# Patient Record
Sex: MALE | Race: ASIAN | Employment: PART TIME | URBAN - METROPOLITAN AREA
[De-identification: names, ages, dates, MRNs, and addresses within clinical notes are randomized per-mention and may not be internally consistent; named-entity substitution may affect disease eponyms.]

---

## 2017-01-19 ENCOUNTER — OFFICE VISIT (OUTPATIENT)
Dept: FAMILY MEDICINE CLINIC | Age: 72
End: 2017-01-19

## 2017-01-19 VITALS — HEIGHT: 63 IN

## 2017-01-20 ENCOUNTER — OFFICE VISIT (OUTPATIENT)
Dept: FAMILY MEDICINE CLINIC | Age: 72
End: 2017-01-20

## 2017-01-20 VITALS
HEART RATE: 73 BPM | HEIGHT: 63 IN | DIASTOLIC BLOOD PRESSURE: 72 MMHG | SYSTOLIC BLOOD PRESSURE: 116 MMHG | TEMPERATURE: 97.5 F | OXYGEN SATURATION: 98 % | BODY MASS INDEX: 26.58 KG/M2 | RESPIRATION RATE: 16 BRPM | WEIGHT: 150 LBS

## 2017-01-20 DIAGNOSIS — M94.0 COSTOCHONDRITIS, ACUTE: ICD-10-CM

## 2017-01-20 DIAGNOSIS — R07.2 PRECORDIAL PAIN: Primary | ICD-10-CM

## 2017-01-20 DIAGNOSIS — I25.10 CORONARY ARTERY DISEASE INVOLVING NATIVE CORONARY ARTERY OF NATIVE HEART WITHOUT ANGINA PECTORIS: Chronic | ICD-10-CM

## 2017-01-20 DIAGNOSIS — J32.2 CHRONIC ETHMOIDAL SINUSITIS: ICD-10-CM

## 2017-01-20 RX ORDER — DICLOFENAC SODIUM 10 MG/G
GEL TOPICAL 4 TIMES DAILY
Qty: 100 G | Refills: 1 | Status: SHIPPED | OUTPATIENT
Start: 2017-01-20 | End: 2017-03-10 | Stop reason: ALTCHOICE

## 2017-01-20 NOTE — PROGRESS NOTES
HISTORY OF PRESENT ILLNESS  Haider Ramesh is a 80 y.o. male. He was seen for sharp left sided chest pain, headache and rt knee pain. Pt has h/o CAD, s/p stent  HPI  Chest Pain  Patient complains of chest pain. Onset was 1 week ago, with unchanged course since that time. The patient admits to chest discomfort that is intermittent, with radiation to none, rated as a scale of 6/10 in intensity that is sharp, precordial in nature. Associated symptoms are none. Aggravating factors are coughing, deep inspiration, cold air or exertion. Alleviating factors are: rest. Patient's cardiac risk factors are dyslipidemia, diabetes mellitus, male gender, hypertension, h/o CAD and s/p stent. Patient's risk factors for DVT/PE: none. Previous cardiac testing includes: Electrocardiogram (EKG), Echocardiogram, Exercise Thallium Stress Test , he has been evaluated by cardiology, almost 2 years back and Echo and stress test were reassuring. Candance Huger URI Review  Akash Martinez returns to clinic today to talk about: congestion, dry cough, headache, bilateral sinus pain, itching in eyes and URI symptoms for several days ago, which are unchanged since that time. He is not compliant with his Singulair and Asmanex  He also reports nasal blockage, sinus congestion and chest congestion. He denies a history of: coryza, sore throat, night sweats, fever, chills, rhinorrhea, wheezing and SOB/DUGGAN. Treatments have included: cough suppressants, nasal steroids, which have been not very effective. Relevant PMH: COPD, Sinusitis and allergic rhinitis. Patient reports sick contacts: no.      Knee Pain  Patient complains of right knee injury and knee swelling. Onset of the symptoms was several months ago. Inciting event: none known. Current symptoms include pain location: right sided: anterior, lateral, severity of pain = fairly severe, swelling: mild, knee gives out, knee locks and popping sensation. Associated symptoms: none.  Aggravating symptoms: going up and down stairs, walking, squatting, rising after sitting. Patient's overall course: gradually worsening and course of pain: gradually worsening Patient has had prior knee problems. Patient denies knee procedure or trauma. Evaluation to date: none. Treatment to date: none. Review of Systems   Constitutional: Negative for chills, fever and malaise/fatigue. HENT: Negative for congestion, ear pain, sore throat and tinnitus. Eyes: Negative for blurred vision, double vision, pain and discharge. Respiratory: Positive for cough. Negative for shortness of breath and wheezing. Cardiovascular: Positive for chest pain. Negative for palpitations and leg swelling. Gastrointestinal: Negative for abdominal pain, blood in stool, constipation, diarrhea, nausea and vomiting. Genitourinary: Negative for dysuria, frequency, hematuria and urgency. Musculoskeletal: Negative for back pain, joint pain and myalgias. Rt knee pain   Skin: Negative for rash. Neurological: Positive for headaches. Negative for dizziness, tremors and seizures. Endo/Heme/Allergies: Negative for polydipsia. Does not bruise/bleed easily. Psychiatric/Behavioral: Negative for depression and substance abuse. The patient is not nervous/anxious. Physical Exam   Constitutional: He is oriented to person, place, and time. He appears well-developed and well-nourished. No distress. HENT:   Head: Normocephalic and atraumatic. Right Ear: Tympanic membrane and external ear normal. No drainage. Tympanic membrane is not injected. No middle ear effusion. No decreased hearing is noted. Left Ear: Tympanic membrane normal. No drainage. Tympanic membrane is not injected. No middle ear effusion. No decreased hearing is noted. Nose: Mucosal edema present. No rhinorrhea. Right sinus exhibits maxillary sinus tenderness and frontal sinus tenderness. Left sinus exhibits maxillary sinus tenderness and frontal sinus tenderness.    Mouth/Throat: Uvula is midline. Posterior oropharyngeal erythema present. No oropharyngeal exudate. Nasal mucosa congested, edematous   Eyes: Conjunctivae and EOM are normal. Pupils are equal, round, and reactive to light. No scleral icterus. Neck: Normal range of motion. Neck supple. No JVD present. No thyromegaly present. Cardiovascular: Normal rate, regular rhythm, normal heart sounds and intact distal pulses. No murmur heard. Pulmonary/Chest: Effort normal and breath sounds normal. He has no wheezes. He has no rales. Abdominal: Soft. Bowel sounds are normal. He exhibits no distension and no mass. Musculoskeletal: Normal range of motion. He exhibits no edema. Right knee: He exhibits swelling. He exhibits normal range of motion, no effusion, no deformity and no erythema. Tenderness found. Lateral joint line and patellar tendon tenderness noted. Lymphadenopathy:        Head (right side): No tonsillar adenopathy present. Head (left side): No tonsillar adenopathy present. He has no cervical adenopathy. Neurological: He is alert and oriented to person, place, and time. He has normal reflexes. No cranial nerve deficit. Skin: Skin is warm and dry. No rash noted. He is not diaphoretic. Psychiatric: He has a normal mood and affect. Nursing note and vitals reviewed. ASSESSMENT and PLAN  Jessenia Madera was seen today for chest pain, leg pain and other. Diagnoses and all orders for this visit:    Precordial pain  -     AMB POC EKG ROUTINE W/ 12 LEADS, INTER & REP  -     diclofenac (VOLTAREN) 1 % gel; Apply  to affected area four (4) times daily. Coronary artery disease involving native coronary artery of native heart without angina pectoris  -     AMB POC EKG ROUTINE W/ 12 LEADS, INTER & REP  -     diclofenac (VOLTAREN) 1 % gel;  Apply  to affected area four (4) times daily.  -     REFERRAL TO CARDIOLOGY    Costochondritis, acute    Chronic ethmoidal sinusitis    EKG reassuring, pain likely from costochondritis vs strained intercostal muscle from coughing  As he is on Plavix, no NSAID. Advised for Tylenol extra strength and topical Diclofenac  Knee exercise demonstrated and advised for Tylenol  Headache likely from sinusitis and allergies. Advised for medication compliance  Discussed lifestyle issues and health guidance given  Patient was given an after visit summary which includes diagnoses, vital signs, current medications, instructions and references & authorized prescriptions . Pt verbalized instructions I provided and expressed understanding of discussion that was held today.   Follow-up Disposition: Not on File

## 2017-01-20 NOTE — MR AVS SNAPSHOT
Visit Information Date & Time Provider Department Dept. Phone Encounter #  
 1/20/2017  3:30 PM Caden Rosales Ana [id number] Upcoming Health Maintenance Date Due  
 EYE EXAM RETINAL OR DILATED Q1 9/4/1956 DTaP/Tdap/Td series (1 - Tdap) 9/4/1967 FOBT Q 1 YEAR AGE 50-75 9/4/1996 ZOSTER VACCINE AGE 60> 9/4/2006 GLAUCOMA SCREENING Q2Y 9/4/2011 MEDICARE YEARLY EXAM 1/28/2017 HEMOGLOBIN A1C Q6M 5/12/2017 FOOT EXAM Q1 11/10/2017 MICROALBUMIN Q1 11/12/2017 LIPID PANEL Q1 11/12/2017 Allergies as of 1/20/2017  Review Complete On: 1/20/2017 By: Harmony Ayers MD  
 No Known Allergies Current Immunizations  Reviewed on 11/29/2016 Name Date Pneumococcal Conjugate (PCV-13) 10/1/2016 Pneumococcal Vaccine (Unspecified Type) 7/20/2012 10:35 AM  
  
 Not reviewed this visit You Were Diagnosed With   
  
 Codes Comments Precordial pain    -  Primary ICD-10-CM: R07.2 ICD-9-CM: 786.51 Coronary artery disease involving native coronary artery of native heart without angina pectoris     ICD-10-CM: I25.10 ICD-9-CM: 414.01 Costochondritis, acute     ICD-10-CM: M94.0 ICD-9-CM: 733.6 Chronic ethmoidal sinusitis     ICD-10-CM: J32.2 ICD-9-CM: 473.2 Vitals BP Pulse Temp Resp Height(growth percentile) Weight(growth percentile) 116/72 (BP 1 Location: Right arm, BP Patient Position: Sitting) 73 97.5 °F (36.4 °C) (Oral) 16 5' 3\" (1.6 m) 150 lb (68 kg) SpO2 BMI Smoking Status 98% 26.57 kg/m2 Never Smoker Vitals History BMI and BSA Data Body Mass Index Body Surface Area  
 26.57 kg/m 2 1.74 m 2 Preferred Pharmacy Pharmacy Name Phone [facility] [address] [place] [address] [phone] Your Updated Medication List  
  
   
This list is accurate as of: 1/20/17  4:57 PM.  Always use your most recent med list.  
  
  
  
  
 albuterol 90 mcg/actuation inhaler Commonly known as:  PROVENTIL HFA Take 2 Puffs by inhalation every four (4) hours as needed for Wheezing. carvedilol 6.25 mg tablet Commonly known as:  COREG  
TAKE ONE TABLET BY MOUTH TWICE DAILY WITH FOOD  
  
 clopidogrel 75 mg Tab Commonly known as:  PLAVIX TAKE ONE TABLET BY MOUTH ONCE DAILY  
  
 diclofenac 1 % Gel Commonly known as:  VOLTAREN Apply  to affected area four (4) times daily. fluticasone 50 mcg/actuation nasal spray Commonly known as:  Radha Shames One spray each nostril  
  
 guaiFENesin-codeine 100-10 mg/5 mL solution Commonly known as:  ROBITUSSIN AC Take 5 mL by mouth three (3) times daily as needed for Cough. Max Daily Amount: 15 mL. losartan-hydroCHLOROthiazide 50-12.5 mg per tablet Commonly known as:  HYZAAR Take 1 Tab by mouth daily. Indications: HYPERTENSION  
  
 metFORMIN 500 mg tablet Commonly known as:  GLUCOPHAGE  
TAKE ONE TABLET BY MOUTH TWICE DAILY WITH MEALS  
  
 montelukast 10 mg tablet Commonly known as:  SINGULAIR Take 1 Tab by mouth daily. nitroglycerin 0.4 mg SL tablet Commonly known as:  NITROSTAT  
1 Tab by SubLINGual route every five (5) minutes as needed for Chest Pain. Please provide Generic Omega-3-DHA-EPA-Fish Oil 1,200 (144-216) mg Cap Take 1 tablet by mouth daily (after lunch). pravastatin 20 mg tablet Commonly known as:  PRAVACHOL Take 1 Tab by mouth nightly. Prescriptions Sent to Pharmacy Refills  
 diclofenac (VOLTAREN) 1 % gel 1 Sig: Apply  to affected area four (4) times daily. Class: Normal  
 Pharmacy: [address] Ph #: [phone] Route: Topical  
  
We Performed the Following AMB POC EKG ROUTINE W/ 12 LEADS, INTER & REP [81582 CPT(R)] REFERRAL TO CARDIOLOGY [ADB31 Custom] Comments:  
 Please evaluate patient for f/up on CAD. Referral Information Referral ID Referred By Referred To  
  
 [id number] MD Jeanna Lindsey [address] Phone: [phone] Fax: [phone] Visits Status Start Date End Date 1 New Request 1/20/17 1/20/18 If your referral has a status of pending review or denied, additional information will be sent to support the outcome of this decision. Patient Instructions Costochondritis: Care Instructions Your Care Instructions You have chest pain because the cartilage of your rib cage is inflamed. This problem is called costochondritis. This type of chest wall pain may last from days to weeks. It is not a heart problem. Sometimes costochondritis occurs with a cold or the flu, and other times the exact cause is not known. Follow-up care is a key part of your treatment and safety. Be sure to make and go to all appointments, and call your doctor if you are having problems. Its also a good idea to know your test results and keep a list of the medicines you take. How can you care for yourself at home? · Take medicines for pain and inflammation exactly as directed. ¨ If the doctor gave you a prescription medicine, take it as prescribed. ¨ If you are not taking a prescription pain medicine, ask your doctor if you can take an over-the-counter medicine. ¨ Do not take two or more pain medicines at the same time unless the doctor told you to. Many pain medicines have acetaminophen, which is Tylenol. Too much acetaminophen (Tylenol) can be harmful. · It may help to use a warm compress or heating pad (set on low) on your chest. You can also try alternating heat and ice. Put ice or a cold pack on the area for 10 to 20 minutes at a time. Put a thin cloth between the ice and your skin. · Avoid any activity that strains the chest area. As your pain gets better, you can slowly return to your normal activities. · Do not use tape, an elastic bandage, a \"rib belt,\" or anything else that restricts your chest wall motion. When should you call for help? Call 911 anytime you think you may need emergency care. For example, call if: 
· You have new or different chest pain or pressure. This may occur with: ¨ Sweating. ¨ Shortness of breath. ¨ Nausea or vomiting. ¨ Pain that spreads from the chest to the neck, jaw, or one or both shoulders or arms. ¨ Dizziness or lightheadedness. ¨ A fast or uneven pulse. After calling 911, chew 1 adult-strength aspirin. Wait for an ambulance. Do not try to drive yourself. · You have severe trouble breathing. Call your doctor now or seek immediate medical care if: 
· You have a fever or cough. · You have any trouble breathing. · Your chest pain gets worse. Watch closely for changes in your health, and be sure to contact your doctor if: 
· Your chest pain continues even though you are taking anti-inflammatory medicine. · Your chest wall pain has not improved after 5 to 7 days. Where can you learn more? Go to http://lv-shelbi.info/. Enter R569 in the search box to learn more about \"Costochondritis: Care Instructions. \" Current as of: May 27, 2016 Content Version: 11.1 © 6286-0636 [employer]. Care instructions adapted under license by [employer] (which disclaims liability or warranty for this information). If you have questions about a medical condition or this instruction, always ask your healthcare professional. [place] [address] any warranty or liability for your use of this information. Introducing [facility]! Immanuel Henderson introduces Playdate App patient portal. Now you can access parts of your medical record, email your doctor's office, and request medication refills online. 1. In your internet browser, go to [email or web address] 2. Click on the First Time User? Click Here link in the Sign In box. You will see the New Member Sign Up page. 3. Enter your Dctio Access Code exactly as it appears below. You will not need to use this code after youve completed the sign-up process. If you do not sign up before the expiration date, you must request a new code. · Dctio Access Code: [email or web address] Expires: 4/19/2017  3:16 PM 
 
4. Enter the last four digits of your Social Security Number (xxxx) and Date of Birth (mm/dd/yyyy) as indicated and click Submit. You will be taken to the next sign-up page. 5. Create a Dctio ID. This will be your Dctio login ID and cannot be changed, so think of one that is secure and easy to remember. 6. Create a Dctio password. You can change your password at any time. 7. Enter your Password Reset Question and Answer. This can be used at a later time if you forget your password. 8. Enter your e-mail address. You will receive e-mail notification when new information is available in [address] 9. Click Sign Up. You can now view and download portions of your medical record. 10. Click the Download Summary menu link to download a portable copy of your medical information. If you have questions, please visit the Frequently Asked Questions section of the Dctio website. Remember, Dctio is NOT to be used for urgent needs. For medical emergencies, dial 911. Now available from your iPhone and Android! Please provide this summary of care documentation to your next provider. Your primary care clinician is listed as Sumit Donald. If you have any questions after today's visit, please call [phone].

## 2017-01-20 NOTE — PROGRESS NOTES
Chief Complaint   Patient presents with    Chest Pain     throbbing sensation    Leg Pain     left side with electricial sensation     Other     Head fullness     1. Have you been to the ER, urgent care clinic since your last visit? Hospitalized since your last visit? No    2. Have you seen or consulted any other health care providers outside of the [facility] since your last visit? Include any pap smears or colon screening.    No

## 2017-01-20 NOTE — PATIENT INSTRUCTIONS
Costochondritis: Care Instructions  Your Care Instructions  You have chest pain because the cartilage of your rib cage is inflamed. This problem is called costochondritis. This type of chest wall pain may last from days to weeks. It is not a heart problem. Sometimes costochondritis occurs with a cold or the flu, and other times the exact cause is not known. Follow-up care is a key part of your treatment and safety. Be sure to make and go to all appointments, and call your doctor if you are having problems. Its also a good idea to know your test results and keep a list of the medicines you take. How can you care for yourself at home? · Take medicines for pain and inflammation exactly as directed. ¨ If the doctor gave you a prescription medicine, take it as prescribed. ¨ If you are not taking a prescription pain medicine, ask your doctor if you can take an over-the-counter medicine. ¨ Do not take two or more pain medicines at the same time unless the doctor told you to. Many pain medicines have acetaminophen, which is Tylenol. Too much acetaminophen (Tylenol) can be harmful. · It may help to use a warm compress or heating pad (set on low) on your chest. You can also try alternating heat and ice. Put ice or a cold pack on the area for 10 to 20 minutes at a time. Put a thin cloth between the ice and your skin. · Avoid any activity that strains the chest area. As your pain gets better, you can slowly return to your normal activities. · Do not use tape, an elastic bandage, a \"rib belt,\" or anything else that restricts your chest wall motion. When should you call for help? Call 911 anytime you think you may need emergency care. For example, call if:  · You have new or different chest pain or pressure. This may occur with:  ¨ Sweating. ¨ Shortness of breath. ¨ Nausea or vomiting. ¨ Pain that spreads from the chest to the neck, jaw, or one or both shoulders or arms. ¨ Dizziness or lightheadedness.   ¨ A fast or uneven pulse. After calling 911, chew 1 adult-strength aspirin. Wait for an ambulance. Do not try to drive yourself. · You have severe trouble breathing. Call your doctor now or seek immediate medical care if:  · You have a fever or cough. · You have any trouble breathing. · Your chest pain gets worse. Watch closely for changes in your health, and be sure to contact your doctor if:  · Your chest pain continues even though you are taking anti-inflammatory medicine. · Your chest wall pain has not improved after 5 to 7 days. Where can you learn more? Go to http://lv-shelbi.info/. Enter F759 in the search box to learn more about \"Costochondritis: Care Instructions. \"  Current as of: May 27, 2016  Content Version: 11.1  © 2181-6038 [employer]. Care instructions adapted under license by [employer] (which disclaims liability or warranty for this information). If you have questions about a medical condition or this instruction, always ask your healthcare professional. [address] any warranty or liability for your use of this information.

## 2017-02-04 DIAGNOSIS — I25.10 CORONARY ARTERY DISEASE INVOLVING NATIVE CORONARY ARTERY OF NATIVE HEART WITHOUT ANGINA PECTORIS: Chronic | ICD-10-CM

## 2017-02-04 RX ORDER — CLOPIDOGREL BISULFATE 75 MG/1
TABLET ORAL
Qty: 90 TAB | Refills: 0 | Status: SHIPPED | COMMUNITY
Start: 2017-02-04 | End: 2017-03-10 | Stop reason: SDUPTHER

## 2017-02-24 ENCOUNTER — TELEPHONE (OUTPATIENT)
Dept: FAMILY MEDICINE CLINIC | Age: 72
End: 2017-02-24

## 2017-02-24 NOTE — TELEPHONE ENCOUNTER
Diclofenac is topical antiinflammatory for costochondritis, can not replace with Flurouracil or Imiquimod

## 2017-02-24 NOTE — TELEPHONE ENCOUNTER
Chief Complaint   Patient presents with    Request For New Medication     Plan prefers Fluorouracil, Imiquimod. Diclofenac 1% gel is not covered.

## 2017-03-09 DIAGNOSIS — I25.10 CORONARY ARTERY DISEASE INVOLVING NATIVE CORONARY ARTERY OF NATIVE HEART WITHOUT ANGINA PECTORIS: Chronic | ICD-10-CM

## 2017-03-09 NOTE — TELEPHONE ENCOUNTER
Patient called and said he needed refills on \"Carvedilol 6.25 mg, and Clopidogrel 75mg. \", sent to [facility] on [facility]. (he called the pharmacy and he said they told him to call his doctor's office) Thanks

## 2017-03-10 RX ORDER — CARVEDILOL 6.25 MG/1
TABLET ORAL
Qty: 180 TAB | Refills: 1 | Status: SHIPPED | OUTPATIENT
Start: 2017-03-10 | End: 2017-08-31 | Stop reason: SDUPTHER

## 2017-03-10 RX ORDER — CLOPIDOGREL BISULFATE 75 MG/1
75 TABLET ORAL DAILY
Qty: 90 TAB | Refills: 1 | Status: SHIPPED | OUTPATIENT
Start: 2017-03-10 | End: 2017-06-05 | Stop reason: SDUPTHER

## 2017-03-10 NOTE — TELEPHONE ENCOUNTER
Patient informed that prescriptions as written by Dr. Cuauhtemoc Kumar, Plavix and Carvedilol has been sent to pharmacy.

## 2017-03-24 RX ORDER — NITROGLYCERIN 0.4 MG/1
0.4 TABLET SUBLINGUAL
Qty: 30 TAB | Refills: 2 | Status: SHIPPED | OUTPATIENT
Start: 2017-03-24 | End: 2017-06-29 | Stop reason: SDUPTHER

## 2017-03-24 NOTE — TELEPHONE ENCOUNTER
Chief Complaint   Patient presents with    Medication Refill     Nitroglycerin     Patient called requesting refill on medication.

## 2017-05-04 DIAGNOSIS — I10 ESSENTIAL HYPERTENSION WITH GOAL BLOOD PRESSURE LESS THAN 130/85: ICD-10-CM

## 2017-05-04 RX ORDER — LOSARTAN POTASSIUM AND HYDROCHLOROTHIAZIDE 12.5; 5 MG/1; MG/1
TABLET ORAL
Qty: 90 TAB | Refills: 1 | Status: SHIPPED | OUTPATIENT
Start: 2017-05-04 | End: 2017-06-05 | Stop reason: SDUPTHER

## 2017-06-05 DIAGNOSIS — I25.10 CORONARY ARTERY DISEASE INVOLVING NATIVE CORONARY ARTERY OF NATIVE HEART WITHOUT ANGINA PECTORIS: Chronic | ICD-10-CM

## 2017-06-05 DIAGNOSIS — I10 ESSENTIAL HYPERTENSION WITH GOAL BLOOD PRESSURE LESS THAN 130/85: ICD-10-CM

## 2017-06-05 NOTE — TELEPHONE ENCOUNTER
Marcos Orta  [phone]    Patient is requesting refills of Losartan and Plavix. He states that he is out of his medications. Pharmacy verified. [facility]

## 2017-06-06 RX ORDER — LOSARTAN POTASSIUM AND HYDROCHLOROTHIAZIDE 12.5; 5 MG/1; MG/1
TABLET ORAL
Qty: 90 TAB | Refills: 0 | Status: SHIPPED | OUTPATIENT
Start: 2017-06-06 | End: 2018-02-26 | Stop reason: ALTCHOICE

## 2017-06-06 RX ORDER — CLOPIDOGREL BISULFATE 75 MG/1
75 TABLET ORAL DAILY
Qty: 90 TAB | Refills: 0 | Status: SHIPPED | OUTPATIENT
Start: 2017-06-06 | End: 2018-05-16 | Stop reason: SDUPTHER

## 2017-06-07 DIAGNOSIS — E78.5 HYPERLIPIDEMIA LDL GOAL <100: ICD-10-CM

## 2017-06-07 DIAGNOSIS — I25.10 CORONARY ARTERY DISEASE INVOLVING NATIVE CORONARY ARTERY OF NATIVE HEART WITHOUT ANGINA PECTORIS: Chronic | ICD-10-CM

## 2017-06-07 RX ORDER — ROSUVASTATIN CALCIUM 10 MG/1
10 TABLET, COATED ORAL
Qty: 30 TAB | Refills: 5 | Status: CANCELLED | OUTPATIENT
Start: 2017-06-07

## 2017-06-07 NOTE — TELEPHONE ENCOUNTER
Received faxed refill request for this medication from the pharmacy that is on file.     rosuvastatin (CRESTOR) 10 mg tablet  Take 1 Tab by mouth nightly., Normal, Disp-30 Tab, R-5

## 2017-06-19 RX ORDER — ROSUVASTATIN CALCIUM 10 MG/1
10 TABLET, COATED ORAL
Qty: 30 TAB | Refills: 5 | Status: CANCELLED | OUTPATIENT
Start: 2017-06-19

## 2017-06-19 RX ORDER — ROSUVASTATIN CALCIUM 10 MG/1
10 TABLET, COATED ORAL
Qty: 30 TAB | Refills: 5 | Status: SHIPPED | OUTPATIENT
Start: 2017-06-19 | End: 2017-06-20

## 2017-06-19 NOTE — TELEPHONE ENCOUNTER
[facility]  [phone]    Patient is requesting a 90 day refill of Crestor. Pharmacy verified. 72 hour rule given, however, the patient states that he has been out of his medication for 3 days.

## 2017-06-19 NOTE — TELEPHONE ENCOUNTER
Spoke to patient. Verified, he is taking Crestor that he has brought from [facility].  Will send Rx

## 2017-06-20 RX ORDER — PRAVASTATIN SODIUM 20 MG/1
20 TABLET ORAL
Qty: 90 TAB | Refills: 1 | Status: SHIPPED | OUTPATIENT
Start: 2017-06-20 | End: 2017-10-03 | Stop reason: ALTCHOICE

## 2017-06-20 RX ORDER — PRAVASTATIN SODIUM 20 MG/1
20 TABLET ORAL
Qty: 90 TAB | Refills: 1 | Status: SHIPPED | OUTPATIENT
Start: 2017-06-20 | End: 2017-06-29 | Stop reason: SDUPTHER

## 2017-06-20 NOTE — TELEPHONE ENCOUNTER
Bailey Temple  [phone]    Mr. Ana Paula Emery states that he needs a refill of Crestor. He states that he was taking Pravastatin here but then went to [place] and began taking Crestor in [place]. Patient states that his co-pay for Crestor is too expensive in the [address] and he needs an alternative. *note - Patient states that he has talked to Dr. Avila Luu about this in the past and Dr. Avila Luu agreed to refill the Crestor with an alternative. Patient was very unfriendly to the [facility]. Pharmacy verified.

## 2017-06-29 ENCOUNTER — OFFICE VISIT (OUTPATIENT)
Dept: FAMILY MEDICINE CLINIC | Age: 72
End: 2017-06-29

## 2017-06-29 VITALS
TEMPERATURE: 98.3 F | OXYGEN SATURATION: 95 % | HEART RATE: 75 BPM | SYSTOLIC BLOOD PRESSURE: 114 MMHG | RESPIRATION RATE: 16 BRPM | BODY MASS INDEX: 27.29 KG/M2 | HEIGHT: 63 IN | WEIGHT: 154 LBS | DIASTOLIC BLOOD PRESSURE: 79 MMHG

## 2017-06-29 DIAGNOSIS — M17.11 ARTHRITIS OF KNEE, RIGHT: ICD-10-CM

## 2017-06-29 DIAGNOSIS — E11.9 CONTROLLED TYPE 2 DIABETES MELLITUS WITHOUT COMPLICATION, WITHOUT LONG-TERM CURRENT USE OF INSULIN (HCC): Primary | ICD-10-CM

## 2017-06-29 DIAGNOSIS — I25.10 CORONARY ARTERY DISEASE INVOLVING NATIVE CORONARY ARTERY OF NATIVE HEART WITHOUT ANGINA PECTORIS: Chronic | ICD-10-CM

## 2017-06-29 DIAGNOSIS — E78.5 HYPERLIPIDEMIA LDL GOAL <100: ICD-10-CM

## 2017-06-29 DIAGNOSIS — I10 ESSENTIAL HYPERTENSION: ICD-10-CM

## 2017-06-29 RX ORDER — NITROGLYCERIN 0.4 MG/1
0.4 TABLET SUBLINGUAL
Qty: 30 TAB | Refills: 2 | Status: SHIPPED | OUTPATIENT
Start: 2017-06-29 | End: 2018-07-07 | Stop reason: SDUPTHER

## 2017-06-29 RX ORDER — ROSUVASTATIN CALCIUM 10 MG/1
TABLET, COATED ORAL
COMMUNITY
Start: 2017-06-19 | End: 2017-09-06 | Stop reason: SDUPTHER

## 2017-06-29 RX ORDER — MELOXICAM 15 MG/1
15 TABLET ORAL
Qty: 30 TAB | Refills: 0 | Status: SHIPPED | OUTPATIENT
Start: 2017-06-29 | End: 2017-08-17 | Stop reason: SDUPTHER

## 2017-06-29 NOTE — PROGRESS NOTES
HISTORY OF PRESENT ILLNESS  Sylvia Joy is a 80 y.o. male. he was seen for follow up on diabetes, HTN, dyslipidemia, CAD, new concern of rt knee pain. HPI  Cardiovascular Review  The patient has hypertension, hyperlipidemia, coronary artery disease and status post coronary artery stenting. He reports taking medications as instructed, no medication side effects noted, no TIA's, no chest pain on exertion, no dyspnea on exertion, no swelling of ankles, no orthostatic dizziness or lightheadedness, no orthopnea or paroxysmal nocturnal dyspnea, no palpitations, no intermittent claudication symptoms, no muscle aches or pain. Diet and Lifestyle: generally follows a low fat low cholesterol diet, generally follows a low sodium diet, does not rigorously follow a diabetic diet, exercises regularly, nonsmoker. Lab review: labs reviewed and discussed with patient. He had normal Lexican stress test and Echo in 09/2014 and normal EKG in 01/2016  Medicines: Carvedilol 6.25 mg BID, Plavix, Pravastatin 20 mg,( crestor 10 mg sometimes) Losartan/Hctz 50/12.5 mg    Lab Results   Component Value Date/Time    Cholesterol, total 116 11/12/2016 08:49 AM    HDL Cholesterol 49 11/12/2016 08:49 AM    LDL, calculated 51 11/12/2016 08:49 AM    VLDL, calculated 16 11/12/2016 08:49 AM    Triglyceride 79 11/12/2016 08:49 AM       Endocrine Review  He is seen for diabetes. Since last visit: no change. Home glucose monitoring: is performed sporadically, nonfasting values range 100-140. He reports medication compliance: noncompliant some of the time. Medication side effects: none. Diabetic diet compliance: noncompliant some of the time. Further diabetic ROS: no polyuria or polydipsia, no chest pain, dyspnea or TIA's, no numbness, tingling or pain in extremities, no unusual visual symptoms, no hypoglycemia. Lab review: labs reviewed and discussed with patient.   Eye exam: due.  ,   Medications: Metformin  500 mg BID  on ACEI/ARBS :yes  On ASA, no as he is on Plavix  Podiatry visit: not done  Lab Results   Component Value Date/Time    Hemoglobin A1c 6.4 11/12/2016 08:49 AM        COPD  Patient complains of cough and shortness of breath. Symptoms began several years ago. Symptoms chronic dyspnea: severity = mild: course of sx: well controlled  able walk 5 blocks  cough: severity: moderate: sputum : clear: light  symptoms worse with exertion. does worsen with exertion. Sputum is clear in small amounts. Fever has been suspected fevers but not measured at home. Patient uses 1 pillows at night. Patient can walk 1 mile  before resting. Patient currently is not on home oxygen therapy. Sami Grant Respiratory history: frequent episodes of bronchitis and COPD  He is on scheduled Qvar, Singulair and prn albuterol, but non compliant with treatment. Knee Pain  Patient complains of right knee pain and knee swelling. Onset of the symptoms was several months ago. Inciting event: none known. Current symptoms include pain location: right sided: anterior, medial, severity of pain = fairly severe and swelling: moderate. Associated symptoms: knee giving out, knee locking, crepitus sensation and popping sensation. Aggravating symptoms: any weight bearing. Patient's overall course: gradually worsening and course of pain: gradually worsening Patient has had prior knee problems. Patient denies injury. Evaluation to date: plain films: abnormal: arthritis, Ortho consult: years back. Treatment to date: OTC analgesics: somewhat effective  Cortisone shot years back. Review of Systems   Constitutional: Negative for chills, fever and malaise/fatigue. HENT: Negative for congestion, ear pain, sore throat and tinnitus. Eyes: Negative for blurred vision, double vision, pain and discharge. Respiratory: Negative for cough, shortness of breath and wheezing. Cardiovascular: Negative for chest pain, palpitations and leg swelling.    Gastrointestinal: Negative for abdominal pain, blood in stool, constipation, diarrhea, nausea and vomiting. Genitourinary: Negative for dysuria, frequency, hematuria and urgency. Musculoskeletal: Negative for back pain, joint pain and myalgias. Knee pain   Skin: Negative for rash. Neurological: Negative for dizziness, tremors, seizures and headaches. Endo/Heme/Allergies: Negative for polydipsia. Does not bruise/bleed easily. Psychiatric/Behavioral: Negative for depression and substance abuse. The patient is not nervous/anxious. Physical Exam   Constitutional: He is oriented to person, place, and time. He appears well-developed and well-nourished. HENT:   Head: Normocephalic and atraumatic. Right Ear: External ear normal.   Mouth/Throat: Oropharynx is clear and moist. No oropharyngeal exudate. Eyes: Conjunctivae and EOM are normal. Pupils are equal, round, and reactive to light. No scleral icterus. Neck: Normal range of motion. Neck supple. No JVD present. No thyromegaly present. Cardiovascular: Normal rate, regular rhythm, normal heart sounds and intact distal pulses. No murmur heard. Pulmonary/Chest: Effort normal and breath sounds normal. He has no wheezes. Abdominal: Soft. Bowel sounds are normal. He exhibits no distension and no mass. Musculoskeletal: He exhibits no edema. Right knee: He exhibits decreased range of motion and swelling. Tenderness found. Medial joint line and lateral joint line tenderness noted. Feet:warm, good capillary refill, no trophic changes or ulcerative lesions, normal DP and PT pulses, normal monofilament exam and normal sensory exam     Lymphadenopathy:     He has no cervical adenopathy. Neurological: He is alert and oriented to person, place, and time. He has normal reflexes. No cranial nerve deficit. Skin: Skin is warm and dry. No rash noted. He is not diaphoretic. Psychiatric: He has a normal mood and affect. Nursing note and vitals reviewed.       ASSESSMENT and PLAN  Jonathan Escobar was seen today for knee pain, ear pain and diabetes. Diagnoses and all orders for this visit:    Controlled type 2 diabetes mellitus without complication, without long-term current use of insulin ([facility] [address]  -     REFERRAL TO OPHTHALMOLOGY  -      DIABETES FOOT EXAM  -     METABOLIC PANEL, COMPREHENSIVE  -     HEMOGLOBIN A1C WITH EAG  -     MICROALBUMIN, UR, RAND W/ MICROALBUMIN/CREA RATIO    Essential hypertension  -     METABOLIC PANEL, COMPREHENSIVE    Hyperlipidemia LDL goal <773  -     METABOLIC PANEL, COMPREHENSIVE  -     LIPID PANEL    Coronary artery disease involving native coronary artery of native heart without angina pectoris  -     CBC WITH AUTOMATED DIFF  -     METABOLIC PANEL, COMPREHENSIVE    Arthritis of knee, right  -     meloxicam (MOBIC) 15 mg tablet; Take 1 Tab by mouth nightly. -     Cancel: XR KNEE RT MAX 2 VWS; Future    Other orders  -     nitroglycerin (NITROSTAT) 0.4 mg SL tablet; 1 Tab by SubLINGual route every five (5) minutes as needed for Chest Pain (up to 3 doses). 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 Results of labs will be conveyed to patient, once available. Pt verbalized instructions I provided and expressed understanding of discussion that was held today. Follow-up Disposition:  Return in about 4 months (around 10/29/2017) for fasting, physical, medicare wellness.

## 2017-06-29 NOTE — PROGRESS NOTES
Chief Complaint   Patient presents with    Knee Pain     right    Ear Pain     left ear, nodule      1. Have you been to the ER, urgent care clinic since your last visit? Hospitalized since your last visit? No    2. Have you seen or consulted any other health care providers outside of the [address] [place] [address] since your last visit? Include any pap smears or colon screening.  No

## 2017-06-29 NOTE — MR AVS SNAPSHOT
Visit Information Date & Time Provider Department Dept. Phone Encounter #  
 6/29/2017  5:00 PM Mary Vickers MD [address] [place] [address] [phone] [id number] Follow-up Instructions Return in about 4 months (around 10/29/2017) for fasting, physical, medicare wellness. Upcoming Health Maintenance Date Due  
 EYE EXAM RETINAL OR DILATED Q1 9/4/1956 DTaP/Tdap/Td series (1 - Tdap) 9/4/1967 ZOSTER VACCINE AGE 60> 9/4/2006 GLAUCOMA SCREENING Q2Y 9/4/2011 MEDICARE YEARLY EXAM 1/28/2017 HEMOGLOBIN A1C Q6M 5/12/2017 INFLUENZA AGE 9 TO ADULT 8/1/2017 FOOT EXAM Q1 11/10/2017 MICROALBUMIN Q1 11/12/2017 LIPID PANEL Q1 11/12/2017 COLONOSCOPY 6/29/2027 Allergies as of 6/29/2017  Review Complete On: 6/29/2017 By: Aaron Kohli LPN No Known Allergies Current Immunizations  Reviewed on 11/29/2016 Name Date Pneumococcal Conjugate (PCV-13) 10/1/2016 Pneumococcal Vaccine (Unspecified Type) 7/20/2012 10:35 AM  
  
 Not reviewed this visit You Were Diagnosed With   
  
 Codes Comments Controlled type 2 diabetes mellitus without complication, without long-term current use of insulin [facility]    -  Primary ICD-10-CM: E11.9 ICD-9-CM: 250.00 Essential hypertension     ICD-10-CM: I10 
ICD-9-CM: 401.9 Hyperlipidemia LDL goal <100     ICD-10-CM: E78.5 ICD-9-CM: 272.4 Coronary artery disease involving native coronary artery of native heart without angina pectoris     ICD-10-CM: I25.10 ICD-9-CM: 414.01 Arthritis of knee, right     ICD-10-CM: M17.11 ICD-9-CM: 716.96 Vitals BP Pulse Temp Resp Height(growth percentile) Weight(growth percentile) 114/79 (BP 1 Location: Left arm, BP Patient Position: Sitting) 75 98.3 °F (36.8 °C) (Oral) 16 5' 3\" (1.6 m) 154 lb (69.9 kg) SpO2 BMI Smoking Status 95% 27.28 kg/m2 Never Smoker Vitals History BMI and BSA Data Body Mass Index Body Surface Area 27.28 kg/m 2 1.76 m 2 Preferred Pharmacy Pharmacy Name Phone [id number]- [place] - [address] AT [address] [place] [address] [phone] Your Updated Medication List  
  
   
This list is accurate as of: 17  6:04 PM.  Always use your most recent med list.  
  
  
  
  
 carvedilol 6.25 mg tablet Commonly known as:  COREG  
TAKE ONE TABLET BY MOUTH TWICE DAILY WITH FOOD  
  
 clopidogrel 75 mg Tab Commonly known as:  PLAVIX Take 1 Tab by mouth daily. fluticasone 50 mcg/actuation nasal spray Commonly known as:  Joyce Rad One spray each nostril  
  
 losartan-hydroCHLOROthiazide 50-12.5 mg per tablet Commonly known as:  HYZAAR  
TAKE 1 TABLET BY MOUTH DAILY. INDICATIONS: HYPERTENSION  
  
 meloxicam 15 mg tablet Commonly known as:  MOBIC Take 1 Tab by mouth nightly. metFORMIN 500 mg tablet Commonly known as:  GLUCOPHAGE  
TAKE ONE TABLET BY MOUTH TWICE DAILY WITH MEALS  
  
 montelukast 10 mg tablet Commonly known as:  SINGULAIR Take 1 Tab by mouth daily. nitroglycerin 0.4 mg SL tablet Commonly known as:  NITROSTAT  
1 Tab by SubLINGual route every five (5) minutes as needed for Chest Pain (up to 3 doses). Please provide Generic  
  
 pravastatin 20 mg tablet Commonly known as:  PRAVACHOL Take 1 Tab by mouth nightly. rosuvastatin 10 mg tablet Commonly known as:  CRESTOR Prescriptions Sent to Pharmacy Refills  
 nitroglycerin (NITROSTAT) 0.4 mg SL tablet 2 Si Tab by SubLINGual route every five (5) minutes as needed for Chest Pain (up to 3 doses). Please provide Generic Class: Normal  
 Pharmacy: [place], [address]  #: [phone] Route: SubLINGual  
 meloxicam (MOBIC) 15 mg tablet 0 Sig: Take 1 Tab by mouth nightly.   
 Class: Normal  
 Pharmacy: [address] [place] [address] [address] [facility] #: [phone] Route: Oral  
  
We Performed the Following CBC WITH AUTOMATED DIFF [19782 CPT(R)] HEMOGLOBIN A1C WITH EAG [65304 CPT(R)]  DIABETES FOOT EXAM [HM7 Custom] LIPID PANEL [23336 CPT(R)] METABOLIC PANEL, COMPREHENSIVE [71516 CPT(R)] MICROALBUMIN, UR, RAND W/ MICROALBUMIN/CREA RATIO [id number] CPT(R)] REFERRAL TO OPHTHALMOLOGY [REF57 Custom] Comments:  
 Please evaluate patient for diabetic eye exam.  
  
Follow-up Instructions Return in about 4 months (around 10/29/2017) for fasting, physical, medicare wellness. To-Do List   
 06/29/2017 Imaging:  XR KNEE RT MAX 2 VWS Referral Information Referral ID Referred By Referred To  
  
 [id number] Frank Han [address] [place] [address] [phone] 97 [place], [address] [place] [address] Visits Status Start Date End Date 1 New Request 6/29/17 6/29/18 If your referral has a status of pending review or denied, additional information will be sent to support the outcome of this decision. Introducing [facility]! [facility] introduces QBotix patient portal. Now you can access parts of your medical record, email your doctor's office, and request medication refills online. 1. In your internet browser, go to [email or web address] 2. Click on the First Time User? Click Here link in the Sign In box. You will see the New Member Sign Up page. 3. Enter your QBotix Access Code exactly as it appears below. You will not need to use this code after youve completed the sign-up process. If you do not sign up before the expiration date, you must request a new code. · QBotix Access Code: [email or web address] Expires: 9/27/2017  6:04 PM 
 
4. Enter the last four digits of your Social Security Number (xxxx) and Date of Birth (mm/dd/yyyy) as indicated and click Submit. You will be taken to the next sign-up page. 5. Create a Epuls ID. This will be your Epuls login ID and cannot be changed, so think of one that is secure and easy to remember. 6. Create a Epuls password. You can change your password at any time. 7. Enter your Password Reset Question and Answer. This can be used at a later time if you forget your password. 8. Enter your e-mail address. You will receive e-mail notification when new information is available in [address] 9. Click Sign Up. You can now view and download portions of your medical record. 10. Click the Download Summary menu link to download a portable copy of your medical information. If you have questions, please visit the Frequently Asked Questions section of the Epuls website. Remember, Epuls is NOT to be used for urgent needs. For medical emergencies, dial 911. Now available from your iPhone and Android! Please provide this summary of care documentation to your next provider. Your primary care clinician is listed as Norberto Ritchie. If you have any questions after today's visit, please call [phone].

## 2017-06-29 NOTE — LETTER
7/3/2017 12:02 PM 
 
Mr. Marco Chavira 
[place] [address] Dear Marco Costello: 
 
Please find your most recent results below. Resulted Orders CBC WITH AUTOMATED DIFF Result Value Ref Range WBC 6.3 3.4 - 10.8 x10E3/uL  
 RBC 4.42 4.14 - 5.80 x10E6/uL HGB 13.2 12.6 - 17.7 g/dL HCT 39.6 37.5 - 51.0 % MCV 90 79 - 97 fL  
 MCH 29.9 26.6 - 33.0 pg  
 MCHC 33.3 31.5 - 35.7 g/dL  
 RDW 13.6 12.3 - 15.4 % PLATELET 334 207 - 404 x10E3/uL NEUTROPHILS 58 % Lymphocytes 30 % MONOCYTES 8 % EOSINOPHILS 4 % BASOPHILS 0 %  
 ABS. NEUTROPHILS 3.7 1.4 - 7.0 x10E3/uL Abs Lymphocytes 1.9 0.7 - 3.1 x10E3/uL  
 ABS. MONOCYTES 0.5 0.1 - 0.9 x10E3/uL  
 ABS. EOSINOPHILS 0.2 0.0 - 0.4 x10E3/uL  
 ABS. BASOPHILS 0.0 0.0 - 0.2 x10E3/uL IMMATURE GRANULOCYTES 0 %  
 ABS. IMM. GRANS. 0.0 0.0 - 0.1 x10E3/uL Narrative Performed at:  [address]  [id number] : Sheila Heredia MD, Phone:  [phone] METABOLIC PANEL, COMPREHENSIVE Result Value Ref Range Glucose 120 (H) 65 - 99 mg/dL BUN 14 8 - 27 mg/dL Creatinine 0.79 0.76 - 1.27 mg/dL GFR est non-AA 91 >59 mL/min/1.73 GFR est  >59 mL/min/1.73  
 BUN/Creatinine ratio 18 10 - 24 Sodium 138 134 - 144 mmol/L Potassium 4.3 3.5 - 5.2 mmol/L Chloride 101 96 - 106 mmol/L  
 CO2 24 18 - 29 mmol/L Calcium 9.0 8.6 - 10.2 mg/dL Protein, total 6.9 6.0 - 8.5 g/dL Albumin 3.8 3.5 - 4.8 g/dL GLOBULIN, TOTAL 3.1 1.5 - 4.5 g/dL A-G Ratio 1.2 1.2 - 2.2 Bilirubin, total 0.4 0.0 - 1.2 mg/dL Alk. phosphatase 68 39 - 117 IU/L  
 AST (SGOT) 21 0 - 40 IU/L  
 ALT (SGPT) 11 0 - 44 IU/L Narrative Performed at:  [address]  [id number] : Sheila Heredia MD, Phone:  [phone] HEMOGLOBIN A1C WITH EAG Result Value Ref Range Hemoglobin A1c 6.7 (H) 4.8 - 5.6 % Comment:  
            Pre-diabetes: 5.7 - 6.4 Diabetes: >6.4 Glycemic control for adults with diabetes: <7.0 Estimated average glucose 146 mg/dL Narrative Performed at:  [address]  [id number] : Rashawn Basilio MD, Phone:  [phone] LIPID PANEL Result Value Ref Range Cholesterol, total 111 100 - 199 mg/dL Triglyceride 71 0 - 149 mg/dL HDL Cholesterol 43 >39 mg/dL VLDL, calculated 14 5 - 40 mg/dL LDL, calculated 54 0 - 99 mg/dL Narrative Performed at:  [address]  [id number] : Rashawn Basilio MD, Phone:  [phone] MICROALBUMIN, UR, RAND W/ MICROALBUMIN/CREA RATIO Result Value Ref Range Creatinine, urine 41.2 Not Estab. mg/dL Microalbumin, urine <3.0 Not Estab. ug/mL Comment: **Verified by repeat analysis** Microalb/Creat ratio (ug/mg creat.) <7.3 0.0 - 30.0 mg/g creat Narrative Performed at:  [address]  [id number] : Rashawn Basilio MD, Phone:  [phone] CVD REPORT Result Value Ref Range INTERPRETATION Note Comment:  
   Supplement report is available. Narrative Performed at:  [address] [place] [address]  [id number] : Kirsten Rodriguez PhD, Phone:  [phone] RECOMMENDATIONS: 
Results are normal for kidney, liver, cholesterol, urine protein. But A1C is up to 6.7, needs to take Metformin twice as directed, which he is not taking and missing dose. thanks Please call me if you have any questions: [phone] Sincerely, Prince Fernandez MD

## 2017-07-01 LAB
ALBUMIN SERPL-MCNC: 3.8 G/DL (ref 3.5–4.8)
ALBUMIN/CREAT UR: <7.3 MG/G CREAT (ref 0–30)
ALBUMIN/GLOB SERPL: 1.2 {RATIO} (ref 1.2–2.2)
ALP SERPL-CCNC: 68 IU/L (ref 39–117)
ALT SERPL-CCNC: 11 IU/L (ref 0–44)
AST SERPL-CCNC: 21 IU/L (ref 0–40)
BASOPHILS # BLD AUTO: 0 X10E3/UL (ref 0–0.2)
BASOPHILS NFR BLD AUTO: 0 %
BILIRUB SERPL-MCNC: 0.4 MG/DL (ref 0–1.2)
BUN SERPL-MCNC: 14 MG/DL (ref 8–27)
BUN/CREAT SERPL: 18 (ref 10–24)
CALCIUM SERPL-MCNC: 9 MG/DL (ref 8.6–10.2)
CHLORIDE SERPL-SCNC: 101 MMOL/L (ref 96–106)
CHOLEST SERPL-MCNC: 111 MG/DL (ref 100–199)
CO2 SERPL-SCNC: 24 MMOL/L (ref 18–29)
CREAT SERPL-MCNC: 0.79 MG/DL (ref 0.76–1.27)
CREAT UR-MCNC: 41.2 MG/DL
EOSINOPHIL # BLD AUTO: 0.2 X10E3/UL (ref 0–0.4)
EOSINOPHIL NFR BLD AUTO: 4 %
ERYTHROCYTE [DISTWIDTH] IN BLOOD BY AUTOMATED COUNT: 13.6 % (ref 12.3–15.4)
EST. AVERAGE GLUCOSE BLD GHB EST-MCNC: 146 MG/DL
GLOBULIN SER CALC-MCNC: 3.1 G/DL (ref 1.5–4.5)
GLUCOSE SERPL-MCNC: 120 MG/DL (ref 65–99)
HBA1C MFR BLD: 6.7 % (ref 4.8–5.6)
HCT VFR BLD AUTO: 39.6 % (ref 37.5–51)
HDLC SERPL-MCNC: 43 MG/DL
HGB BLD-MCNC: 13.2 G/DL (ref 12.6–17.7)
IMM GRANULOCYTES # BLD: 0 X10E3/UL (ref 0–0.1)
IMM GRANULOCYTES NFR BLD: 0 %
INTERPRETATION, 910389: NORMAL
LDLC SERPL CALC-MCNC: 54 MG/DL (ref 0–99)
LYMPHOCYTES # BLD AUTO: 1.9 X10E3/UL (ref 0.7–3.1)
LYMPHOCYTES NFR BLD AUTO: 30 %
MCH RBC QN AUTO: 29.9 PG (ref 26.6–33)
MCHC RBC AUTO-ENTMCNC: 33.3 G/DL (ref 31.5–35.7)
MCV RBC AUTO: 90 FL (ref 79–97)
MICROALBUMIN UR-MCNC: <3 UG/ML
MONOCYTES # BLD AUTO: 0.5 X10E3/UL (ref 0.1–0.9)
MONOCYTES NFR BLD AUTO: 8 %
NEUTROPHILS # BLD AUTO: 3.7 X10E3/UL (ref 1.4–7)
NEUTROPHILS NFR BLD AUTO: 58 %
PLATELET # BLD AUTO: 277 X10E3/UL (ref 150–379)
POTASSIUM SERPL-SCNC: 4.3 MMOL/L (ref 3.5–5.2)
PROT SERPL-MCNC: 6.9 G/DL (ref 6–8.5)
RBC # BLD AUTO: 4.42 X10E6/UL (ref 4.14–5.8)
SODIUM SERPL-SCNC: 138 MMOL/L (ref 134–144)
TRIGL SERPL-MCNC: 71 MG/DL (ref 0–149)
VLDLC SERPL CALC-MCNC: 14 MG/DL (ref 5–40)
WBC # BLD AUTO: 6.3 X10E3/UL (ref 3.4–10.8)

## 2017-07-01 NOTE — PROGRESS NOTES
Please inform patient  Results are normal for kidney, liver, cholesterol, urine protein. But A1C is up to 6.7, he needs to take Metformin twice as directed, which he is not taking and missing dose.   thanks

## 2017-07-03 ENCOUNTER — TELEPHONE (OUTPATIENT)
Dept: FAMILY MEDICINE CLINIC | Age: 72
End: 2017-07-03

## 2017-07-03 NOTE — TELEPHONE ENCOUNTER
Patient was left an voice message informing as reviewed by Dr. Rubi Olivares can take OTC Folic Acid 147 mcg daily and Vitamin B 12 500 mcg daily.

## 2017-07-03 NOTE — TELEPHONE ENCOUNTER
Chief Complaint   Patient presents with    Labs     results    Medication Evaluation     Vitamin K71 and Folic Acid         Patient was informed of lab results as reviewed by Dr. Daniel Goldstein. Patient inquired about dosage of Folic Acid or whether or not he needs to take medication ( prescription/OTC). Patient states he is currently taking Vitamin B 12. Informed will contact once reviewed by Dr. Daniel Goldstein.

## 2017-08-01 DIAGNOSIS — I25.10 CORONARY ARTERY DISEASE INVOLVING NATIVE CORONARY ARTERY OF NATIVE HEART WITHOUT ANGINA PECTORIS: Primary | Chronic | ICD-10-CM

## 2017-08-03 ENCOUNTER — TELEPHONE (OUTPATIENT)
Dept: FAMILY MEDICINE CLINIC | Age: 72
End: 2017-08-03

## 2017-08-08 ENCOUNTER — OFFICE VISIT (OUTPATIENT)
Dept: FAMILY MEDICINE CLINIC | Age: 72
End: 2017-08-08

## 2017-08-08 VITALS
RESPIRATION RATE: 16 BRPM | HEART RATE: 68 BPM | HEIGHT: 63 IN | TEMPERATURE: 97.6 F | BODY MASS INDEX: 27.29 KG/M2 | DIASTOLIC BLOOD PRESSURE: 80 MMHG | WEIGHT: 154 LBS | OXYGEN SATURATION: 98 % | SYSTOLIC BLOOD PRESSURE: 120 MMHG

## 2017-08-08 DIAGNOSIS — E11.9 CONTROLLED TYPE 2 DIABETES MELLITUS WITHOUT COMPLICATION, WITHOUT LONG-TERM CURRENT USE OF INSULIN (HCC): ICD-10-CM

## 2017-08-08 DIAGNOSIS — I10 ESSENTIAL HYPERTENSION: Primary | ICD-10-CM

## 2017-08-08 RX ORDER — CLONIDINE HYDROCHLORIDE 0.1 MG/1
0.1 TABLET ORAL AS NEEDED
Qty: 30 TAB | Refills: 0 | Status: SHIPPED | OUTPATIENT
Start: 2017-08-08 | End: 2017-09-06 | Stop reason: SDUPTHER

## 2017-08-08 RX ORDER — METFORMIN HYDROCHLORIDE 500 MG/1
500 TABLET ORAL 2 TIMES DAILY WITH MEALS
Qty: 180 TAB | Refills: 2 | Status: SHIPPED | OUTPATIENT
Start: 2017-08-08 | End: 2018-05-16 | Stop reason: SDUPTHER

## 2017-08-08 NOTE — PROGRESS NOTES
Chief Complaint   Patient presents with    Blood Pressure Check    Headache     1. Have you been to the ER, urgent care clinic since your last visit? Hospitalized since your last visit? No    2. Have you seen or consulted any other health care providers outside of the [facility] since your last visit? Include any pap smears or colon screening.  No

## 2017-08-08 NOTE — MR AVS SNAPSHOT
Visit Information Date & Time Provider Department Dept. Phone Encounter #  
 8/8/2017 10:00 AM Marquita Tiffanysherin, [address] [facility] [facility] [address] [phone] [id number] Follow-up Instructions Return if symptoms worsen or fail to improve. Your Appointments 8/31/2017  1:40 PM  
ESTABLISHED PATIENT with Jayson Perry MD  
[facility] ([facility] SCHEDULING) Appt Note: annual  
 [address] [place] [address]  
[address] [place] [address]  
  
    
 10/30/2017  2:00 PM  
COMPLETE 40 with Marquita Ann MD  
[facility]) Appt Note: CPE w/fasting labs; Medicare wellness [address] [phone]  
  
   
 [address] [phone] Upcoming Health Maintenance Date Due  
 EYE EXAM RETINAL OR DILATED Q1 9/4/1956 DTaP/Tdap/Td series (1 - Tdap) 9/4/1967 ZOSTER VACCINE AGE 60> 7/4/2006 GLAUCOMA SCREENING Q2Y 9/4/2011 MEDICARE YEARLY EXAM 1/28/2017 INFLUENZA AGE 9 TO ADULT 8/1/2017 HEMOGLOBIN A1C Q6M 12/30/2017 FOOT EXAM Q1 6/29/2018 MICROALBUMIN Q1 6/30/2018 LIPID PANEL Q1 6/30/2018 COLONOSCOPY 6/29/2027 Allergies as of 8/8/2017  Review Complete On: 8/8/2017 By: Marquita Ann MD  
 No Known Allergies Current Immunizations  Reviewed on 11/29/2016 Name Date Pneumococcal Conjugate (PCV-13) 10/1/2016 ZZZ-RETIRED (DO NOT USE) Pneumococcal Vaccine (Unspecified Type) 7/20/2012 10:35 AM  
  
 Not reviewed this visit You Were Diagnosed With   
  
 Codes Comments Essential hypertension    -  Primary ICD-10-CM: I10 
ICD-9-CM: 401.9 Vitals BP Pulse Temp Resp Height(growth percentile) Weight(growth percentile) 120/80 (BP 1 Location: Left arm, BP Patient Position: Sitting) 68 97.6 °F (36.4 °C) (Oral) 16 5' 3\" (1.6 m) 154 lb (69.9 kg) SpO2 BMI Smoking Status 98% 27.28 kg/m2 Never Smoker Vitals History BMI and BSA Data Body Mass Index Body Surface Area  
 27.28 kg/m 2 1.76 m 2 Preferred Pharmacy Pharmacy Name Phone [facility] #[id number]- PETER Lerner  [address] [facility] AT [address] [place], [place] [phone] Your Updated Medication List  
  
   
This list is accurate as of: 8/8/17 10:50 AM.  Always use your most recent med list.  
  
  
  
  
 carvedilol 6.25 mg tablet Commonly known as:  COREG  
TAKE ONE TABLET BY MOUTH TWICE DAILY WITH FOOD  
  
 cloNIDine HCl 0.1 mg tablet Commonly known as:  CATAPRES Take 1 Tab by mouth as needed. If systolic BP > 533 and/or diastolic BP > 90  
  
 clopidogrel 75 mg Tab Commonly known as:  PLAVIX Take 1 Tab by mouth daily. fluticasone 50 mcg/actuation nasal spray Commonly known as:  Karina Courser One spray each nostril  
  
 losartan-hydroCHLOROthiazide 50-12.5 mg per tablet Commonly known as:  HYZAAR  
TAKE 1 TABLET BY MOUTH DAILY. INDICATIONS: HYPERTENSION  
  
 meloxicam 15 mg tablet Commonly known as:  MOBIC Take 1 Tab by mouth nightly. metFORMIN 500 mg tablet Commonly known as:  GLUCOPHAGE Take 1 Tab by mouth two (2) times daily (with meals). montelukast 10 mg tablet Commonly known as:  SINGULAIR Take 1 Tab by mouth daily. nitroglycerin 0.4 mg SL tablet Commonly known as:  NITROSTAT  
1 Tab by SubLINGual route every five (5) minutes as needed for Chest Pain (up to 3 doses). Please provide Generic  
  
 pravastatin 20 mg tablet Commonly known as:  PRAVACHOL Take 1 Tab by mouth nightly. rosuvastatin 10 mg tablet Commonly known as:  CRESTOR Prescriptions Sent to Pharmacy Refills  
 metFORMIN (GLUCOPHAGE) 500 mg tablet 2 Sig: Take 1 Tab by mouth two (2) times daily (with meals).   
 Class: Normal  
 Pharmacy: [address] [place] [address] [address] [facility] #: [phone] Route: Oral  
 cloNIDine HCl (CATAPRES) 0.1 mg tablet 0 Sig: Take 1 Tab by mouth as needed. If systolic BP > 657 and/or diastolic BP > 90 Class: Normal  
 Pharmacy: [place], [address] Ph #: [phone] Route: Oral  
  
Follow-up Instructions Return if symptoms worsen or fail to improve. Patient Instructions DASH Diet: Care Instructions Your Care Instructions The DASH diet is an eating plan that can help lower your blood pressure. DASH stands for Dietary Approaches to Stop Hypertension. Hypertension is high blood pressure. The DASH diet focuses on eating foods that are high in calcium, potassium, and magnesium. These nutrients can lower blood pressure. The foods that are highest in these nutrients are fruits, vegetables, low-fat dairy products, nuts, seeds, and legumes. But taking calcium, potassium, and magnesium supplements instead of eating foods that are high in those nutrients does not have the same effect. The DASH diet also includes whole grains, fish, and poultry. The DASH diet is one of several lifestyle changes your doctor may recommend to lower your high blood pressure. Your doctor may also want you to decrease the amount of sodium in your diet. Lowering sodium while following the DASH diet can lower blood pressure even further than just the DASH diet alone. Follow-up care is a key part of your treatment and safety. Be sure to make and go to all appointments, and call your doctor if you are having problems. It's also a good idea to know your test results and keep a list of the medicines you take. How can you care for yourself at home? Following the DASH diet · Eat 4 to 5 servings of fruit each day.  A serving is 1 medium-sized piece of fruit, ½ cup chopped or canned fruit, 1/4 cup dried fruit, or 4 ounces (½ cup) of fruit juice. Choose fruit more often than fruit juice. · Eat 4 to 5 servings of vegetables each day. A serving is 1 cup of lettuce or raw leafy vegetables, ½ cup of chopped or cooked vegetables, or 4 ounces (½ cup) of vegetable juice. Choose vegetables more often than vegetable juice. · Get 2 to 3 servings of low-fat and fat-free dairy each day. A serving is 8 ounces of milk, 1 cup of yogurt, or 1 ½ ounces of cheese. · Eat 6 to 8 servings of grains each day. A serving is 1 slice of bread, 1 ounce of dry cereal, or ½ cup of cooked rice, pasta, or cooked cereal. Try to choose whole-grain products as much as possible. · Limit lean meat, poultry, and fish to 2 servings each day. A serving is 3 ounces, about the size of a deck of cards. · Eat 4 to 5 servings of nuts, seeds, and legumes (cooked dried beans, lentils, and split peas) each week. A serving is 1/3 cup of nuts, 2 tablespoons of seeds, or ½ cup of cooked beans or peas. · Limit fats and oils to 2 to 3 servings each day. A serving is 1 teaspoon of vegetable oil or 2 tablespoons of salad dressing. · Limit sweets and added sugars to 5 servings or less a week. A serving is 1 tablespoon jelly or jam, ½ cup sorbet, or 1 cup of lemonade. · Eat less than 2,300 milligrams (mg) of sodium a day. If you limit your sodium to 1,500 mg a day, you can lower your blood pressure even more. Tips for success · Start small. Do not try to make dramatic changes to your diet all at once. You might feel that you are missing out on your favorite foods and then be more likely to not follow the plan. Make small changes, and stick with them. Once those changes become habit, add a few more changes. · Try some of the following: ¨ Make it a goal to eat a fruit or vegetable at every meal and at snacks. This will make it easy to get the recommended amount of fruits and vegetables each day. ¨ Try yogurt topped with fruit and nuts for a snack or healthy dessert. ¨ Add lettuce, tomato, cucumber, and onion to sandwiches. ¨ Combine a ready-made pizza crust with low-fat mozzarella cheese and lots of vegetable toppings. Try using tomatoes, squash, spinach, broccoli, carrots, cauliflower, and onions. ¨ Have a variety of cut-up vegetables with a low-fat dip as an appetizer instead of chips and dip. ¨ Sprinkle sunflower seeds or chopped almonds over salads. Or try adding chopped walnuts or almonds to cooked vegetables. ¨ Try some vegetarian meals using beans and peas. Add garbanzo or kidney beans to salads. Make burritos and tacos with mashed gil beans or black beans. Where can you learn more? Go to http://lv-shelbi.info/. Enter T097 in the search box to learn more about \"DASH Diet: Care Instructions. \" Current as of: April 3, 2017 Content Version: 11.3 © 7581-5488 [employer]. Care instructions adapted under license by [employer] (which disclaims liability or warranty for this information). If you have questions about a medical condition or this instruction, always ask your healthcare professional. [address] any warranty or liability for your use of this information. Introducing [facility] & HEALTH SERVICES! Pari Fernandes introduces [employer] patient portal. Now you can access parts of your medical record, email your doctor's office, and request medication refills online. 1. In your internet browser, go to [email or web address] 2. Click on the First Time User? Click Here link in the Sign In box. You will see the New Member Sign Up page. 3. Enter your [employer] Access Code exactly as it appears below. You will not need to use this code after youve completed the sign-up process. If you do not sign up before the expiration date, you must request a new code. · [employer] Access Code: [email or web address] Expires: 9/27/2017  6:04 PM 
 
 4. Enter the last four digits of your Social Security Number (xxxx) and Date of Birth (mm/dd/yyyy) as indicated and click Submit. You will be taken to the next sign-up page. 5. Create a [employer] ID. This will be your [employer] login ID and cannot be changed, so think of one that is secure and easy to remember. 6. Create a [employer] password. You can change your password at any time. 7. Enter your Password Reset Question and Answer. This can be used at a later time if you forget your password. 8. Enter your e-mail address. You will receive e-mail notification when new information is available in [address] 9. Click Sign Up. You can now view and download portions of your medical record. 10. Click the Download Summary menu link to download a portable copy of your medical information. If you have questions, please visit the Frequently Asked Questions section of the [employer] website. Remember, [employer] is NOT to be used for urgent needs. For medical emergencies, dial 911. Now available from your iPhone and Android! Please provide this summary of care documentation to your next provider. Your primary care clinician is listed as Debora Gaitan. If you have any questions after today's visit, please call [phone].

## 2017-08-08 NOTE — PATIENT INSTRUCTIONS

## 2017-08-08 NOTE — PROGRESS NOTES
HISTORY OF PRESENT ILLNESS  Eladio Santiago is a 80 y.o. male. He was seen for concern of labile blood pressure readings for last 2 weeks. HPI  Cardiovascular Review  The patient has hypertension. He reports taking medications as instructed, no medication side effects noted, home BP monitoring in range of 928-144'Q systolic over 14-81'Z diastolic, no chest pain on exertion, no dyspnea on exertion, notes new dyspnea on exertion  no swelling of ankles, no orthostatic dizziness or lightheadedness, no orthopnea or paroxysmal nocturnal dyspnea, no palpitations, , no muscle aches or pain but has pressure in head and pounding headaches sometimes. Diet and Lifestyle: generally follows a low fat low cholesterol diet, generally follows a low sodium diet, sedentary, nonsmoker, but active. Lab review: labs reviewed and discussed with patient. Medicines:Losartan/Hctz 50/12.5 mg, Carvedilol 6.25 mg BID    He is concerned about headaches, intermittent left sided chest pain and worsening SOB. He has been non compliant with advise and follow ups in past. He stops his medicines on his own. He has scheduled an appointment with cardiology after strict recommendation. Review of Systems   Constitutional: Negative for chills, fever and malaise/fatigue. HENT: Negative for congestion, ear pain, sore throat and tinnitus. Eyes: Negative for blurred vision, double vision, pain and discharge. Respiratory: Positive for shortness of breath. Negative for cough and wheezing. Cardiovascular: Positive for chest pain. Negative for palpitations and leg swelling. Gastrointestinal: Negative for abdominal pain, blood in stool, constipation, diarrhea, nausea and vomiting. Genitourinary: Negative for dysuria, frequency, hematuria and urgency. Musculoskeletal: Negative for back pain, joint pain and myalgias. Skin: Negative for rash. Neurological: Positive for headaches. Negative for dizziness, tremors and seizures. Endo/Heme/Allergies: Negative for polydipsia. Does not bruise/bleed easily. Psychiatric/Behavioral: Negative for depression and substance abuse. The patient is not nervous/anxious. Physical Exam   Constitutional: He is oriented to person, place, and time. He appears well-developed and well-nourished. HENT:   Head: Normocephalic and atraumatic. Right Ear: External ear normal.   Mouth/Throat: Oropharynx is clear and moist. No oropharyngeal exudate. Eyes: Conjunctivae and EOM are normal. Pupils are equal, round, and reactive to light. No scleral icterus. Neck: Normal range of motion. Neck supple. No JVD present. No thyromegaly present. Cardiovascular: Normal rate, regular rhythm, normal heart sounds and intact distal pulses. No murmur heard. Pulmonary/Chest: Effort normal and breath sounds normal. He has no wheezes. Abdominal: Soft. Bowel sounds are normal. He exhibits no distension and no mass. Musculoskeletal: Normal range of motion. He exhibits no edema or tenderness. Lymphadenopathy:     He has no cervical adenopathy. Neurological: He is alert and oriented to person, place, and time. He has normal reflexes. No cranial nerve deficit. Skin: Skin is warm and dry. No rash noted. He is not diaphoretic. Scattered Bruises over skin   Psychiatric: He has a normal mood and affect. Nursing note and vitals reviewed. ASSESSMENT and PLAN  Diagnoses and all orders for this visit:    1. Essential hypertension  -     cloNIDine HCl (CATAPRES) 0.1 mg tablet; Take 1 Tab by mouth as needed. If systolic BP > 072 and/or diastolic BP > 90    2. Controlled type 2 diabetes mellitus without complication, without long-term current use of insulin (HCC)  -     REFERRAL TO OPHTHALMOLOGY    Other orders  -     metFORMIN (GLUCOPHAGE) 500 mg tablet; Take 1 Tab by mouth two (2) times daily (with meals). Discussed lifestyle issues and health guidance given.  Advised for prn Clonidine with criteria, of systolic > 839 and or  Diastolic > 90  He needs to closely follow cardiology, discussed again  Patient was given an after visit summary which includes diagnoses, vital signs, current medications, instructions and references & authorized prescriptions . Results of labs will be conveyed to patient, once available. Pt verbalized instructions I provided and expressed understanding of discussion that was held today. Follow-up Disposition:  Return if symptoms worsen or fail to improve.

## 2017-08-16 ENCOUNTER — TELEPHONE (OUTPATIENT)
Dept: FAMILY MEDICINE CLINIC | Age: 72
End: 2017-08-16

## 2017-08-16 NOTE — TELEPHONE ENCOUNTER
Referral Request Telephone Call      Insurance Name:     [address] [place] [address] (MEDICARE REPLACEMENT/ADVANTAGE - HMO    Insurance ID:   [id number]   Specialist Name: Dr. Xena Ascencio of Specialty:  Cardiovascular    Address of Specialist:  Jeanna Murguia, [address], [address] [facility]   Phone/Fax Number of Specialist: Phone: [phone]  Fax: [phone]   Diagnosis: Stress test for cardiovascular disease   Appointment Date: 08/17/17   NPI    Tax ID

## 2017-08-17 ENCOUNTER — OFFICE VISIT (OUTPATIENT)
Dept: CARDIOLOGY CLINIC | Age: 72
End: 2017-08-17

## 2017-08-17 ENCOUNTER — TELEPHONE (OUTPATIENT)
Dept: FAMILY MEDICINE CLINIC | Age: 72
End: 2017-08-17

## 2017-08-17 VITALS
HEIGHT: 63 IN | HEART RATE: 72 BPM | WEIGHT: 153.4 LBS | RESPIRATION RATE: 16 BRPM | SYSTOLIC BLOOD PRESSURE: 130 MMHG | BODY MASS INDEX: 27.18 KG/M2 | DIASTOLIC BLOOD PRESSURE: 90 MMHG

## 2017-08-17 DIAGNOSIS — E78.00 HYPERCHOLESTEREMIA: ICD-10-CM

## 2017-08-17 DIAGNOSIS — E11.9 CONTROLLED TYPE 2 DIABETES MELLITUS WITHOUT COMPLICATION, WITHOUT LONG-TERM CURRENT USE OF INSULIN (HCC): ICD-10-CM

## 2017-08-17 DIAGNOSIS — R06.09 DOE (DYSPNEA ON EXERTION): ICD-10-CM

## 2017-08-17 DIAGNOSIS — M17.11 ARTHRITIS OF KNEE, RIGHT: ICD-10-CM

## 2017-08-17 DIAGNOSIS — I25.110 CORONARY ARTERY DISEASE INVOLVING NATIVE CORONARY ARTERY OF NATIVE HEART WITH UNSTABLE ANGINA PECTORIS (HCC): Primary | Chronic | ICD-10-CM

## 2017-08-17 DIAGNOSIS — R07.2 PRECORDIAL PAIN: ICD-10-CM

## 2017-08-17 DIAGNOSIS — I10 ESSENTIAL HYPERTENSION: ICD-10-CM

## 2017-08-17 RX ORDER — MELOXICAM 15 MG/1
TABLET ORAL
Qty: 30 TAB | Refills: 0 | Status: SHIPPED | OUTPATIENT
Start: 2017-08-17 | End: 2017-10-03 | Stop reason: ALTCHOICE

## 2017-08-17 NOTE — PROGRESS NOTES
Satinder Meek     1945       Endy Faustin MD, [facility] [place]  Date of Visit-2017   PCP is Ascencion Edwards MD   [facility] [place]  [facility]  HPI:  Satinder Meek is a 80 y.o. male   Not here for fu in 3 years, Ascencion Edwards MD urged patient to come and requested move up fu which we are glad to do so    Presents with chest pain, DUGGAN, and CAD. He reports DUGGAN occurs with mild activity such as walking in from parking lot today. He states he has taken nitro recently for episodes of daily chest pain and notes these occasionally feel like the chest pain he was experiencing in the past for which he had stents placed. Denies edema, syncope or shortness of breath at rest, has no tachycardia, palpitations or sense of arrhythmia. Social history:  Nonsmoker. No alcohol. No caffeine. , retired, has two children. Family history: Mother passed away during an operation, unclear age. Father  of an elderly illness at 80. Assessment/Plan:     CAD with new CP on exertion  The primary encounter diagnosis was Coronary artery disease involving native coronary artery of native heart with unstable angina pectoris ([address] Diagnoses of Essential hypertension, Hypercholesteremia, Controlled type 2 diabetes mellitus without complication, without long-term current use of insulin ([address] Precordial pain, and DUGGAN (dyspnea on exertion) were also pertinent to this visit.    Patient with exertional angina and increased dyspnea  -strongly reccomend heart cath and angioplasty   -he declines cath understanding the risk and insists on an MRI before  -I explained to him that an MRI is not appropriate to look for CAD but stress test would be alternative  -he agrees only to nuclear stress test and not cath at this time  -current medical therapy would be adjusted based on results   Continue treatment for lipids and bp  DM is main cardiovascular disease risk factors   Patient in some denial about his symptoms    PLAN: Nuclear stress test asap , if pt changes his mind then cath asap   I  ROS-except as noted above. . A complete cardiac and respiratory are reviewed and negative except as above ; Resp-denies wheezing  or productive cough,. Const- No unusual weight loss or fever; Neuro-no recent seizure or CVA ; GI- No BRBPR, abdom pain, bloating ; - no  hematuria   see supplement sheet, initialed and to be scanned by staff  Past Medical History:   Diagnosis Date    CAD (coronary artery disease)     3 stents, [place] one [place] apart in [place],    Diabetes [facility])     Hypercholesterolemia     Hypertension       Social Hx= reports that he has never smoked. He has never used smokeless tobacco. He reports that he does not drink alcohol or use illicit drugs. Exam and Labs:    Visit Vitals    /90 (BP 1 Location: Right arm, BP Patient Position: Sitting)    Pulse 72    Resp 16    Ht 5' 3\" (1.6 m)    Wt 153 lb 6.4 oz (69.6 kg)    BMI 27.17 kg/m2      Constitutional:  NAD, comfortable  Head: NC,AT. Eyes: No scleral icterus. Neck:  Neck supple. No JVD present. Throat: moist mucous membranes. Chest: Effort normal & normal respiratory excursion . Neurological: alert, conversant and oriented . Skin: Skin is not cold. No obvious systemic rash noted. Not diaphoretic. No erythema. Psychiatric:  Grossly normal mood and affect. Behavior appears normal. Extremities:  no clubbing or cyanosis. Abdomen: non distended    Lungs:breath sounds normal. No stridor. distress, wheezes or  Rales. Heart:    normal rate, regular rhythm, normal S1, S2, no murmurs, rubs, clicks or gallops , PMI non displaced. Edema: Edema is none. Lab Results   Component Value Date/Time    Cholesterol, total 111 06/30/2017 08:39 AM    HDL Cholesterol 43 06/30/2017 08:39 AM    LDL, calculated 54 06/30/2017 08:39 AM    Triglyceride 71 06/30/2017 08:39 AM     No results found for this or any previous visit.    Lab Results   Component Value Date/Time    Sodium 138 06/30/2017 08:39 AM    Potassium 4.3 06/30/2017 08:39 AM    Chloride 101 06/30/2017 08:39 AM    CO2 24 06/30/2017 08:39 AM    Glucose 120 06/30/2017 08:39 AM    BUN 14 06/30/2017 08:39 AM    Creatinine 0.79 06/30/2017 08:39 AM    BUN/Creatinine ratio 18 06/30/2017 08:39 AM    GFR est  06/30/2017 08:39 AM    GFR est non-AA 91 06/30/2017 08:39 AM    Calcium 9.0 06/30/2017 08:39 AM      Wt Readings from Last 3 Encounters:   08/17/17 153 lb 6.4 oz (69.6 kg)   08/08/17 154 lb (69.9 kg)   06/29/17 154 lb (69.9 kg)      BP Readings from Last 3 Encounters:   08/17/17 130/90   08/08/17 120/80   06/29/17 114/79        Current Outpatient Prescriptions   Medication Sig    meloxicam (MOBIC) 15 mg tablet TAKE 1 TABLET BY MOUTH NIGHTLY.  metFORMIN (GLUCOPHAGE) 500 mg tablet Take 1 Tab by mouth two (2) times daily (with meals).  cloNIDine HCl (CATAPRES) 0.1 mg tablet Take 1 Tab by mouth as needed. If systolic BP > 385 and/or diastolic BP > 90    rosuvastatin (CRESTOR) 10 mg tablet     nitroglycerin (NITROSTAT) 0.4 mg SL tablet 1 Tab by SubLINGual route every five (5) minutes as needed for Chest Pain (up to 3 doses). Please provide Generic    pravastatin (PRAVACHOL) 20 mg tablet Take 1 Tab by mouth nightly.  losartan-hydroCHLOROthiazide (HYZAAR) 50-12.5 mg per tablet TAKE 1 TABLET BY MOUTH DAILY. INDICATIONS: HYPERTENSION    clopidogrel (PLAVIX) 75 mg tab Take 1 Tab by mouth daily.  carvedilol (COREG) 6.25 mg tablet TAKE ONE TABLET BY MOUTH TWICE DAILY WITH FOOD    montelukast (SINGULAIR) 10 mg tablet Take 1 Tab by mouth daily.  fluticasone (FLONASE) 50 mcg/actuation nasal spray One spray each nostril     No current facility-administered medications for this visit. Impression see above.     Written by Mara Morley, as dictated by Roseanna Martinez MD.

## 2017-08-17 NOTE — TELEPHONE ENCOUNTER
Karla Grade       [phone]     -  Received in-coming call from this number but the connection was terrible-  Could only hear static and wind blowing-      Called patient back and he stated he was calling about the referral to make sure it was faxed to the specialist and yes per the doctor's office they did receive the fax-                 BRYSON -----------

## 2017-08-17 NOTE — MR AVS SNAPSHOT
Visit Information Date & Time Provider Department Dept. Phone Encounter #  
 8/17/2017 12:00 PM Yesy Bowman MD [facility] [phone] [id number] Your Appointments 10/30/2017  2:00 PM  
COMPLETE 40 with Judson Valdivia MD  
[facility]) Appt Note: CPE w/fasting labs; Medicare wellness [address] [address]  
[phone]  
  
   
 [address] [address] Upcoming Health Maintenance Date Due  
 EYE EXAM RETINAL OR DILATED Q1 9/4/1956 DTaP/Tdap/Td series (1 - Tdap) 9/4/1967 ZOSTER VACCINE AGE 60> 7/4/2006 GLAUCOMA SCREENING Q2Y 9/4/2011 MEDICARE YEARLY EXAM 1/28/2017 INFLUENZA AGE 9 TO ADULT 8/1/2017 HEMOGLOBIN A1C Q6M 12/30/2017 FOOT EXAM Q1 6/29/2018 MICROALBUMIN Q1 6/30/2018 LIPID PANEL Q1 6/30/2018 COLONOSCOPY 6/29/2027 Allergies as of 8/17/2017  Review Complete On: 8/17/2017 By: Sandrine Arana No Known Allergies Current Immunizations  Reviewed on 11/29/2016 Name Date Pneumococcal Conjugate (PCV-13) 10/1/2016 ZZZ-RETIRED (DO NOT USE) Pneumococcal Vaccine (Unspecified Type) 7/20/2012 10:35 AM  
  
 Not reviewed this visit You Were Diagnosed With   
  
 Codes Comments Coronary artery disease involving native coronary artery of native heart without angina pectoris    -  Primary ICD-10-CM: I25.10 ICD-9-CM: 414.01 Essential hypertension     ICD-10-CM: I10 
ICD-9-CM: 401.9 Vitals BP Pulse Resp Height(growth percentile) Weight(growth percentile) BMI  
 130/90 (BP 1 Location: Right arm, BP Patient Position: Sitting) 72 16 5' 3\" (1.6 m) 153 lb 6.4 oz (69.6 kg) 27.17 kg/m2 Smoking Status Never Smoker Vitals History BMI and BSA Data Body Mass Index Body Surface Area  
 27.17 kg/m 2 1.76 m 2 Preferred Pharmacy Pharmacy Name Phone [facility]/PHARMACY #[id number]- [place] [phone] [facility] AT [address] [place] [address] [phone] Your Updated Medication List  
  
   
This list is accurate as of: 8/17/17  1:48 PM.  Always use your most recent med list.  
  
  
  
  
 carvedilol 6.25 mg tablet Commonly known as:  COREG  
TAKE ONE TABLET BY MOUTH TWICE DAILY WITH FOOD  
  
 cloNIDine HCl 0.1 mg tablet Commonly known as:  CATAPRES Take 1 Tab by mouth as needed. If systolic BP > 275 and/or diastolic BP > 90  
  
 clopidogrel 75 mg Tab Commonly known as:  PLAVIX Take 1 Tab by mouth daily. fluticasone 50 mcg/actuation nasal spray Commonly known as:  Francella Lacks One spray each nostril  
  
 losartan-hydroCHLOROthiazide 50-12.5 mg per tablet Commonly known as:  HYZAAR  
TAKE 1 TABLET BY MOUTH DAILY. INDICATIONS: HYPERTENSION  
  
 meloxicam 15 mg tablet Commonly known as:  MOBIC  
TAKE 1 TABLET BY MOUTH NIGHTLY.  
  
 metFORMIN 500 mg tablet Commonly known as:  GLUCOPHAGE Take 1 Tab by mouth two (2) times daily (with meals). montelukast 10 mg tablet Commonly known as:  SINGULAIR Take 1 Tab by mouth daily. nitroglycerin 0.4 mg SL tablet Commonly known as:  NITROSTAT  
1 Tab by SubLINGual route every five (5) minutes as needed for Chest Pain (up to 3 doses). Please provide Generic  
  
 pravastatin 20 mg tablet Commonly known as:  PRAVACHOL Take 1 Tab by mouth nightly. rosuvastatin 10 mg tablet Commonly known as:  CRESTOR We Performed the Following AMB POC EKG ROUTINE W/ 12 LEADS, INTER & REP [90020 CPT(R)] Patient Instructions Nuclear stress test  
 
 
  
Introducing [facility]! Yusra Barahona introduces [employer] patient portal. Now you can access parts of your medical record, email your doctor's office, and request medication refills online. 1. In your internet browser, go to [email or web address] 2. Click on the First Time User? Click Here link in the Sign In box. You will see the New Member Sign Up page. 3. Enter your [employer] Access Code exactly as it appears below. You will not need to use this code after youve completed the sign-up process. If you do not sign up before the expiration date, you must request a new code. · [employer] Access Code: [email or web address] Expires: 9/27/2017  6:04 PM 
 
4. Enter the last four digits of your Social Security Number (xxxx) and Date of Birth (mm/dd/yyyy) as indicated and click Submit. You will be taken to the next sign-up page. 5. Create a [employer] ID. This will be your [employer] login ID and cannot be changed, so think of one that is secure and easy to remember. 6. Create a [employer] password. You can change your password at any time. 7. Enter your Password Reset Question and Answer. This can be used at a later time if you forget your password. 8. Enter your e-mail address. You will receive e-mail notification when new information is available in [address] 9. Click Sign Up. You can now view and download portions of your medical record. 10. Click the Download Summary menu link to download a portable copy of your medical information. If you have questions, please visit the Frequently Asked Questions section of the [employer] website. Remember, [employer] is NOT to be used for urgent needs. For medical emergencies, dial 911. Now available from your iPhone and Android! Please provide this summary of care documentation to your next provider. Your primary care clinician is listed as Beverly Ear. If you have any questions after today's visit, please call [phone].

## 2017-08-22 ENCOUNTER — TELEPHONE (OUTPATIENT)
Dept: CARDIOLOGY CLINIC | Age: 72
End: 2017-08-22

## 2017-08-22 ENCOUNTER — CLINICAL SUPPORT (OUTPATIENT)
Dept: CARDIOLOGY CLINIC | Age: 72
End: 2017-08-22

## 2017-08-22 DIAGNOSIS — Z98.61 HISTORY OF PTCA: Primary | ICD-10-CM

## 2017-08-22 NOTE — PROGRESS NOTES
See scanned document. Ordering Doctor:Dr. Karina Cruz  Reading Doctor:Dr. Karina Cruz    Patient tolerated procedure well.

## 2017-08-29 NOTE — TELEPHONE ENCOUNTER
His nuclear shows moderate risk scan with areas of reversible ischemia   I called both #s he answered on mobile  Called and spoke to patient -ID X2   He denies cp at first but admits to cp and sob with exertion  I recommend cath as I did in office but he is not sure and wants to speak to Dr Abhijit Khan first  We await his decision  thanks

## 2017-08-31 DIAGNOSIS — I25.10 CORONARY ARTERY DISEASE INVOLVING NATIVE CORONARY ARTERY OF NATIVE HEART WITHOUT ANGINA PECTORIS: Chronic | ICD-10-CM

## 2017-08-31 RX ORDER — CARVEDILOL 6.25 MG/1
TABLET ORAL
Qty: 180 TAB | Refills: 1 | Status: SHIPPED | COMMUNITY
Start: 2017-08-31 | End: 2018-04-03 | Stop reason: SDUPTHER

## 2017-09-06 ENCOUNTER — DOCUMENTATION ONLY (OUTPATIENT)
Dept: FAMILY MEDICINE CLINIC | Age: 72
End: 2017-09-06

## 2017-09-06 DIAGNOSIS — I10 ESSENTIAL HYPERTENSION: ICD-10-CM

## 2017-09-06 NOTE — TELEPHONE ENCOUNTER
Received faxed refill request for this medication from the pharmacy that is on file. Requesting a 90 day supply. cloNIDine HCl (CATAPRES) 0.1 mg tablet  Take 1 Tab by mouth as needed.  If systolic BP > 088 and/or diastolic BP > 90, Normal, Disp-30 Tab, R-0    rosuvastatin (CRESTOR) 10 mg tablet  Normal

## 2017-09-06 NOTE — PROGRESS NOTES
Spoke to patient and discussed his nuclear stress test result and Dr Deepak Holliday recommendation in detail. Recommended cardiac cath due to his previous h/o cardiac stent, diabetes, HTN, dyslipidemia and symptoms. Patient is in denial. He wants to wait and if has symptoms again, will get cardiac cath. Again warned patient, not to neglect his symptoms and keeping his follow up appointments.

## 2017-09-07 RX ORDER — CLONIDINE HYDROCHLORIDE 0.1 MG/1
0.1 TABLET ORAL AS NEEDED
Qty: 30 TAB | Refills: 1 | Status: SHIPPED | OUTPATIENT
Start: 2017-09-07 | End: 2017-09-11 | Stop reason: SDUPTHER

## 2017-09-07 RX ORDER — ROSUVASTATIN CALCIUM 10 MG/1
10 TABLET, COATED ORAL DAILY
Qty: 90 TAB | Refills: 1 | Status: SHIPPED | OUTPATIENT
Start: 2017-09-07 | End: 2019-07-16 | Stop reason: SDUPTHER

## 2017-09-11 DIAGNOSIS — I10 ESSENTIAL HYPERTENSION: ICD-10-CM

## 2017-09-11 RX ORDER — CLONIDINE HYDROCHLORIDE 0.1 MG/1
0.1 TABLET ORAL AS NEEDED
Qty: 90 TAB | Refills: 0 | Status: SHIPPED | OUTPATIENT
Start: 2017-09-11 | End: 2019-12-03 | Stop reason: ALTCHOICE

## 2017-09-11 NOTE — TELEPHONE ENCOUNTER
Pharmacy on file is requesting that the patients Clonidine HCL 0.1mg tab be re-written and sent over to the pharmacy for a 90 day supply for insurance coverage purposes.

## 2017-10-03 ENCOUNTER — OFFICE VISIT (OUTPATIENT)
Dept: FAMILY MEDICINE CLINIC | Age: 72
End: 2017-10-03

## 2017-10-03 VITALS
DIASTOLIC BLOOD PRESSURE: 86 MMHG | HEIGHT: 63 IN | RESPIRATION RATE: 18 BRPM | SYSTOLIC BLOOD PRESSURE: 124 MMHG | WEIGHT: 154 LBS | TEMPERATURE: 97.3 F | HEART RATE: 88 BPM | OXYGEN SATURATION: 99 % | BODY MASS INDEX: 27.29 KG/M2

## 2017-10-03 DIAGNOSIS — J44.1 COPD EXACERBATION (HCC): ICD-10-CM

## 2017-10-03 DIAGNOSIS — J20.0 ACUTE BRONCHITIS DUE TO MYCOPLASMA PNEUMONIAE: Primary | ICD-10-CM

## 2017-10-03 RX ORDER — LEVOFLOXACIN 750 MG/1
750 TABLET ORAL DAILY
Qty: 10 TAB | Refills: 0 | Status: SHIPPED | OUTPATIENT
Start: 2017-10-03 | End: 2017-10-13

## 2017-10-03 RX ORDER — PREDNISONE 10 MG/1
10 TABLET ORAL DAILY
Qty: 10 TAB | Refills: 0 | Status: SHIPPED | OUTPATIENT
Start: 2017-10-03 | End: 2017-10-30 | Stop reason: ALTCHOICE

## 2017-10-03 NOTE — MR AVS SNAPSHOT
Visit Information Date & Time Provider Department Dept. Phone Encounter #  
 10/3/2017  5:40 PM Louise Magallanes  [facility] [phone] [id number] Follow-up Instructions Return if symptoms worsen or fail to improve. Your Appointments 10/30/2017  2:00 PM  
COMPLETE 40 with Louise Magallanes MD  
[facility]) Appt Note: CPE w/fasting labs; Medicare wellness [address] [phone]  
[phone]  
  
   
 [address] [phone] Upcoming Health Maintenance Date Due  
 EYE EXAM RETINAL OR DILATED Q1 9/4/1956 DTaP/Tdap/Td series (1 - Tdap) 9/4/1967 ZOSTER VACCINE AGE 60> 7/4/2006 GLAUCOMA SCREENING Q2Y 9/4/2011 MEDICARE YEARLY EXAM 1/28/2017 INFLUENZA AGE 9 TO ADULT 8/1/2017 HEMOGLOBIN A1C Q6M 12/30/2017 FOOT EXAM Q1 6/29/2018 MICROALBUMIN Q1 6/30/2018 LIPID PANEL Q1 6/30/2018 COLONOSCOPY 6/29/2027 Allergies as of 10/3/2017  Review Complete On: 10/3/2017 By: Louise Magallanes MD  
 No Known Allergies Current Immunizations  Reviewed on 11/29/2016 Name Date Pneumococcal Conjugate (PCV-13) 10/1/2016 ZZZ-RETIRED (DO NOT USE) Pneumococcal Vaccine (Unspecified Type) 7/20/2012 10:35 AM  
  
 Not reviewed this visit You Were Diagnosed With   
  
 Codes Comments Acute bronchitis due to Mycoplasma pneumoniae    -  Primary ICD-10-CM: J20.0 ICD-9-CM: 466.0, 041.81   
 COPD exacerbation ([facility] [address]     ICD-10-CM: J44.1 ICD-9-CM: 491.21 Vitals BP Pulse Temp Resp Height(growth percentile) Weight(growth percentile) 124/86 (BP 1 Location: Right arm, BP Patient Position: Sitting) 88 97.3 °F (36.3 °C) (Oral) 18 5' 3\" (1.6 m) 154 lb (69.9 kg) SpO2 BMI Smoking Status 99% 27.28 kg/m2 Never Smoker Vitals History BMI and BSA Data  Body Mass Index Body Surface Area  
 27.28 kg/m 2 1.76 m 2  
  
  
 Preferred Pharmacy Pharmacy Name Phone [facility]/PHARMACY #[id number]- [place] - [address] [facility] AT [address] [place] [address] [phone] Your Updated Medication List  
  
   
This list is accurate as of: 10/3/17  6:22 PM.  Always use your most recent med list.  
  
  
  
  
 carvedilol 6.25 mg tablet Commonly known as:  COREG  
TAKE ONE TABLET BY MOUTH TWICE DAILY WITH FOOD  
  
 cloNIDine HCl 0.1 mg tablet Commonly known as:  CATAPRES Take 1 Tab by mouth as needed. If systolic BP > 216 and/or diastolic BP > 90  
  
 clopidogrel 75 mg Tab Commonly known as:  PLAVIX Take 1 Tab by mouth daily. fluticasone 50 mcg/actuation nasal spray Commonly known as:  Giraldo Blizzard One spray each nostril  
  
 levoFLOXacin 750 mg tablet Commonly known as:  Threasa Mince Take 1 Tab by mouth daily for 10 days. losartan-hydroCHLOROthiazide 50-12.5 mg per tablet Commonly known as:  HYZAAR  
TAKE 1 TABLET BY MOUTH DAILY. INDICATIONS: HYPERTENSION  
  
 metFORMIN 500 mg tablet Commonly known as:  GLUCOPHAGE Take 1 Tab by mouth two (2) times daily (with meals). montelukast 10 mg tablet Commonly known as:  SINGULAIR Take 1 Tab by mouth daily. nitroglycerin 0.4 mg SL tablet Commonly known as:  NITROSTAT  
1 Tab by SubLINGual route every five (5) minutes as needed for Chest Pain (up to 3 doses). Please provide Generic  
  
 predniSONE 10 mg tablet Commonly known as:  Therman Rail Take 1 Tab by mouth daily. rosuvastatin 10 mg tablet Commonly known as:  CRESTOR Take 1 Tab by mouth daily. Prescriptions Sent to Pharmacy Refills  
 predniSONE (DELTASONE) 10 mg tablet 0 Sig: Take 1 Tab by mouth daily. Class: Normal  
 Pharmacy: [address] [place], [place] [address]  #: [phone] Route: Oral  
 levoFLOXacin (LEVAQUIN) 750 mg tablet 0 Sig: Take 1 Tab by mouth daily for 10 days.   
 Class: Normal  
 Pharmacy: [facility]  #: [phone] Route: Oral  
  
Follow-up Instructions Return if symptoms worsen or fail to improve. Patient Instructions Learning About Chronic Bronchitis What is chronic bronchitis? Chronic bronchitis is long-term swelling and the buildup of mucus in the airways of your lungs. The airways (bronchial tubes) get inflamed and make a lot of mucus. This can narrow or block the airways, making it hard for you to breathe. It is a form of COPD (chronic obstructive pulmonary disease). Chronic bronchitis is usually caused by smoking. But chemical fumes, dust, or air pollution also can cause it over time. What can you expect when you have chronic bronchitis? Chronic bronchitis gets worse over time. You cannot undo the damage to your lungs. Over time, you may find that: 
· You get short of breath even when you do simple things like get dressed or fix a meal. 
· It is hard to eat or exercise. · You lose weight and feel weaker. Over many years, the swelling and mucus from chronic bronchitis make it more likely that you will get lung infections. But there are things you can do to prevent more damage and feel better. What are the symptoms? The main symptoms of chronic bronchitis are: · A cough that will not go away. · Mucus that comes up when you cough. · Shortness of breath that gets worse when you exercise. At times, your symptoms may suddenly flare up and get much worse. This is a called an exacerbation (say \"egg-SE--BAY-luis felipe\"). When this happens, your usual symptoms quickly get worse and stay bad. This can be dangerous. You may have to go to the hospital. 
How can you keep chronic bronchitis from getting worse? Don't smoke. That is the best way to keep chronic bronchitis from getting worse. If you already smoke, it is never too late to stop.  If you need help quitting, talk to your doctor about stop-smoking programs and medicines. These can increase your chances of quitting for good. You can do other things to keep chronic bronchitis from getting worse: · Avoid bad air. Air pollution, chemical fumes, and dust also can make chronic bronchitis worse. · Get a flu shot every year. A shot may keep the flu from turning into something more serious, like pneumonia. A flu shot also may lower your chances of having a flare-up. · Get a pneumococcal shot. A shot can prevent some of the serious complications of pneumonia. Ask your doctor how often you should get this shot. How is chronic bronchitis treated? Chronic bronchitis is treated with medicines and oxygen. You also can take steps at home to stay healthy and keep your condition from getting worse. Medicines and oxygen therapy · You may be taking medicines such as: ¨ Bronchodilators. These help open your airways and make breathing easier. Bronchodilators are either short-acting (work for 6 to 9 hours) or long-acting (work for 24 hours). You inhale most bronchodilators, so they start to act quickly. Always carry your quick-relief inhaler with you in case you need it while you are away from home. ¨ Corticosteroids. These reduce airway inflammation. They come in pill or inhaled form. You must take these medicines every day for them to work well. ¨ Antibiotics. These medicines are used when you have a bacterial lung infection. · Take your medicines exactly as prescribed. Call your doctor if you think you are having a problem with your medicine. · Oxygen therapy boosts the amount of oxygen in your blood and helps you breathe easier. Use the flow rate your doctor has recommended, and do not change it without talking to your doctor first. 
Other care at home · If your doctor recommends it, get more exercise. Walking is a good choice. Bit by bit, increase the amount you walk every day.  Try for at least 30 minutes on most days of the week. · Learn breathing methodssuch as breathing through pursed lipsto help you become less short of breath. · If your doctor has not set you up with a pulmonary rehabilitation program, talk to him or her about whether rehab is right for you. Rehab includes exercise programs, education about your disease and how to manage it, help with diet and other changes, and emotional support. · Eat regular, healthy meals. Use bronchodilators about 1 hour before you eat to make it easier to eat. Eat several small meals instead of three large ones. Drink beverages at the end of the meal. Avoid foods that are hard to chew. Follow-up care is a key part of your treatment and safety. Be sure to make and go to all appointments, and call your doctor if you are having problems. It's also a good idea to know your test results and keep a list of the medicines you take. Where can you learn more? Go to http://lvRedKixshelbi.info/. Enter I000 in the search box to learn more about \"Learning About Chronic Bronchitis. \" Current as of: March 25, 2017 Content Version: 11.3 © 2190-8425 [employer]. Care instructions adapted under license by [employer] (which disclaims liability or warranty for this information). If you have questions about a medical condition or this instruction, always ask your healthcare professional. [address] any warranty or liability for your use of this information. Introducing [facility]! Kristian Maki introduces [employer] patient portal. Now you can access parts of your medical record, email your doctor's office, and request medication refills online. 1. In your internet browser, go to [email or web address] 2. Click on the First Time User? Click Here link in the Sign In box. You will see the New Member Sign Up page. 3. Enter your proVITAL Access Code exactly as it appears below. You will not need to use this code after youve completed the sign-up process. If you do not sign up before the expiration date, you must request a new code. · proVITAL Access Code: [email or web address] Expires: 1/1/2018  6:22 PM 
 
4. Enter the last four digits of your Social Security Number (xxxx) and Date of Birth (mm/dd/yyyy) as indicated and click Submit. You will be taken to the next sign-up page. 5. Create a proVITAL ID. This will be your proVITAL login ID and cannot be changed, so think of one that is secure and easy to remember. 6. Create a proVITAL password. You can change your password at any time. 7. Enter your Password Reset Question and Answer. This can be used at a later time if you forget your password. 8. Enter your e-mail address. You will receive e-mail notification when new information is available in [address] 9. Click Sign Up. You can now view and download portions of your medical record. 10. Click the Download Summary menu link to download a portable copy of your medical information. If you have questions, please visit the Frequently Asked Questions section of the proVITAL website. Remember, proVITAL is NOT to be used for urgent needs. For medical emergencies, dial 911. Now available from your iPhone and Android! Please provide this summary of care documentation to your next provider. Your primary care clinician is listed as Khris Ramos. If you have any questions after today's visit, please call [phone].

## 2017-10-03 NOTE — PATIENT INSTRUCTIONS
Learning About Chronic Bronchitis  What is chronic bronchitis? Chronic bronchitis is long-term swelling and the buildup of mucus in the airways of your lungs. The airways (bronchial tubes) get inflamed and make a lot of mucus. This can narrow or block the airways, making it hard for you to breathe. It is a form of COPD (chronic obstructive pulmonary disease). Chronic bronchitis is usually caused by smoking. But chemical fumes, dust, or air pollution also can cause it over time. What can you expect when you have chronic bronchitis? Chronic bronchitis gets worse over time. You cannot undo the damage to your lungs. Over time, you may find that:  · You get short of breath even when you do simple things like get dressed or fix a meal.  · It is hard to eat or exercise. · You lose weight and feel weaker. Over many years, the swelling and mucus from chronic bronchitis make it more likely that you will get lung infections. But there are things you can do to prevent more damage and feel better. What are the symptoms? The main symptoms of chronic bronchitis are:  · A cough that will not go away. · Mucus that comes up when you cough. · Shortness of breath that gets worse when you exercise. At times, your symptoms may suddenly flare up and get much worse. This is a called an exacerbation (say \"egg-ZAYUNG-er-BAY-luis felipe\"). When this happens, your usual symptoms quickly get worse and stay bad. This can be dangerous. You may have to go to the hospital.  How can you keep chronic bronchitis from getting worse? Don't smoke. That is the best way to keep chronic bronchitis from getting worse. If you already smoke, it is never too late to stop. If you need help quitting, talk to your doctor about stop-smoking programs and medicines. These can increase your chances of quitting for good. You can do other things to keep chronic bronchitis from getting worse:  · Avoid bad air.  Air pollution, chemical fumes, and dust also can make chronic bronchitis worse. · Get a flu shot every year. A shot may keep the flu from turning into something more serious, like pneumonia. A flu shot also may lower your chances of having a flare-up. · Get a pneumococcal shot. A shot can prevent some of the serious complications of pneumonia. Ask your doctor how often you should get this shot. How is chronic bronchitis treated? Chronic bronchitis is treated with medicines and oxygen. You also can take steps at home to stay healthy and keep your condition from getting worse. Medicines and oxygen therapy  · You may be taking medicines such as:  ¨ Bronchodilators. These help open your airways and make breathing easier. Bronchodilators are either short-acting (work for 6 to 9 hours) or long-acting (work for 24 hours). You inhale most bronchodilators, so they start to act quickly. Always carry your quick-relief inhaler with you in case you need it while you are away from home. ¨ Corticosteroids. These reduce airway inflammation. They come in pill or inhaled form. You must take these medicines every day for them to work well. ¨ Antibiotics. These medicines are used when you have a bacterial lung infection. · Take your medicines exactly as prescribed. Call your doctor if you think you are having a problem with your medicine. · Oxygen therapy boosts the amount of oxygen in your blood and helps you breathe easier. Use the flow rate your doctor has recommended, and do not change it without talking to your doctor first.  Other care at home  · If your doctor recommends it, get more exercise. Walking is a good choice. Bit by bit, increase the amount you walk every day. Try for at least 30 minutes on most days of the week. · Learn breathing methods--such as breathing through pursed lips--to help you become less short of breath. · If your doctor has not set you up with a pulmonary rehabilitation program, talk to him or her about whether rehab is right for you.  Rehab includes exercise programs, education about your disease and how to manage it, help with diet and other changes, and emotional support. · Eat regular, healthy meals. Use bronchodilators about 1 hour before you eat to make it easier to eat. Eat several small meals instead of three large ones. Drink beverages at the end of the meal. Avoid foods that are hard to chew. Follow-up care is a key part of your treatment and safety. Be sure to make and go to all appointments, and call your doctor if you are having problems. It's also a good idea to know your test results and keep a list of the medicines you take. Where can you learn more? Go to http://lv-shelbi.info/. Enter I827 in the search box to learn more about \"Learning About Chronic Bronchitis. \"  Current as of: March 25, 2017  Content Version: 11.3  © 6937-3259 [employer]. Care instructions adapted under license by [employer] (which disclaims liability or warranty for this information). If you have questions about a medical condition or this instruction, always ask your healthcare professional. [address] any warranty or liability for your use of this information.

## 2017-10-03 NOTE — PROGRESS NOTES
HISTORY OF PRESENT ILLNESS  Ciera Recio is a 71 y.o. male. He was seen for cough, wheezing and congestion fo 2 days. He has just returned from [address] 2 days back  HPI  Cough  Patient complains of nonproductive cough, sore throat and wheezing. Symptoms began 1 week ago. The cough is non-productive, with wheezing, with shortness of breath, with shortness of breath during the cough, chest is painful during coughing, worsening over time and is aggravated by pollens, cold air and reclining position Associated symptoms include:chills, change in voice, sore throat. Patient does not have new pets. Patient does have a history of COPD Patient does have a history of environmental allergens. Patient had recent travel. he went to [facility]  and was sick while returning back. Patient does not have a history of smoking. Patient  does not have previous Chest X-ray. Patient does not have had a PPD done. He is on scheduled asmanex and prn albuterol  Review of Systems   Constitutional: Positive for malaise/fatigue. Negative for chills and fever. HENT: Positive for congestion. Negative for ear pain, sore throat and tinnitus. Eyes: Negative for blurred vision, double vision, pain and discharge. Respiratory: Positive for cough. Negative for sputum production, shortness of breath and wheezing. Cardiovascular: Negative for chest pain, palpitations and leg swelling. Gastrointestinal: Negative for abdominal pain, blood in stool, constipation, diarrhea, nausea and vomiting. Genitourinary: Negative for dysuria, frequency, hematuria and urgency. Musculoskeletal: Negative for back pain, joint pain and myalgias. Skin: Negative for rash. Neurological: Negative for dizziness, tremors, seizures and headaches. Endo/Heme/Allergies: Negative for polydipsia. Does not bruise/bleed easily. Psychiatric/Behavioral: Negative for depression and substance abuse. The patient is not nervous/anxious.         Physical Exam   Constitutional: No distress. HENT:   Right Ear: Tympanic membrane normal. No drainage. Tympanic membrane is not injected. No middle ear effusion. No decreased hearing is noted. Left Ear: Tympanic membrane normal. No drainage. Tympanic membrane is not injected. No middle ear effusion. No decreased hearing is noted. Nose: Mucosal edema present. No rhinorrhea. Right sinus exhibits maxillary sinus tenderness and frontal sinus tenderness. Left sinus exhibits maxillary sinus tenderness and frontal sinus tenderness. Mouth/Throat: Uvula is midline. Posterior oropharyngeal erythema present. No oropharyngeal exudate. Nasal mucosa congested, edematous   Cardiovascular: Regular rhythm and normal heart sounds. No murmur heard. Pulmonary/Chest: Effort normal. He has wheezes (expiratory wheezes). He has no rales. Bilateral coarse crackles on bases   Lymphadenopathy:        Head (right side): No tonsillar adenopathy present. Head (left side): No tonsillar adenopathy present. He has no cervical adenopathy. Nursing note and vitals reviewed. ASSESSMENT and PLAN  Diagnoses and all orders for this visit:    1. Acute bronchitis due to Mycoplasma pneumoniae  -     predniSONE (DELTASONE) 10 mg tablet; Take 1 Tab by mouth daily. -     levoFLOXacin (LEVAQUIN) 750 mg tablet; Take 1 Tab by mouth daily for 10 days. 2. COPD exacerbation (HCC)  -     predniSONE (DELTASONE) 10 mg tablet; Take 1 Tab by mouth daily. -     levoFLOXacin (LEVAQUIN) 750 mg tablet; Take 1 Tab by mouth daily for 10 days. Discussed lifestyle issues and health guidance given  Patient was given an after visit summary which includes diagnoses, vital signs, current medications, instructions and references & authorized prescriptions . Pt verbalized instructions I provided and expressed understanding of discussion that was held today. Follow-up Disposition:  Return if symptoms worsen or fail to improve.

## 2017-10-30 ENCOUNTER — OFFICE VISIT (OUTPATIENT)
Dept: FAMILY MEDICINE CLINIC | Age: 72
End: 2017-10-30

## 2017-10-30 VITALS
OXYGEN SATURATION: 98 % | SYSTOLIC BLOOD PRESSURE: 118 MMHG | DIASTOLIC BLOOD PRESSURE: 72 MMHG | RESPIRATION RATE: 16 BRPM | HEART RATE: 72 BPM | TEMPERATURE: 97.6 F | WEIGHT: 151 LBS | HEIGHT: 63 IN | BODY MASS INDEX: 26.75 KG/M2

## 2017-10-30 DIAGNOSIS — Z00.00 MEDICARE ANNUAL WELLNESS VISIT, SUBSEQUENT: ICD-10-CM

## 2017-10-30 DIAGNOSIS — Z12.5 SPECIAL SCREENING FOR MALIGNANT NEOPLASM OF PROSTATE: ICD-10-CM

## 2017-10-30 DIAGNOSIS — K59.00 CONSTIPATION, UNSPECIFIED CONSTIPATION TYPE: ICD-10-CM

## 2017-10-30 DIAGNOSIS — E78.5 HYPERLIPIDEMIA LDL GOAL <100: ICD-10-CM

## 2017-10-30 DIAGNOSIS — I25.10 CORONARY ARTERY DISEASE INVOLVING NATIVE CORONARY ARTERY OF NATIVE HEART WITHOUT ANGINA PECTORIS: Chronic | ICD-10-CM

## 2017-10-30 DIAGNOSIS — Z12.11 SCREEN FOR COLON CANCER: ICD-10-CM

## 2017-10-30 DIAGNOSIS — Z23 ENCOUNTER FOR IMMUNIZATION: ICD-10-CM

## 2017-10-30 DIAGNOSIS — E11.9 CONTROLLED TYPE 2 DIABETES MELLITUS WITHOUT COMPLICATION, WITHOUT LONG-TERM CURRENT USE OF INSULIN (HCC): ICD-10-CM

## 2017-10-30 DIAGNOSIS — I10 ESSENTIAL HYPERTENSION: ICD-10-CM

## 2017-10-30 DIAGNOSIS — Z00.00 ROUTINE GENERAL MEDICAL EXAMINATION AT A HEALTH CARE FACILITY: Primary | ICD-10-CM

## 2017-10-30 RX ORDER — MELOXICAM 15 MG/1
TABLET ORAL
COMMUNITY
Start: 2017-08-17 | End: 2017-10-30 | Stop reason: ALTCHOICE

## 2017-10-30 RX ORDER — POLYETHYLENE GLYCOL 3350 17 G/17G
17 POWDER, FOR SOLUTION ORAL DAILY
Qty: 510 G | Refills: 0 | Status: SHIPPED | OUTPATIENT
Start: 2017-10-30 | End: 2019-01-02 | Stop reason: ALTCHOICE

## 2017-10-30 NOTE — PROGRESS NOTES
This is a Subsequent Medicare Annual Wellness Exam (AWV) (Performed 12 months after IPPE or effective date of Medicare Part B enrollment)    I have reviewed the patient's medical history in detail and updated the computerized patient record. History     Past Medical History:   Diagnosis Date    CAD (coronary artery disease)     3 stents, [place] one [place] in [place] in [place],    Diabetes [facility])     Hypercholesterolemia     Hypertension       Past Surgical History:   Procedure Laterality Date    HX PTCA      2007    HX TONSILLECTOMY       Current Outpatient Prescriptions   Medication Sig Dispense Refill    polyethylene glycol (MIRALAX) 17 gram/dose powder Take 17 g by mouth daily. 510 g 0    pneumococcal 13 argelia conj dip (PREVNAR 13, PF,) 0.5 mL syrg injection 0.5 mL by IntraMUSCular route once for 1 dose. 0.5 mL 0    cloNIDine HCl (CATAPRES) 0.1 mg tablet Take 1 Tab by mouth as needed. If systolic BP > 324 and/or diastolic BP > 90 90 Tab 0    rosuvastatin (CRESTOR) 10 mg tablet Take 1 Tab by mouth daily. 90 Tab 1    carvedilol (COREG) 6.25 mg tablet TAKE ONE TABLET BY MOUTH TWICE DAILY WITH FOOD 180 Tab 1    metFORMIN (GLUCOPHAGE) 500 mg tablet Take 1 Tab by mouth two (2) times daily (with meals). 180 Tab 2    nitroglycerin (NITROSTAT) 0.4 mg SL tablet 1 Tab by SubLINGual route every five (5) minutes as needed for Chest Pain (up to 3 doses). Please provide Generic 30 Tab 2    losartan-hydroCHLOROthiazide (HYZAAR) 50-12.5 mg per tablet TAKE 1 TABLET BY MOUTH DAILY. INDICATIONS: HYPERTENSION 90 Tab 0    clopidogrel (PLAVIX) 75 mg tab Take 1 Tab by mouth daily. 90 Tab 0    montelukast (SINGULAIR) 10 mg tablet Take 1 Tab by mouth daily.  30 Tab 3    fluticasone (FLONASE) 50 mcg/actuation nasal spray One spray each nostril 1 Bottle 2     No Known Allergies  Family History   Problem Relation Age of Onset    No Known Problems Mother     No Known Problems Father      Social History   Substance Use Topics    Smoking status: Never Smoker    Smokeless tobacco: Never Used    Alcohol use No     Patient Active Problem List   Diagnosis Code    Dysuria R30.0    Essential hypertension I10    Urinary frequency R35.0    Fever R50.9    CAD (coronary artery disease) I25.10    Hyperlipidemia LDL goal <100 E78.5    Vitamin D deficiency E55.9    Visit for preventive health examination Z00.00    Controlled type 2 diabetes mellitus without complication ([facility] [address] D66.1    Bronchitis, acute J20.9    Sinusitis J32.9    Tongue sore K14.6    Tonsillitis J03.90    COPD, moderate (HCC) J44.9    Routine general medical examination at a health care facility Z00.00    Coronary artery disease involving native coronary artery of native heart without angina pectoris I25.10    Chronic ethmoidal sinusitis J32.2    Bilateral impacted cerumen H61.23    COPD exacerbation (HCC) J44.1    Precordial pain R07.2    Costochondritis, acute M94.0    Arthritis of knee, right M17.11       Depression Risk Factor Screening:     PHQ over the last two weeks 10/30/2017   Little interest or pleasure in doing things Not at all   Feeling down, depressed or hopeless Not at all   Total Score PHQ 2 0     Alcohol Risk Factor Screening: You do not drink alcohol or very rarely. Functional Ability and Level of Safety:   Hearing Loss  Hearing is good. Activities of Daily Living  The home contains: no safety equipment. Patient does total self care    Fall Risk  Fall Risk Assessment, last 12 mths 10/30/2017   Able to walk? Yes   Fall in past 12 months?  No       Abuse Screen  Patient is not abused    Cognitive Screening   Evaluation of Cognitive Function:  Has your family/caregiver stated any concerns about your memory: no  Normal, MMSE    Patient Care Team   Patient Care Team:  Katrin Solitario MD as PCP - General (Family Practice)  Semaj Bush MD (Cardiology)    Assessment/Plan   Education and counseling provided:  Are appropriate based on today's review and evaluation    Diagnoses and all orders for this visit:    1. Routine general medical examination at a health care facility    2. Controlled type 2 diabetes mellitus without complication, without long-term current use of insulin (HCC)  -     HEMOGLOBIN A1C WITH EAG    3. Essential hypertension    4. Hyperlipidemia LDL goal <100  -     LIPID PANEL    5. Coronary artery disease involving native coronary artery of native heart without angina pectoris    6. Medicare annual wellness visit, subsequent  -     CBC WITH AUTOMATED DIFF  -     METABOLIC PANEL, COMPREHENSIVE  -     TSH 3RD GENERATION  -     URINALYSIS W/ RFLX MICROSCOPIC    7. Screen for colon cancer  -     OCCULT BLOOD, IMMUNOASSAY (FIT) ([id number])    8. Special screening for malignant neoplasm of prostate  -     PSA Screening ([id number])    9. Encounter for immunization  -     Influenza Admin ()  -     Influenza virus vaccine (FLUZONE HIGH DOSE) PF ([id number])  -     pneumococcal 13 argelia conj dip (PREVNAR 13, PF,) 0.5 mL syrg injection; 0.5 mL by IntraMUSCular route once for 1 dose. Other orders  -     polyethylene glycol (MIRALAX) 17 gram/dose powder; Take 17 g by mouth daily. Health Maintenance Due   Topic Date Due    EYE EXAM RETINAL OR DILATED Q1  09/04/1956    DTaP/Tdap/Td series (1 - Tdap) 09/04/1967    ZOSTER VACCINE AGE 60>  07/04/2006    GLAUCOMA SCREENING Q2Y  09/04/2011       Discussed lifestyle issues and health guidance given  Patient was given an after visit summary which includes diagnoses, vital signs, current medications, instructions and references & authorized prescriptions . Results of labs will be conveyed to patient, once available. Pt verbalized instructions I provided and expressed understanding of discussion that was held today. Follow-up Disposition:  Return in about 4 months (around 2/28/2018) for fasting, follow up, DM, HTN, CHO.

## 2017-10-30 NOTE — PROGRESS NOTES
Chief Complaint   Patient presents with    Complete Physical    Knee Pain     right     Leg Pain     left     Constipation    Immunization/Injection     Influenza High dose       1. Have you been to the ER, urgent care clinic since your last visit? Hospitalized since your last visit? No    2. Have you seen or consulted any other health care providers outside of the [address] [place] [address] since your last visit? Include any pap smears or colon screening. Yoly Zepeda  is a 71 y.o.  male  who present for Influenza High dose immunizations/injections. He/she denies any symptoms , reactions or allergies that would exclude them from being immunized today. Risks and adverse reactions were discussed and the VIS was given if applicable to them. All questions were addressed. He/She was observed for 10 min post injection. There were no reactions observed.     Gianfranco Haji LPN

## 2017-10-30 NOTE — PATIENT INSTRUCTIONS
Medicare Wellness Visit, Male    The best way to live healthy is to have a healthy lifestyle by eating a well-balanced diet, exercising regularly, limiting alcohol and stopping smoking. Regular physical exams and screening tests are another way to keep healthy. Preventive exams provided by your health care provider can find health problems before they become diseases or illnesses. Preventive services including immunizations, screening tests, monitoring and exams can help you take care of your own health. All people over age 72 should have a pneumovax  and and a prevnar shot to prevent pneumonia. These are once in a lifetime unless you and your provider decide differently. All people over 65 should have a yearly flu shot and a tetanus vaccine every 10 years. Screening for diabetes mellitus with a blood sugar test should be done every year. Glaucoma is a disease of the eye due to increased ocular pressure that can lead to blindness and it should be done every year by an eye professional.    Cardiovascular screening tests that check for elevated lipids (fatty part of blood) which can lead to heart disease and strokes should be done every 5 years. Colorectal screening that evaluates for blood or polyps in your colon should be done yearly as a stool test or every five years as a flexible sigmoidoscope or every 10 years as a colonoscopy up to age 76. Men up to age 76 may need a screening blood test for prostate cancer at certain intervals, depending on their personal and family history. This decision is between the patient and his provider. If you have been a smoker or had family history of abdominal aortic aneurysms, you and your provider may decide to schedule an ultrasound test of your aorta. Hepatitis C screening is also recommended for anyone born between [birth year] through [address]. A shingles vaccine is also recommended once in a lifetime after age 61.     Your Medicare Wellness Exam is recommended annually. Here is a list of your current Health Maintenance items with a due date:  Health Maintenance Due   Topic Date Due    Eye Exam  09[birth year]04/1955    DTaP/Tdap/Td  (1 - Tdap) 09/04/1967    Shingles Vaccine  07/04/2006    Glaucoma Screening   09/04/2011    Annual Well Visit  01/28/2017    Flu Vaccine  08/01/2017         Medicare Wellness Visit, Male    The best way to live healthy is to have a healthy lifestyle by eating a well-balanced diet, exercising regularly, limiting alcohol and stopping smoking. Regular physical exams and screening tests are another way to keep healthy. Preventive exams provided by your health care provider can find health problems before they become diseases or illnesses. Preventive services including immunizations, screening tests, monitoring and exams can help you take care of your own health. All people over age 72 should have a pneumovax  and and a prevnar shot to prevent pneumonia. These are once in a lifetime unless you and your provider decide differently. All people over 65 should have a yearly flu shot and a tetanus vaccine every 10 years. Screening for diabetes mellitus with a blood sugar test should be done every year. Glaucoma is a disease of the eye due to increased ocular pressure that can lead to blindness and it should be done every year by an eye professional.    Cardiovascular screening tests that check for elevated lipids (fatty part of blood) which can lead to heart disease and strokes should be done every 5 years. Colorectal screening that evaluates for blood or polyps in your colon should be done yearly as a stool test or every five years as a flexible sigmoidoscope or every 10 years as a colonoscopy up to age 76. Men up to age 76 may need a screening blood test for prostate cancer at certain intervals, depending on their personal and family history. This decision is between the patient and his provider.     If you have been a smoker or had family history of abdominal aortic aneurysms, you and your provider may decide to schedule an ultrasound test of your aorta. Hepatitis C screening is also recommended for anyone born between [birth year] through [address]. A shingles vaccine is also recommended once in a lifetime after age 61. Your Medicare Wellness Exam is recommended annually. Here is a list of your current Health Maintenance items with a due date:  Health Maintenance Due   Topic Date Due    Eye Exam  09/04/1955    DTaP/Tdap/Td  (1 - Tdap) 09/04/1967    Shingles Vaccine  07/04/2006    Glaucoma Screening   09/04/2011    Annual Well Visit  01/28/2017    Flu Vaccine  08/01/2017            Prostate Cancer Screening: Care Instructions  Your Care Instructions    The prostate gland is an organ found just below a man's bladder. It is the size and shape of a walnut. It surrounds the tube that carries urine from the bladder out of the body through the penis. This tube is called the urethra. Prostate cancer is the abnormal growth of cells in the prostate. It is the second most common type of cancer in men. (Skin cancer is the most common.)  Most cases of prostate cancer occur in men older than 72. The disease runs in families. And it's more common in -American men. When it's found and treated early, prostate cancer may be cured. But it is not always treated. This is because prostate cancer may not shorten your life, especially if you are older and the cancer is growing slowly. Follow-up care is a key part of your treatment and safety. Be sure to make and go to all appointments, and call your doctor if you are having problems. It's also a good idea to know your test results and keep a list of the medicines you take. What are the screening tests for prostate cancer? The main screening test for prostate cancer is the prostate-specific antigen (PSA) test. This is a blood test that measures how much PSA is in your blood.  A high level may mean that you have an enlargement, an infection, or cancer. Along with the PSA test, you may have a digital rectal exam. The digital (finger) rectal exam checks for anything abnormal in your prostate. To do the exam, the doctor puts a lubricated, gloved finger into your rectum. If these tests suggest cancer, you may need a prostate biopsy. How is prostate cancer diagnosed? In a biopsy, the doctor takes small tissue samples from your prostate gland. Another doctor then looks at the tissue under a microscope to see if there are cancer cells, signs of infection, or other problems. The results help diagnose prostate cancer. What are the pros and cons of screening? Neither a PSA test nor a digital rectal exam can tell you for sure that you do or do not have cancer. But they can help you decide if you need more tests, such as a prostate biopsy. Screening tests may be useful because most men with prostate cancer don't have symptoms. It can be hard to know if you have cancer until it is more advanced. And then it's harder to treat. But having a PSA test can also cause harm. The test may show high levels of PSA that aren't caused by cancer. So you could have a prostate biopsy you didn't need. Or the PSA test might be normal when there is cancer, so a cancer might not be found early. The test can also find cancers that would never have caused a problem during your lifetime. So you might have treatment that was not needed. Prostate cancer usually develops late in life and grows slowly. For many men, it does not shorten their lives. Some experts advise screening only for men who are at high risk. Talk with your doctor to see if screening is right for you. Where can you learn more? Go to http://lv-shelbi.info/. Enter R550 in the search box to learn more about \"Prostate Cancer Screening: Care Instructions. \"  Current as of:  May 12, 2017  Content Version: 11.4  © 2812-2737 [employer]. Care instructions adapted under license by [employer] (which disclaims liability or warranty for this information). If you have questions about a medical condition or this instruction, always ask your healthcare professional. [address] any warranty or liability for your use of this information. High Cholesterol: Care Instructions  Your Care Instructions    Cholesterol is a type of fat in your blood. It is needed for many body functions, such as making new cells. Cholesterol is made by your body. It also comes from food you eat. High cholesterol means that you have too much of the fat in your blood. This raises your risk of a heart attack and stroke. LDL and HDL are part of your total cholesterol. LDL is the \"bad\" cholesterol. High LDL can raise your risk for heart disease, heart attack, and stroke. HDL is the \"good\" cholesterol. It helps clear bad cholesterol from the body. High HDL is linked with a lower risk of heart disease, heart attack, and stroke. Your cholesterol levels help your doctor find out your risk for having a heart attack or stroke. You and your doctor can talk about whether you need to lower your risk and what treatment is best for you. A heart-healthy lifestyle along with medicines can help lower your cholesterol and your risk. The way you choose to lower your risk will depend on how high your risk is for heart attack and stroke. It will also depend on how you feel about taking medicines. Follow-up care is a key part of your treatment and safety. Be sure to make and go to all appointments, and call your doctor if you are having problems. It's also a good idea to know your test results and keep a list of the medicines you take. How can you care for yourself at home? · Eat a variety of foods every day.  Good choices include fruits, vegetables, whole grains (like oatmeal), dried beans and peas, nuts and seeds, soy products (like tofu), and fat-free or low-fat dairy products. · Replace butter, margarine, and hydrogenated or partially hydrogenated oils with olive and canola oils. (Canola oil margarine without trans fat is fine.)  · Replace red meat with fish, poultry, and soy protein (like tofu). · Limit processed and packaged foods like chips, crackers, and cookies. · Bake, broil, or steam foods. Don't palmer them. · Be physically active. Get at least 30 minutes of exercise on most days of the week. Walking is a good choice. You also may want to do other activities, such as running, swimming, cycling, or playing tennis or team sports. · Stay at a healthy weight or lose weight by making the changes in eating and physical activity listed above. Losing just a small amount of weight, even 5 to 10 pounds, can reduce your risk for having a heart attack or stroke. · Do not smoke. When should you call for help? Watch closely for changes in your health, and be sure to contact your doctor if:  ? · You need help making lifestyle changes. ? · You have questions about your medicine. Where can you learn more? Go to http://lv-shelbi.info/. Enter Q262 in the search box to learn more about \"High Cholesterol: Care Instructions. \"  Current as of: September 21, 2016  Content Version: 11.4  © 0151-0025 [employer]. Care instructions adapted under license by [employer] (which disclaims liability or warranty for this information). If you have questions about a medical condition or this instruction, always ask your healthcare professional. [place] [address] any warranty or liability for your use of this information.

## 2017-10-30 NOTE — MR AVS SNAPSHOT
Visit Information Date & Time Provider Department Dept. Phone Encounter #  
 10/30/2017  4:00 PM Carleen Chatterjee, [address] [facility] [address] [phone] [id number] Follow-up Instructions Return in about 4 months (around 2/28/2018) for fasting, follow up, DM, HTN, CHO. Upcoming Health Maintenance Date Due  
 EYE EXAM RETINAL OR DILATED Q1 9/4/1955 DTaP/Tdap/Td series (1 - Tdap) 9/4/1967 ZOSTER VACCINE AGE 60> 7/4/2006 GLAUCOMA SCREENING Q2Y 9/4/2011 MEDICARE YEARLY EXAM 1/28/2017 INFLUENZA AGE 9 TO ADULT 8/1/2017 HEMOGLOBIN A1C Q6M 12/30/2017 FOOT EXAM Q1 6/29/2018 MICROALBUMIN Q1 6/30/2018 LIPID PANEL Q1 6/30/2018 COLONOSCOPY 6/29/2027 Allergies as of 10/30/2017  Review Complete On: 10/30/2017 By: Carleen Chatterjee MD  
 No Known Allergies Current Immunizations  Reviewed on 11/29/2016 Name Date Influenza High Dose Vaccine PF  Incomplete Pneumococcal Conjugate (PCV-13) 10/1/2016 ZZZ-RETIRED (DO NOT USE) Pneumococcal Vaccine (Unspecified Type) 7/20/2012 10:35 AM  
  
 Not reviewed this visit You Were Diagnosed With   
  
 Codes Comments Routine general medical examination at a health care facility    -  Primary ICD-10-CM: Z00.00 ICD-9-CM: V70.0 Controlled type 2 diabetes mellitus without complication, without long-term current use of insulin ([facility]     ICD-10-CM: E11.9 ICD-9-CM: 250.00 Essential hypertension     ICD-10-CM: I10 
ICD-9-CM: 401.9 Hyperlipidemia LDL goal <100     ICD-10-CM: E78.5 ICD-9-CM: 272.4 Coronary artery disease involving native coronary artery of native heart without angina pectoris     ICD-10-CM: I25.10 ICD-9-CM: 414.01 Medicare annual wellness visit, subsequent     ICD-10-CM: Z00.00 ICD-9-CM: V70.0 Screen for colon cancer     ICD-10-CM: Z12.11 ICD-9-CM: V76.51 Special screening for malignant neoplasm of prostate     ICD-10-CM: Z12.5 ICD-9-CM: V76.44   
 Encounter for immunization     ICD-10-CM: B50 ICD-9-CM: V03.89 Vitals BP Pulse Temp Resp Height(growth percentile) Weight(growth percentile) 118/72 (BP 1 Location: Right arm, BP Patient Position: Sitting) 72 97.6 °F (36.4 °C) (Oral) 16 5' 3\" (1.6 m) 151 lb (68.5 kg) SpO2 BMI Smoking Status 98% 26.75 kg/m2 Never Smoker Vitals History BMI and BSA Data Body Mass Index Body Surface Area  
 26.75 kg/m 2 1.74 m 2 Preferred Pharmacy Pharmacy Name Phone [id number] [place], [place] - [address] [facility] AT [address] [place] [address] [phone] Your Updated Medication List  
  
   
This list is accurate as of: 10/30/17  4:52 PM.  Always use your most recent med list.  
  
  
  
  
 carvedilol 6.25 mg tablet Commonly known as:  COREG  
TAKE ONE TABLET BY MOUTH TWICE DAILY WITH FOOD  
  
 cloNIDine HCl 0.1 mg tablet Commonly known as:  CATAPRES Take 1 Tab by mouth as needed. If systolic BP > 971 and/or diastolic BP > 90  
  
 clopidogrel 75 mg Tab Commonly known as:  PLAVIX Take 1 Tab by mouth daily. fluticasone 50 mcg/actuation nasal spray Commonly known as:  Nell Fryer One spray each nostril  
  
 losartan-hydroCHLOROthiazide 50-12.5 mg per tablet Commonly known as:  HYZAAR  
TAKE 1 TABLET BY MOUTH DAILY. INDICATIONS: HYPERTENSION  
  
 metFORMIN 500 mg tablet Commonly known as:  GLUCOPHAGE Take 1 Tab by mouth two (2) times daily (with meals). montelukast 10 mg tablet Commonly known as:  SINGULAIR Take 1 Tab by mouth daily. nitroglycerin 0.4 mg SL tablet Commonly known as:  NITROSTAT  
1 Tab by SubLINGual route every five (5) minutes as needed for Chest Pain (up to 3 doses). Please provide Generic  
  
 pneumococcal 13 argelia conj dip 0.5 mL Syrg injection Commonly known as:  PREVNAR 13 (PF)  
0.5 mL by IntraMUSCular route once for 1 dose. polyethylene glycol 17 gram/dose powder Commonly known as:  Naeem Arevalo Take 17 g by mouth daily. rosuvastatin 10 mg tablet Commonly known as:  CRESTOR Take 1 Tab by mouth daily. Prescriptions Printed Refills  
 pneumococcal 13 argelia conj dip (PREVNAR 13, PF,) 0.5 mL syrg injection 0 Si.5 mL by IntraMUSCular route once for 1 dose. Class: Print Route: IntraMUSCular Prescriptions Sent to Pharmacy Refills  
 polyethylene glycol (MIRALAX) 17 gram/dose powder 0 Sig: Take 17 g by mouth daily. Class: Normal  
 Pharmacy: [place], [address]  #: [phone] Route: Oral  
  
We Performed the Following ADMIN INFLUENZA VIRUS VAC [ [facility]] CBC WITH AUTOMATED DIFF [33452 CPT(R)] HEMOGLOBIN A1C WITH EAG [25455 CPT(R)] INFLUENZA VIRUS VACCINE, HIGH DOSE SEASONAL, PRESERVATIVE FREE [55244 CPT(R)] LIPID PANEL [67445 CPT(R)] METABOLIC PANEL, COMPREHENSIVE [85762 CPT(R)] OCCULT BLOOD, IMMUNOASSAY (FIT) [id number] CPT(R)] PSA SCREENING (SCREENING) [ [facility]] TSH 3RD GENERATION [66114 CPT(R)] URINALYSIS W/ RFLX MICROSCOPIC [27321 CPT(R)] Follow-up Instructions Return in about 4 months (around 2018) for fasting, follow up, DM, HTN, CHO. Patient Instructions Medicare Wellness Visit, Male The best way to live healthy is to have a healthy lifestyle by eating a well-balanced diet, exercising regularly, limiting alcohol and stopping smoking. Regular physical exams and screening tests are another way to keep healthy. Preventive exams provided by your health care provider can find health problems before they become diseases or illnesses. Preventive services including immunizations, screening tests, monitoring and exams can help you take care of your own health.  
 
All people over age 72 should have a pneumovax  and and a prevnar shot to prevent pneumonia. These are once in a lifetime unless you and your provider decide differently. All people over 65 should have a yearly flu shot and a tetanus vaccine every 10 years. Screening for diabetes mellitus with a blood sugar test should be done every year. Glaucoma is a disease of the eye due to increased ocular pressure that can lead to blindness and it should be done every year by an eye professional. 
 
Cardiovascular screening tests that check for elevated lipids (fatty part of blood) which can lead to heart disease and strokes should be done every 5 years. Colorectal screening that evaluates for blood or polyps in your colon should be done yearly as a stool test or every five years as a flexible sigmoidoscope or every 10 years as a colonoscopy up to age 76. Men up to age 76 may need a screening blood test for prostate cancer at certain intervals, depending on their personal and family history. This decision is between the patient and his provider. If you have been a smoker or had family history of abdominal aortic aneurysms, you and your provider may decide to schedule an ultrasound test of your aorta. Hepatitis C screening is also recommended for anyone born between [birth year] through [address]. A shingles vaccine is also recommended once in a lifetime after age 61. Your Medicare Wellness Exam is recommended annually. Here is a list of your current Health Maintenance items with a due date: 
Health Maintenance Due Topic Date Due [facility] Eye Exam  09/04/1955  DTaP/Tdap/Td  (1 - Tdap) 09/04/1967  Shingles Vaccine  07/04/2006  Glaucoma Screening   09/04/2011 [facility] Annual Well Visit  01/28/2017  Flu Vaccine  08/01/2017 Medicare Wellness Visit, Male The best way to live healthy is to have a healthy lifestyle by eating a well-balanced diet, exercising regularly, limiting alcohol and stopping smoking.  
 
Regular physical exams and screening tests are another way to keep healthy. Preventive exams provided by your health care provider can find health problems before they become diseases or illnesses. Preventive services including immunizations, screening tests, monitoring and exams can help you take care of your own health. All people over age 72 should have a pneumovax  and and a prevnar shot to prevent pneumonia. These are once in a lifetime unless you and your provider decide differently. All people over 65 should have a yearly flu shot and a tetanus vaccine every 10 years. Screening for diabetes mellitus with a blood sugar test should be done every year. Glaucoma is a disease of the eye due to increased ocular pressure that can lead to blindness and it should be done every year by an eye professional. 
 
Cardiovascular screening tests that check for elevated lipids (fatty part of blood) which can lead to heart disease and strokes should be done every 5 years. Colorectal screening that evaluates for blood or polyps in your colon should be done yearly as a stool test or every five years as a flexible sigmoidoscope or every 10 years as a colonoscopy up to age 76. Men up to age 76 may need a screening blood test for prostate cancer at certain intervals, depending on their personal and family history. This decision is between the patient and his provider. If you have been a smoker or had family history of abdominal aortic aneurysms, you and your provider may decide to schedule an ultrasound test of your aorta. Hepatitis C screening is also recommended for anyone born between [birth year] through [address]. A shingles vaccine is also recommended once in a lifetime after age 61. Your Medicare Wellness Exam is recommended annually. Here is a list of your current Health Maintenance items with a due date: 
Health Maintenance Due Topic Date Due [facility] Eye Exam  09/04/1955  DTaP/Tdap/Td  (1 - Tdap) 09/04/1967  Shingles Vaccine  07/04/2006  Glaucoma Screening   09/04/2011 [facility] Annual Well Visit  01/28/2017  Flu Vaccine  08/01/2017 Prostate Cancer Screening: Care Instructions Your Care Instructions The prostate gland is an organ found just below a man's bladder. It is the size and shape of a walnut. It surrounds the tube that carries urine from the bladder out of the body through the penis. This tube is called the urethra. Prostate cancer is the abnormal growth of cells in the prostate. It is the second most common type of cancer in men. (Skin cancer is the most common.) Most cases of prostate cancer occur in men older than 72. The disease runs in families. And it's more common in -American men. When it's found and treated early, prostate cancer may be cured. But it is not always treated. This is because prostate cancer may not shorten your life, especially if you are older and the cancer is growing slowly. Follow-up care is a key part of your treatment and safety. Be sure to make and go to all appointments, and call your doctor if you are having problems. It's also a good idea to know your test results and keep a list of the medicines you take. What are the screening tests for prostate cancer? The main screening test for prostate cancer is the prostate-specific antigen (PSA) test. This is a blood test that measures how much PSA is in your blood. A high level may mean that you have an enlargement, an infection, or cancer. Along with the PSA test, you may have a digital rectal exam. The digital (finger) rectal exam checks for anything abnormal in your prostate. To do the exam, the doctor puts a lubricated, gloved finger into your rectum. If these tests suggest cancer, you may need a prostate biopsy. How is prostate cancer diagnosed? In a biopsy, the doctor takes small tissue samples from your prostate gland.  Another doctor then looks at the tissue under a microscope to see if there are cancer cells, signs of infection, or other problems. The results help diagnose prostate cancer. What are the pros and cons of screening? Neither a PSA test nor a digital rectal exam can tell you for sure that you do or do not have cancer. But they can help you decide if you need more tests, such as a prostate biopsy. Screening tests may be useful because most men with prostate cancer don't have symptoms. It can be hard to know if you have cancer until it is more advanced. And then it's harder to treat. But having a PSA test can also cause harm. The test may show high levels of PSA that aren't caused by cancer. So you could have a prostate biopsy you didn't need. Or the PSA test might be normal when there is cancer, so a cancer might not be found early. The test can also find cancers that would never have caused a problem during your lifetime. So you might have treatment that was not needed. Prostate cancer usually develops late in life and grows slowly. For many men, it does not shorten their lives. Some experts advise screening only for men who are at high risk. Talk with your doctor to see if screening is right for you. Where can you learn more? Go to http://lv-shelbi.info/. Enter R550 in the search box to learn more about \"Prostate Cancer Screening: Care Instructions. \" Current as of: May 12, 2017 Content Version: 11.4 © 1887-0628 [employer]. Care instructions adapted under license by [employer] (which disclaims liability or warranty for this information). If you have questions about a medical condition or this instruction, always ask your healthcare professional. [place] [address] any warranty or liability for your use of this information. High Cholesterol: Care Instructions Your Care Instructions Cholesterol is a type of fat in your blood.  It is needed for many body functions, such as making new cells. Cholesterol is made by your body. It also comes from food you eat. High cholesterol means that you have too much of the fat in your blood. This raises your risk of a heart attack and stroke. LDL and HDL are part of your total cholesterol. LDL is the \"bad\" cholesterol. High LDL can raise your risk for heart disease, heart attack, and stroke. HDL is the \"good\" cholesterol. It helps clear bad cholesterol from the body. High HDL is linked with a lower risk of heart disease, heart attack, and stroke. Your cholesterol levels help your doctor find out your risk for having a heart attack or stroke. You and your doctor can talk about whether you need to lower your risk and what treatment is best for you. A heart-healthy lifestyle along with medicines can help lower your cholesterol and your risk. The way you choose to lower your risk will depend on how high your risk is for heart attack and stroke. It will also depend on how you feel about taking medicines. Follow-up care is a key part of your treatment and safety. Be sure to make and go to all appointments, and call your doctor if you are having problems. It's also a good idea to know your test results and keep a list of the medicines you take. How can you care for yourself at home? · Eat a variety of foods every day. Good choices include fruits, vegetables, whole grains (like oatmeal), dried beans and peas, nuts and seeds, soy products (like tofu), and fat-free or low-fat dairy products. · Replace butter, margarine, and hydrogenated or partially hydrogenated oils with olive and canola oils. (Canola oil margarine without trans fat is fine.) · Replace red meat with fish, poultry, and soy protein (like tofu). · Limit processed and packaged foods like chips, crackers, and cookies. · Bake, broil, or steam foods. Don't palmer them. · Be physically active.  Get at least 30 minutes of exercise on most days of the week. Walking is a good choice. You also may want to do other activities, such as running, swimming, cycling, or playing tennis or team sports. · Stay at a healthy weight or lose weight by making the changes in eating and physical activity listed above. Losing just a small amount of weight, even 5 to 10 pounds, can reduce your risk for having a heart attack or stroke. · Do not smoke. When should you call for help? Watch closely for changes in your health, and be sure to contact your doctor if: 
? · You need help making lifestyle changes. ? · You have questions about your medicine. Where can you learn more? Go to http://lvGazillion Entertainmentshelbi.info/. Enter A952 in the search box to learn more about \"High Cholesterol: Care Instructions. \" Current as of: September 21, 2016 Content Version: 11.4 © 9618-9658 [employer]. Care instructions adapted under license by [employer] (which disclaims liability or warranty for this information). If you have questions about a medical condition or this instruction, always ask your healthcare professional. [address] any warranty or liability for your use of this information. Introducing [facility]! Chuck Galdamez introduces [employer] patient portal. Now you can access parts of your medical record, email your doctor's office, and request medication refills online. 1. In your internet browser, go to [email or web address] 2. Click on the First Time User? Click Here link in the Sign In box. You will see the New Member Sign Up page. 3. Enter your [employer] Access Code exactly as it appears below. You will not need to use this code after youve completed the sign-up process. If you do not sign up before the expiration date, you must request a new code. · [employer] Access Code: [email or web address] Expires: 1/1/2018  6:22 PM 
 
 4. Enter the last four digits of your Social Security Number (xxxx) and Date of Birth (mm/dd/yyyy) as indicated and click Submit. You will be taken to the next sign-up page. 5. Create a [employer] ID. This will be your [employer] login ID and cannot be changed, so think of one that is secure and easy to remember. 6. Create a [employer] password. You can change your password at any time. 7. Enter your Password Reset Question and Answer. This can be used at a later time if you forget your password. 8. Enter your e-mail address. You will receive e-mail notification when new information is available in [address] 9. Click Sign Up. You can now view and download portions of your medical record. 10. Click the Download Summary menu link to download a portable copy of your medical information. If you have questions, please visit the Frequently Asked Questions section of the [employer] website. Remember, [employer] is NOT to be used for urgent needs. For medical emergencies, dial 911. Now available from your iPhone and Android! Please provide this summary of care documentation to your next provider. Your primary care clinician is listed as Barbie Rosa. If you have any questions after today's visit, please call [phone].

## 2017-11-05 LAB
ALBUMIN SERPL-MCNC: 4.2 G/DL (ref 3.5–4.8)
ALBUMIN/GLOB SERPL: 1.5 {RATIO} (ref 1.2–2.2)
ALP SERPL-CCNC: 68 IU/L (ref 39–117)
ALT SERPL-CCNC: 9 IU/L (ref 0–44)
APPEARANCE UR: CLEAR
AST SERPL-CCNC: 15 IU/L (ref 0–40)
BACTERIA #/AREA URNS HPF: ABNORMAL /[HPF]
BASOPHILS # BLD AUTO: 0 X10E3/UL (ref 0–0.2)
BASOPHILS NFR BLD AUTO: 1 %
BILIRUB SERPL-MCNC: 0.5 MG/DL (ref 0–1.2)
BILIRUB UR QL STRIP: NEGATIVE
BUN SERPL-MCNC: 14 MG/DL (ref 8–27)
BUN/CREAT SERPL: 16 (ref 10–24)
CALCIUM SERPL-MCNC: 9.3 MG/DL (ref 8.6–10.2)
CASTS URNS MICRO: ABNORMAL
CASTS URNS QL MICRO: PRESENT /LPF
CHLORIDE SERPL-SCNC: 99 MMOL/L (ref 96–106)
CHOLEST SERPL-MCNC: 113 MG/DL (ref 100–199)
CO2 SERPL-SCNC: 26 MMOL/L (ref 18–29)
COLOR UR: YELLOW
CREAT SERPL-MCNC: 0.85 MG/DL (ref 0.76–1.27)
EOSINOPHIL # BLD AUTO: 0.2 X10E3/UL (ref 0–0.4)
EOSINOPHIL NFR BLD AUTO: 3 %
EPI CELLS #/AREA URNS HPF: ABNORMAL /HPF
ERYTHROCYTE [DISTWIDTH] IN BLOOD BY AUTOMATED COUNT: 12.8 % (ref 12.3–15.4)
EST. AVERAGE GLUCOSE BLD GHB EST-MCNC: 137 MG/DL
GFR SERPLBLD CREATININE-BSD FMLA CKD-EPI: 101 ML/MIN/1.73
GFR SERPLBLD CREATININE-BSD FMLA CKD-EPI: 88 ML/MIN/1.73
GLOBULIN SER CALC-MCNC: 2.8 G/DL (ref 1.5–4.5)
GLUCOSE SERPL-MCNC: 105 MG/DL (ref 65–99)
GLUCOSE UR QL: NEGATIVE
HBA1C MFR BLD: 6.4 % (ref 4.8–5.6)
HCT VFR BLD AUTO: 38.2 % (ref 37.5–51)
HDLC SERPL-MCNC: 43 MG/DL
HGB BLD-MCNC: 13.2 G/DL (ref 12.6–17.7)
HGB UR QL STRIP: ABNORMAL
IMM GRANULOCYTES # BLD: 0 X10E3/UL (ref 0–0.1)
IMM GRANULOCYTES NFR BLD: 0 %
INTERPRETATION, 910389: NORMAL
KETONES UR QL STRIP: NEGATIVE
LDLC SERPL CALC-MCNC: 48 MG/DL (ref 0–99)
LEUKOCYTE ESTERASE UR QL STRIP: NEGATIVE
LYMPHOCYTES # BLD AUTO: 2 X10E3/UL (ref 0.7–3.1)
LYMPHOCYTES NFR BLD AUTO: 35 %
MCH RBC QN AUTO: 30.5 PG (ref 26.6–33)
MCHC RBC AUTO-ENTMCNC: 34.6 G/DL (ref 31.5–35.7)
MCV RBC AUTO: 88 FL (ref 79–97)
MICRO URNS: ABNORMAL
MONOCYTES # BLD AUTO: 0.6 X10E3/UL (ref 0.1–0.9)
MONOCYTES NFR BLD AUTO: 10 %
MORPHOLOGY BLD-IMP: NORMAL
MUCOUS THREADS URNS QL MICRO: PRESENT
NEUTROPHILS # BLD AUTO: 2.9 X10E3/UL (ref 1.4–7)
NEUTROPHILS NFR BLD AUTO: 51 %
NITRITE UR QL STRIP: NEGATIVE
PH UR STRIP: 6 [PH] (ref 5–7.5)
PLATELET # BLD AUTO: 302 X10E3/UL (ref 150–379)
POTASSIUM SERPL-SCNC: 4 MMOL/L (ref 3.5–5.2)
PROT SERPL-MCNC: 7 G/DL (ref 6–8.5)
PROT UR QL STRIP: NEGATIVE
PSA SERPL-MCNC: 1.8 NG/ML (ref 0–4)
RBC # BLD AUTO: 4.33 X10E6/UL (ref 4.14–5.8)
RBC #/AREA URNS HPF: ABNORMAL /HPF
SODIUM SERPL-SCNC: 138 MMOL/L (ref 134–144)
SP GR UR: 1.02 (ref 1–1.03)
TRIGL SERPL-MCNC: 112 MG/DL (ref 0–149)
TSH SERPL DL<=0.005 MIU/L-ACNC: 1.78 UIU/ML (ref 0.45–4.5)
UROBILINOGEN UR STRIP-MCNC: 0.2 MG/DL (ref 0.2–1)
VLDLC SERPL CALC-MCNC: 22 MG/DL (ref 5–40)
WBC # BLD AUTO: 5.6 X10E3/UL (ref 3.4–10.8)
WBC #/AREA URNS HPF: ABNORMAL /HPF

## 2017-12-04 ENCOUNTER — OFFICE VISIT (OUTPATIENT)
Dept: FAMILY MEDICINE CLINIC | Age: 72
End: 2017-12-04

## 2017-12-04 VITALS
WEIGHT: 150 LBS | SYSTOLIC BLOOD PRESSURE: 120 MMHG | HEART RATE: 88 BPM | TEMPERATURE: 97.4 F | RESPIRATION RATE: 20 BRPM | DIASTOLIC BLOOD PRESSURE: 76 MMHG | HEIGHT: 63 IN | BODY MASS INDEX: 26.58 KG/M2 | OXYGEN SATURATION: 97 %

## 2017-12-04 DIAGNOSIS — J44.1 COPD EXACERBATION (HCC): Primary | ICD-10-CM

## 2017-12-04 RX ORDER — PREDNISONE 10 MG/1
TABLET ORAL
COMMUNITY
Start: 2017-10-03 | End: 2017-12-04 | Stop reason: ALTCHOICE

## 2017-12-04 RX ORDER — PREDNISONE 5 MG/1
TABLET ORAL
Qty: 21 TAB | Refills: 0 | Status: SHIPPED | OUTPATIENT
Start: 2017-12-04 | End: 2018-02-28 | Stop reason: ALTCHOICE

## 2017-12-04 RX ORDER — LEVOFLOXACIN 500 MG/1
500 TABLET, FILM COATED ORAL DAILY
Qty: 10 TAB | Refills: 0 | Status: SHIPPED | OUTPATIENT
Start: 2017-12-04 | End: 2017-12-14

## 2017-12-04 RX ORDER — IPRATROPIUM BROMIDE AND ALBUTEROL SULFATE 2.5; .5 MG/3ML; MG/3ML
3 SOLUTION RESPIRATORY (INHALATION)
Qty: 1 NEBULE | Refills: 0
Start: 2017-12-04 | End: 2017-12-04

## 2017-12-04 RX ORDER — ALBUTEROL SULFATE 0.83 MG/ML
2.5 SOLUTION RESPIRATORY (INHALATION) ONCE
Qty: 1 EACH | Refills: 0
Start: 2017-12-04 | End: 2017-12-04 | Stop reason: ALTCHOICE

## 2017-12-04 NOTE — MR AVS SNAPSHOT
Visit Information Date & Time Provider Department Dept. Phone Encounter #  
 12/4/2017  1:20 PM Autumn Pratt MD [address] [place] [address] [phone] [id number] Follow-up Instructions Return if symptoms worsen or fail to improve. Your Appointments 2/28/2018  4:00 PM  
ROUTINE CARE with Autumn Pratt MD  
[facility]) Appt Note: medication check [address] [place] [address]  
[phone]  
  
   
 [address] [place] [address] Upcoming Health Maintenance Date Due DTaP/Tdap/Td series (1 - Tdap) 9/4/1967 ZOSTER VACCINE AGE 60> 7/4/2006 HEMOGLOBIN A1C Q6M 5/4/2018 FOOT EXAM Q1 6/29/2018 MICROALBUMIN Q1 6/30/2018 EYE EXAM RETINAL OR DILATED Q1 10/11/2018 MEDICARE YEARLY EXAM 10/31/2018 LIPID PANEL Q1 11/4/2018 GLAUCOMA SCREENING Q2Y 10/11/2019 COLONOSCOPY 6/29/2027 Allergies as of 12/4/2017  Review Complete On: 12/4/2017 By: Autumn Pratt MD  
 No Known Allergies Current Immunizations  Reviewed on 10/30/2017 Name Date Influenza High Dose Vaccine PF 10/30/2017 Pneumococcal Conjugate (PCV-13) 10/1/2016 ZZZ-RETIRED (DO NOT USE) Pneumococcal Vaccine (Unspecified Type) 7/20/2012 10:35 AM  
  
 Not reviewed this visit You Were Diagnosed With   
  
 Codes Comments COPD exacerbation ([facility] [address]    -  Primary ICD-10-CM: J44.1 ICD-9-CM: 491.21 Vitals BP Pulse Temp Resp Height(growth percentile) Weight(growth percentile) 120/76 (BP 1 Location: Left arm, BP Patient Position: Sitting) 88 97.4 °F (36.3 °C) (Oral) 20 5' 3\" (1.6 m) 150 lb (68 kg) SpO2 BMI Smoking Status 97% 26.57 kg/m2 Never Smoker Vitals History BMI and BSA Data Body Mass Index Body Surface Area  
 26.57 kg/m 2 1.74 m 2 Preferred Pharmacy Pharmacy Name Phone [facility]ACY [facility][id number] [place] [phone] [facility] AT [address] [place] [address] [phone] Your Updated Medication List  
  
   
This list is accurate as of: 12/4/17  2:17 PM.  Always use your most recent med list.  
  
  
  
  
 * albuterol-ipratropium 2.5 mg-0.5 mg/3 ml Nebu Commonly known as:  DUO-NEB  
3 mL by Nebulization route now for 1 dose. * ipratropium-albuterol  mcg/actuation inhaler Commonly known as:  Kinza Pour Take 1 Puff by inhalation every six (6) hours. carvedilol 6.25 mg tablet Commonly known as:  COREG  
TAKE ONE TABLET BY MOUTH TWICE DAILY WITH FOOD  
  
 cloNIDine HCl 0.1 mg tablet Commonly known as:  CATAPRES Take 1 Tab by mouth as needed. If systolic BP > 123 and/or diastolic BP > 90  
  
 clopidogrel 75 mg Tab Commonly known as:  PLAVIX Take 1 Tab by mouth daily. fluticasone 50 mcg/actuation nasal spray Commonly known as:  Keily Finely One spray each nostril  
  
 fluticasone furoate 200 mcg/actuation Dsdv inhaler Commonly known as:  ARNUITY ELLIPTA Take 1 Puff by inhalation daily. levoFLOXacin 500 mg tablet Commonly known as:  Jayshree Moscow Take 1 Tab by mouth daily for 10 days. losartan-hydroCHLOROthiazide 50-12.5 mg per tablet Commonly known as:  HYZAAR  
TAKE 1 TABLET BY MOUTH DAILY. INDICATIONS: HYPERTENSION  
  
 metFORMIN 500 mg tablet Commonly known as:  GLUCOPHAGE Take 1 Tab by mouth two (2) times daily (with meals). montelukast 10 mg tablet Commonly known as:  SINGULAIR Take 1 Tab by mouth daily. nitroglycerin 0.4 mg SL tablet Commonly known as:  NITROSTAT  
1 Tab by SubLINGual route every five (5) minutes as needed for Chest Pain (up to 3 doses). Please provide Generic  
  
 polyethylene glycol 17 gram/dose powder Commonly known as:  Scot [place] Take 17 g by mouth daily. predniSONE 5 mg dose pack Commonly known as:  STERAPRED  
 See administration instruction per 5mg dose pack  
  
 rosuvastatin 10 mg tablet Commonly known as:  CRESTOR Take 1 Tab by mouth daily. * Notice: This list has 2 medication(s) that are the same as other medications prescribed for you. Read the directions carefully, and ask your doctor or other care provider to review them with you. Prescriptions Sent to Pharmacy Refills  
 levoFLOXacin (LEVAQUIN) 500 mg tablet 0 Sig: Take 1 Tab by mouth daily for 10 days. Class: Normal  
 Pharmacy: [place], [address]  Ph #: [phone] Route: Oral  
 predniSONE (STERAPRED) 5 mg dose pack 0 Sig: See administration instruction per 5mg dose pack Class: Normal  
 Pharmacy: [facility]/pharmacy #[id number]- [place] [facility] #: [phone]  
 ipratropium-albuterol (COMBIVENT RESPIMAT)  mcg/actuation inhaler 1 Sig: Take 1 Puff by inhalation every six (6) hours. Class: Normal  
 Pharmacy: [place], [address] [facility] #: [phone] Route: Inhalation We Performed the Following ALBUTEROL IPRATROP NON-COMP [id number] [facility]] CT INHAL RX, AIRWAY OBST/DX SPUTUM INDUCT [id number] CPT(R)] Follow-up Instructions Return if symptoms worsen or fail to improve. Patient Instructions Chronic Obstructive Pulmonary Disease (COPD) Flare-Ups: Care Instructions Your Care Instructions Chronic obstructive pulmonary disease (COPD) is a lung disease that makes it hard to breathe. It is caused by damage to the lungs over many years, usually from smoking. COPD is often a mix of two diseases: · Chronic bronchitis: The airways that carry air to the lungs (bronchial tubes) get inflamed and make a lot of mucus. This can narrow or block the airways. · Emphysema:  In a healthy person, the tiny air sacs in the lungs are like balloons. As you breathe in and out, they get bigger and smaller to move air through your lungs. But with emphysema, these air sacs are damaged and lose their stretch. Less air gets in and out of the lungs. Many people with COPD have attacks called flare-ups or exacerbations. This is when your usual symptoms quickly get worse and stay worse. The doctor has checked you carefully. But problems can develop later. If you notice any problems or new symptoms, get medical treatment right away. Follow-up care is a key part of your treatment and safety. Be sure to make and go to all appointments, and call your doctor if you are having problems. It's also a good idea to know your test results and keep a list of the medicines you take. How can you care for yourself at home? · Be safe with medicines. Take your medicines exactly as prescribed. Call your doctor if you think you are having a problem with your medicine. You may be taking medicines such as: ¨ Bronchodilators. These help open your airways and make breathing easier. ¨ Corticosteroids. These reduce airway inflammation. They may be given as pills, in a vein, or in an inhaled form. You may go home with pills in addition to an inhaler that you already use. · A spacer may help you get more inhaled medicine to your lungs. Ask your doctor or pharmacist if a spacer is right for you. If it is, ask how to use it properly. · If your doctor prescribed antibiotics, take them as directed. Do not stop taking them just because you feel better. You need to take the full course of antibiotics. · If your doctor prescribed oxygen, use the flow rate your doctor has recommended. Do not change it without talking to your doctor first. 
· Do not smoke. Smoking makes COPD worse. If you need help quitting, talk to your doctor about stop-smoking programs and medicines. These can increase your chances of quitting for good. When should you call for help? Call 911 anytime you think you may need emergency care. For example, call if: 
? · You have severe trouble breathing. ?Call your doctor now or seek immediate medical care if: 
? · You have new or worse trouble breathing. ? · Your coughing or wheezing gets worse. ? · You cough up dark brown or bloody mucus (sputum). ? · You have a new or higher fever. ? Watch closely for changes in your health, and be sure to contact your doctor if: 
? · You notice more mucus or a change in the color of your mucus. ? · You need to use your antibiotic or steroid pills. ? · You do not get better as expected. Where can you learn more? Go to http://lv-shelbi.info/. Enter X881 in the search box to learn more about \"Chronic Obstructive Pulmonary Disease (COPD) Flare-Ups: Care Instructions. \" Current as of: May 12, 2017 Content Version: 11.4 © 5591-3591 [employer]. Care instructions adapted under license by [employer] (which disclaims liability or warranty for this information). If you have questions about a medical condition or this instruction, always ask your healthcare professional. [place] [address] any warranty or liability for your use of this information. Introducing [facility]! Carly Alcocer introduces [employer] patient portal. Now you can access parts of your medical record, email your doctor's office, and request medication refills online. 1. In your internet browser, go to [email or web address] 2. Click on the First Time User? Click Here link in the Sign In box. You will see the New Member Sign Up page. 3. Enter your [employer] Access Code exactly as it appears below. You will not need to use this code after youve completed the sign-up process. If you do not sign up before the expiration date, you must request a new code. · [employer] Access Code: [email or web address] Expires: 1/1/2018  5:22 PM 
 
 4. Enter the last four digits of your Social Security Number (xxxx) and Date of Birth (mm/dd/yyyy) as indicated and click Submit. You will be taken to the next sign-up page. 5. Create a Greenstack ID. This will be your Greenstack login ID and cannot be changed, so think of one that is secure and easy to remember. 6. Create a Greenstack password. You can change your password at any time. 7. Enter your Password Reset Question and Answer. This can be used at a later time if you forget your password. 8. Enter your e-mail address. You will receive e-mail notification when new information is available in [address] 9. Click Sign Up. You can now view and download portions of your medical record. 10. Click the Download Summary menu link to download a portable copy of your medical information. If you have questions, please visit the Frequently Asked Questions section of the Greenstack website. Remember, Greenstack is NOT to be used for urgent needs. For medical emergencies, dial 911. Now available from your iPhone and Android! Please provide this summary of care documentation to your next provider. Your primary care clinician is listed as Desean Nava. If you have any questions after today's visit, please call [phone].

## 2017-12-04 NOTE — PROGRESS NOTES
Chief Complaint   Patient presents with    Cough     cough, shortness of breath X 1 week      1. Have you been to the ER, urgent care clinic since your last visit? Hospitalized since your last visit? No    2. Have you seen or consulted any other health care providers outside of the [address] [place] [address] since your last visit? Include any pap smears or colon screening.  No

## 2017-12-04 NOTE — PATIENT INSTRUCTIONS
Chronic Obstructive Pulmonary Disease (COPD) Flare-Ups: Care Instructions  Your Care Instructions    Chronic obstructive pulmonary disease (COPD) is a lung disease that makes it hard to breathe. It is caused by damage to the lungs over many years, usually from smoking. COPD is often a mix of two diseases:  · Chronic bronchitis: The airways that carry air to the lungs (bronchial tubes) get inflamed and make a lot of mucus. This can narrow or block the airways. · Emphysema: In a healthy person, the tiny air sacs in the lungs are like balloons. As you breathe in and out, they get bigger and smaller to move air through your lungs. But with emphysema, these air sacs are damaged and lose their stretch. Less air gets in and out of the lungs. Many people with COPD have attacks called flare-ups or exacerbations. This is when your usual symptoms quickly get worse and stay worse. The doctor has checked you carefully. But problems can develop later. If you notice any problems or new symptoms, get medical treatment right away. Follow-up care is a key part of your treatment and safety. Be sure to make and go to all appointments, and call your doctor if you are having problems. It's also a good idea to know your test results and keep a list of the medicines you take. How can you care for yourself at home? · Be safe with medicines. Take your medicines exactly as prescribed. Call your doctor if you think you are having a problem with your medicine. You may be taking medicines such as:  ¨ Bronchodilators. These help open your airways and make breathing easier. ¨ Corticosteroids. These reduce airway inflammation. They may be given as pills, in a vein, or in an inhaled form. You may go home with pills in addition to an inhaler that you already use. · A spacer may help you get more inhaled medicine to your lungs. Ask your doctor or pharmacist if a spacer is right for you. If it is, ask how to use it properly.   · If your doctor prescribed antibiotics, take them as directed. Do not stop taking them just because you feel better. You need to take the full course of antibiotics. · If your doctor prescribed oxygen, use the flow rate your doctor has recommended. Do not change it without talking to your doctor first.  · Do not smoke. Smoking makes COPD worse. If you need help quitting, talk to your doctor about stop-smoking programs and medicines. These can increase your chances of quitting for good. When should you call for help? Call 911 anytime you think you may need emergency care. For example, call if:  ? · You have severe trouble breathing. ?Call your doctor now or seek immediate medical care if:  ? · You have new or worse trouble breathing. ? · Your coughing or wheezing gets worse. ? · You cough up dark brown or bloody mucus (sputum). ? · You have a new or higher fever. ? Watch closely for changes in your health, and be sure to contact your doctor if:  ? · You notice more mucus or a change in the color of your mucus. ? · You need to use your antibiotic or steroid pills. ? · You do not get better as expected. Where can you learn more? Go to http://lv-shelbi.info/. Enter J984 in the search box to learn more about \"Chronic Obstructive Pulmonary Disease (COPD) Flare-Ups: Care Instructions. \"  Current as of: May 12, 2017  Content Version: 11.4  © 0628-8804 [employer]. Care instructions adapted under license by [employer] (which disclaims liability or warranty for this information). If you have questions about a medical condition or this instruction, always ask your healthcare professional. [address] any warranty or liability for your use of this information.

## 2017-12-04 NOTE — PROGRESS NOTES
HISTORY OF PRESENT ILLNESS  Karla Dunn is a 71 y.o. male. He was seen for cough and wheezing for a month. He has h/o COPD and has been non compliant with treatment. HPI  Cough  Patient complains of nonproductive cough, sore throat and wheezing. Symptoms began 1 week ago. The cough is non-productive, with wheezing, with shortness of breath, with shortness of breath during the cough, chest is painful during coughing, worsening over time and is aggravated by pollens, cold air and reclining position Associated symptoms include:chills, change in voice, sore throat. Patient does not have new pets. Patient does have a history of COPD Patient does have a history of environmental allergens. Patient had recent travel. he went to [facility]  and was sick while returning back. Patient does not have a history of smoking. Patient  does not have previous Chest X-ray. Patient does not have had a PPD done. He is on scheduled asmanex and prn albuterol but not using asmanex at all and albuterol only occasionally. Review of Systems   Constitutional: Positive for malaise/fatigue. Negative for chills and fever. HENT: Negative for congestion, ear pain, sore throat and tinnitus. Eyes: Negative for blurred vision, double vision, pain and discharge. Respiratory: Positive for cough, shortness of breath and wheezing. Cardiovascular: Negative for chest pain, palpitations and leg swelling. Gastrointestinal: Negative for abdominal pain, blood in stool, constipation, diarrhea, nausea and vomiting. Genitourinary: Negative for dysuria, frequency, hematuria and urgency. Musculoskeletal: Negative for back pain, joint pain and myalgias. Skin: Negative for rash. Neurological: Negative for dizziness, tremors, seizures and headaches. Endo/Heme/Allergies: Negative for polydipsia. Does not bruise/bleed easily. Psychiatric/Behavioral: Negative for depression and substance abuse. The patient is not nervous/anxious. Physical Exam   Constitutional: He is oriented to person, place, and time. He appears well-developed and well-nourished. No distress. HENT:   Head: Normocephalic and atraumatic. Right Ear: Tympanic membrane and external ear normal. No drainage. Tympanic membrane is not injected. No middle ear effusion. No decreased hearing is noted. Left Ear: Tympanic membrane normal. No drainage. Tympanic membrane is not injected. No middle ear effusion. No decreased hearing is noted. Nose: Mucosal edema present. No rhinorrhea. Right sinus exhibits maxillary sinus tenderness and frontal sinus tenderness. Left sinus exhibits maxillary sinus tenderness and frontal sinus tenderness. Mouth/Throat: Uvula is midline and oropharynx is clear and moist. No oropharyngeal exudate. Nasal mucosa congested, edematous   Eyes: Conjunctivae and EOM are normal. Pupils are equal, round, and reactive to light. No scleral icterus. Neck: Normal range of motion. Neck supple. No JVD present. No thyromegaly present. Cardiovascular: Normal rate, regular rhythm, normal heart sounds and intact distal pulses. No murmur heard. Pulmonary/Chest: Effort normal. He has wheezes. He has no rales. Decreased air entry bilaterally   Abdominal: Soft. Bowel sounds are normal. He exhibits no distension and no mass. Musculoskeletal: Normal range of motion. He exhibits no edema or tenderness. Lymphadenopathy:        Head (right side): No tonsillar adenopathy present. Head (left side): No tonsillar adenopathy present. He has no cervical adenopathy. Neurological: He is alert and oriented to person, place, and time. He has normal reflexes. No cranial nerve deficit. Skin: Skin is warm and dry. No rash noted. He is not diaphoretic. Psychiatric: He has a normal mood and affect. Nursing note and vitals reviewed. ASSESSMENT and PLAN  Diagnoses and all orders for this visit:    1.  COPD exacerbation (HCC)  -     ALBUTEROL IPRATROP NON-COMP  -     NE INHAL RX, AIRWAY OBST/DX SPUTUM INDUCT  -     albuterol-ipratropium (DUO-NEB) 2.5 mg-0.5 mg/3 ml nebu; 3 mL by Nebulization route now for 1 dose. -     levoFLOXacin (LEVAQUIN) 500 mg tablet; Take 1 Tab by mouth daily for 10 days. -     predniSONE (STERAPRED) 5 mg dose pack; See administration instruction per 5mg dose pack  -     ipratropium-albuterol (COMBIVENT RESPIMAT)  mcg/actuation inhaler; Take 1 Puff by inhalation every six (6) hours. post nebulizer exam much better than prior. Discussed lifestyle issues and health guidance given  Patient was given an after visit summary which includes diagnoses, vital signs, current medications, instructions and references & authorized prescriptions . Pt verbalized instructions I provided and expressed understanding of discussion that was held today.   Follow-up Disposition: Not on File

## 2017-12-05 ENCOUNTER — TELEPHONE (OUTPATIENT)
Dept: FAMILY MEDICINE CLINIC | Age: 72
End: 2017-12-05

## 2017-12-05 DIAGNOSIS — J44.1 COPD EXACERBATION (HCC): ICD-10-CM

## 2017-12-05 RX ORDER — ALBUTEROL SULFATE 90 UG/1
2 AEROSOL, METERED RESPIRATORY (INHALATION)
Qty: 1 INHALER | Refills: 1 | Status: SHIPPED | OUTPATIENT
Start: 2017-12-05 | End: 2019-11-29 | Stop reason: SDUPTHER

## 2017-12-05 NOTE — TELEPHONE ENCOUNTER
Outgoing call to patient.  verified. Patient stated medication was to expensive. MD made aware new RX sent to pharmacy.

## 2017-12-05 NOTE — TELEPHONE ENCOUNTER
Pharmacy on file verified. Pharmacy sent a fax  Requested Prescriptions     Pending Prescriptions Disp Refills    ipratropium-albuterol (COMBIVENT RESPIMAT)  mcg/actuation inhaler 1 Inhaler 1     Sig: Take 1 Puff by inhalation every six (6) hours.

## 2017-12-05 NOTE — TELEPHONE ENCOUNTER
----- Message from Arnaud Giron sent at 12/4/2017  5:44 PM EST -----  Regarding: Dr. João Sharpe  Pt requesting inexpensive alternatives to the inhaler that was discussed at today's appt. Pt callback [phone].

## 2018-01-17 ENCOUNTER — TELEPHONE (OUTPATIENT)
Dept: FAMILY MEDICINE CLINIC | Age: 73
End: 2018-01-17

## 2018-01-17 ENCOUNTER — OFFICE VISIT (OUTPATIENT)
Dept: INTERNAL MEDICINE CLINIC | Age: 73
End: 2018-01-17

## 2018-01-17 ENCOUNTER — OFFICE VISIT (OUTPATIENT)
Dept: FAMILY MEDICINE CLINIC | Age: 73
End: 2018-01-17

## 2018-01-17 VITALS
OXYGEN SATURATION: 96 % | BODY MASS INDEX: 26.58 KG/M2 | WEIGHT: 150 LBS | HEART RATE: 84 BPM | HEIGHT: 63 IN | SYSTOLIC BLOOD PRESSURE: 117 MMHG | TEMPERATURE: 98.7 F | RESPIRATION RATE: 18 BRPM | DIASTOLIC BLOOD PRESSURE: 70 MMHG

## 2018-01-17 VITALS
RESPIRATION RATE: 17 BRPM | HEART RATE: 88 BPM | TEMPERATURE: 98.6 F | SYSTOLIC BLOOD PRESSURE: 119 MMHG | WEIGHT: 150 LBS | OXYGEN SATURATION: 99 % | DIASTOLIC BLOOD PRESSURE: 78 MMHG | HEIGHT: 63 IN | BODY MASS INDEX: 26.58 KG/M2

## 2018-01-17 DIAGNOSIS — M25.561 PATELLOFEMORAL ARTHRALGIA OF RIGHT KNEE: Primary | ICD-10-CM

## 2018-01-17 DIAGNOSIS — M75.42 SHOULDER IMPINGEMENT, LEFT: ICD-10-CM

## 2018-01-17 DIAGNOSIS — M76.822 POSTERIOR TIBIAL TENDONITIS, LEFT: ICD-10-CM

## 2018-01-17 DIAGNOSIS — M17.11 ARTHRITIS OF KNEE, RIGHT: Primary | ICD-10-CM

## 2018-01-17 RX ORDER — MELOXICAM 15 MG/1
15 TABLET ORAL DAILY
COMMUNITY
End: 2018-02-26 | Stop reason: ALTCHOICE

## 2018-01-17 RX ORDER — METHYLPREDNISOLONE 4 MG/1
TABLET ORAL
Qty: 1 DOSE PACK | Refills: 0 | Status: SHIPPED | OUTPATIENT
Start: 2018-01-17 | End: 2018-02-28 | Stop reason: ALTCHOICE

## 2018-01-17 RX ORDER — TRIAMCINOLONE ACETONIDE 40 MG/ML
40 INJECTION, SUSPENSION INTRA-ARTICULAR; INTRAMUSCULAR ONCE
Qty: 1 ML | Refills: 0
Start: 2018-01-17 | End: 2018-01-17

## 2018-01-17 NOTE — TELEPHONE ENCOUNTER
----- Message from Alejandrina Hicks sent at 1/17/2018  9:53 AM EST -----  Regarding: [place]ala/telephone  Pt is requesting an appointment today for right knee pain. I did offer him an appointment to see an other provider and he did decline. Pts number is [phone].

## 2018-01-17 NOTE — PATIENT INSTRUCTIONS

## 2018-01-17 NOTE — PROGRESS NOTES
HISTORY OF PRESENT ILLNESS  Shira Rizvi is a 71 y.o. male. Blood pressure 117/70, pulse 84, temperature 98.7 °F (37.1 °C), temperature source Oral, resp. rate 18, height 5' 3\" (1.6 m), weight 150 lb (68 kg), SpO2 96 %. Body mass index is 26.57 kg/(m^2). Chief Complaint   Patient presents with    Knee Swelling     x24 hours not due to injury        HPI  Shira Fillers 71 y.o. male  presents to the office today for knee swelling. Right knee swelling: Pt reports that he has been having knee pain for several months that has started to get worse. Pt states that starting yesterday, 1/16/17, he began having increased pain in his right knee. He notes that starting this morning his knee began to swell and he is now having difficulty walking. Pt reports that he was given Meloxicam with minimal relief. Advised pt that I will give him a Medrol dose pack for the pain and I want to get an XR of his knee. Will also refer pt to Dr. Jovanna Panda (Sports medicine) for further evaluation. Will also check a Uric acid level today to rule out Gout. Current Outpatient Prescriptions   Medication Sig Dispense Refill    methylPREDNISolone (MEDROL DOSEPACK) 4 mg tablet Take as directed 1 Dose Pack 0    albuterol (PROVENTIL HFA, VENTOLIN HFA, PROAIR HFA) 90 mcg/actuation inhaler Take 2 Puffs by inhalation every four (4) hours as needed for Wheezing. 1 Inhaler 1    fluticasone furoate (ARNUITY ELLIPTA) 200 mcg/actuation dsdv inhaler Take 1 Puff by inhalation daily. 1 Inhaler 5    predniSONE (STERAPRED) 5 mg dose pack See administration instruction per 5mg dose pack 21 Tab 0    polyethylene glycol (MIRALAX) 17 gram/dose powder Take 17 g by mouth daily. 510 g 0    cloNIDine HCl (CATAPRES) 0.1 mg tablet Take 1 Tab by mouth as needed. If systolic BP > 015 and/or diastolic BP > 90 90 Tab 0    rosuvastatin (CRESTOR) 10 mg tablet Take 1 Tab by mouth daily.  90 Tab 1    carvedilol (COREG) 6.25 mg tablet TAKE ONE TABLET BY MOUTH TWICE DAILY WITH FOOD 180 Tab 1    metFORMIN (GLUCOPHAGE) 500 mg tablet Take 1 Tab by mouth two (2) times daily (with meals). 180 Tab 2    nitroglycerin (NITROSTAT) 0.4 mg SL tablet 1 Tab by SubLINGual route every five (5) minutes as needed for Chest Pain (up to 3 doses). Please provide Generic 30 Tab 2    losartan-hydroCHLOROthiazide (HYZAAR) 50-12.5 mg per tablet TAKE 1 TABLET BY MOUTH DAILY. INDICATIONS: HYPERTENSION 90 Tab 0    clopidogrel (PLAVIX) 75 mg tab Take 1 Tab by mouth daily. 90 Tab 0    montelukast (SINGULAIR) 10 mg tablet Take 1 Tab by mouth daily. 27 Tab 3    fluticasone (FLONASE) 50 mcg/actuation nasal spray One spray each nostril 1 Bottle 2     No Known Allergies  Past Medical History:   Diagnosis Date    CAD (coronary artery disease)     3 stents, done one [place] apart in [place] in [place],    Diabetes [facility])     Hypercholesterolemia     Hypertension      Past Surgical History:   Procedure Laterality Date    HX PTCA      2007    HX TONSILLECTOMY       Family History   Problem Relation Age of Onset    No Known Problems Mother     No Known Problems Father      Social History   Substance Use Topics    Smoking status: Never Smoker    Smokeless tobacco: Never Used    Alcohol use No        Review of Systems   Constitutional: Negative. Negative for malaise/fatigue. Eyes: Negative for blurred vision. Respiratory: Negative for shortness of breath. Cardiovascular: Negative for chest pain and leg swelling. Musculoskeletal: Positive for joint pain (right knee). Neurological: Negative. Negative for dizziness and headaches. All other systems reviewed and are negative. Physical Exam   Constitutional: He is oriented to person, place, and time. He appears well-developed and well-nourished. HENT:   Head: Normocephalic and atraumatic. Neck: Carotid bruit is not present. Cardiovascular: Normal rate, regular rhythm, normal heart sounds and intact distal pulses.   Exam reveals no gallop and no friction rub. No murmur heard. Pulmonary/Chest: Effort normal and breath sounds normal. No respiratory distress. He has no wheezes. He has no rales. He exhibits no tenderness. Musculoskeletal:        Right knee: He exhibits decreased range of motion and swelling. Tenderness found. Right knee: Swelling, crepitations felt, decreased rom, ttp, anterior posterior drawer sign negative    Neurological: He is alert and oriented to person, place, and time. Psychiatric: He has a normal mood and affect. His behavior is normal. Judgment and thought content normal.   Nursing note and vitals reviewed. ASSESSMENT and PLAN  Diagnoses and all orders for this visit:    1. Arthritis of knee, right  -     XR KNEE RT 3 V; Future  -     URIC ACID  -     methylPREDNISolone (MEDROL DOSEPACK) 4 mg tablet; Take as directed  -     REFERRAL TO SPORTS MEDICINE (Dr. Allyssa Hamilton)  - Advised pt that I will give him a Medrol dose pack for the pain and I want to get an XR of his knee. - Will also refer pt to Dr. Allyssa Hamilton (Sports medicine) for further evaluation.   - Will also check a Uric acid level today to rule out Gout. Follow-up Disposition:  Return for right knee pain. he  Written by alison Edmondson, as dictated by Naya Lutz M.D.   I have reviewed and agree with the above note and have made corrections where appropriate, Dr. Claude Acevedo MD

## 2018-01-17 NOTE — TELEPHONE ENCOUNTER
Call placed to patient  verified. Patient was seen by another provider today and was in office when I called him.

## 2018-01-17 NOTE — PROGRESS NOTES
Chief Complaint   Patient presents with    Knee Swelling     x24 hours not due to injury     Pt denied weight check. 1. Have you been to the ER, urgent care clinic since your last visit? Hospitalized since your last visit? No    2. Have you seen or consulted any other health care providers outside of the [address] [place] [address] since your last visit? Include any pap smears or colon screening.  No

## 2018-01-17 NOTE — PROGRESS NOTES
Chief Complaint   Patient presents with    Knee Pain     he is a 71y.o. year old male who presents for evaluation of Right Knee Pain  Pain Assessment Encounter      Ketty Yang  1/17/2018  Onset of Symptoms: Several Months  ________________________________________________________________________  Description:Patient complains of constant right knee pain he believes is due to arthritis    Frequency: more than 5 times a day  Pain Scale:(1-10): 10  Trauma Hx: None  Hx of similar symptoms: No  Radiation: None  Duration:  continuous      Progression: is unchanged  What makes it better?: rest  What makes it worse?:walking  Medications tried: None    Reviewed and agree with Nurse Note and duplicated in this note. Reviewed PmHx, RxHx, FmHx, SocHx, AllgHx and updated and dated in the chart.     Family History   Problem Relation Age of Onset    No Known Problems Mother     No Known Problems Father        Past Medical History:   Diagnosis Date    CAD (coronary artery disease)     3 stents, done one month apart in [place] in 2007,    Diabetes [facility])     Hypercholesterolemia     Hypertension       Social History     Social History    Marital status:      Spouse name: N/A    Number of children: N/A    Years of education: N/A     Social History Main Topics    Smoking status: Never Smoker    Smokeless tobacco: Never Used    Alcohol use No    Drug use: No    Sexual activity: Not Asked     Other Topics Concern    None     Social History Narrative        Review of Systems - negative except as listed above      Objective:     Vitals:    01/17/18 1426   BP: 119/78   Pulse: 88   Resp: 17   Temp: 98.6 °F (37 °C)   TempSrc: Oral   SpO2: 99%   Weight: 150 lb (68 kg)   Height: 5' 3\" (1.6 m)       Physical Examination: General appearance - alert, well appearing, and in no distress  Back exam - full range of motion, no tenderness, palpable spasm or pain on motion  Neurological - alert, oriented, normal speech, no focal findings or movement disorder noted  Musculoskeletal - right knee exam:  The patient'sright Knee  is  normal to inspection. The ROM is normal and there is flexion to 60 Effusion is: moderate The joint exhibits absent warmth and Crepitus is: moderate. The Cliff test is:  Negative Joint Line Tenderness is  Positive . The [facility] Test is Negative  and the Lachman is  negative. The Anterior Drawer is Negative. The Posterior Drawer is:  negative. Valgus Stress (for MCL) is:  normal . Varus Stress (for LCL) is  normal  . The Sharon Test is negative and the Apprehension Sign:  negative  Patellar Grind Positive and Tracking is normal    A left ankle exam was performed. Swelling: moderate  Warmth: no warmth  Tenderness: moderate at medial ankle  Palpation:  ATFL:  non-tender  CFL:  non-tender  PTFL:  non-tender  Syndesmosis Ligament:  non-tender  Deltoid ligament:  non-tender  Posterior Tibialis Tendon:  tender  Peroneal Tendon: non-tender  Achilles Tendon:  non-tender     Proximal Fibula:  non-tender  Proximal Tibia:  non-tender  Distal Fibula:  non-tender  Distal Tibia:  non-tender    Plantar Fascia: non-tender  Midfoot:  non-tender  Forefoot:  non-tender    ROM:  Plantar Flexion:  normal  Dorsiflexion:  normal    Squeeze Test: negative  Talar Tilt Test:  negative  Anterior Drawer:negative    Kleiger Test:  negative  Hop Test: negative  Sanders: negative    Extremities - peripheral pulses normal, no pedal edema, no clubbing or cyanosis  Skin - normal coloration and turgor, no rashes, no suspicious skin lesions noted  Time Out taken at:  3:02 PM  1/17/2018    * Patient was identified by name and date of birth   * Agreement on procedure being performed was verified  * Risks and Benefits explained to the patient  * Procedure site verified and marked as necessary  * Patient was positioned for comfort  * Consent was signed and verified   In the presence of:     Witness: MELINDA Keys  Injection #: 1  Needle:  18 gauge  Procedure: This procedure was discussed with Sunny Hodge and other therapeutic options were considered (risks vs benefits). Sunny Hodge and I thought that an injection was merited. After informed consent was obtained, landmarks were identified(marked), and the right joint  was cleansed with ChlorPrep in the standard sterile manner. 10 cc;s of serous fluid was removed. 3mL  1% lidocaine  and  1mL Kenalog  was then injected and needle tenotomy was not performed. Procedure performed with ultrasound needle guidance. The needle was then withdrawn. T he procedure was well tolerated. The patient is asked to continue to rest the area for a few more days before resuming regular activities. It may be more painful for the first 1-2 days. NSAIDS are to be avoided. Watch for fever, or increased swelling or persistent pain in the joint. Call or return to clinic prn if such symptoms occur or there is failure to improve as anticipated. The procedure did provide relief of symptoms in the clinic. RTC in 4 weeks for reevaluation and possible reinjection. Given the patient's body habitus and the anatomically deep nature of this structure, sonographic guidance is recommended to prevent injury to neurovascular structures and confirm accuracy of injection. Furthermore, this patient has failed conservative treatment with physical therapy and modalities and the diagnostic and therapeutic accuracy is important. Assessment/ Plan:   Diagnoses and all orders for this visit:    1. Patellofemoral arthralgia of right knee  -     TRIAMCINOLONE ACETONIDE INJ  -     triamcinolone acetonide (KENALOG) 40 mg/mL injection; 1 mL by IntraMUSCular route once for 1 dose. - 20606 - DRAIN/INJECT INTERMEDIATE JOINT/BURSA WITH US    2. Shoulder impingement, left  -     XR SHOULDER LT AP/LAT MIN 2 V; Future    3.  Posterior tibial tendonitis, left  -     XR ANKLE LT MIN 3 V; Future    Patient recommend physical therapy but he would like to start with home physical therapy first.  Patient return in 4 weeks for shoulder    Pathophysiology, recovery and rehabilitation process discussed and questions answered   Counseling for 30 Minutes of the total visit duration   Pictures and figures used as necessary   Provided reassurance   Monitor response to injection   Discussed steroid side effects of fat atrophy, hypopigmentation, steroid flare or infection   Monitor response to home exercises   Recommend activity modification   Recommend  lower impact activities-walking, Eliptical, Nordic Track, cycling or swimming   Follow up prn       1) Remember to stay active and/or exercise regularly (I suggest 30-45 minutes daily)   2) For reliable dietary information, go to www. EATRIGHT.org. You may wish to consider seeing the nutritionist at [facility] [phone], also consider the [address] I have discussed the diagnosis with the patient and the intended plan as seen in the above orders. The patient has received an after-visit summary and questions were answered concerning future plans. Medication Side Effects and Warnings were discussed with patient: yes  Patient Labs were reviewed and or requested: yes  Patient Past Records were reviewed and or requested  yes  I have discussed the diagnosis with the patient and the intended plan as seen in the above orders. The patient has received an after-visit summary and questions were answered concerning future plans. Pt agrees to call or return to clinic and/or go to closest ER with any worsening of symptoms. This may include, but not limited to increased fever (>100.4) with NSAIDS or Tylenol, increased edema, confusion, rash, worsening of presenting symptoms.

## 2018-01-17 NOTE — MR AVS SNAPSHOT
[address] [place] [address]. [address] [id number] 
[phone] Patient: Ricco Figueredo MRN: [id number] FHL:[id number] Visit Information Date & Time Provider Department Dept. Phone Encounter #  
 1/17/2018  2:15 PM Monkia Velarde MD [facility] Medicine and Primary [facility] [phone] [id number] Follow-up Instructions Return in about 4 weeks (around 2/14/2018) for shoulder pain. Follow-up and Disposition History Your Appointments 2/28/2018  4:00 PM  
ROUTINE CARE with Debbrah Severs, MD  
[facility]) Appt Note: medication check [address] [address]  
[phone]  
  
   
 [address] [address] Upcoming Health Maintenance Date Due DTaP/Tdap/Td series (1 - Tdap) 9/4/1967 ZOSTER VACCINE AGE 60> 7/4/2006 HEMOGLOBIN A1C Q6M 5/4/2018 FOOT EXAM Q1 6/29/2018 MICROALBUMIN Q1 6/30/2018 EYE EXAM RETINAL OR DILATED Q1 10/11/2018 MEDICARE YEARLY EXAM 10/31/2018 LIPID PANEL Q1 11/4/2018 GLAUCOMA SCREENING Q2Y 10/11/2019 COLONOSCOPY 6/29/2027 Allergies as of 1/17/2018  Review Complete On: 1/17/2018 By: Monika Velarde MD  
 No Known Allergies Current Immunizations  Reviewed on 10/30/2017 Name Date Influenza High Dose Vaccine PF 10/30/2017 Pneumococcal Conjugate (PCV-13) 10/1/2016 ZZZ-RETIRED (DO NOT USE) Pneumococcal Vaccine (Unspecified Type) 7/20/2012 10:35 AM  
  
 Not reviewed this visit You Were Diagnosed With   
  
 Codes Comments Patellofemoral arthralgia of right knee    -  Primary ICD-10-CM: M25.561 ICD-9-CM: 719.46 Shoulder impingement, left     ICD-10-CM: M75.42 
ICD-9-CM: 726.2 Posterior tibial tendonitis, left     ICD-10-CM: Y63.987 ICD-9-CM: 726.72 Vitals BP Pulse Temp Resp Height(growth percentile) Weight(growth percentile) 119/78 (BP 1 Location: Left arm, BP Patient Position: Sitting) 88 98.6 °F (37 °C) (Oral) 17 5' 3\" (1.6 m) 150 lb (68 kg) SpO2 BMI Smoking Status 99% 26.57 kg/m2 Never Smoker Vitals History BMI and BSA Data Body Mass Index Body Surface Area  
 26.57 kg/m 2 1.74 m 2 Preferred Pharmacy Pharmacy Name Phone [id number] [place] - [address] [phone] Your Updated Medication List  
  
   
This list is accurate as of: 1/17/18  3:27 PM.  Always use your most recent med list.  
  
  
  
  
 albuterol 90 mcg/actuation inhaler Commonly known as:  PROVENTIL HFA, VENTOLIN HFA, PROAIR HFA Take 2 Puffs by inhalation every four (4) hours as needed for Wheezing. carvedilol 6.25 mg tablet Commonly known as:  COREG  
TAKE ONE TABLET BY MOUTH TWICE DAILY WITH FOOD  
  
 cloNIDine HCl 0.1 mg tablet Commonly known as:  CATAPRES Take 1 Tab by mouth as needed. If systolic BP > 270 and/or diastolic BP > 90  
  
 clopidogrel 75 mg Tab Commonly known as:  PLAVIX Take 1 Tab by mouth daily. fluticasone 50 mcg/actuation nasal spray Commonly known as:  Lobito Lover One spray each nostril  
  
 fluticasone furoate 200 mcg/actuation Dsdv inhaler Commonly known as:  ARNUITY ELLIPTA Take 1 Puff by inhalation daily. losartan-hydroCHLOROthiazide 50-12.5 mg per tablet Commonly known as:  HYZAAR  
TAKE 1 TABLET BY MOUTH DAILY. INDICATIONS: HYPERTENSION  
  
 meloxicam 15 mg tablet Commonly known as:  MOBIC Take 15 mg by mouth daily. metFORMIN 500 mg tablet Commonly known as:  GLUCOPHAGE Take 1 Tab by mouth two (2) times daily (with meals). methylPREDNISolone 4 mg tablet Commonly known as:  Rosalio Facundo Take as directed  
  
 montelukast 10 mg tablet Commonly known as:  SINGULAIR Take 1 Tab by mouth daily. nitroglycerin 0.4 mg SL tablet Commonly known as:  NITROSTAT 1 Tab by SubLINGual route every five (5) minutes as needed for Chest Pain (up to 3 doses). Please provide Generic  
  
 polyethylene glycol 17 gram/dose powder Commonly known as:  Fercho Roller Take 17 g by mouth daily. predniSONE 5 mg dose pack Commonly known as:  STERAPRED See administration instruction per 5mg dose pack  
  
 rosuvastatin 10 mg tablet Commonly known as:  CRESTOR Take 1 Tab by mouth daily. triamcinolone acetonide 40 mg/mL injection Commonly known as:  KENALOG  
1 mL by IntraMUSCular route once for 1 dose. We Performed the Following [place] ARTHROCENTESIS ASPIR&/INJ INTERM JT/BURS W/US [42916 CPT(R)] REFERRAL TO PHYSICAL THERAPY [NBI22 Custom] TRIAMCINOLONE ACETONIDE INJ [ [facility]] Follow-up Instructions Return in about 4 weeks (around 2/14/2018) for shoulder pain. To-Do List   
 01/17/2018 Imaging:  XR ANKLE LT MIN 3 V   
  
 01/17/2018 Imaging:  XR SHOULDER LT AP/LAT MIN 2 V Referral Information Referral ID Referred By Referred To  
  
 [id number] Rosalba Bazan [facility] Lin JerLUISA [address] Phone: [phone] Visits Status Start Date End Date  
 20 New Request 1/17/18 1/17/19 If your referral has a status of pending review or denied, additional information will be sent to support the outcome of this decision. Introducing [facility]! Faustino Ordoñez introduces [employer] patient portal. Now you can access parts of your medical record, email your doctor's office, and request medication refills online. 1. In your internet browser, go to [email or web address] 2. Click on the First Time User? Click Here link in the Sign In box. You will see the New Member Sign Up page. 3. Enter your [employer] Access Code exactly as it appears below. You will not need to use this code after youve completed the sign-up process.  If you do not sign up before the expiration date, you must request a new code. · [employer] Access Code: [email or web address] Expires: 4/17/2018 12:34 PM 
 
4. Enter the last four digits of your Social Security Number (xxxx) and Date of Birth (mm/dd/yyyy) as indicated and click Submit. You will be taken to the next sign-up page. 5. Create a [employer] ID. This will be your [employer] login ID and cannot be changed, so think of one that is secure and easy to remember. 6. Create a [employer] password. You can change your password at any time. 7. Enter your Password Reset Question and Answer. This can be used at a later time if you forget your password. 8. Enter your e-mail address. You will receive e-mail notification when new information is available in [address]. 9. Click Sign Up. You can now view and download portions of your medical record. 10. Click the Download Summary menu link to download a portable copy of your medical information. If you have questions, please visit the Frequently Asked Questions section of the [employer] website. Remember, [employer] is NOT to be used for urgent needs. For medical emergencies, dial 911. Now available from your iPhone and Android! Please provide this summary of care documentation to your next provider. Your primary care clinician is listed as Laurens Caodaism. If you have any questions after today's visit, please call [phone].

## 2018-01-18 LAB — URATE SERPL-MCNC: 5.4 MG/DL (ref 3.7–8.6)

## 2018-01-22 LAB
APPEARANCE FLD: ABNORMAL
BACTERIA FLD CULT: NORMAL
CIL COLUMNAR LINING CELLS FLD: 0 %
COLOR FLD: YELLOW
CRYSTALS FLD MICRO: ABNORMAL
EOSINOPHIL NFR FLD MANUAL: 0 %
LYMPHOCYTES NFR FLD MANUAL: 21 %
MACROPHAGES NFR FLD MANUAL: 5 %
NEUTROPHILS NFR FLD MANUAL: 74 %
NUC CELL # FLD: ABNORMAL CELLS/UL (ref 0–200)
RBC # FLD AUTO: 2000 /UL

## 2018-02-26 DIAGNOSIS — I25.10 CORONARY ARTERY DISEASE INVOLVING NATIVE CORONARY ARTERY OF NATIVE HEART WITHOUT ANGINA PECTORIS: Chronic | ICD-10-CM

## 2018-02-26 DIAGNOSIS — I10 ESSENTIAL HYPERTENSION WITH GOAL BLOOD PRESSURE LESS THAN 130/85: ICD-10-CM

## 2018-02-26 RX ORDER — LOSARTAN POTASSIUM AND HYDROCHLOROTHIAZIDE 12.5; 5 MG/1; MG/1
TABLET ORAL
Qty: 90 TAB | Refills: 1 | Status: SHIPPED | OUTPATIENT
Start: 2018-02-26 | End: 2018-05-16 | Stop reason: SDUPTHER

## 2018-02-26 RX ORDER — CLOPIDOGREL BISULFATE 75 MG/1
TABLET ORAL
Qty: 90 TAB | Refills: 1 | Status: SHIPPED | OUTPATIENT
Start: 2018-02-26 | End: 2018-02-28 | Stop reason: SDUPTHER

## 2018-02-28 ENCOUNTER — OFFICE VISIT (OUTPATIENT)
Dept: FAMILY MEDICINE CLINIC | Age: 73
End: 2018-02-28

## 2018-02-28 VITALS
WEIGHT: 151 LBS | HEIGHT: 63 IN | TEMPERATURE: 97.3 F | RESPIRATION RATE: 16 BRPM | DIASTOLIC BLOOD PRESSURE: 78 MMHG | OXYGEN SATURATION: 98 % | BODY MASS INDEX: 26.75 KG/M2 | HEART RATE: 68 BPM | SYSTOLIC BLOOD PRESSURE: 112 MMHG

## 2018-02-28 DIAGNOSIS — Z92.29 HX OF LONG TERM USE OF BLOOD THINNERS: ICD-10-CM

## 2018-02-28 DIAGNOSIS — S93.402A MILD SPRAIN OF LEFT ANKLE, INITIAL ENCOUNTER: ICD-10-CM

## 2018-02-28 DIAGNOSIS — E11.9 CONTROLLED TYPE 2 DIABETES MELLITUS WITHOUT COMPLICATION, WITHOUT LONG-TERM CURRENT USE OF INSULIN (HCC): Primary | ICD-10-CM

## 2018-02-28 DIAGNOSIS — I25.10 CORONARY ARTERY DISEASE INVOLVING NATIVE CORONARY ARTERY OF NATIVE HEART WITHOUT ANGINA PECTORIS: Chronic | ICD-10-CM

## 2018-02-28 DIAGNOSIS — I10 ESSENTIAL HYPERTENSION: ICD-10-CM

## 2018-02-28 DIAGNOSIS — J44.9 COPD, MODERATE (HCC): ICD-10-CM

## 2018-02-28 DIAGNOSIS — E78.5 HYPERLIPIDEMIA LDL GOAL <100: ICD-10-CM

## 2018-02-28 PROBLEM — I25.110 CORONARY ARTERY DISEASE INVOLVING NATIVE CORONARY ARTERY OF NATIVE HEART WITH UNSTABLE ANGINA PECTORIS (HCC): Chronic | Status: ACTIVE | Noted: 2018-02-28

## 2018-02-28 NOTE — PROGRESS NOTES
HISTORY OF PRESENT ILLNESS  Irais Ritchie is a 71 y.o. male. He was seen for 4 months follow up on diabetes, HTN, dyslipidemia, CAD  Was seen in office on 01/17/18 for knee pain, shoulder pain. Xray and uric acid level was reassuring. Had cortisone injection in rt knee by Dr Ngozi Aguirre. HPI  Cardiovascular Review  The patient has hypertension, hyperlipidemia, coronary artery disease and status post coronary artery stenting. He reports taking medications as instructed, no medication side effects noted, no TIA's, no chest pain on exertion, no dyspnea on exertion, no swelling of ankles, no orthostatic dizziness or lightheadedness, no orthopnea or paroxysmal nocturnal dyspnea, no palpitations, no intermittent claudication symptoms, no muscle aches or pain. Diet and Lifestyle: generally follows a low fat low cholesterol diet, generally follows a low sodium diet, does not rigorously follow a diabetic diet, exercises regularly, nonsmoker. Lab review: labs reviewed and discussed with patient. He had normal Lexican stress test and Echo in 09/2014 and normal EKG in 01/2016  Medicines: Carvedilol 6.25 mg BID, Plavix, Pravastatin 20 mg,( crestor 10 mg sometimes) Losartan/Hctz 50/12.5 mg, clonidine as needed  He was recently evaluated by cardiology and has been advised for cardiac cath due to abnormal stress test. Patient is in denial    Lab Results   Component Value Date/Time    Cholesterol, total 113 11/04/2017 10:18 AM    HDL Cholesterol 43 11/04/2017 10:18 AM    LDL, calculated 48 11/04/2017 10:18 AM    VLDL, calculated 22 11/04/2017 10:18 AM    Triglyceride 112 11/04/2017 10:18 AM         Endocrine Review  He is seen for diabetes. Since last visit: no change. Home glucose monitoring: is performed sporadically, nonfasting values range 100-140. He reports medication compliance: noncompliant some of the time. Medication side effects: none. Diabetic diet compliance: noncompliant some of the time.   Further diabetic ROS: no polyuria or polydipsia, no chest pain, dyspnea or TIA's, no numbness, tingling or pain in extremities, no unusual visual symptoms, no hypoglycemia. Lab review: labs reviewed and discussed with patient. Eye exam: due.  ,   Medications: Metformin  500 mg BID  on ACEI/ARBS :yes  On ASA, no as he is on Plavix  Podiatry visit: not done    Lab Results   Component Value Date/Time    Hemoglobin A1c 6.4 (H) 11/04/2017 10:18 AM        COPD  Patient complains of cough and shortness of breath. Symptoms began several years ago. Symptoms chronic dyspnea: severity = mild: course of sx: well controlled  able walk 5 blocks  cough: severity: moderate: sputum : clear: light  symptoms worse with exertion. does worsen with exertion. Sputum is clear in small amounts. Fever has been suspected fevers but not measured at home. Patient uses 1 pillows at night. Patient can walk 1 mile  before resting. Patient currently is not on home oxygen therapy. Arvid Roby Respiratory history: frequent episodes of bronchitis and COPD  He is on scheduled Qvar, Singulair and prn albuterol, but non compliant with treatment. Review of Systems   Constitutional: Negative for chills, fever and malaise/fatigue. HENT: Negative for congestion, ear pain, sore throat and tinnitus. Eyes: Negative for blurred vision, double vision, pain and discharge. Respiratory: Negative for cough, shortness of breath and wheezing. Cardiovascular: Negative for chest pain, palpitations and leg swelling. Gastrointestinal: Negative for abdominal pain, blood in stool, constipation, diarrhea, nausea and vomiting. Genitourinary: Negative for dysuria, frequency, hematuria and urgency. Musculoskeletal: Negative for back pain, joint pain and myalgias. Bilateral knee pain and left ankle pain   Skin: Negative for rash. Neurological: Negative for dizziness, tremors, seizures and headaches. Endo/Heme/Allergies: Negative for polydipsia. Does not bruise/bleed easily. Psychiatric/Behavioral: Negative for depression and substance abuse. The patient is not nervous/anxious. Physical Exam   Constitutional: He is oriented to person, place, and time. He appears well-developed and well-nourished. HENT:   Head: Normocephalic and atraumatic. Right Ear: External ear normal.   Mouth/Throat: Oropharynx is clear and moist. No oropharyngeal exudate. Eyes: Conjunctivae and EOM are normal. Pupils are equal, round, and reactive to light. No scleral icterus. Neck: Normal range of motion. Neck supple. No JVD present. No thyromegaly present. Cardiovascular: Normal rate, regular rhythm, normal heart sounds and intact distal pulses. No murmur heard. Pulmonary/Chest: Effort normal and breath sounds normal. He has no wheezes. Abdominal: Soft. Bowel sounds are normal. He exhibits no distension and no mass. Musculoskeletal: Normal range of motion. He exhibits no edema or tenderness. Feet:    Lymphadenopathy:     He has no cervical adenopathy. Neurological: He is alert and oriented to person, place, and time. He has normal reflexes. No cranial nerve deficit. Skin: Skin is warm and dry. No rash noted. He is not diaphoretic. Scattered Bruises over skin   Psychiatric: He has a normal mood and affect. Nursing note and vitals reviewed. ASSESSMENT and PLAN  Diagnoses and all orders for this visit:    1. Controlled type 2 diabetes mellitus without complication, without long-term current use of insulin ([facility] [address]  Assessment & Plan:  Well Controlled, based on history, physical exam and review of pertinent labs, studies and medications; meds reconciled; continue current treatment plan, lifestyle modifications recommended, medication compliance emphasized. Key Antihyperglycemic Medications             metFORMIN (GLUCOPHAGE) 500 mg tablet  (Taking) Take 1 Tab by mouth two (2) times daily (with meals).         Other Key Diabetic Medications losartan-hydroCHLOROthiazide (HYZAAR) 50-12.5 mg per tablet  (Taking) TAKE 1 TABLET BY MOUTH DAILY. INDICATIONS: HYPERTENSION    rosuvastatin (CRESTOR) 10 mg tablet  (Taking) Take 1 Tab by mouth daily. Lab Results   Component Value Date/Time    Hemoglobin A1c 6.4 11/04/2017 10:18 AM    Glucose 105 11/04/2017 10:18 AM    Creatinine 0.85 11/04/2017 10:18 AM    Microalb/Creat ratio (ug/mg creat.) <7.3 06/30/2017 08:39 AM    Cholesterol, total 113 11/04/2017 10:18 AM    HDL Cholesterol 43 11/04/2017 10:18 AM    LDL, calculated 48 11/04/2017 10:18 AM    Triglyceride 112 11/04/2017 10:18 AM     Diabetic Foot and Eye Exam HM Status   Topic Date Due    Diabetic Foot Care  06/29/2018    Eye Exam  10/11/2018       Orders:  -     METABOLIC PANEL, COMPREHENSIVE  -     HEMOGLOBIN A1C WITH EAG  -     LIPID PANEL    2. COPD, moderate ([address]  Assessment & Plan:  Well Controlled, based on history, physical exam and review of pertinent labs, studies and medications; meds reconciled; continue current treatment plan, medication compliance emphasized. Key COPD Medications             albuterol (PROVENTIL HFA, VENTOLIN HFA, PROAIR HFA) 90 mcg/actuation inhaler  (Taking) Take 2 Puffs by inhalation every four (4) hours as needed for Wheezing. fluticasone furoate (ARNUITY ELLIPTA) 200 mcg/actuation dsdv inhaler  (Taking) Take 1 Puff by inhalation daily. montelukast (SINGULAIR) 10 mg tablet  (Taking) Take 1 Tab by mouth daily. Lab Results   Component Value Date/Time    WBC 5.6 11/04/2017 10:18 AM    HGB 13.2 11/04/2017 10:18 AM    HCT 38.2 11/04/2017 10:18 AM    PLATELET 934 72/62/2482 10:18 AM       Orders:  -     CBC WITH AUTOMATED DIFF    3. Coronary artery disease involving native coronary artery of native heart without angina pectoris  -     METABOLIC PANEL, COMPREHENSIVE  -     LIPID PANEL    4. Hyperlipidemia LDL goal <911  -     METABOLIC PANEL, COMPREHENSIVE  -     LIPID PANEL    5.  Essential hypertension  - METABOLIC PANEL, COMPREHENSIVE    6. Hx of long term use of blood thinners  -     CBC WITH AUTOMATED DIFF    7. Mild sprain of left ankle, initial encounter  ACE bandage was given. Discussed rehab exercise    Discussed lifestyle issues and health guidance given  Patient was given an after visit summary which includes diagnoses, vital signs, current medications, instructions and references & authorized prescriptions . Results of labs will be conveyed to patient, once available. Pt verbalized instructions I provided and expressed understanding of discussion that was held today. Follow-up Disposition:  Return in about 4 months (around 6/28/2018) for fasting, follow up, DM, CHO.

## 2018-02-28 NOTE — MR AVS SNAPSHOT
[facility] 
 
 
 [address] [facility] 
[phone] Patient: Germain Luis MRN: [id number] WNP:[birth year] Visit Information Date & Time Provider Department Dept. Phone Encounter #  
 2/28/2018  4:00 PM Patience Ybarra, [address] [phone] [id number] Follow-up Instructions Return in about 4 months (around 6/28/2018) for fasting, follow up, DM, CHO. Upcoming Health Maintenance Date Due DTaP/Tdap/Td series (1 - Tdap) 9/4/1967 ZOSTER VACCINE AGE 60> 7/4/2006 HEMOGLOBIN A1C Q6M 5/4/2018 FOOT EXAM Q1 6/29/2018 MICROALBUMIN Q1 6/30/2018 EYE EXAM RETINAL OR DILATED Q1 10/11/2018 MEDICARE YEARLY EXAM 10/31/2018 LIPID PANEL Q1 11/4/2018 GLAUCOMA SCREENING Q2Y 10/11/2019 COLONOSCOPY 6/29/2027 Allergies as of 2/28/2018  Review Complete On: 2/28/2018 By: Patience Ybarra MD  
 No Known Allergies Current Immunizations  Reviewed on 10/30/2017 Name Date Influenza High Dose Vaccine PF 10/30/2017 Pneumococcal Conjugate (PCV-13) 10/1/2016 ZZZ-RETIRED (DO NOT USE) Pneumococcal Vaccine (Unspecified Type) 7/20/2012 10:35 AM  
  
 Not reviewed this visit You Were Diagnosed With   
  
 Codes Comments Controlled type 2 diabetes mellitus without complication, without long-term current use of insulin ([address]    -  Primary ICD-10-CM: E11.9 ICD-9-CM: 250.00 COPD, moderate ([address]     ICD-10-CM: J44.9 ICD-9-CM: 065 Coronary artery disease involving native coronary artery of native heart without angina pectoris     ICD-10-CM: I25.10 ICD-9-CM: 414.01 Hyperlipidemia LDL goal <100     ICD-10-CM: E78.5 ICD-9-CM: 272.4 Essential hypertension     ICD-10-CM: I10 
ICD-9-CM: 401.9 Hx of long term use of blood thinners     ICD-10-CM: Z79.01 
ICD-9-CM: V58.61 Vitals BP Pulse Temp Resp Height(growth percentile) Weight(growth percentile) 112/78 (BP 1 Location: Left arm, BP Patient Position: Sitting) 68 97.3 °F (36.3 °C) (Oral) 16 5' 3\" (1.6 m) 151 lb (68.5 kg) SpO2 BMI Smoking Status 98% 26.75 kg/m2 Never Smoker Vitals History BMI and BSA Data Body Mass Index Body Surface Area  
 26.75 kg/m 2 1.74 m 2 Preferred Pharmacy Pharmacy Name Phone [id number]- [place], [place] - [address] [phone] Your Updated Medication List  
  
   
This list is accurate as of 2/28/18  4:12 PM.  Always use your most recent med list.  
  
  
  
  
 albuterol 90 mcg/actuation inhaler Commonly known as:  PROVENTIL HFA, VENTOLIN HFA, PROAIR HFA Take 2 Puffs by inhalation every four (4) hours as needed for Wheezing. carvedilol 6.25 mg tablet Commonly known as:  COREG  
TAKE ONE TABLET BY MOUTH TWICE DAILY WITH FOOD  
  
 cloNIDine HCl 0.1 mg tablet Commonly known as:  CATAPRES Take 1 Tab by mouth as needed. If systolic BP > 629 and/or diastolic BP > 90  
  
 clopidogrel 75 mg Tab Commonly known as:  PLAVIX Take 1 Tab by mouth daily. fluticasone 50 mcg/actuation nasal spray Commonly known as:  Tanya Alen One spray each nostril  
  
 fluticasone furoate 200 mcg/actuation Dsdv inhaler Commonly known as:  ARNUITY ELLIPTA Take 1 Puff by inhalation daily. losartan-hydroCHLOROthiazide 50-12.5 mg per tablet Commonly known as:  HYZAAR  
TAKE 1 TABLET BY MOUTH DAILY. INDICATIONS: HYPERTENSION  
  
 metFORMIN 500 mg tablet Commonly known as:  GLUCOPHAGE Take 1 Tab by mouth two (2) times daily (with meals). montelukast 10 mg tablet Commonly known as:  SINGULAIR Take 1 Tab by mouth daily. nitroglycerin 0.4 mg SL tablet Commonly known as:  NITROSTAT  
1 Tab by SubLINGual route every five (5) minutes as needed for Chest Pain (up to 3 doses). Please provide Generic  
  
 polyethylene glycol 17 gram/dose powder Commonly known as:  Melina Session Take 17 g by mouth daily. rosuvastatin 10 mg tablet Commonly known as:  CRESTOR Take 1 Tab by mouth daily. We Performed the Following CBC WITH AUTOMATED DIFF [47367 CPT(R)] HEMOGLOBIN A1C WITH EAG [96512 CPT(R)] LIPID PANEL [88159 CPT(R)] METABOLIC PANEL, COMPREHENSIVE [14917 CPT(R)] Follow-up Instructions Return in about 4 months (around 6/28/2018) for fasting, follow up, DM, CHO. Patient Instructions DASH Diet: Care Instructions Your Care Instructions The DASH diet is an eating plan that can help lower your blood pressure. DASH stands for Dietary Approaches to Stop Hypertension. Hypertension is high blood pressure. The DASH diet focuses on eating foods that are high in calcium, potassium, and magnesium. These nutrients can lower blood pressure. The foods that are highest in these nutrients are fruits, vegetables, low-fat dairy products, nuts, seeds, and legumes. But taking calcium, potassium, and magnesium supplements instead of eating foods that are high in those nutrients does not have the same effect. The DASH diet also includes whole grains, fish, and poultry. The DASH diet is one of several lifestyle changes your doctor may recommend to lower your high blood pressure. Your doctor may also want you to decrease the amount of sodium in your diet. Lowering sodium while following the DASH diet can lower blood pressure even further than just the DASH diet alone. Follow-up care is a key part of your treatment and safety. Be sure to make and go to all appointments, and call your doctor if you are having problems. It's also a good idea to know your test results and keep a list of the medicines you take. How can you care for yourself at home? Following the DASH diet · Eat 4 to 5 servings of fruit each day.  A serving is 1 medium-sized piece of fruit, ½ cup chopped or canned fruit, 1/4 cup dried fruit, or 4 ounces (½ cup) of fruit juice. Choose fruit more often than fruit juice. · Eat 4 to 5 servings of vegetables each day. A serving is 1 cup of lettuce or raw leafy vegetables, ½ cup of chopped or cooked vegetables, or 4 ounces (½ cup) of vegetable juice. Choose vegetables more often than vegetable juice. · Get 2 to 3 servings of low-fat and fat-free dairy each day. A serving is 8 ounces of milk, 1 cup of yogurt, or 1 ½ ounces of cheese. · Eat 6 to 8 servings of grains each day. A serving is 1 slice of bread, 1 ounce of dry cereal, or ½ cup of cooked rice, pasta, or cooked cereal. Try to choose whole-grain products as much as possible. · Limit lean meat, poultry, and fish to 2 servings each day. A serving is 3 ounces, about the size of a deck of cards. · Eat 4 to 5 servings of nuts, seeds, and legumes (cooked dried beans, lentils, and split peas) each week. A serving is 1/3 cup of nuts, 2 tablespoons of seeds, or ½ cup of cooked beans or peas. · Limit fats and oils to 2 to 3 servings each day. A serving is 1 teaspoon of vegetable oil or 2 tablespoons of salad dressing. · Limit sweets and added sugars to 5 servings or less a week. A serving is 1 tablespoon jelly or jam, ½ cup sorbet, or 1 cup of lemonade. · Eat less than 2,300 milligrams (mg) of sodium a day. If you limit your sodium to 1,500 mg a day, you can lower your blood pressure even more. Tips for success · Start small. Do not try to make dramatic changes to your diet all at once. You might feel that you are missing out on your favorite foods and then be more likely to not follow the plan. Make small changes, and stick with them. Once those changes become habit, add a few more changes. · Try some of the following: ¨ Make it a goal to eat a fruit or vegetable at every meal and at snacks. This will make it easy to get the recommended amount of fruits and vegetables each day. ¨ Try yogurt topped with fruit and nuts for a snack or healthy dessert. ¨ Add lettuce, tomato, cucumber, and onion to sandwiches. ¨ Combine a ready-made pizza crust with low-fat mozzarella cheese and lots of vegetable toppings. Try using tomatoes, squash, spinach, broccoli, carrots, cauliflower, and onions. ¨ Have a variety of cut-up vegetables with a low-fat dip as an appetizer instead of chips and dip. ¨ Sprinkle sunflower seeds or chopped almonds over salads. Or try adding chopped walnuts or almonds to cooked vegetables. ¨ Try some vegetarian meals using beans and peas. Add garbanzo or kidney beans to salads. Make burritos and tacos with mashed gil beans or black beans. Where can you learn more? Go to http://lv-shelbi.info/. Enter G275 in the search box to learn more about \"DASH Diet: Care Instructions. \" Current as of: September 21, 2016 Content Version: 11.4 © 4263-8501 [employer]. Care instructions adapted under license by [employer] (which disclaims liability or warranty for this information). If you have questions about a medical condition or this instruction, always ask your healthcare professional. [address] any warranty or liability for your use of this information. Introducing hospitals & HEALTH SERVICES! Serina Carodso introduces [employer] patient portal. Now you can access parts of your medical record, email your doctor's office, and request medication refills online. 1. In your internet browser, go to [email or web address] 2. Click on the First Time User? Click Here link in the Sign In box. You will see the New Member Sign Up page. 3. Enter your [employer] Access Code exactly as it appears below. You will not need to use this code after youve completed the sign-up process. If you do not sign up before the expiration date, you must request a new code. · [employer] Access Code: [email or web address] Expires: 4/17/2018 12:34 PM 
 
 4. Enter the last four digits of your Social Security Number (xxxx) and Date of Birth (mm/dd/yyyy) as indicated and click Submit. You will be taken to the next sign-up page. 5. Create a [employer] ID. This will be your [employer] login ID and cannot be changed, so think of one that is secure and easy to remember. 6. Create a [employer] password. You can change your password at any time. 7. Enter your Password Reset Question and Answer. This can be used at a later time if you forget your password. 8. Enter your e-mail address. You will receive e-mail notification when new information is available in [address] 9. Click Sign Up. You can now view and download portions of your medical record. 10. Click the Download Summary menu link to download a portable copy of your medical information. If you have questions, please visit the Frequently Asked Questions section of the [employer] website. Remember, [employer] is NOT to be used for urgent needs. For medical emergencies, dial 911. Now available from your iPhone and Android! Please provide this summary of care documentation to your next provider. Your primary care clinician is listed as Sarah Mcginnis. If you have any questions after today's visit, please call [phone].

## 2018-02-28 NOTE — ASSESSMENT & PLAN NOTE
Well Controlled, based on history, physical exam and review of pertinent labs, studies and medications; meds reconciled; continue current treatment plan, lifestyle modifications recommended, medication compliance emphasized. Key Antihyperglycemic Medications             metFORMIN (GLUCOPHAGE) 500 mg tablet  (Taking) Take 1 Tab by mouth two (2) times daily (with meals). Other Key Diabetic Medications             losartan-hydroCHLOROthiazide (HYZAAR) 50-12.5 mg per tablet  (Taking) TAKE 1 TABLET BY MOUTH DAILY. INDICATIONS: HYPERTENSION    rosuvastatin (CRESTOR) 10 mg tablet  (Taking) Take 1 Tab by mouth daily.         Lab Results   Component Value Date/Time    Hemoglobin A1c 6.4 11/04/2017 10:18 AM    Glucose 105 11/04/2017 10:18 AM    Creatinine 0.85 11/04/2017 10:18 AM    Microalb/Creat ratio (ug/mg creat.) <7.3 06/30/2017 08:39 AM    Cholesterol, total 113 11/04/2017 10:18 AM    HDL Cholesterol 43 11/04/2017 10:18 AM    LDL, calculated 48 11/04/2017 10:18 AM    Triglyceride 112 11/04/2017 10:18 AM     Diabetic Foot and Eye Exam HM Status   Topic Date Due    Diabetic Foot Care  06/29/2018    Eye Exam  10/11/2018

## 2018-02-28 NOTE — LETTER
3/5/2018 10:17 AM 
 
Mr. Va Avina [address] [facility] [id number] Dear Va Avina: 
 
Please find your most recent results below. Resulted Orders CBC WITH AUTOMATED DIFF Result Value Ref Range WBC 6.4 3.4 - 10.8 x10E3/uL  
 RBC 4.48 4.14 - 5.80 x10E6/uL HGB 13.5 13.0 - 17.7 g/dL HCT 40.3 37.5 - 51.0 % MCV 90 79 - 97 fL  
 MCH 30.1 26.6 - 33.0 pg  
 MCHC 33.5 31.5 - 35.7 g/dL  
 RDW 13.3 12.3 - 15.4 % PLATELET 220 922 - 344 x10E3/uL NEUTROPHILS 52 Not Estab. % Lymphocytes 31 Not Estab. % MONOCYTES 8 Not Estab. % EOSINOPHILS 9 Not Estab. % BASOPHILS 0 Not Estab. %  
 ABS. NEUTROPHILS 3.3 1.4 - 7.0 x10E3/uL Abs Lymphocytes 2.0 0.7 - 3.1 x10E3/uL  
 ABS. MONOCYTES 0.5 0.1 - 0.9 x10E3/uL  
 ABS. EOSINOPHILS 0.6 (H) 0.0 - 0.4 x10E3/uL  
 ABS. BASOPHILS 0.0 0.0 - 0.2 x10E3/uL IMMATURE GRANULOCYTES 0 Not Estab. %  
 ABS. IMM. GRANS. 0.0 0.0 - 0.1 x10E3/uL Narrative Performed at:  [address]  [id number] : Neyda Perez MD, Phone:  [phone] METABOLIC PANEL, COMPREHENSIVE Result Value Ref Range Glucose 108 (H) 65 - 99 mg/dL BUN 12 8 - 27 mg/dL Creatinine 0.86 0.76 - 1.27 mg/dL GFR est non-AA 87 >59 mL/min/1.73 GFR est  >59 mL/min/1.73  
 BUN/Creatinine ratio 14 10 - 24 Sodium 142 134 - 144 mmol/L Potassium 4.4 3.5 - 5.2 mmol/L Chloride 103 96 - 106 mmol/L  
 CO2 25 18 - 29 mmol/L Calcium 9.0 8.6 - 10.2 mg/dL Protein, total 6.5 6.0 - 8.5 g/dL Albumin 3.8 3.5 - 4.8 g/dL GLOBULIN, TOTAL 2.7 1.5 - 4.5 g/dL A-G Ratio 1.4 1.2 - 2.2 Bilirubin, total 0.3 0.0 - 1.2 mg/dL Alk. phosphatase 69 39 - 117 IU/L  
 AST (SGOT) 23 0 - 40 IU/L  
 ALT (SGPT) 8 0 - 44 IU/L Narrative Performed at:  [address]  [id number] : Neyda Perez MD, Phone:  [phone] HEMOGLOBIN A1C WITH EAG  
 Result Value Ref Range Hemoglobin A1c 6.5 (H) 4.8 - 5.6 % Comment:  
            Pre-diabetes: 5.7 - 6.4 Diabetes: >6.4 Glycemic control for adults with diabetes: <7.0 Estimated average glucose 140 mg/dL Narrative Performed at:  [address]  [id number] : Anu Plunkett MD, Phone:  [phone] LIPID PANEL Result Value Ref Range Cholesterol, total 117 100 - 199 mg/dL Triglyceride 70 0 - 149 mg/dL HDL Cholesterol 43 >39 mg/dL VLDL, calculated 14 5 - 40 mg/dL LDL, calculated 60 0 - 99 mg/dL Narrative Performed at:  [address]  [id number] : Anu Plunkett MD, Phone:  [phone] CVD REPORT Result Value Ref Range INTERPRETATION Note Comment:  
   Supplemental report is available. Narrative Performed at:  [address] [place] [address]  [id number] : Mia Webster PhD, Phone:  [phone] RECOMMENDATIONS: 
{RKidney, liver, blood count, cholesterol, average sugar all results very reassuring To continue on same medicine Please call me if you have any questions: [phone] Sincerely, Cheryl Holcomb MD

## 2018-02-28 NOTE — PATIENT INSTRUCTIONS

## 2018-02-28 NOTE — ASSESSMENT & PLAN NOTE
Well Controlled, based on history, physical exam and review of pertinent labs, studies and medications; meds reconciled; continue current treatment plan, medication compliance emphasized. Key COPD Medications             albuterol (PROVENTIL HFA, VENTOLIN HFA, PROAIR HFA) 90 mcg/actuation inhaler  (Taking) Take 2 Puffs by inhalation every four (4) hours as needed for Wheezing. fluticasone furoate (ARNUITY ELLIPTA) 200 mcg/actuation dsdv inhaler  (Taking) Take 1 Puff by inhalation daily. montelukast (SINGULAIR) 10 mg tablet  (Taking) Take 1 Tab by mouth daily.         Lab Results   Component Value Date/Time    WBC 5.6 11/04/2017 10:18 AM    HGB 13.2 11/04/2017 10:18 AM    HCT 38.2 11/04/2017 10:18 AM    PLATELET 158 74/12/3307 10:18 AM

## 2018-02-28 NOTE — PROGRESS NOTES
Chief Complaint   Patient presents with    Diabetes     Follow up    Cholesterol Problem    Hypertension     1. Have you been to the ER, urgent care clinic since your last visit? Hospitalized since your last visit? No    2. Have you seen or consulted any other health care providers outside of the [facility] since your last visit? Include any pap smears or colon screening.  No

## 2018-03-04 LAB
ALBUMIN SERPL-MCNC: 3.8 G/DL (ref 3.5–4.8)
ALBUMIN/GLOB SERPL: 1.4 {RATIO} (ref 1.2–2.2)
ALP SERPL-CCNC: 69 IU/L (ref 39–117)
ALT SERPL-CCNC: 8 IU/L (ref 0–44)
AST SERPL-CCNC: 23 IU/L (ref 0–40)
BASOPHILS # BLD AUTO: 0 X10E3/UL (ref 0–0.2)
BASOPHILS NFR BLD AUTO: 0 %
BILIRUB SERPL-MCNC: 0.3 MG/DL (ref 0–1.2)
BUN SERPL-MCNC: 12 MG/DL (ref 8–27)
BUN/CREAT SERPL: 14 (ref 10–24)
CALCIUM SERPL-MCNC: 9 MG/DL (ref 8.6–10.2)
CHLORIDE SERPL-SCNC: 103 MMOL/L (ref 96–106)
CHOLEST SERPL-MCNC: 117 MG/DL (ref 100–199)
CO2 SERPL-SCNC: 25 MMOL/L (ref 18–29)
CREAT SERPL-MCNC: 0.86 MG/DL (ref 0.76–1.27)
EOSINOPHIL # BLD AUTO: 0.6 X10E3/UL (ref 0–0.4)
EOSINOPHIL NFR BLD AUTO: 9 %
ERYTHROCYTE [DISTWIDTH] IN BLOOD BY AUTOMATED COUNT: 13.3 % (ref 12.3–15.4)
EST. AVERAGE GLUCOSE BLD GHB EST-MCNC: 140 MG/DL
GFR SERPLBLD CREATININE-BSD FMLA CKD-EPI: 101 ML/MIN/1.73
GFR SERPLBLD CREATININE-BSD FMLA CKD-EPI: 87 ML/MIN/1.73
GLOBULIN SER CALC-MCNC: 2.7 G/DL (ref 1.5–4.5)
GLUCOSE SERPL-MCNC: 108 MG/DL (ref 65–99)
HBA1C MFR BLD: 6.5 % (ref 4.8–5.6)
HCT VFR BLD AUTO: 40.3 % (ref 37.5–51)
HDLC SERPL-MCNC: 43 MG/DL
HGB BLD-MCNC: 13.5 G/DL (ref 13–17.7)
IMM GRANULOCYTES # BLD: 0 X10E3/UL (ref 0–0.1)
IMM GRANULOCYTES NFR BLD: 0 %
INTERPRETATION, 910389: NORMAL
LDLC SERPL CALC-MCNC: 60 MG/DL (ref 0–99)
LYMPHOCYTES # BLD AUTO: 2 X10E3/UL (ref 0.7–3.1)
LYMPHOCYTES NFR BLD AUTO: 31 %
MCH RBC QN AUTO: 30.1 PG (ref 26.6–33)
MCHC RBC AUTO-ENTMCNC: 33.5 G/DL (ref 31.5–35.7)
MCV RBC AUTO: 90 FL (ref 79–97)
MONOCYTES # BLD AUTO: 0.5 X10E3/UL (ref 0.1–0.9)
MONOCYTES NFR BLD AUTO: 8 %
NEUTROPHILS # BLD AUTO: 3.3 X10E3/UL (ref 1.4–7)
NEUTROPHILS NFR BLD AUTO: 52 %
PLATELET # BLD AUTO: 266 X10E3/UL (ref 150–379)
POTASSIUM SERPL-SCNC: 4.4 MMOL/L (ref 3.5–5.2)
PROT SERPL-MCNC: 6.5 G/DL (ref 6–8.5)
RBC # BLD AUTO: 4.48 X10E6/UL (ref 4.14–5.8)
SODIUM SERPL-SCNC: 142 MMOL/L (ref 134–144)
TRIGL SERPL-MCNC: 70 MG/DL (ref 0–149)
VLDLC SERPL CALC-MCNC: 14 MG/DL (ref 5–40)
WBC # BLD AUTO: 6.4 X10E3/UL (ref 3.4–10.8)

## 2018-03-05 NOTE — PROGRESS NOTES
Outbound call to patient.  verified. notified patient of recent lab results. Verbalized understanding. Letter printed with result.

## 2018-03-05 NOTE — PROGRESS NOTES
Please inform patient and send letter  Kidney, liver, blood count, cholesterol, average sugar all results very reassuring  To continue on same medicine  thanks

## 2018-03-13 ENCOUNTER — OFFICE VISIT (OUTPATIENT)
Dept: INTERNAL MEDICINE CLINIC | Age: 73
End: 2018-03-13

## 2018-03-13 VITALS
HEIGHT: 63 IN | WEIGHT: 150 LBS | RESPIRATION RATE: 16 BRPM | HEART RATE: 74 BPM | SYSTOLIC BLOOD PRESSURE: 127 MMHG | DIASTOLIC BLOOD PRESSURE: 83 MMHG | BODY MASS INDEX: 26.58 KG/M2 | TEMPERATURE: 98.4 F | OXYGEN SATURATION: 98 %

## 2018-03-13 DIAGNOSIS — M25.562 ACUTE PAIN OF LEFT KNEE: ICD-10-CM

## 2018-03-13 DIAGNOSIS — M25.561 ACUTE PAIN OF RIGHT KNEE: ICD-10-CM

## 2018-03-13 DIAGNOSIS — M17.11 PRIMARY OSTEOARTHRITIS OF RIGHT KNEE: Primary | ICD-10-CM

## 2018-03-13 RX ORDER — MELOXICAM 15 MG/1
15 TABLET ORAL DAILY
Qty: 30 TAB | Refills: 1 | Status: SHIPPED | OUTPATIENT
Start: 2018-03-13 | End: 2018-03-27 | Stop reason: DRUGHIGH

## 2018-03-13 NOTE — MR AVS SNAPSHOT
Leesa Ottokaterin 
 
 
 [address] [facility] [address] [address] 
[phone] Patient: Jeromy Galeano MRN: [id number] PQO:[birth year] Visit Information Date & Time Provider Department Dept. Phone Encounter #  
 3/13/2018  3:30 PM Lacey James MD [address] [place] [address] and [facility] [phone] [id number] Follow-up Instructions Return in about 4 weeks (around 4/10/2018), or if symptoms worsen or fail to improve. Follow-up and Disposition History Upcoming Health Maintenance Date Due DTaP/Tdap/Td series (1 - Tdap) 9/4/1967 ZOSTER VACCINE AGE 60> 7/4/2006 FOOT EXAM Q1 6/29/2018 MICROALBUMIN Q1 6/30/2018 HEMOGLOBIN A1C Q6M 9/3/2018 EYE EXAM RETINAL OR DILATED Q1 10/11/2018 MEDICARE YEARLY EXAM 10/31/2018 LIPID PANEL Q1 3/3/2019 GLAUCOMA SCREENING Q2Y 10/11/2019 COLONOSCOPY 6/29/2027 Allergies as of 3/13/2018  Review Complete On: 3/13/2018 By: Lacey James MD  
 No Known Allergies Current Immunizations  Reviewed on 10/30/2017 Name Date Influenza High Dose Vaccine PF 10/30/2017 Pneumococcal Conjugate (PCV-13) 10/1/2016 ZZZ-RETIRED (DO NOT USE) Pneumococcal Vaccine (Unspecified Type) 7/20/2012 10:35 AM  
  
 Not reviewed this visit You Were Diagnosed With   
  
 Codes Comments Primary osteoarthritis of right knee    -  Primary ICD-10-CM: M17.11 ICD-9-CM: 715.16 Acute pain of left knee     ICD-10-CM: M25.562 ICD-9-CM: 719.46 Acute pain of right knee     ICD-10-CM: M25.561 ICD-9-CM: 719.46 Vitals BP Pulse Temp Resp Height(growth percentile) Weight(growth percentile) 127/83 (BP 1 Location: Left arm, BP Patient Position: Sitting) 74 98.4 °F (36.9 °C) (Oral) 16 5' 3\" (1.6 m) 150 lb (68 kg) SpO2 BMI Smoking Status 98% 26.57 kg/m2 Never Smoker Vitals History BMI and BSA Data  Body Mass Index Body Surface Area  
 26.57 kg/m 2 1.74 m 2  
  
  
 Preferred Pharmacy Pharmacy Name Phone [id number] AT [address] [place] [address] [phone] Your Updated Medication List  
  
   
This list is accurate as of 3/13/18  4:23 PM.  Always use your most recent med list.  
  
  
  
  
 albuterol 90 mcg/actuation inhaler Commonly known as:  PROVENTIL HFA, VENTOLIN HFA, PROAIR HFA Take 2 Puffs by inhalation every four (4) hours as needed for Wheezing. carvedilol 6.25 mg tablet Commonly known as:  COREG  
TAKE ONE TABLET BY MOUTH TWICE DAILY WITH FOOD  
  
 cloNIDine HCl 0.1 mg tablet Commonly known as:  CATAPRES Take 1 Tab by mouth as needed. If systolic BP > 164 and/or diastolic BP > 90  
  
 clopidogrel 75 mg Tab Commonly known as:  PLAVIX Take 1 Tab by mouth daily. fluticasone 50 mcg/actuation nasal spray Commonly known as:  Severa Candace One spray each nostril  
  
 fluticasone furoate 200 mcg/actuation Dsdv inhaler Commonly known as:  ARNUITY ELLIPTA Take 1 Puff by inhalation daily. losartan-hydroCHLOROthiazide 50-12.5 mg per tablet Commonly known as:  HYZAAR  
TAKE 1 TABLET BY MOUTH DAILY. INDICATIONS: HYPERTENSION  
  
 meloxicam 15 mg tablet Commonly known as:  MOBIC Take 1 Tab by mouth daily. metFORMIN 500 mg tablet Commonly known as:  GLUCOPHAGE Take 1 Tab by mouth two (2) times daily (with meals). montelukast 10 mg tablet Commonly known as:  SINGULAIR Take 1 Tab by mouth daily. nitroglycerin 0.4 mg SL tablet Commonly known as:  NITROSTAT  
1 Tab by SubLINGual route every five (5) minutes as needed for Chest Pain (up to 3 doses). Please provide Generic  
  
 polyethylene glycol 17 gram/dose powder Commonly known as:  Ratna Arts Take 17 g by mouth daily. rosuvastatin 10 mg tablet Commonly known as:  CRESTOR Take 1 Tab by mouth daily. Prescriptions Sent to Pharmacy  Refills  
 meloxicam (MOBIC) 15 mg tablet 1  
 Sig: Take 1 Tab by mouth daily. Class: Normal  
 Pharmacy: [place], [address]  #: [phone] Route: Oral  
  
Follow-up Instructions Return in about 4 weeks (around 4/10/2018), or if symptoms worsen or fail to improve. To-Do List   
 03/13/2018 Imaging:  MRI KNEE RT WO CONT   
  
 03/13/2018 Imaging:  XR KNEE LT MIN 4 V Introducing [facility] & HEALTH SERVICES! [employer] introduces [employer] patient portal. Now you can access parts of your medical record, email your doctor's office, and request medication refills online. 1. In your internet browser, go to [email or web address] 2. Click on the First Time User? Click Here link in the Sign In box. You will see the New Member Sign Up page. 3. Enter your Tonara Access Code exactly as it appears below. You will not need to use this code after youve completed the sign-up process. If you do not sign up before the expiration date, you must request a new code. · Tonara Access Code: [email or web address] Expires: 4/17/2018  1:34 PM 
 
4. Enter the last four digits of your Social Security Number (xxxx) and Date of Birth (mm/dd/yyyy) as indicated and click Submit. You will be taken to the next sign-up page. 5. Create a Tonara ID. This will be your Tonara login ID and cannot be changed, so think of one that is secure and easy to remember. 6. Create a Tonara password. You can change your password at any time. 7. Enter your Password Reset Question and Answer. This can be used at a later time if you forget your password. 8. Enter your e-mail address. You will receive e-mail notification when new information is available in [address] 9. Click Sign Up. You can now view and download portions of your medical record. 10. Click the Download Summary menu link to download a portable copy of your medical information. If you have questions, please visit the Frequently Asked Questions section of the [employer] website. Remember, AfterShip is NOT to be used for urgent needs. For medical emergencies, dial 911. Now available from your iPhone and Android! Please provide this summary of care documentation to your next provider. Your primary care clinician is listed as Cheryl Villar. If you have any questions after today's visit, please call [phone].

## 2018-03-13 NOTE — PROGRESS NOTES
Chief Complaint   Patient presents with    Knee Pain     he is a 71y.o. year old male who presents for follow up of injury. Follow Up Pain Assessment Encounter      Onset of Symptoms: Several Months  ________________________________________________________________________  Description: Pain is unchanged      Pain Scale:(1-10): 8  Duration:  continuous  What makes it better?: rest  What makes it worse?:walking  Medications tried: None  Modalities tried: Home exercises for 4 weeks and failure of steroid injection 1 month ago        Reviewed and agree with Nurse Note and duplicated in this note. Reviewed PmHx, RxHx, FmHx, SocHx, AllgHx and updated and dated in the chart.     Family History   Problem Relation Age of Onset    No Known Problems Mother     No Known Problems Father        Past Medical History:   Diagnosis Date    CAD (coronary artery disease)     3 stents, done one month apart in [place] in [place],    Diabetes [facility])     Hypercholesterolemia     Hypertension       Social History     Social History    Marital status:      Spouse name: N/A    Number of children: N/A    Years of education: N/A     Social History Main Topics    Smoking status: Never Smoker    Smokeless tobacco: Never Used    Alcohol use No    Drug use: No    Sexual activity: Not Asked     Other Topics Concern    None     Social History Narrative        Review of Systems - negative except as listed above      Objective:     Vitals:    03/13/18 1539   BP: 127/83   Pulse: 74   Resp: 16   Temp: 98.4 °F (36.9 °C)   TempSrc: Oral   SpO2: 98%   Weight: 150 lb (68 kg)   Height: 5' 3\" (1.6 m)       Physical Examination: General appearance - alert, well appearing, and in no distress  Back exam - full range of motion, no tenderness, palpable spasm or pain on motion  Neurological - alert, oriented, normal speech, no focal findings or movement disorder noted  Musculoskeletal - right knee exam:  The patient'sright Knee  is normal to inspection. The ROM is normal and there is flexion to 60 Effusion is: absent The joint exhibits absent warmth and Crepitus is: mild. The Cliff test is:  Positive Joint Line Tenderness is  Positive medial.  The Thessaly Test is not done and the Lachman is  negative. The Anterior Drawer is Negative. The Posterior Drawer is:  negative. Valgus Stress (for MCL) is:  normal . Varus Stress (for LCL) is  normal  . The Sharon Test is negative and the Apprehension Sign:  negative  Patellar Grind Negative and Tracking is normal            Extremities - peripheral pulses normal, no pedal edema, no clubbing or cyanosis  Skin - normal coloration and turgor, no rashes, no suspicious skin lesions noted    Assessment/ Plan:   Diagnoses and all orders for this visit:    1. Primary osteoarthritis of right knee    2. Acute pain of left knee  -     XR KNEE LT MIN 4 V; Future    3. Acute pain of right knee  -     MRI KNEE RT WO CONT; Future    Other orders  -     meloxicam (MOBIC) 15 mg tablet; Take 1 Tab by mouth daily. MRI for meniscus derangement   Follow-up Disposition:  Return if symptoms worsen or fail to improve. Pathophysiology, recovery and rehabilitation process discussed and questions answered   Counseling for 30 Minutes of the total visit duration   Pictures and figures used as necessary   Provided reassurance   Phone f/u for MRI results  Monitor response to injection   Discussed steroid side effects of fat atrophy, hypopigmentation, steroid flare or infection   Monitor response to home exercises   Recommend activity modification   Avoid: over use          1) Remember to stay active and/or exercise regularly (I suggest 30-45 minutes daily)   2) For reliable dietary information, go to www. EATRIGHT.org. You may wish to consider seeing the nutritionist at [facility] [phone], also consider the [address]   I have discussed the diagnosis with the patient and the intended plan as seen in the above orders. The patient has received an after-visit summary and questions were answered concerning future plans. Medication Side Effects and Warnings were discussed with patient: yes  Patient Labs were reviewed and or requested: yes  Patient Past Records were reviewed and or requested  yes  I have discussed the diagnosis with the patient and the intended plan as seen in the above orders. The patient has received an after-visit summary and questions were answered concerning future plans. Pt agrees to call or return to clinic and/or go to closest ER with any worsening of symptoms. This may include, but not limited to increased fever (>100.4) with NSAIDS or Tylenol, increased edema, confusion, rash, worsening of presenting symptoms.

## 2018-03-27 ENCOUNTER — OFFICE VISIT (OUTPATIENT)
Dept: INTERNAL MEDICINE CLINIC | Age: 73
End: 2018-03-27

## 2018-03-27 VITALS
HEIGHT: 63 IN | WEIGHT: 150 LBS | SYSTOLIC BLOOD PRESSURE: 123 MMHG | DIASTOLIC BLOOD PRESSURE: 87 MMHG | HEART RATE: 71 BPM | OXYGEN SATURATION: 99 % | TEMPERATURE: 98.6 F | BODY MASS INDEX: 26.58 KG/M2 | RESPIRATION RATE: 17 BRPM

## 2018-03-27 DIAGNOSIS — M76.822 LEFT TIBIALIS POSTERIOR TENDONITIS: ICD-10-CM

## 2018-03-27 DIAGNOSIS — M17.11 PRIMARY OSTEOARTHRITIS OF RIGHT KNEE: Primary | ICD-10-CM

## 2018-03-27 RX ORDER — DICLOFENAC SODIUM 10 MG/G
GEL TOPICAL 4 TIMES DAILY
Qty: 100 G | Refills: 1 | Status: SHIPPED | OUTPATIENT
Start: 2018-03-27 | End: 2018-03-27 | Stop reason: SDUPTHER

## 2018-03-27 NOTE — TELEPHONE ENCOUNTER
Refill Prior Auth    Requested Prescriptions     Pending Prescriptions Disp Refills    diclofenac (VOLTAREN) 1 % gel 100 g 1     Sig: Apply  to affected area four (4) times daily.  Apply to right knee and left ankle

## 2018-03-27 NOTE — MR AVS SNAPSHOT
[address] [facility] [place] [address] [address] 
[phone] Patient: Ketty Yang MRN: [id number] SHR:[birth year] Visit Information Date & Time Provider Department Dept. Phone Encounter #  
 3/27/2018  3:30 PM MD Serina Lora [facility] and [place] [address] [id number] Follow-up Instructions Return if symptoms worsen or fail to improve. Upcoming Health Maintenance Date Due DTaP/Tdap/Td series (1 - Tdap) 9/4/1967 ZOSTER VACCINE AGE 60> 7/4/2006 FOOT EXAM Q1 6/29/2018 MICROALBUMIN Q1 6/30/2018 HEMOGLOBIN A1C Q6M 9/3/2018 EYE EXAM RETINAL OR DILATED Q1 10/11/2018 LIPID PANEL Q1 3/3/2019 GLAUCOMA SCREENING Q2Y 10/11/2019 COLONOSCOPY 6/29/2027 Allergies as of 3/27/2018  Review Complete On: 3/27/2018 By: Babita Farnsworth LPN No Known Allergies Current Immunizations  Reviewed on 10/30/2017 Name Date Influenza High Dose Vaccine PF 10/30/2017 Pneumococcal Conjugate (PCV-13) 10/1/2016 ZZZ-RETIRED (DO NOT USE) Pneumococcal Vaccine (Unspecified Type) 7/20/2012 10:35 AM  
  
 Not reviewed this visit You Were Diagnosed With   
  
 Codes Comments Primary osteoarthritis of right knee    -  Primary ICD-10-CM: M17.11 ICD-9-CM: 715.16 Left tibialis posterior tendonitis     ICD-10-CM: I57.756 ICD-9-CM: 726.72 Vitals BP Pulse Temp Resp Height(growth percentile) Weight(growth percentile) 123/87 (BP 1 Location: Left arm, BP Patient Position: Sitting) 71 98.6 °F (37 °C) (Oral) 17 5' 3\" (1.6 m) 150 lb (68 kg) SpO2 BMI Smoking Status 99% 26.57 kg/m2 Never Smoker Vitals History BMI and BSA Data Body Mass Index Body Surface Area  
 26.57 kg/m 2 1.74 m 2 Preferred Pharmacy Pharmacy Name Phone [id number] [place] - [address] [facility] AT [address] [place] [address] [phone] Your Updated Medication List  
 This list is accurate as of 3/27/18  3:55 PM.  Always use your most recent med list.  
  
  
  
  
 albuterol 90 mcg/actuation inhaler Commonly known as:  PROVENTIL HFA, VENTOLIN HFA, PROAIR HFA Take 2 Puffs by inhalation every four (4) hours as needed for Wheezing. carvedilol 6.25 mg tablet Commonly known as:  COREG  
TAKE ONE TABLET BY MOUTH TWICE DAILY WITH FOOD  
  
 cloNIDine HCl 0.1 mg tablet Commonly known as:  CATAPRES Take 1 Tab by mouth as needed. If systolic BP > 492 and/or diastolic BP > 90  
  
 clopidogrel 75 mg Tab Commonly known as:  PLAVIX Take 1 Tab by mouth daily. diclofenac 1 % Gel Commonly known as:  VOLTAREN Apply  to affected area four (4) times daily. Apply to right knee and left ankle  
  
 fluticasone 50 mcg/actuation nasal spray Commonly known as:  [place] Oats One spray each nostril  
  
 fluticasone furoate 200 mcg/actuation Dsdv inhaler Commonly known as:  ARNUITY ELLIPTA Take 1 Puff by inhalation daily. losartan-hydroCHLOROthiazide 50-12.5 mg per tablet Commonly known as:  HYZAAR  
TAKE 1 TABLET BY MOUTH DAILY. INDICATIONS: HYPERTENSION  
  
 metFORMIN 500 mg tablet Commonly known as:  GLUCOPHAGE Take 1 Tab by mouth two (2) times daily (with meals). montelukast 10 mg tablet Commonly known as:  SINGULAIR Take 1 Tab by mouth daily. nitroglycerin 0.4 mg SL tablet Commonly known as:  NITROSTAT  
1 Tab by SubLINGual route every five (5) minutes as needed for Chest Pain (up to 3 doses). Please provide Generic  
  
 polyethylene glycol 17 gram/dose powder Commonly known as:  Josh Anitha Take 17 g by mouth daily. rosuvastatin 10 mg tablet Commonly known as:  CRESTOR Take 1 Tab by mouth daily. Prescriptions Sent to Pharmacy Refills  
 diclofenac (VOLTAREN) 1 % gel 1 Sig: Apply  to affected area four (4) times daily. Apply to right knee and left ankle  Class: Normal  
 Pharmacy: [place]  #: [phone] Route: Topical  
  
We Performed the Following REFERRAL TO PHYSICAL THERAPY [OYA26 Custom] Follow-up Instructions Return if symptoms worsen or fail to improve. Referral Information Referral ID Referred By Referred To  
  
 [id number] Radha Godinez [facility] OP PT [place]   
   [address]   
   [place], [address] Phone: [phone] Fax: [phone] Visits Status Start Date End Date  
 20 New Request 3/27/18 3/27/19 If your referral has a status of pending review or denied, additional information will be sent to support the outcome of this decision. Introducing [facility] & HEALTH SERVICES! [employer] introduces [employer] patient portal. Now you can access parts of your medical record, email your doctor's office, and request medication refills online. 1. In your internet browser, go to [email or web address] 2. Click on the First Time User? Click Here link in the Sign In box. You will see the New Member Sign Up page. 3. Enter your Soundrop Access Code exactly as it appears below. You will not need to use this code after youve completed the sign-up process. If you do not sign up before the expiration date, you must request a new code. · Sound[employer] Access Code: [email or web address] Expires: 4/17/2018  1:34 PM 
 
4. Enter the last four digits of your Social Security Number (xxxx) and Date of Birth (mm/dd/yyyy) as indicated and click Submit. You will be taken to the next sign-up page. 5. Create a IMTt ID. This will be your Soundrop login ID and cannot be changed, so think of one that is secure and easy to remember. 6. Create a Soundrop password. You can change your password at any time. 7. Enter your Password Reset Question and Answer. This can be used at a later time if you forget your password. 8. Enter your e-mail address. You will receive e-mail notification when new information is available in [address] 9. Click Sign Up. You can now view and download portions of your medical record. 10. Click the Download Summary menu link to download a portable copy of your medical information. If you have questions, please visit the Frequently Asked Questions section of the Open-Plug website. Remember, Open-Plug is NOT to be used for urgent needs. For medical emergencies, dial 911. Now available from your iPhone and Android! Please provide this summary of care documentation to your next provider. Your primary care clinician is listed as Deidra Flowers. If you have any questions after today's visit, please call [phone].

## 2018-03-27 NOTE — PROGRESS NOTES
Chief Complaint   Patient presents with    Knee Pain     Right Knee      he is a 71y.o. year old male who presents for follow up of injury. Follow Up Pain Assessment Encounter      Onset of Symptoms: Several Months  ________________________________________________________________________  Description: Pain is unchanged, patient states that he was not able to get the MRI as it was ordered at last visit. States that costs were too high for his co-pay. Pain Scale:(1-10): 9  Duration:  continuous  What makes it better?: none  What makes it worse?:walking  Medications tried: None  Modalities tried: None        Reviewed and agree with Nurse Note and duplicated in this note. Reviewed PmHx, RxHx, FmHx, SocHx, AllgHx and updated and dated in the chart.     Family History   Problem Relation Age of Onset    No Known Problems Mother     No Known Problems Father        Past Medical History:   Diagnosis Date    CAD (coronary artery disease)     3 stents, done one month apart in [place] in [place],    Diabetes [facility])     Hypercholesterolemia     Hypertension       Social History     Social History    Marital status:      Spouse name: N/A    Number of children: N/A    Years of education: N/A     Social History Main Topics    Smoking status: Never Smoker    Smokeless tobacco: Never Used    Alcohol use No    Drug use: No    Sexual activity: Not Asked     Other Topics Concern    None     Social History Narrative        Review of Systems - negative except as listed above      Objective:     Vitals:    03/27/18 1527   BP: 123/87   Pulse: 71   Resp: 17   Temp: 98.6 °F (37 °C)   TempSrc: Oral   SpO2: 99%   Weight: 150 lb (68 kg)   Height: 5' 3\" (1.6 m)       Physical Examination: General appearance - alert, well appearing, and in no distress  Back exam - full range of motion, no tenderness, palpable spasm or pain on motion  Neurological - alert, oriented, normal speech, no focal findings or movement disorder noted  Musculoskeletal - right knee exam:  The patient'sright Knee  is  normal to inspection. The ROM is normal and there is flexion to 60 Effusion is: absent The joint exhibits absent warmth and Crepitus is: mild. The Cliff test is:  Positive Joint Line Tenderness is  Positive medial.  The Thessaly Test is not done and the Lachman is  negative. The Anterior Drawer is Negative. The Posterior Drawer is:  negative. Valgus Stress (for MCL) is:  normal . Varus Stress (for LCL) is  normal  . The Sharon Test is negative and the Apprehension Sign:  negative  Patellar Grind Negative and Tracking is normal  A left ankle exam was performed. General appearance: no acute distress  Swelling: minimal  Warmth: mildly increased warmth  Tenderness: mild    Palpation:  ATFL:  non-tender  CFL:  non-tender  PTFL:  non-tender  Syndesmosis Ligament:  non-tender  Deltoid ligament:  non-tender  Posterior Tibialis Tendon:  tender  Peroneal Tendon: non-tender  Achilles Tendon:  non-tender     Proximal Fibula:  non-tender  Proximal Tibia:  non-tender  Distal Fibula:  non-tender  Distal Tibia:  non-tender    Plantar Fascia: non-tender  Midfoot:  non-tender  Forefoot:  non-tender    ROM:  Plantar Flexion:  normal  Dorsiflexion:  normal    Squeeze Test: negative  Talar Tilt Test:  negative  Anterior Drawer:negative    Kleiger Test:  negative  Hop Test: negative  Carlisle: negative      Extremities - peripheral pulses normal, no pedal edema, no clubbing or cyanosis  Skin - normal coloration and turgor, no rashes, no suspicious skin lesions noted    Assessment/ Plan:   Diagnoses and all orders for this visit:    1. Primary osteoarthritis of right knee  -     REFERRAL TO PHYSICAL THERAPY    2. Left tibialis posterior tendonitis  -     REFERRAL TO PHYSICAL THERAPY    Other orders  -     diclofenac (VOLTAREN) 1 % gel; Apply  to affected area four (4) times daily.  Apply to right knee and left ankle  Patient will return if he wants to do another injection to the right knee  Patient is now unable to get an MRI due to cost  Avoid NSAIDs orally due to stenting history  Follow-up Disposition:  Return if symptoms worsen or fail to improve. Pathophysiology, recovery and rehabilitation process discussed and questions answered   Counseling for 30 Minutes of the total visit duration   Pictures and figures used as necessary   Provided reassurance   Handouts provided and reviewed with patient for PTTD  Monitor response to Physical Therapy   Monitor response to home exercises   Recommend  lower impact activities-walking, Eliptical, Nordic Track, cycling or swimming   Follow up in 4 week(s)        1) Remember to stay active and/or exercise regularly (I suggest 30-45 minutes daily)   2) For reliable dietary information, go to www. EATRIGHT.org. You may wish to consider seeing the nutritionist at [facility] [phone], also consider the [address] I have discussed the diagnosis with the patient and the intended plan as seen in the above orders. The patient has received an after-visit summary and questions were answered concerning future plans. Medication Side Effects and Warnings were discussed with patient: yes  Patient Labs were reviewed and or requested: yes  Patient Past Records were reviewed and or requested  yes  I have discussed the diagnosis with the patient and the intended plan as seen in the above orders. The patient has received an after-visit summary and questions were answered concerning future plans. Pt agrees to call or return to clinic and/or go to closest ER with any worsening of symptoms. This may include, but not limited to increased fever (>100.4) with NSAIDS or Tylenol, increased edema, confusion, rash, worsening of presenting symptoms.

## 2018-03-28 RX ORDER — DICLOFENAC SODIUM 10 MG/G
GEL TOPICAL 4 TIMES DAILY
Qty: 100 G | Refills: 1 | Status: SHIPPED | OUTPATIENT
Start: 2018-03-28 | End: 2018-04-05 | Stop reason: SDUPTHER

## 2018-04-03 DIAGNOSIS — I25.10 CORONARY ARTERY DISEASE INVOLVING NATIVE CORONARY ARTERY OF NATIVE HEART WITHOUT ANGINA PECTORIS: Chronic | ICD-10-CM

## 2018-04-03 RX ORDER — CARVEDILOL 6.25 MG/1
TABLET ORAL
Qty: 180 TAB | Refills: 1 | Status: SHIPPED | COMMUNITY
Start: 2018-04-03 | End: 2018-05-16 | Stop reason: SDUPTHER

## 2018-04-05 ENCOUNTER — OFFICE VISIT (OUTPATIENT)
Dept: INTERNAL MEDICINE CLINIC | Age: 73
End: 2018-04-05

## 2018-04-05 VITALS
BODY MASS INDEX: 26.58 KG/M2 | DIASTOLIC BLOOD PRESSURE: 84 MMHG | HEART RATE: 73 BPM | HEIGHT: 63 IN | SYSTOLIC BLOOD PRESSURE: 133 MMHG | RESPIRATION RATE: 17 BRPM | WEIGHT: 150 LBS | TEMPERATURE: 98.4 F | OXYGEN SATURATION: 97 %

## 2018-04-05 DIAGNOSIS — M25.561 ACUTE PAIN OF RIGHT KNEE: Primary | ICD-10-CM

## 2018-04-05 RX ORDER — DICLOFENAC SODIUM 10 MG/G
GEL TOPICAL 4 TIMES DAILY
Qty: 100 G | Refills: 1 | Status: SHIPPED | OUTPATIENT
Start: 2018-04-05 | End: 2018-06-27 | Stop reason: ALTCHOICE

## 2018-04-05 RX ORDER — MELOXICAM 15 MG/1
TABLET ORAL
Refills: 1 | COMMUNITY
Start: 2018-03-13 | End: 2018-05-17 | Stop reason: ALTCHOICE

## 2018-04-05 NOTE — MR AVS SNAPSHOT
[address] [facility] 
 
 
 [address] [facility] [address] [phone] Patient: Wes Cardona MRN: [id number] Visit Information Date & Time Provider Department Dept. Phone Encounter #  
 4/5/2018  1:15 PM Laina Arteaga MD [facility] [phone] [id number] Follow-up Instructions Return if symptoms worsen or fail to improve. Follow-up and Disposition History Upcoming Health Maintenance Date Due DTaP/Tdap/Td series (1 - Tdap) 9/4/1967 ZOSTER VACCINE AGE 60> 7/4/2006 FOOT EXAM Q1 6/29/2018 MICROALBUMIN Q1 6/30/2018 HEMOGLOBIN A1C Q6M 9/3/2018 EYE EXAM RETINAL OR DILATED Q1 10/11/2018 MEDICARE YEARLY EXAM 10/31/2018 LIPID PANEL Q1 3/3/2019 GLAUCOMA SCREENING Q2Y 10/11/2019 COLONOSCOPY 6/29/2027 Allergies as of 4/5/2018  Review Complete On: 4/5/2018 By: Laina Arteaga MD  
 No Known Allergies Current Immunizations  Reviewed on 10/30/2017 Name Date Influenza High Dose Vaccine PF 10/30/2017 Pneumococcal Conjugate (PCV-13) 10/1/2016 ZZZ-RETIRED (DO NOT USE) Pneumococcal Vaccine (Unspecified Type) 7/20/2012 10:35 AM  
  
 Not reviewed this visit You Were Diagnosed With   
  
 Codes Comments Acute pain of right knee    -  Primary ICD-10-CM: M25.561 ICD-9-CM: 719.46 Vitals BP Pulse Temp Resp Height(growth percentile) Weight(growth percentile) 133/84 (BP 1 Location: Left arm, BP Patient Position: Sitting) 73 98.4 °F (36.9 °C) (Oral) 17 5' 3\" (1.6 m) 150 lb (68 kg) SpO2 BMI Smoking Status 97% 26.57 kg/m2 Never Smoker Vitals History BMI and BSA Data Body Mass Index Body Surface Area  
 26.57 kg/m 2 1.74 m 2 Preferred Pharmacy Pharmacy Name Phone [id number] [id number] AT [address] [place] [address] [phone] Your Updated Medication List  
  
   
 This list is accurate as of 4/5/18  2:08 PM.  Always use your most recent med list.  
  
  
  
  
 albuterol 90 mcg/actuation inhaler Commonly known as:  PROVENTIL HFA, VENTOLIN HFA, PROAIR HFA Take 2 Puffs by inhalation every four (4) hours as needed for Wheezing. carvedilol 6.25 mg tablet Commonly known as:  COREG  
TAKE ONE TABLET BY MOUTH TWICE DAILY WITH FOOD  
  
 cloNIDine HCl 0.1 mg tablet Commonly known as:  CATAPRES Take 1 Tab by mouth as needed. If systolic BP > 800 and/or diastolic BP > 90  
  
 clopidogrel 75 mg Tab Commonly known as:  PLAVIX Take 1 Tab by mouth daily. diclofenac 1 % Gel Commonly known as:  VOLTAREN Apply  to affected area four (4) times daily. Apply to right knee and left ankle  
  
 fluticasone 50 mcg/actuation nasal spray Commonly known as:  Francella Lacks One spray each nostril  
  
 fluticasone furoate 200 mcg/actuation Dsdv inhaler Commonly known as:  ARNUITY ELLIPTA Take 1 Puff by inhalation daily. losartan-hydroCHLOROthiazide 50-12.5 mg per tablet Commonly known as:  HYZAAR  
TAKE 1 TABLET BY MOUTH DAILY. INDICATIONS: HYPERTENSION  
  
 meloxicam 15 mg tablet Commonly known as:  MOBIC  
TAKE 1 TAB BY MOUTH DAILY. metFORMIN 500 mg tablet Commonly known as:  GLUCOPHAGE Take 1 Tab by mouth two (2) times daily (with meals). montelukast 10 mg tablet Commonly known as:  SINGULAIR Take 1 Tab by mouth daily. nitroglycerin 0.4 mg SL tablet Commonly known as:  NITROSTAT  
1 Tab by SubLINGual route every five (5) minutes as needed for Chest Pain (up to 3 doses). Please provide Generic  
  
 polyethylene glycol 17 gram/dose powder Commonly known as:  Papito Prince Take 17 g by mouth daily. rosuvastatin 10 mg tablet Commonly known as:  CRESTOR Take 1 Tab by mouth daily. Prescriptions Sent to Pharmacy Refills  
 diclofenac (VOLTAREN) 1 % gel 1 Sig: Apply  to affected area four (4) times daily. Apply to right knee and left ankle Class: Normal  
 Pharmacy: [id number], [place]  #: [phone] Route: Topical  
  
Follow-up Instructions Return if symptoms worsen or fail to improve. To-Do List   
 04/18/2018 4:00 PM  
  Appointment with Carmela Hunter at [facility] ([phone]) Introducing [facility]! Mariza Yi introduces [employer]mo patient portal. Now you can access parts of your medical record, email your doctor's office, and request medication refills online. 1. In your internet browser, go to [email or web address] 2. Click on the First Time User? Click Here link in the Sign In box. You will see the New Member Sign Up page. 3. Enter your Dexmo Access Code exactly as it appears below. You will not need to use this code after youve completed the sign-up process. If you do not sign up before the expiration date, you must request a new code. · Dexmo Access Code: [email or web address] Expires: 4/17/2018  1:34 PM 
 
4. Enter the last four digits of your Social Security Number (xxxx) and Date of Birth (mm/dd/yyyy) as indicated and click Submit. You will be taken to the next sign-up page. 5. Create a Dexmo ID. This will be your Dexmo login ID and cannot be changed, so think of one that is secure and easy to remember. 6. Create a Dexmo password. You can change your password at any time. 7. Enter your Password Reset Question and Answer. This can be used at a later time if you forget your password. 8. Enter your e-mail address. You will receive e-mail notification when new information is available in [address] 9. Click Sign Up. You can now view and download portions of your medical record. 10. Click the Download Summary menu link to download a portable copy of your medical information. If you have questions, please visit the Frequently Asked Questions section of the [employer] website. Remember, [employer] is NOT to be used for urgent needs. For medical emergencies, dial 911. Now available from your iPhone and Android! Please provide this summary of care documentation to your next provider. Your primary care clinician is listed as Katy Carroll. If you have any questions after today's visit, please call [phone].

## 2018-04-05 NOTE — PROGRESS NOTES
Chief Complaint   Patient presents with    Knee Pain     Right Knee     he is a 71y.o. year old male who presents for follow up of injury. Follow Up Pain Assessment Encounter      Onset of Symptoms: Several Months  ________________________________________________________________________  Description: Pain has worsened      Pain Scale:(1-10): 10  Duration:  continuous  What makes it better?: ice and rest  What makes it worse?:walking  Medications tried: None  Modalities tried: None        Reviewed and agree with Nurse Note and duplicated in this note. Reviewed PmHx, RxHx, FmHx, SocHx, AllgHx and updated and dated in the chart. Family History   Problem Relation Age of Onset    No Known Problems Mother     No Known Problems Father        Past Medical History:   Diagnosis Date    CAD (coronary artery disease)     3 stents, [place] one [place],    Diabetes [facility])     Hypercholesterolemia     Hypertension       Social History     Social History    Marital status:      Spouse name: N/A    Number of children: N/A    Years of education: N/A     Social History Main Topics    Smoking status: Never Smoker    Smokeless tobacco: Never Used    Alcohol use No    Drug use: No    Sexual activity: Not Asked     Other Topics Concern    None     Social History Narrative        Review of Systems - negative except as listed above      Objective:     Vitals:    04/05/18 1333   BP: 133/84   Pulse: 73   Resp: 17   Temp: 98.4 °F (36.9 °C)   TempSrc: Oral   SpO2: 97%   Weight: 150 lb (68 kg)   Height: 5' 3\" (1.6 m)       Physical Examination: General appearance - alert, well appearing, and in no distress  Back exam - full range of motion, no tenderness, palpable spasm or pain on motion  Neurological - alert, oriented, normal speech, no focal findings or movement disorder noted  Musculoskeletal - right knee exam:  The patient'sright Knee  is  normal to inspection.   The ROM is normal and there is flexion to 60 Effusion is: absent The joint exhibits absent warmth and Crepitus is: mild. The Cliff test is:  Positive Joint Line Tenderness is  Positive medial.  The Thessaly Test is not done and the Lachman is  negative. The Anterior Drawer is Negative. The Posterior Drawer is:  negative. Valgus Stress (for MCL) is:  normal . Varus Stress (for LCL) is  normal  . The Sharon Test is negative and the Apprehension Sign:  negative  Patellar Grind Negative and Tracking is normal  Extremities - peripheral pulses normal, no pedal edema, no clubbing or cyanosis  Skin - normal coloration and turgor, no rashes, no suspicious skin lesions noted    Assessment/ Plan:   Diagnoses and all orders for this visit:    1. Acute pain of right knee    Other orders  -     diclofenac (VOLTAREN) 1 % gel; Apply  to affected area four (4) times daily. Apply to right knee and left ankle     patient to get MRI done at [facility]  Follow-up Disposition:  Return if symptoms worsen or fail to improve. Pathophysiology, recovery and rehabilitation process discussed and questions answered   Counseling for 30 Minutes of the total visit duration   Pictures and figures used as necessary   Provided reassurance   Phone f/u for MRI results  Follow up in 4 week(s)    1) Remember to stay active and/or exercise regularly (I suggest 30-45 minutes daily)   2) For reliable dietary information, go to www. EATRIGHT.org. You may wish to consider seeing the nutritionist at [facility] [phone], also consider the [id number] I have discussed the diagnosis with the patient and the intended plan as seen in the above orders. The patient has received an after-visit summary and questions were answered concerning future plans.      Medication Side Effects and Warnings were discussed with patient: yes  Patient Labs were reviewed and or requested: yes  Patient Past Records were reviewed and or requested  yes  I have discussed the diagnosis with the patient and the intended plan as seen in the above orders. The patient has received an after-visit summary and questions were answered concerning future plans. Pt agrees to call or return to clinic and/or go to closest ER with any worsening of symptoms. This may include, but not limited to increased fever (>100.4) with NSAIDS or Tylenol, increased edema, confusion, rash, worsening of presenting symptoms.

## 2018-04-18 ENCOUNTER — APPOINTMENT (OUTPATIENT)
Dept: PHYSICAL THERAPY | Age: 73
End: 2018-04-18

## 2018-05-16 DIAGNOSIS — I25.10 CORONARY ARTERY DISEASE INVOLVING NATIVE CORONARY ARTERY OF NATIVE HEART WITHOUT ANGINA PECTORIS: Chronic | ICD-10-CM

## 2018-05-16 DIAGNOSIS — I10 ESSENTIAL HYPERTENSION WITH GOAL BLOOD PRESSURE LESS THAN 130/85: ICD-10-CM

## 2018-05-16 NOTE — TELEPHONE ENCOUNTER
Refill    Requested Prescriptions     Pending Prescriptions Disp Refills    carvedilol (COREG) 6.25 mg tablet 180 Tab 1    clopidogrel (PLAVIX) 75 mg tab 90 Tab 0     Sig: Take 1 Tab by mouth daily.  losartan-hydroCHLOROthiazide (HYZAAR) 50-12.5 mg per tablet 90 Tab 1    metFORMIN (GLUCOPHAGE) 500 mg tablet 180 Tab 2     Sig: Take 1 Tab by mouth two (2) times daily (with meals).

## 2018-05-17 RX ORDER — METFORMIN HYDROCHLORIDE 500 MG/1
500 TABLET ORAL 2 TIMES DAILY WITH MEALS
Qty: 180 TAB | Refills: 2 | Status: SHIPPED | OUTPATIENT
Start: 2018-05-17 | End: 2019-06-17 | Stop reason: SDUPTHER

## 2018-05-17 RX ORDER — LOSARTAN POTASSIUM AND HYDROCHLOROTHIAZIDE 12.5; 5 MG/1; MG/1
TABLET ORAL
Qty: 90 TAB | Refills: 1 | Status: SHIPPED | OUTPATIENT
Start: 2018-05-17 | End: 2018-07-09 | Stop reason: SDUPTHER

## 2018-05-17 RX ORDER — CLOPIDOGREL BISULFATE 75 MG/1
75 TABLET ORAL DAILY
Qty: 90 TAB | Refills: 0 | Status: SHIPPED | OUTPATIENT
Start: 2018-05-17 | End: 2019-01-02 | Stop reason: ALTCHOICE

## 2018-05-17 RX ORDER — CARVEDILOL 6.25 MG/1
TABLET ORAL
Qty: 180 TAB | Refills: 1 | Status: SHIPPED | OUTPATIENT
Start: 2018-05-17 | End: 2019-06-14 | Stop reason: SDUPTHER

## 2018-05-18 ENCOUNTER — TELEPHONE (OUTPATIENT)
Dept: FAMILY MEDICINE CLINIC | Age: 73
End: 2018-05-18

## 2018-06-27 ENCOUNTER — OFFICE VISIT (OUTPATIENT)
Dept: FAMILY MEDICINE CLINIC | Age: 73
End: 2018-06-27

## 2018-06-27 VITALS
DIASTOLIC BLOOD PRESSURE: 76 MMHG | BODY MASS INDEX: 26.05 KG/M2 | TEMPERATURE: 98.5 F | SYSTOLIC BLOOD PRESSURE: 120 MMHG | HEART RATE: 74 BPM | RESPIRATION RATE: 16 BRPM | WEIGHT: 147 LBS | HEIGHT: 63 IN | OXYGEN SATURATION: 98 %

## 2018-06-27 DIAGNOSIS — J44.9 COPD, MODERATE (HCC): ICD-10-CM

## 2018-06-27 DIAGNOSIS — E78.5 HYPERLIPIDEMIA LDL GOAL <100: ICD-10-CM

## 2018-06-27 DIAGNOSIS — E11.9 CONTROLLED TYPE 2 DIABETES MELLITUS WITHOUT COMPLICATION, WITHOUT LONG-TERM CURRENT USE OF INSULIN (HCC): Primary | ICD-10-CM

## 2018-06-27 DIAGNOSIS — I25.10 CORONARY ARTERY DISEASE INVOLVING NATIVE CORONARY ARTERY OF NATIVE HEART WITHOUT ANGINA PECTORIS: Chronic | ICD-10-CM

## 2018-06-27 DIAGNOSIS — I10 ESSENTIAL HYPERTENSION: ICD-10-CM

## 2018-06-27 DIAGNOSIS — Z92.29 HX OF LONG TERM USE OF BLOOD THINNERS: ICD-10-CM

## 2018-06-27 NOTE — PROGRESS NOTES
Chief Complaint   Patient presents with    Diabetes     Follow up    Cholesterol Problem    Hypertension     1. Have you been to the ER, urgent care clinic since your last visit? Hospitalized since your last visit? No    2. Have you seen or consulted any other health care providers outside of the [facility] since your last visit? Include any pap smears or colon screening.  No

## 2018-06-27 NOTE — PROGRESS NOTES
HISTORY OF PRESENT ILLNESS  Gabby Juan is a 71 y.o. male. He was seen for 4 months follow up on DM, HTN, dyslipidemia, CAD. HPI  Cardiovascular Review  The patient has hypertension, hyperlipidemia, coronary artery disease and status post coronary artery stenting. He reports taking medications as instructed, no medication side effects noted, no TIA's, no chest pain on exertion, no dyspnea on exertion, no swelling of ankles, no orthostatic dizziness or lightheadedness, no orthopnea or paroxysmal nocturnal dyspnea, no palpitations, no intermittent claudication symptoms, no muscle aches or pain. Diet and Lifestyle: generally follows a low fat low cholesterol diet, generally follows a low sodium diet, does not rigorously follow a diabetic diet, exercises regularly, nonsmoker. Lab review: labs reviewed and discussed with patient. He had normal Lexican stress test and Echo in 09/2014 and normal EKG in 01/2016  Medicines: Carvedilol 6.25 mg BID, Plavix, crestor 10 mg  Losartan/Hctz 50/12.5 mg, clonidine as needed  He was recently evaluated by cardiology and has been advised for cardiac cath due to abnormal stress test. Patient is in denial    Lab Results   Component Value Date/Time    Cholesterol, total 117 03/03/2018 08:21 AM    HDL Cholesterol 43 03/03/2018 08:21 AM    LDL, calculated 60 03/03/2018 08:21 AM    VLDL, calculated 14 03/03/2018 08:21 AM    Triglyceride 70 03/03/2018 08:21 AM         Endocrine Review  He is seen for diabetes. Since last visit: no change. Home glucose monitoring: is performed sporadically, nonfasting values range 100-140. He reports medication compliance: noncompliant some of the time. Medication side effects: none. Diabetic diet compliance: noncompliant some of the time. Further diabetic ROS: no polyuria or polydipsia, no chest pain, dyspnea or TIA's, no numbness, tingling or pain in extremities, no unusual visual symptoms, no hypoglycemia.   Lab review: labs reviewed and discussed with patient. Eye exam: due.  ,   Medications: Metformin  500 mg BID  on ACEI/ARBS :yes  On ASA, no as he is on Plavix  Podiatry visit: not done    Lab Results   Component Value Date/Time    Hemoglobin A1c 6.5 (H) 03/03/2018 08:21 AM          COPD  Patient complains of cough and shortness of breath. Symptoms began several years ago. Symptoms chronic dyspnea: severity = mild: course of sx: well controlled  able walk 5 blocks  cough: severity: moderate: sputum : clear: light  symptoms worse with exertion. does worsen with exertion. Sputum is clear in small amounts. Fever has been suspected fevers but not measured at home. Patient uses 1 pillows at night. Patient can walk 1 mile  before resting. Patient currently is not on home oxygen therapy. Tay Munoz Respiratory history: frequent episodes of bronchitis and COPD  He is on scheduled Fluticasone, Singulair and prn albuterol, but non compliant with treatment. Review of Systems   Constitutional: Negative for chills, fever and malaise/fatigue. HENT: Negative for congestion, ear pain, sore throat and tinnitus. Eyes: Negative for blurred vision, double vision, pain and discharge. Respiratory: Negative for cough, shortness of breath and wheezing. Cardiovascular: Negative for chest pain, palpitations and leg swelling. Gastrointestinal: Negative for abdominal pain, blood in stool, constipation, diarrhea, nausea and vomiting. Genitourinary: Negative for dysuria, frequency, hematuria and urgency. Musculoskeletal: Negative for back pain, joint pain and myalgias. Right knee pain   Skin: Negative for rash. Neurological: Negative for dizziness, tremors, seizures and headaches. Endo/Heme/Allergies: Negative for polydipsia. Does not bruise/bleed easily. Psychiatric/Behavioral: Negative for depression and substance abuse. The patient is not nervous/anxious. Physical Exam   Constitutional: He is oriented to person, place, and time.  He appears well-developed and well-nourished. HENT:   Head: Normocephalic and atraumatic. Right Ear: External ear normal.   Mouth/Throat: Oropharynx is clear and moist. No oropharyngeal exudate. Eyes: Conjunctivae and EOM are normal. Pupils are equal, round, and reactive to light. No scleral icterus. Neck: Normal range of motion. Neck supple. No JVD present. No thyromegaly present. Cardiovascular: Normal rate, regular rhythm, normal heart sounds and intact distal pulses. No murmur heard. Pulmonary/Chest: Effort normal and breath sounds normal. He has no wheezes. Abdominal: Soft. Bowel sounds are normal. He exhibits no distension and no mass. Musculoskeletal: Normal range of motion. He exhibits no edema or tenderness. Feet:    Feet:warm, good capillary refill, no trophic changes or ulcerative lesions, normal DP and PT pulses, normal monofilament exam and normal sensory exam     Lymphadenopathy:     He has no cervical adenopathy. Neurological: He is alert and oriented to person, place, and time. He has normal reflexes. No cranial nerve deficit. Skin: Skin is warm and dry. No rash noted. He is not diaphoretic. Scattered Bruises over skin   Psychiatric: He has a normal mood and affect. Nursing note and vitals reviewed. ASSESSMENT and PLAN  Diagnoses and all orders for this visit:    1. Controlled type 2 diabetes mellitus without complication, without long-term current use of insulin ([facility])  -      DIABETES FOOT EXAM  -     METABOLIC PANEL, COMPREHENSIVE  -     HEMOGLOBIN A1C WITH EAG  -     MICROALBUMIN, UR, RAND W/ MICROALB/CREAT RATIO    2. COPD, moderate ([facility])  -     CBC WITH AUTOMATED DIFF    3. Coronary artery disease involving native coronary artery of native heart without angina pectoris  -     METABOLIC PANEL, COMPREHENSIVE  -     LIPID PANEL    4. Hx of long term use of blood thinners  -     CBC WITH AUTOMATED DIFF    5.  Essential hypertension  -     METABOLIC PANEL, COMPREHENSIVE    6. Hyperlipidemia LDL goal <525  -     METABOLIC PANEL, COMPREHENSIVE  -     LIPID PANEL      Discussed lifestyle issues and health guidance given  Patient was given an after visit summary which includes diagnoses, vital signs, current medications, instructions and references & authorized prescriptions . Results of labs will be conveyed to patient, once available. Pt verbalized instructions I provided and expressed understanding of discussion that was held today.   Follow-up Disposition:  Return in about 4 months (around 10/27/2018) for medicarewellness physical.

## 2018-06-27 NOTE — MR AVS SNAPSHOT
[address] [place] [address] 
[phone] Patient: Garrett Sánchez MRN: [id number] Visit Information Date & Time Provider Department Dept. Phone Encounter #  
 2018  1:40 PM Bg Haynes, [facility], [facility] [phone] [id number] Follow-up Instructions Return in about 4 months (around 10/27/2018) for medicarewellness physical.  
  
Upcoming Health Maintenance Date Due DTaP/Tdap/Td series (1 - Tdap) 1966 ZOSTER VACCINE AGE 60> 2006 FOOT EXAM Q1 2018 MICROALBUMIN Q1 2018 Influenza Age 5 to Adult 2018 HEMOGLOBIN A1C Q6M 9/3/2018 EYE EXAM RETINAL OR DILATED Q1 10/11/2018 LIPID PANEL Q1 3/3/2019 GLAUCOMA SCREENING Q2Y 10/11/2019 COLONOSCOPY 2027 Allergies as of 2018  Review Complete On: 2018 By: Bg Haynes MD  
 No Known Allergies Current Immunizations  Reviewed on 10/30/2017 Name Date Influenza High Dose Vaccine PF 10/30/2017 Pneumococcal Conjugate (PCV-13) 10/1/2016 ZZZ-RETIRED (DO NOT USE) Pneumococcal Vaccine (Unspecified Type) 2012 10:35 AM  
  
 Not reviewed this visit You Were Diagnosed With   
  
 Codes Comments Controlled type 2 diabetes mellitus without complication, without long-term current use of insulin ([address]    -  Primary ICD-10-CM: E11.9 ICD-9-CM: 250.00 COPD, moderate ([address]     ICD-10-CM: J44.9 ICD-9-CM: 684 Coronary artery disease involving native coronary artery of native heart without angina pectoris     ICD-10-CM: I25.10 ICD-9-CM: 414.01 Hx of long term use of blood thinners     ICD-10-CM: Z92.29 ICD-9-CM: V87.49 Essential hypertension     ICD-10-CM: I10 
ICD-9-CM: 401.9 Hyperlipidemia LDL goal <100     ICD-10-CM: E78.5 ICD-9-CM: 272.4 Vitals BP Pulse Temp Resp Height(growth percentile) Weight(growth percentile) 120/76 (BP 1 Location: Left arm, BP Patient Position: Sitting) 74 98.5 °F (36.9 °C) (Oral) 16 5' 3\" (1.6 m) 147 lb (66.7 kg) SpO2 BMI Smoking Status 98% 26.04 kg/m2 Never Smoker Vitals History BMI and BSA Data Body Mass Index Body Surface Area 26.04 kg/m 2 1.72 m 2 Preferred Pharmacy Pharmacy Name Phone Jessica Mayer [address] [place] [phone] [facility] [address] [phone] Your Updated Medication List  
  
   
This list is accurate as of 6/27/18  2:13 PM.  Always use your most recent med list.  
  
  
  
  
 albuterol 90 mcg/actuation inhaler Commonly known as:  PROVENTIL HFA, VENTOLIN HFA, PROAIR HFA Take 2 Puffs by inhalation every four (4) hours as needed for Wheezing. carvedilol 6.25 mg tablet Commonly known as:  COREG  
TAKE ONE TABLET BY MOUTH TWICE DAILY WITH FOOD  
  
 cloNIDine HCl 0.1 mg tablet Commonly known as:  CATAPRES Take 1 Tab by mouth as needed. If systolic BP > 664 and/or diastolic BP > 90  
  
 clopidogrel 75 mg Tab Commonly known as:  PLAVIX Take 1 Tab by mouth daily. fluticasone 50 mcg/actuation nasal spray Commonly known as:  Dillon [place] One spray each nostril  
  
 fluticasone furoate 200 mcg/actuation Dsdv inhaler Commonly known as:  ARNUITY ELLIPTA Take 1 Puff by inhalation daily. losartan-hydroCHLOROthiazide 50-12.5 mg per tablet Commonly known as:  HYZAAR  
TAKE 1 TABLET BY MOUTH DAILY. INDICATIONS: HYPERTENSION  
  
 metFORMIN 500 mg tablet Commonly known as:  GLUCOPHAGE Take 1 Tab by mouth two (2) times daily (with meals). montelukast 10 mg tablet Commonly known as:  SINGULAIR Take 1 Tab by mouth daily. nitroglycerin 0.4 mg SL tablet Commonly known as:  NITROSTAT  
1 Tab by SubLINGual route every five (5) minutes as needed for Chest Pain (up to 3 doses). Please provide Generic  
  
 polyethylene glycol 17 gram/dose powder Commonly known as:  Vernel Redder Take 17 g by mouth daily. rosuvastatin 10 mg tablet Commonly known as:  CRESTOR Take 1 Tab by mouth daily. We Performed the Following CBC WITH AUTOMATED DIFF [81347 CPT(R)] HEMOGLOBIN A1C WITH EAG [44539 CPT(R)]  DIABETES FOOT EXAM [7 Custom] LIPID PANEL [08474 CPT(R)] METABOLIC PANEL, COMPREHENSIVE [15570 CPT(R)] MICROALBUMIN, UR, RAND W/ MICROALB/CREAT RATIO [id number] CPT(R)] Follow-up Instructions Return in about 4 months (around 10/27/2018) for medicarewellness physical.  
  
  
Patient Instructions Learning About Meal Planning for Diabetes Why plan your meals? Meal planning can be a key part of managing diabetes. Planning meals and snacks with the right balance of carbohydrate, protein, and fat can help you keep your blood sugar at the target level you set with your doctor. You don't have to eat special foods. You can eat what your family eats, including sweets once in a while. But you do have to pay attention to how often you eat and how much you eat of certain foods. You may want to work with a dietitian or a certified diabetes educator. He or she can give you tips and meal ideas and can answer your questions about meal planning. This health professional can also help you reach a healthy weight if that is one of your goals. What plan is right for you? Your dietitian or diabetes educator may suggest that you start with the plate format or carbohydrate counting. The plate format The plate format is a simple way to help you manage how you eat. You plan meals by learning how much space each food should take on a plate. Using the plate format helps you spread carbohydrate throughout the day. It can make it easier to keep your blood sugar level within your target range. It also helps you see if you're eating healthy portion sizes.  
To use the plate format, you put non-starchy vegetables on half your plate. Add meat or meat substitutes on one-quarter of the plate. Put a grain or starchy vegetable (such as brown rice or a potato) on the final quarter of the plate. You can add a small piece of fruit and some low-fat or fat-free milk or yogurt, depending on your carbohydrate goal for each meal. 
Here are some tips for using the plate format: · Make sure that you are not using an oversized plate. A 9-inch plate is best. Many restaurants use larger plates. · Get used to using the plate format at home. Then you can use it when you eat out. · Write down your questions about using the plate format. Talk to your doctor, a dietitian, or a diabetes educator about your concerns. Carbohydrate counting With carbohydrate counting, you plan meals based on the amount of carbohydrate in each food. Carbohydrate raises blood sugar higher and more quickly than any other nutrient. It is found in desserts, breads and cereals, and fruit. It's also found in starchy vegetables such as potatoes and corn, grains such as rice and pasta, and milk and yogurt. Spreading carbohydrate throughout the day helps keep your blood sugar levels within your target range. Your daily amount depends on several things, including your weight, how active you are, which diabetes medicines you take, and what your goals are for your blood sugar levels. A registered dietitian or diabetes educator can help you plan how much carbohydrate to include in each meal and snack. A guideline for your daily amount of carbohydrate is: · 45 to 60 grams at each meal. That's about the same as 3 to 4 carbohydrate servings. · 15 to 20 grams at each snack. That's about the same as 1 carbohydrate serving. The Nutrition Facts label on packaged foods tells you how much carbohydrate is in a serving of the food. First, look at the serving size on the food label. Is that the amount you eat in a serving?  All of the nutrition information on a food label is based on that serving size. So if you eat more or less than that, you'll need to adjust the other numbers. Total carbohydrate is the next thing you need to look for on the label. If you count carbohydrate servings, one serving of carbohydrate is 15 grams. For foods that don't come with labels, such as fresh fruits and vegetables, you'll need a guide that lists carbohydrate in these foods. Ask your doctor, dietitian, or diabetes educator about books or other nutrition guides you can use. If you take insulin, you need to know how many grams of carbohydrate are in a meal. This lets you know how much rapid-acting insulin to take before you eat. If you use an insulin pump, you get a constant rate of insulin during the day. So the pump must be programmed at meals to give you extra insulin to cover the rise in blood sugar after meals. When you know how much carbohydrate you will eat, you can take the right amount of insulin. Or, if you always use the same amount of insulin, you need to make sure that you eat the same amount of carbohydrate at meals. If you need more help to understand carbohydrate counting and food labels, ask your doctor, dietitian, or diabetes educator. How do you get started with meal planning? Here are some tips to get started: 
· Plan your meals a week at a time. Don't forget to include snacks too. · Use cookbooks or online recipes to plan several main meals. Plan some quick meals for busy nights. You also can double some recipes that freeze well. Then you can save half for other busy nights when you don't have time to cook. · Make sure you have the ingredients you need for your recipes. If you're running low on basic items, put these items on your shopping list too. · List foods that you use to make breakfasts, lunches, and snacks. List plenty of fruits and vegetables. · Post this list on the refrigerator.  Add to it as you think of more things you need. · Take the list to the store to do your weekly shopping. Follow-up care is a key part of your treatment and safety. Be sure to make and go to all appointments, and call your doctor if you are having problems. It's also a good idea to know your test results and keep a list of the medicines you take. Where can you learn more? Go to http://lv-shelbi.info/. Nicolás Hercules in the search box to learn more about \"Learning About Meal Planning for Diabetes. \" Current as of: March 13, 2017 Content Version: 11.4 © 6603-8656 [employer]. Care instructions adapted under license by [employer] (which disclaims liability or warranty for this information). If you have questions about a medical condition or this instruction, always ask your healthcare professional. [address] any warranty or liability for your use of this information. Introducing [facility]! Barry Monte introduces Smore patient portal. Now you can access parts of your medical record, email your doctor's office, and request medication refills online. 1. In your internet browser, go to [email or web address] 2. Click on the First Time User? Click Here link in the Sign In box. You will see the New Member Sign Up page. 3. Enter your Smore Access Code exactly as it appears below. You will not need to use this code after youve completed the sign-up process. If you do not sign up before the expiration date, you must request a new code. · Smore Access Code: [email or web address] Expires: 7/17/2018  3:27 PM 
 
4. Enter the last four digits of your Social Security Number (xxxx) and Date of Birth (mm/dd/yyyy) as indicated and click Submit. You will be taken to the next sign-up page. 5. Create a Well.cat ID. This will be your Smore login ID and cannot be changed, so think of one that is secure and easy to remember. 6. Create a iMemories password. You can change your password at any time. 7. Enter your Password Reset Question and Answer. This can be used at a later time if you forget your password. 8. Enter your e-mail address. You will receive e-mail notification when new information is available in [address] 9. Click Sign Up. You can now view and download portions of your medical record. 10. Click the Download Summary menu link to download a portable copy of your medical information. If you have questions, please visit the Frequently Asked Questions section of the iMemories website. Remember, iMemories is NOT to be used for urgent needs. For medical emergencies, dial 911. Now available from your iPhone and Android! Please provide this summary of care documentation to your next provider. Your primary care clinician is listed as Jarrod Elliott. If you have any questions after today's visit, please call [phone].

## 2018-06-27 NOTE — PATIENT INSTRUCTIONS

## 2018-06-27 NOTE — LETTER
6/29/2018 1:14 PM 
 
Mr. Tamela Closs [address] [facility] [id number] Dear Tamela Closs: 
 
Please find your most recent results below. Resulted Orders CBC WITH AUTOMATED DIFF Result Value Ref Range WBC 7.4 3.4 - 10.8 x10E3/uL  
 RBC 4.54 4.14 - 5.80 x10E6/uL HGB 13.7 13.0 - 17.7 g/dL HCT 40.5 37.5 - 51.0 % MCV 89 79 - 97 fL  
 MCH 30.2 26.6 - 33.0 pg  
 MCHC 33.8 31.5 - 35.7 g/dL  
 RDW 13.7 12.3 - 15.4 % PLATELET 847 493 - 183 x10E3/uL NEUTROPHILS 61 Not Estab. % Lymphocytes 31 Not Estab. % MONOCYTES 6 Not Estab. % EOSINOPHILS 2 Not Estab. % BASOPHILS 0 Not Estab. %  
 ABS. NEUTROPHILS 4.5 1.4 - 7.0 x10E3/uL Abs Lymphocytes 2.3 0.7 - 3.1 x10E3/uL  
 ABS. MONOCYTES 0.5 0.1 - 0.9 x10E3/uL  
 ABS. EOSINOPHILS 0.2 0.0 - 0.4 x10E3/uL  
 ABS. BASOPHILS 0.0 0.0 - 0.2 x10E3/uL IMMATURE GRANULOCYTES 0 Not Estab. %  
 ABS. IMM. GRANS. 0.0 0.0 - 0.1 x10E3/uL Narrative Performed at:  [address]  [id number] : Jesus Vuong MD, Phone:  [phone] METABOLIC PANEL, COMPREHENSIVE Result Value Ref Range Glucose 112 (H) 65 - 99 mg/dL BUN 16 8 - 27 mg/dL Creatinine 0.98 0.76 - 1.27 mg/dL GFR est non-AA 77 >59 mL/min/1.73 GFR est AA 89 >59 mL/min/1.73  
 BUN/Creatinine ratio 16 10 - 24 Sodium 138 134 - 144 mmol/L Potassium 4.4 3.5 - 5.2 mmol/L Chloride 99 96 - 106 mmol/L  
 CO2 24 20 - 29 mmol/L Comment: **Please note reference interval change** Calcium 9.5 8.6 - 10.2 mg/dL Protein, total 6.7 6.0 - 8.5 g/dL Albumin 3.8 3.5 - 4.8 g/dL GLOBULIN, TOTAL 2.9 1.5 - 4.5 g/dL A-G Ratio 1.3 1.2 - 2.2 Bilirubin, total 0.6 0.0 - 1.2 mg/dL Alk. phosphatase 64 39 - 117 IU/L  
 AST (SGOT) 15 0 - 40 IU/L  
 ALT (SGPT) 13 0 - 44 IU/L Narrative Performed at:  [address]  [id number] : Joey Russ MD, Phone:  [phone] HEMOGLOBIN A1C WITH EAG Result Value Ref Range Hemoglobin A1c 6.4 (H) 4.8 - 5.6 % Comment:  
            Pre-diabetes: 5.7 - 6.4 Diabetes: >6.4 Glycemic control for adults with diabetes: <7.0 Estimated average glucose 137 mg/dL Narrative Performed at:  [address]  [id number] : Joey Russ MD, Phone:  [phone] LIPID PANEL Result Value Ref Range Cholesterol, total 105 100 - 199 mg/dL Triglyceride 77 0 - 149 mg/dL HDL Cholesterol 49 >39 mg/dL VLDL, calculated 15 5 - 40 mg/dL LDL, calculated 41 0 - 99 mg/dL Narrative Performed at:  [address]  [id number] : Joey Russ MD, Phone:  [phone] MICROALBUMIN, UR, RAND W/ MICROALB/CREAT RATIO Result Value Ref Range Creatinine, urine 123.7 Not Estab. mg/dL Microalbumin, urine <3.0 Not Estab. ug/mL Microalb/Creat ratio (ug/mg creat.) <2.4 0.0 - 30.0 mg/g creat Narrative Performed at:  [address]  [id number] : Joey Russ MD, Phone:  [phone] CVD REPORT Result Value Ref Range INTERPRETATION Note Comment:  
   Supplemental report is available. Narrative Performed at:  [facility] [place] [place] [address] [place] [address]  [id number] : Tera Knox MD, Phone:  [phone] RECOMMENDATIONS: 
Average sugar 6.4, stable. To continue on same dose of Metformin Cholesterol, kidney, liver,blood count,urine protein all very normal and reassuring Please call me if you have any questions: [phone] Sincerely, Gregoria Trinh MD

## 2018-06-29 LAB
ALBUMIN SERPL-MCNC: 3.8 G/DL (ref 3.5–4.8)
ALBUMIN/CREAT UR: <2.4 MG/G CREAT (ref 0–30)
ALBUMIN/GLOB SERPL: 1.3 {RATIO} (ref 1.2–2.2)
ALP SERPL-CCNC: 64 IU/L (ref 39–117)
ALT SERPL-CCNC: 13 IU/L (ref 0–44)
AST SERPL-CCNC: 15 IU/L (ref 0–40)
BASOPHILS # BLD AUTO: 0 X10E3/UL (ref 0–0.2)
BASOPHILS NFR BLD AUTO: 0 %
BILIRUB SERPL-MCNC: 0.6 MG/DL (ref 0–1.2)
BUN SERPL-MCNC: 16 MG/DL (ref 8–27)
BUN/CREAT SERPL: 16 (ref 10–24)
CALCIUM SERPL-MCNC: 9.5 MG/DL (ref 8.6–10.2)
CHLORIDE SERPL-SCNC: 99 MMOL/L (ref 96–106)
CHOLEST SERPL-MCNC: 105 MG/DL (ref 100–199)
CO2 SERPL-SCNC: 24 MMOL/L (ref 20–29)
CREAT SERPL-MCNC: 0.98 MG/DL (ref 0.76–1.27)
CREAT UR-MCNC: 123.7 MG/DL
EOSINOPHIL # BLD AUTO: 0.2 X10E3/UL (ref 0–0.4)
EOSINOPHIL NFR BLD AUTO: 2 %
ERYTHROCYTE [DISTWIDTH] IN BLOOD BY AUTOMATED COUNT: 13.7 % (ref 12.3–15.4)
EST. AVERAGE GLUCOSE BLD GHB EST-MCNC: 137 MG/DL
GLOBULIN SER CALC-MCNC: 2.9 G/DL (ref 1.5–4.5)
GLUCOSE SERPL-MCNC: 112 MG/DL (ref 65–99)
HBA1C MFR BLD: 6.4 % (ref 4.8–5.6)
HCT VFR BLD AUTO: 40.5 % (ref 37.5–51)
HDLC SERPL-MCNC: 49 MG/DL
HGB BLD-MCNC: 13.7 G/DL (ref 13–17.7)
IMM GRANULOCYTES # BLD: 0 X10E3/UL (ref 0–0.1)
IMM GRANULOCYTES NFR BLD: 0 %
INTERPRETATION, 910389: NORMAL
LDLC SERPL CALC-MCNC: 41 MG/DL (ref 0–99)
LYMPHOCYTES # BLD AUTO: 2.3 X10E3/UL (ref 0.7–3.1)
LYMPHOCYTES NFR BLD AUTO: 31 %
MCH RBC QN AUTO: 30.2 PG (ref 26.6–33)
MCHC RBC AUTO-ENTMCNC: 33.8 G/DL (ref 31.5–35.7)
MCV RBC AUTO: 89 FL (ref 79–97)
MICROALBUMIN UR-MCNC: <3 UG/ML
MONOCYTES # BLD AUTO: 0.5 X10E3/UL (ref 0.1–0.9)
MONOCYTES NFR BLD AUTO: 6 %
NEUTROPHILS # BLD AUTO: 4.5 X10E3/UL (ref 1.4–7)
NEUTROPHILS NFR BLD AUTO: 61 %
PLATELET # BLD AUTO: 289 X10E3/UL (ref 150–379)
POTASSIUM SERPL-SCNC: 4.4 MMOL/L (ref 3.5–5.2)
PROT SERPL-MCNC: 6.7 G/DL (ref 6–8.5)
RBC # BLD AUTO: 4.54 X10E6/UL (ref 4.14–5.8)
SODIUM SERPL-SCNC: 138 MMOL/L (ref 134–144)
TRIGL SERPL-MCNC: 77 MG/DL (ref 0–149)
VLDLC SERPL CALC-MCNC: 15 MG/DL (ref 5–40)
WBC # BLD AUTO: 7.4 X10E3/UL (ref 3.4–10.8)

## 2018-06-29 NOTE — PROGRESS NOTES
Please inform patient and send letter,  Average sugar 6.4, stable.  To continue on same dose of Metformin  Cholesterol, kidney, liver,blood count,urine protein all very normal and reassuring  thanks

## 2018-06-29 NOTE — PROGRESS NOTES
Outbound call to patient. Name and  verified. Notifed patient of recent lab results. Patient verbalized understanding. Letter printed with results.

## 2018-07-07 RX ORDER — NITROGLYCERIN 0.4 MG/1
TABLET SUBLINGUAL
Qty: 25 TAB | Refills: 0 | Status: SHIPPED | OUTPATIENT
Start: 2018-07-07 | End: 2018-07-09 | Stop reason: SDUPTHER

## 2018-07-09 ENCOUNTER — TELEPHONE (OUTPATIENT)
Dept: FAMILY MEDICINE CLINIC | Age: 73
End: 2018-07-09

## 2018-07-09 DIAGNOSIS — I10 ESSENTIAL HYPERTENSION WITH GOAL BLOOD PRESSURE LESS THAN 130/85: ICD-10-CM

## 2018-07-09 RX ORDER — NITROGLYCERIN 0.4 MG/1
TABLET SUBLINGUAL
Qty: 25 TAB | Refills: 0 | Status: SHIPPED | OUTPATIENT
Start: 2018-07-09 | End: 2021-07-06 | Stop reason: SDUPTHER

## 2018-07-09 RX ORDER — LOSARTAN POTASSIUM AND HYDROCHLOROTHIAZIDE 12.5; 5 MG/1; MG/1
TABLET ORAL
Qty: 90 TAB | Refills: 1 | Status: SHIPPED | OUTPATIENT
Start: 2018-07-09 | End: 2019-06-17 | Stop reason: SDUPTHER

## 2018-07-09 NOTE — TELEPHONE ENCOUNTER
Outbound call to patient. Name and  verified. Patient stated that his BP and pulse have been elevated. Inquired as to how long this been going on. Patient stated that his VS have been abnormal for a few days. He stated BP is running anywhere from 122/84 to 135/88 and his heart rate is in the 70's and 80's. Patient denied any chest pain, blurred vision. Stated that he did have a headache \"with a lot of pressure on his brain\". Patient also stated he has pressure above his ears. Looked at patients VS from previous visits. VS that patient had given me are in normal range for patient. Advised patient that if he is having all that pressure in his head and he feels his BP is too high he should go to ED or urgent care. Patient stated \" I just need to change my medication\". \" I have old medication that Dr. Eliseo Haque prescribed me can I take that. Advised patient I would not recommend taking any medication that is not actively prescribed to him. Advised he can call for same day appt in the morning. If BP gets worse or any change of condition he needs to go to urgent care. Patient stated OK.

## 2018-07-09 NOTE — TELEPHONE ENCOUNTER
Patient is calling stating that his blood pressure is high   Morning -135/88, pulse 89  He is requesting call back from Dr Tricia Bruce  He takes losartan-hydroCHLOROthiazide (HYZAAR) 50-12.5 mg per tablet    Best call : [phone]    Patient was advised to call at 7 am to get a same day appointment tomorrow or to be seen by  Urgent care at [facility].

## 2018-07-09 NOTE — TELEPHONE ENCOUNTER
----- Message from Sonali Perez sent at 7/7/2018  3:26 PM EDT -----  Regarding: Dr Mckenna/rx refill  Pt (p) [phone], needs rx refill for his BP medication Losartan Hydrochlorothiazide , for 90 day supply, and his Nitroglycerin Sublingual, tabs  he would like both rx  to go to his  [facility] pharmacy [phone]     Please do not send his mail order pharmacy on this request since he is out of both medications. .  Requested Prescriptions     Pending Prescriptions Disp Refills    losartan-hydroCHLOROthiazide (HYZAAR) 50-12.5 mg per tablet 90 Tab 1     Sig: TAKE 1 TABLET BY MOUTH DAILY. INDICATIONS: HYPERTENSION    nitroglycerin (NITROSTAT) 0.4 mg SL tablet 25 Tab 0     Sig: Up to 3 doses.

## 2018-07-10 ENCOUNTER — OFFICE VISIT (OUTPATIENT)
Dept: FAMILY MEDICINE CLINIC | Age: 73
End: 2018-07-10

## 2018-07-10 VITALS
OXYGEN SATURATION: 97 % | SYSTOLIC BLOOD PRESSURE: 130 MMHG | BODY MASS INDEX: 26.05 KG/M2 | RESPIRATION RATE: 18 BRPM | WEIGHT: 147 LBS | HEART RATE: 95 BPM | DIASTOLIC BLOOD PRESSURE: 67 MMHG | TEMPERATURE: 98.5 F | HEIGHT: 63 IN

## 2018-07-10 DIAGNOSIS — G44.019 EPISODIC CLUSTER HEADACHE, NOT INTRACTABLE: ICD-10-CM

## 2018-07-10 DIAGNOSIS — R51.9 SINUS HEADACHE: Primary | ICD-10-CM

## 2018-07-10 RX ORDER — BUTALBITAL, ACETAMINOPHEN AND CAFFEINE 50; 325; 40 MG/1; MG/1; MG/1
1 TABLET ORAL
Qty: 20 TAB | Refills: 0 | Status: SHIPPED | OUTPATIENT
Start: 2018-07-10 | End: 2019-01-02 | Stop reason: ALTCHOICE

## 2018-07-10 NOTE — PATIENT INSTRUCTIONS
Cluster Headache: Care Instructions  Your Care Instructions  Cluster headaches are very painful. They happen on one side of the head and often start at night. They can last for 30 minutes to several hours. They usually occur in groups, or clusters, over weeks or months. You may have a stuffy nose and watery eyes during the headaches. The cause of cluster headaches is not known. Medicine may help prevent cluster headaches. You also can try to avoid things that trigger your headaches. Follow-up care is a key part of your treatment and safety. Be sure to make and go to all appointments, and call your doctor if you are having problems. It's also a good idea to know your test results and keep a list of the medicines you take. How can you care for yourself at home? · Watch for new symptoms with a headache. These include fever, weakness or numbness, vision changes, or confusion. They may be signs of a more serious problem. · Be safe with medicines. Take your medicines exactly as prescribed. Call your doctor if you think you are having a problem with your medicine. You will get more details on the specific medicines your doctor prescribes. · If your doctor recommends it, take an over-the-counter pain medicine, such as acetaminophen (Tylenol), ibuprofen (Advil, Motrin), or naproxen (Aleve). Read and follow all instructions on the label. · Do not take two or more pain medicines at the same time unless the doctor told you to. Many pain medicines have acetaminophen, which is Tylenol. Too much acetaminophen (Tylenol) can be harmful. · Carry medicine with you to quickly treat a headache. · Put ice or a cold pack on the area for 10 to 20 minutes at a time. Put a thin cloth between the ice and your skin. · If your doctor prescribed at-home oxygen therapy to stop a cluster headache, follow the directions for using it. To prevent cluster headaches  · Keep a headache diary.  Avoiding triggers may help you prevent headaches. Write down when a headache begins, how long it lasts, and what might have triggered it. This could include stress, alcohol, or certain foods. · Exercise daily to lower stress. · Limit caffeine by not drinking too much coffee, tea, or soda. But do not quit caffeine suddenly. This can also give you headaches. · Do not smoke or allow others to smoke around you. If you need help quitting, talk to your doctor about stop-smoking programs and medicines. These can increase your chances of quitting for good. · Tell your doctor if your headaches get worse and medicines do not help. You may need to try a different medicine. When should you call for help? Call 911 anytime you think you may need emergency care. For example, call if:    · You have symptoms of a stroke. These may include:  ¨ Sudden numbness, tingling, weakness, or loss of movement in your face, arm, or leg, especially on only one side of your body. ¨ Sudden vision changes. ¨ Sudden trouble speaking. ¨ Sudden confusion or trouble understanding simple statements. ¨ Sudden problems with walking or balance. ¨ A sudden, severe headache that is different from past headaches.    Call your doctor now or seek immediate medical care if:    · You have a fever with a stiff neck or a severe headache.     · You are sensitive to light or feel very sleepy or confused.     · You have new nausea and vomiting and you cannot keep down food or liquids.    Watch closely for changes in your health, and be sure to contact your doctor if:    · You have a headache that does not get better within 1 or 2 days.     · Your headaches get worse or happen more often. Where can you learn more? Go to http://lv-shelbi.info/. Enter X630 in the search box to learn more about \"Cluster Headache: Care Instructions. \"  Current as of: October 9, 2017  Content Version: 11.7  © 8128-5985 [employer].  Care instructions adapted under license by [employer] (which disclaims liability or warranty for this information). If you have questions about a medical condition or this instruction, always ask your healthcare professional. [address] any warranty or liability for your use of this information.

## 2018-07-10 NOTE — MR AVS SNAPSHOT
[address] [facility] 
 
 
 [address] [facility] [address] 
[phone] Patient: Shiela Tesfaye MRN: [id number] Visit Information Date & Time Provider Department Dept. Phone Encounter #  
 7/10/2018  1:00 PM Duarte Martinez  [facility] [address] [phone] [id number] Follow-up Instructions Return if symptoms worsen or fail to improve. Your Appointments 10/8/2018  4:00 PM  
ROUTINE CARE with Duarte Martinez MD  
[facility]) Appt Note: Medicare Wellness exam  
 [address] [address]  
[phone]  
  
   
 [address] [address] Upcoming Health Maintenance Date Due DTaP/Tdap/Td series (1 - Tdap) 9/4/1967 ZOSTER VACCINE AGE 60> 7/4/2006 Influenza Age 5 to Adult 8/1/2018 EYE EXAM RETINAL OR DILATED Q1 10/11/2018 MEDICARE YEARLY EXAM 10/31/2018 HEMOGLOBIN A1C Q6M 12/28/2018 FOOT EXAM Q1 6/27/2019 MICROALBUMIN Q1 6/28/2019 LIPID PANEL Q1 6/28/2019 GLAUCOMA SCREENING Q2Y 10/11/2019 COLONOSCOPY 6/29/2027 Allergies as of 7/10/2018  Review Complete On: 7/10/2018 By: Duarte Martinez MD  
 No Known Allergies Current Immunizations  Reviewed on 10/30/2017 Name Date Influenza High Dose Vaccine PF 10/30/2017 Pneumococcal Conjugate (PCV-13) 10/1/2016 ZZZ-RETIRED (DO NOT USE) Pneumococcal Vaccine (Unspecified Type) 7/20/2012 10:35 AM  
  
 Not reviewed this visit You Were Diagnosed With   
  
 Codes Comments Sinus headache    -  Primary ICD-10-CM: D67 ICD-9-CM: 784.0 Episodic cluster headache, not intractable     ICD-10-CM: U40.952 
ICD-9-CM: 339.01 Vitals BP Pulse Temp Resp Height(growth percentile) Weight(growth percentile) 130/67 (BP 1 Location: Left arm, BP Patient Position: Sitting) 95 98.5 °F (36.9 °C) (Oral) 18 5' 3\" (1.6 m) 147 lb (66.7 kg) SpO2 BMI Smoking Status 97% 26.04 kg/m2 Never Smoker BMI and BSA Data Body Mass Index Body Surface Area 26.04 kg/m 2 1.72 m 2 Preferred Pharmacy Pharmacy Name Phone [id number] [address] [facility] AT [address] [place] [address] [phone] Your Updated Medication List  
  
   
This list is accurate as of 7/10/18  1:26 PM.  Always use your most recent med list.  
  
  
  
  
 albuterol 90 mcg/actuation inhaler Commonly known as:  PROVENTIL HFA, VENTOLIN HFA, PROAIR HFA Take 2 Puffs by inhalation every four (4) hours as needed for Wheezing. butalbital-acetaminophen-caffeine -40 mg per tablet Commonly known as:  Rawleitammi Salen Take 1 Tab by mouth every eight (8) hours as needed for Pain. Indications: TENSION-TYPE HEADACHE  
  
 carvedilol 6.25 mg tablet Commonly known as:  COREG  
TAKE ONE TABLET BY MOUTH TWICE DAILY WITH FOOD  
  
 cloNIDine HCl 0.1 mg tablet Commonly known as:  CATAPRES Take 1 Tab by mouth as needed. If systolic BP > 531 and/or diastolic BP > 90  
  
 clopidogrel 75 mg Tab Commonly known as:  PLAVIX Take 1 Tab by mouth daily. fluticasone 50 mcg/actuation nasal spray Commonly known as:  Renetta Still One spray each nostril  
  
 fluticasone furoate 200 mcg/actuation Dsdv inhaler Commonly known as:  ARNUITY ELLIPTA Take 1 Puff by inhalation daily. losartan-hydroCHLOROthiazide 50-12.5 mg per tablet Commonly known as:  HYZAAR  
TAKE 1 TABLET BY MOUTH DAILY. INDICATIONS: HYPERTENSION  
  
 metFORMIN 500 mg tablet Commonly known as:  GLUCOPHAGE Take 1 Tab by mouth two (2) times daily (with meals). montelukast 10 mg tablet Commonly known as:  SINGULAIR Take 1 Tab by mouth daily. nitroglycerin 0.4 mg SL tablet Commonly known as:  NITROSTAT  
TAKE 1 TABLET AND PLACE UNDER THE TONGUE EVERY 5 MINUTES FOR CHEST PAIN FOR UP TO 3 DOSES  
  
 polyethylene glycol 17 gram/dose powder Commonly known as:  Keysha Osei Take 17 g by mouth daily. rosuvastatin 10 mg tablet Commonly known as:  CRESTOR Take 1 Tab by mouth daily. Prescriptions Printed Refills  
 butalbital-acetaminophen-caffeine (FIORICET, ESGIC) -40 mg per tablet 0 Sig: Take 1 Tab by mouth every eight (8) hours as needed for Pain. Indications: TENSION-TYPE HEADACHE Class: Print Route: Oral  
  
Follow-up Instructions Return if symptoms worsen or fail to improve. Patient Instructions Cluster Headache: Care Instructions Your Care Instructions Cluster headaches are very painful. They happen on one side of the head and often start at night. They can last for 30 minutes to several hours. They usually occur in groups, or clusters, over weeks or months. You may have a stuffy nose and watery eyes during the headaches. The cause of cluster headaches is not known. Medicine may help prevent cluster headaches. You also can try to avoid things that trigger your headaches. Follow-up care is a key part of your treatment and safety. Be sure to make and go to all appointments, and call your doctor if you are having problems. It's also a good idea to know your test results and keep a list of the medicines you take. How can you care for yourself at home? · Watch for new symptoms with a headache. These include fever, weakness or numbness, vision changes, or confusion. They may be signs of a more serious problem. · Be safe with medicines. Take your medicines exactly as prescribed. Call your doctor if you think you are having a problem with your medicine. You will get more details on the specific medicines your doctor prescribes. · If your doctor recommends it, take an over-the-counter pain medicine, such as acetaminophen (Tylenol), ibuprofen (Advil, Motrin), or naproxen (Aleve). Read and follow all instructions on the label. · Do not take two or more pain medicines at the same time unless the doctor told you to. Many pain medicines have acetaminophen, which is Tylenol. Too much acetaminophen (Tylenol) can be harmful. · Carry medicine with you to quickly treat a headache. · Put ice or a cold pack on the area for 10 to 20 minutes at a time. Put a thin cloth between the ice and your skin. · If your doctor prescribed at-home oxygen therapy to stop a cluster headache, follow the directions for using it. To prevent cluster headaches · Keep a headache diary. Avoiding triggers may help you prevent headaches. Write down when a headache begins, how long it lasts, and what might have triggered it. This could include stress, alcohol, or certain foods. · Exercise daily to lower stress. · Limit caffeine by not drinking too much coffee, tea, or soda. But do not quit caffeine suddenly. This can also give you headaches. · Do not smoke or allow others to smoke around you. If you need help quitting, talk to your doctor about stop-smoking programs and medicines. These can increase your chances of quitting for good. · Tell your doctor if your headaches get worse and medicines do not help. You may need to try a different medicine. When should you call for help? Call 911 anytime you think you may need emergency care. For example, call if: 
  · You have symptoms of a stroke. These may include: 
¨ Sudden numbness, tingling, weakness, or loss of movement in your face, arm, or leg, especially on only one side of your body. ¨ Sudden vision changes. ¨ Sudden trouble speaking. ¨ Sudden confusion or trouble understanding simple statements. ¨ Sudden problems with walking or balance. ¨ A sudden, severe headache that is different from past headaches.  
 Call your doctor now or seek immediate medical care if: 
  · You have a fever with a stiff neck or a severe headache.  
  · You are sensitive to light or feel very sleepy or confused.   · You have new nausea and vomiting and you cannot keep down food or liquids.  
 Watch closely for changes in your health, and be sure to contact your doctor if: 
  · You have a headache that does not get better within 1 or 2 days.  
  · Your headaches get worse or happen more often. Where can you learn more? Go to http://lv-shelbi.info/. Enter N122 in the search box to learn more about \"Cluster Headache: Care Instructions. \" Current as of: October 9, 2017 Content Version: 11.7 © 9252-7029 [employer]. Care instructions adapted under license by [employer] (which disclaims liability or warranty for this information). If you have questions about a medical condition or this instruction, always ask your healthcare professional. [address] any warranty or liability for your use of this information. Introducing [facility]! [facility] introduces [employer] Apps patient portal. Now you can access parts of your medical record, email your doctor's office, and request medication refills online. 1. In your internet browser, go to [email or web address] 2. Click on the First Time User? Click Here link in the Sign In box. You will see the New Member Sign Up page. 3. Enter your [employer] Apps Access Code exactly as it appears below. You will not need to use this code after youve completed the sign-up process. If you do not sign up before the expiration date, you must request a new code. · [employer] Access Code: [email or web address] Expires: 7/17/2018  3:27 PM 
 
4. Enter the last four digits of your Social Security Number (xxxx) and Date of Birth (mm/dd/yyyy) as indicated and click Submit. You will be taken to the next sign-up page. 5. Create a [employer] Apps ID. This will be your [employer] Apps login ID and cannot be changed, so think of one that is secure and easy to remember. 6. Create a [employer] password. You can change your password at any time. 7. Enter your Password Reset Question and Answer. This can be used at a later time if you forget your password. 8. Enter your e-mail address. You will receive e-mail notification when new information is available in [address] 9. Click Sign Up. You can now view and download portions of your medical record. 10. Click the Download Summary menu link to download a portable copy of your medical information. If you have questions, please visit the Frequently Asked Questions section of the [employer] website. Remember, [employer] is NOT to be used for urgent needs. For medical emergencies, dial 911. Now available from your iPhone and Android! Please provide this summary of care documentation to your next provider. Your primary care clinician is listed as Sumit Donald. If you have any questions after today's visit, please call [phone].

## 2018-07-10 NOTE — PROGRESS NOTES
HISTORY OF PRESENT ILLNESS  Karla Dunn is a 71 y.o. male. He was seen in office today for headache x 3 days. HPI  Neurological Review  He is here today to talk about headaches. This is a new problem. Episodes are occuring daily and duration of individual headaches are abrupt and a few hours ago. The pain is described as throbbing pain, squeezing pain, bilateral in the frontal area, bilateral in the temporal area, bilateral in the occipital area. Associated symptoms: congestion and facial pain. Denies aura, dizziness, fainting, fever, flashing lights, fluid retention, light sensitivity, motor impairment, nausea, sweating, visual disturbance and vomiting Precipitating factors: patient is aware of none. He denies a history of recent head injury. Treatments for headaches currently include: none. Review of Systems   Constitutional: Negative for chills, fever and malaise/fatigue. HENT: Positive for congestion and sinus pain. Negative for ear pain, sore throat and tinnitus. Eyes: Negative for blurred vision, double vision, pain and discharge. Respiratory: Negative for cough, shortness of breath and wheezing. Cardiovascular: Negative for chest pain, palpitations and leg swelling. Gastrointestinal: Negative for abdominal pain, blood in stool, constipation, diarrhea, nausea and vomiting. Genitourinary: Negative for dysuria, frequency, hematuria and urgency. Musculoskeletal: Negative for back pain, joint pain and myalgias. Skin: Negative for rash. Neurological: Positive for headaches. Negative for dizziness, tremors and seizures. Endo/Heme/Allergies: Negative for polydipsia. Does not bruise/bleed easily. Psychiatric/Behavioral: Negative for depression and substance abuse. The patient is not nervous/anxious. Physical Exam   Constitutional: He is oriented to person, place, and time. He appears well-developed and well-nourished.    HENT:   Nose: Right sinus exhibits maxillary sinus tenderness and frontal sinus tenderness. Left sinus exhibits maxillary sinus tenderness and frontal sinus tenderness. Neck: Normal range of motion. Neck supple. Cardiovascular: Normal rate, regular rhythm, normal heart sounds and intact distal pulses. Pulmonary/Chest: Effort normal and breath sounds normal.   Abdominal: Soft. Bowel sounds are normal.   Musculoskeletal: Normal range of motion. Neurological: He is alert and oriented to person, place, and time. No cranial nerve deficit. Intact visual fields. Fundi are benign. PERRL, EOM's full, no nystagmus, no ptosis. Facial sensation is normal. Facial movement is symmetric. Palate is midline. Normal sternocleidomastoid strength. Tongue is midline. Hearing is intact bilaterally. Skin: Skin is warm and dry. Psychiatric: He has a normal mood and affect. Nursing note and vitals reviewed. ASSESSMENT and PLAN  Diagnoses and all orders for this visit:    1. Sinus headache  -     butalbital-acetaminophen-caffeine (FIORICET, ESGIC) -40 mg per tablet; Take 1 Tab by mouth every eight (8) hours as needed for Pain. Indications: TENSION-TYPE HEADACHE    2. Episodic cluster headache, not intractable  -     butalbital-acetaminophen-caffeine (FIORICET, ESGIC) -40 mg per tablet; Take 1 Tab by mouth every eight (8) hours as needed for Pain. Indications: TENSION-TYPE HEADACHE    likely sinus vs cluster. Advised to take Tylenol as scheduled and to take Singulair and steam inhalation. Also to drink more water. ( he looks dehydrated on exam)  Discussed lifestyle issues and health guidance given  Patient was given an after visit summary which includes diagnoses, vital signs, current medications, instructions and references & authorized prescriptions . Pt verbalized instructions I provided and expressed understanding of discussion that was held today. Follow-up Disposition:  Return if symptoms worsen or fail to improve.

## 2018-07-10 NOTE — PROGRESS NOTES
Chief Complaint   Patient presents with    Headache     x 3 days    Pressure Behind the Eyes     1. Have you been to the ER, urgent care clinic since your last visit? Hospitalized since your last visit? No    2. Have you seen or consulted any other health care providers outside of the [address] [place] [address] since your last visit? Include any pap smears or colon screening.  No

## 2018-07-24 ENCOUNTER — OFFICE VISIT (OUTPATIENT)
Dept: FAMILY MEDICINE CLINIC | Age: 73
End: 2018-07-24

## 2018-07-24 VITALS
DIASTOLIC BLOOD PRESSURE: 70 MMHG | BODY MASS INDEX: 26.22 KG/M2 | HEIGHT: 63 IN | OXYGEN SATURATION: 99 % | SYSTOLIC BLOOD PRESSURE: 110 MMHG | WEIGHT: 148 LBS | RESPIRATION RATE: 18 BRPM | HEART RATE: 71 BPM | TEMPERATURE: 98.3 F

## 2018-07-24 DIAGNOSIS — R07.9 CHEST PAIN IN ADULT: Primary | ICD-10-CM

## 2018-07-24 DIAGNOSIS — I25.10 CORONARY ARTERY DISEASE INVOLVING NATIVE CORONARY ARTERY OF NATIVE HEART WITHOUT ANGINA PECTORIS: Chronic | ICD-10-CM

## 2018-07-24 DIAGNOSIS — M94.0 COSTOCHONDRITIS: ICD-10-CM

## 2018-07-24 RX ORDER — PREDNISONE 5 MG/1
5 TABLET ORAL DAILY
Qty: 14 TAB | Refills: 0 | Status: SHIPPED | OUTPATIENT
Start: 2018-07-24 | End: 2018-10-24 | Stop reason: ALTCHOICE

## 2018-07-24 NOTE — PROGRESS NOTES
Discussed with patient  EKG showing NSR with low voltage. Most likely from his lung disease but can be pericardial effusion  Started on low dose steroid.

## 2018-07-24 NOTE — PROGRESS NOTES
Chief Complaint   Patient presents with    Chest Pain     constant pressure in chest .  one episode of vomiting , acidic feeling      1. Have you been to the ER, urgent care clinic since your last visit? Hospitalized since your last visit? No    2. Have you seen or consulted any other health care providers outside of the [address] [place] [address] since your last visit? Include any pap smears or colon screening.  No

## 2018-07-24 NOTE — PROGRESS NOTES
HISTORY OF PRESENT ILLNESS  Eliel Watson is a 71 y.o. male. he was seen for chest pain, and epigastric discomfort for few days. He is leaving for [place] on 07/26/2018, so wants to get checked. HPI  Chest Pain  Patient complains of chest pain. Onset was 1 month ago, with unchanged course since that time. The patient admits to chest discomfort that is intermittent, with radiation to epigastrium and left costochondral area, rated as a scale of 6/10 in intensity that is substernal, burning in nature. Associated symptoms are chest pressure/discomfort, dyspnea on exertion. Aggravating factors are coughing, large meals or palpation of chest.  Alleviating factors are: nitroglycerin  tablets. Patient's cardiac risk factors are dyslipidemia, diabetes mellitus, male gender, hypertension, previous h/o CAD. Patient had nuclear stress test in 08/2017, and was advised to go for cath as test was abnormal and showed reversible ischemia. Patient is in denial to get cath done    Review of Systems   Constitutional: Negative for chills, fever and malaise/fatigue. HENT: Negative for congestion, ear pain, sore throat and tinnitus. Eyes: Negative for blurred vision, double vision, pain and discharge. Respiratory: Negative for cough, shortness of breath and wheezing. Cardiovascular: Positive for chest pain. Negative for palpitations and leg swelling. Gastrointestinal: Positive for heartburn and nausea. Negative for abdominal pain, blood in stool, constipation, diarrhea and vomiting. Genitourinary: Negative for dysuria, frequency, hematuria and urgency. Musculoskeletal: Negative for back pain, joint pain and myalgias. Skin: Negative for rash. Neurological: Negative for dizziness, tremors, seizures and headaches. Endo/Heme/Allergies: Negative for polydipsia. Does not bruise/bleed easily. Psychiatric/Behavioral: Negative for depression and substance abuse. The patient is not nervous/anxious.         Physical Exam   Constitutional: He is oriented to person, place, and time. He appears well-developed and well-nourished. HENT:   Head: Normocephalic and atraumatic. Right Ear: External ear normal.   Mouth/Throat: Oropharynx is clear and moist. No oropharyngeal exudate. Eyes: Conjunctivae and EOM are normal. Pupils are equal, round, and reactive to light. No scleral icterus. Neck: Normal range of motion. Neck supple. No JVD present. No thyromegaly present. Cardiovascular: Normal rate, regular rhythm, S1 normal, S2 normal, normal heart sounds and intact distal pulses. Exam reveals no gallop. No murmur heard. Pulses:       Carotid pulses are 2+ on the right side, and 2+ on the left side. Radial pulses are 2+ on the right side, and 2+ on the left side. Femoral pulses are 2+ on the right side, and 2+ on the left side. Popliteal pulses are 2+ on the right side, and 2+ on the left side. Dorsalis pedis pulses are 2+ on the right side, and 2+ on the left side. Posterior tibial pulses are 2+ on the right side, and 2+ on the left side. Pulmonary/Chest: Effort normal and breath sounds normal. He has no wheezes. Bilateral expiratory wheeze   Abdominal: Soft. Bowel sounds are normal. He exhibits no distension and no mass. Musculoskeletal: Normal range of motion. He exhibits no edema or tenderness. Lymphadenopathy:     He has no cervical adenopathy. Neurological: He is alert and oriented to person, place, and time. He has normal reflexes. No cranial nerve deficit. Skin: Skin is warm and dry. No rash noted. He is not diaphoretic. Psychiatric: He has a normal mood and affect. Nursing note and vitals reviewed. ASSESSMENT and PLAN  Diagnoses and all orders for this visit:    1.  Chest pain in adult  -     AMB POC EKG ROUTINE W/ 12 LEADS, INTER & REP    EKG showing NSR with low voltage: pulmonary disease vs pericardial effusion  As patient is on Plavix, can't take NSAID. Advised to use his inhaler regularly, which he has stopped and to start with low dose steroid    2. Coronary artery disease involving native coronary artery of native heart without angina pectoris  -     AMB POC EKG ROUTINE W/ 12 LEADS, INTER & REP    Recommended strogly to follow up with cardiology  Discussed lifestyle issues and health guidance given  Patient was given an after visit summary which includes diagnoses, vital signs, current medications, instructions and references & authorized prescriptions . Pt verbalized instructions I provided and expressed understanding of discussion that was held today. Follow-up Disposition:  Return if symptoms worsen or fail to improve.

## 2018-07-24 NOTE — MR AVS SNAPSHOT
[address] [facility] 
 
 
 [address] Shaun Wolff 13 
[phone] Patient: Joseluis Arroyo MRN: [id number] CTR:[id number]/[id number] Visit Information Date & Time Provider Department Dept. Phone Encounter #  
 7/24/2018  1:00 PM Geetha Snow, [facility] [phone] [id number] Follow-up Instructions Return if symptoms worsen or fail to improve. Your Appointments 10/8/2018  4:00 PM  
ROUTINE CARE with Geetha Snow MD  
[facility]) Appt Note: Medicare Wellness exam  
 [address] [address]  
[phone]  
  
   
 [address] [address] Upcoming Health Maintenance Date Due DTaP/Tdap/Td series (1 - Tdap) 9/4/1967 ZOSTER VACCINE AGE 60> 7/4/2006 Influenza Age 5 to Adult 8/1/2018 EYE EXAM RETINAL OR DILATED Q1 10/11/2018 MEDICARE YEARLY EXAM 10/31/2018 HEMOGLOBIN A1C Q6M 12/28/2018 MICROALBUMIN Q1 6/28/2019 LIPID PANEL Q1 6/28/2019 FOOT EXAM Q1 7/24/2019 GLAUCOMA SCREENING Q2Y 10/11/2019 COLONOSCOPY 6/29/2027 Allergies as of 7/24/2018  Review Complete On: 7/24/2018 By: Geetha Snow MD  
 No Known Allergies Current Immunizations  Reviewed on 10/30/2017 Name Date Influenza High Dose Vaccine PF 10/30/2017 Pneumococcal Conjugate (PCV-13) 10/1/2016 ZZZ-RETIRED (DO NOT USE) Pneumococcal Vaccine (Unspecified Type) 7/20/2012 10:35 AM  
  
 Not reviewed this visit You Were Diagnosed With   
  
 Codes Comments Chest pain in adult    -  Primary ICD-10-CM: R07.9 ICD-9-CM: 786.50 Coronary artery disease involving native coronary artery of native heart without angina pectoris     ICD-10-CM: I25.10 ICD-9-CM: 414.01 Vitals BP Pulse Temp Resp Height(growth percentile) Weight(growth percentile)  110/70 (BP 1 Location: Left arm, BP Patient Position: Sitting) 71 98.3 °F (36.8 °C) (Oral) 18 5' 3\" (1.6 m) 148 lb (67.1 kg) SpO2 BMI Smoking Status 99% 26.22 kg/m2 Never Smoker Vitals History BMI and BSA Data Body Mass Index Body Surface Area  
 26.22 kg/m 2 1.73 m 2 Preferred Pharmacy Pharmacy Name Phone [id number] [place] [facility] [address] [phone] Your Updated Medication List  
  
   
This list is accurate as of 7/24/18  2:05 PM.  Always use your most recent med list.  
  
  
  
  
 albuterol 90 mcg/actuation inhaler Commonly known as:  PROVENTIL HFA, VENTOLIN HFA, PROAIR HFA Take 2 Puffs by inhalation every four (4) hours as needed for Wheezing. butalbital-acetaminophen-caffeine -40 mg per tablet Commonly known as:  Ricardo Lasso Take 1 Tab by mouth every eight (8) hours as needed for Pain. Indications: TENSION-TYPE HEADACHE  
  
 carvedilol 6.25 mg tablet Commonly known as:  COREG  
TAKE ONE TABLET BY MOUTH TWICE DAILY WITH FOOD  
  
 cloNIDine HCl 0.1 mg tablet Commonly known as:  CATAPRES Take 1 Tab by mouth as needed. If systolic BP > 339 and/or diastolic BP > 90  
  
 clopidogrel 75 mg Tab Commonly known as:  PLAVIX Take 1 Tab by mouth daily. fluticasone 50 mcg/actuation nasal spray Commonly known as:  Estrella Serene One spray each nostril  
  
 fluticasone furoate 200 mcg/actuation Dsdv inhaler Commonly known as:  ARNUITY ELLIPTA Take 1 Puff by inhalation daily. losartan-hydroCHLOROthiazide 50-12.5 mg per tablet Commonly known as:  HYZAAR  
TAKE 1 TABLET BY MOUTH DAILY. INDICATIONS: HYPERTENSION  
  
 metFORMIN 500 mg tablet Commonly known as:  GLUCOPHAGE Take 1 Tab by mouth two (2) times daily (with meals). montelukast 10 mg tablet Commonly known as:  SINGULAIR Take 1 Tab by mouth daily. nitroglycerin 0.4 mg SL tablet Commonly known as:  NITROSTAT TAKE 1 TABLET AND PLACE UNDER THE TONGUE EVERY 5 MINUTES FOR CHEST PAIN FOR UP TO 3 DOSES  
  
 polyethylene glycol 17 gram/dose powder Commonly known as:  Keysha Osei Take 17 g by mouth daily. rosuvastatin 10 mg tablet Commonly known as:  CRESTOR Take 1 Tab by mouth daily. We Performed the Following AMB POC EKG ROUTINE W/ 12 LEADS, INTER & REP [27714 CPT(R)] Follow-up Instructions Return if symptoms worsen or fail to improve. Patient Instructions Musculoskeletal Chest Pain: Care Instructions Your Care Instructions Chest pain is not always a sign that something is wrong with your heart or that you have another serious problem. The doctor thinks your chest pain is caused by strained muscles or ligaments, inflamed chest cartilage, or another problem in your chest, rather than by your heart. You may need more tests to find the cause of your chest pain. Follow-up care is a key part of your treatment and safety. Be sure to make and go to all appointments, and call your doctor if you are having problems. It's also a good idea to know your test results and keep a list of the medicines you take. How can you care for yourself at home? · Take pain medicines exactly as directed. ¨ If the doctor gave you a prescription medicine for pain, take it as prescribed. ¨ If you are not taking a prescription pain medicine, ask your doctor if you can take an over-the-counter medicine. · Rest and protect the sore area. · Stop, change, or take a break from any activity that may be causing your pain or soreness. · Put ice or a cold pack on the sore area for 10 to 20 minutes at a time. Try to do this every 1 to 2 hours for the next 3 days (when you are awake) or until the swelling goes down. Put a thin cloth between the ice and your skin.  
· After 2 or 3 days, apply a heating pad set on low or a warm cloth to the area that hurts. Some doctors suggest that you go back and forth between hot and cold. · Do not wrap or tape your ribs for support. This may cause you to take smaller breaths, which could increase your risk of lung problems. · Mentholated creams such as Bengay or Icy Hot may soothe sore muscles. Follow the instructions on the package. · Follow your doctor's instructions for exercising. · Gentle stretching and massage may help you get better faster. Stretch slowly to the point just before pain begins, and hold the stretch for at least 15 to 30 seconds. Do this 3 or 4 times a day. Stretch just after you have applied heat. · As your pain gets better, slowly return to your normal activities. Any increased pain may be a sign that you need to rest a while longer. When should you call for help? Call 911 anytime you think you may need emergency care. For example, call if: 
  · You have chest pain or pressure. This may occur with: ¨ Sweating. ¨ Shortness of breath. ¨ Nausea or vomiting. ¨ Pain that spreads from the chest to the neck, jaw, or one or both shoulders or arms. ¨ Dizziness or lightheadedness. ¨ A fast or uneven pulse. After calling 911, chew 1 adult-strength aspirin. Wait for an ambulance. Do not try to drive yourself.  
  · You have sudden chest pain and shortness of breath, or you cough up blood.  
 Call your doctor now or seek immediate medical care if: 
  · You have any trouble breathing.  
  · Your chest pain gets worse.  
  · Your chest pain occurs consistently with exercise and is relieved by rest.  
 Watch closely for changes in your health, and be sure to contact your doctor if: 
  · Your chest pain does not get better after 1 week. Where can you learn more? Go to http://lv-shelbi.info/. Enter V293 in the search box to learn more about \"Musculoskeletal Chest Pain: Care Instructions. \" Current as of: November 20, 2017 Content Version: 11.7 © 9059-2314 [employer], [employer]. Care instructions adapted under license by [employer] (which disclaims liability or warranty for this information). If you have questions about a medical condition or this instruction, always ask your healthcare professional. [address] any warranty or liability for your use of this information. Chest Pain: Care Instructions Your Care Instructions There are many things that can cause chest pain. Some are not serious and will get better on their own in a few days. But some kinds of chest pain need more testing and treatment. Your doctor may have recommended a follow-up visit in the next 8 to 12 hours. If you are not getting better, you may need more tests or treatment. Even though your doctor has released you, you still need to watch for any problems. The doctor carefully checked you, but sometimes problems can develop later. If you have new symptoms or if your symptoms do not get better, get medical care right away. If you have worse or different chest pain or pressure that lasts more than 5 minutes or you passed out (lost consciousness), call 911 or seek other emergency help right away. A medical visit is only one step in your treatment. Even if you feel better, you still need to do what your doctor recommends, such as going to all suggested follow-up appointments and taking medicines exactly as directed. This will help you recover and help prevent future problems. How can you care for yourself at home? · Rest until you feel better. · Take your medicine exactly as prescribed. Call your doctor if you think you are having a problem with your medicine. · Do not drive after taking a prescription pain medicine. When should you call for help? Call 911 if: 
  · You passed out (lost consciousness).  
  · You have severe difficulty breathing.  
  · You have symptoms of a heart attack. These may include: ¨ Chest pain or pressure, or a strange feeling in your chest. 
¨ Sweating. ¨ Shortness of breath. ¨ Nausea or vomiting. ¨ Pain, pressure, or a strange feeling in your back, neck, jaw, or upper belly or in one or both shoulders or arms. ¨ Lightheadedness or sudden weakness. ¨ A fast or irregular heartbeat. After you call 911, the  may tell you to chew 1 adult-strength or 2 to 4 low-dose aspirin. Wait for an ambulance. Do not try to drive yourself.  
 Call your doctor today if: 
  · You have any trouble breathing.  
  · Your chest pain gets worse.  
  · You are dizzy or lightheaded, or you feel like you may faint.  
  · You are not getting better as expected.  
  · You are having new or different chest pain. Where can you learn more? Go to http://lv-shelbi.info/. Enter A120 in the search box to learn more about \"Chest Pain: Care Instructions. \" Current as of: November 20, 2017 Content Version: 11.7 © 3865-2654 [employer]. Care instructions adapted under license by Epic! (which disclaims liability or warranty for this information). If you have questions about a medical condition or this instruction, always ask your healthcare professional. [address] any warranty or liability for your use of this information. Introducing [facility]! [facility] introduces SignalDemand patient portal. Now you can access parts of your medical record, email your doctor's office, and request medication refills online. 1. In your internet browser, go to [email or web address] 2. Click on the First Time User? Click Here link in the Sign In box. You will see the New Member Sign Up page. 3. Enter your SignalDemand Access Code exactly as it appears below. You will not need to use this code after youve completed the sign-up process. If you do not sign up before the expiration date, you must request a new code. · MakerCraft Access Code: [email or web address] Expires: 10/22/2018  2:04 PM 
 
4. Enter the last four digits of your Social Security Number (xxxx) and Date of Birth (mm/dd/yyyy) as indicated and click Submit. You will be taken to the next sign-up page. 5. Create a MakerCraft ID. This will be your MakerCraft login ID and cannot be changed, so think of one that is secure and easy to remember. 6. Create a MakerCraft password. You can change your password at any time. 7. Enter your Password Reset Question and Answer. This can be used at a later time if you forget your password. 8. Enter your e-mail address. You will receive e-mail notification when new information is available in [address] 9. Click Sign Up. You can now view and download portions of your medical record. 10. Click the Download Summary menu link to download a portable copy of your medical information. If you have questions, please visit the Frequently Asked Questions section of the MakerCraft website. Remember, MakerCraft is NOT to be used for urgent needs. For medical emergencies, dial 911. Now available from your iPhone and Android! Please provide this summary of care documentation to your next provider. Your primary care clinician is listed as Negar Henderson. If you have any questions after today's visit, please call [phone].

## 2018-07-24 NOTE — PATIENT INSTRUCTIONS
Musculoskeletal Chest Pain: Care Instructions  Your Care Instructions    Chest pain is not always a sign that something is wrong with your heart or that you have another serious problem. The doctor thinks your chest pain is caused by strained muscles or ligaments, inflamed chest cartilage, or another problem in your chest, rather than by your heart. You may need more tests to find the cause of your chest pain. Follow-up care is a key part of your treatment and safety. Be sure to make and go to all appointments, and call your doctor if you are having problems. It's also a good idea to know your test results and keep a list of the medicines you take. How can you care for yourself at home? · Take pain medicines exactly as directed. ¨ If the doctor gave you a prescription medicine for pain, take it as prescribed. ¨ If you are not taking a prescription pain medicine, ask your doctor if you can take an over-the-counter medicine. · Rest and protect the sore area. · Stop, change, or take a break from any activity that may be causing your pain or soreness. · Put ice or a cold pack on the sore area for 10 to 20 minutes at a time. Try to do this every 1 to 2 hours for the next 3 days (when you are awake) or until the swelling goes down. Put a thin cloth between the ice and your skin. · After 2 or 3 days, apply a heating pad set on low or a warm cloth to the area that hurts. Some doctors suggest that you go back and forth between hot and cold. · Do not wrap or tape your ribs for support. This may cause you to take smaller breaths, which could increase your risk of lung problems. · Mentholated creams such as Bengay or Icy Hot may soothe sore muscles. Follow the instructions on the package. · Follow your doctor's instructions for exercising. · Gentle stretching and massage may help you get better faster. Stretch slowly to the point just before pain begins, and hold the stretch for at least 15 to 30 seconds.  Do this 3 or 4 times a day. Stretch just after you have applied heat. · As your pain gets better, slowly return to your normal activities. Any increased pain may be a sign that you need to rest a while longer. When should you call for help? Call 911 anytime you think you may need emergency care. For example, call if:    · You have chest pain or pressure. This may occur with:  ¨ Sweating. ¨ Shortness of breath. ¨ Nausea or vomiting. ¨ Pain that spreads from the chest to the neck, jaw, or one or both shoulders or arms. ¨ Dizziness or lightheadedness. ¨ A fast or uneven pulse. After calling 911, chew 1 adult-strength aspirin. Wait for an ambulance. Do not try to drive yourself.     · You have sudden chest pain and shortness of breath, or you cough up blood.    Call your doctor now or seek immediate medical care if:    · You have any trouble breathing.     · Your chest pain gets worse.     · Your chest pain occurs consistently with exercise and is relieved by rest.    Watch closely for changes in your health, and be sure to contact your doctor if:    · Your chest pain does not get better after 1 week. Where can you learn more? Go to http://lv-shelbi.info/. Enter V293 in the search box to learn more about \"Musculoskeletal Chest Pain: Care Instructions. \"  Current as of: November 20, 2017  Content Version: 11.7  © 2803-7998 [employer]. Care instructions adapted under license by [employer]ajo[employer] (which disclaims liability or warranty for this information). If you have questions about a medical condition or this instruction, always ask your healthcare professional. [address] [address] any warranty or liability for your use of this information. Chest Pain: Care Instructions  Your Care Instructions    There are many things that can cause chest pain. Some are not serious and will get better on their own in a few days.  But some kinds of chest pain need more testing and treatment. Your doctor may have recommended a follow-up visit in the next 8 to 12 hours. If you are not getting better, you may need more tests or treatment. Even though your doctor has released you, you still need to watch for any problems. The doctor carefully checked you, but sometimes problems can develop later. If you have new symptoms or if your symptoms do not get better, get medical care right away. If you have worse or different chest pain or pressure that lasts more than 5 minutes or you passed out (lost consciousness), call 911 or seek other emergency help right away. A medical visit is only one step in your treatment. Even if you feel better, you still need to do what your doctor recommends, such as going to all suggested follow-up appointments and taking medicines exactly as directed. This will help you recover and help prevent future problems. How can you care for yourself at home? · Rest until you feel better. · Take your medicine exactly as prescribed. Call your doctor if you think you are having a problem with your medicine. · Do not drive after taking a prescription pain medicine. When should you call for help? Call 911 if:    · You passed out (lost consciousness).     · You have severe difficulty breathing.     · You have symptoms of a heart attack. These may include:  ¨ Chest pain or pressure, or a strange feeling in your chest.  ¨ Sweating. ¨ Shortness of breath. ¨ Nausea or vomiting. ¨ Pain, pressure, or a strange feeling in your back, neck, jaw, or upper belly or in one or both shoulders or arms. ¨ Lightheadedness or sudden weakness. ¨ A fast or irregular heartbeat. After you call 911, the  may tell you to chew 1 adult-strength or 2 to 4 low-dose aspirin. Wait for an ambulance.  Do not try to drive yourself.    Call your doctor today if:    · You have any trouble breathing.     · Your chest pain gets worse.     · You are dizzy or lightheaded, or you feel like you may faint.     · You are not getting better as expected.     · You are having new or different chest pain. Where can you learn more? Go to http://lv-shelbi.info/. Enter A120 in the search box to learn more about \"Chest Pain: Care Instructions. \"  Current as of: November 20, 2017  Content Version: 11.7  © 9132-9378 [employer]. Care instructions adapted under license by [employer]ogen (which disclaims liability or warranty for this information). If you have questions about a medical condition or this instruction, always ask your healthcare professional. [address] [address] [address] any warranty or liability for your use of this information.

## 2018-10-24 ENCOUNTER — OFFICE VISIT (OUTPATIENT)
Dept: FAMILY MEDICINE CLINIC | Age: 73
End: 2018-10-24

## 2018-10-24 VITALS
HEART RATE: 69 BPM | TEMPERATURE: 98.4 F | OXYGEN SATURATION: 98 % | DIASTOLIC BLOOD PRESSURE: 80 MMHG | RESPIRATION RATE: 16 BRPM | WEIGHT: 151 LBS | BODY MASS INDEX: 26.75 KG/M2 | HEIGHT: 63 IN | SYSTOLIC BLOOD PRESSURE: 120 MMHG

## 2018-10-24 DIAGNOSIS — Z00.00 MEDICARE ANNUAL WELLNESS VISIT, SUBSEQUENT: Primary | ICD-10-CM

## 2018-10-24 DIAGNOSIS — Z13.31 SCREENING FOR DEPRESSION: ICD-10-CM

## 2018-10-24 DIAGNOSIS — R07.89 CHEST PAIN, ATYPICAL: ICD-10-CM

## 2018-10-24 DIAGNOSIS — J44.9 COPD, MODERATE (HCC): ICD-10-CM

## 2018-10-24 DIAGNOSIS — Z13.39 SCREENING FOR ALCOHOLISM: ICD-10-CM

## 2018-10-24 DIAGNOSIS — Z12.5 SPECIAL SCREENING FOR MALIGNANT NEOPLASM OF PROSTATE: ICD-10-CM

## 2018-10-24 DIAGNOSIS — E11.9 CONTROLLED TYPE 2 DIABETES MELLITUS WITHOUT COMPLICATION, WITHOUT LONG-TERM CURRENT USE OF INSULIN (HCC): ICD-10-CM

## 2018-10-24 DIAGNOSIS — Z23 ENCOUNTER FOR IMMUNIZATION: ICD-10-CM

## 2018-10-24 DIAGNOSIS — E78.5 HYPERLIPIDEMIA LDL GOAL <100: ICD-10-CM

## 2018-10-24 DIAGNOSIS — Z71.89 ADVANCED DIRECTIVES, COUNSELING/DISCUSSION: ICD-10-CM

## 2018-10-24 DIAGNOSIS — I10 ESSENTIAL HYPERTENSION WITH GOAL BLOOD PRESSURE LESS THAN 130/85: ICD-10-CM

## 2018-10-24 NOTE — PROGRESS NOTES
This is the Subsequent Medicare Annual Wellness Exam, performed 12 months or more after the Initial AWV or the last Subsequent AWV    I have reviewed the patient's medical history in detail and updated the computerized patient record. History     Past Medical History:   Diagnosis Date    CAD (coronary artery disease)     3 stents, [place] one [place] apart [place],    Diabetes [facility])     Hypercholesterolemia     Hypertension       Past Surgical History:   Procedure Laterality Date    HX PTCA      2007    HX TONSILLECTOMY       Current Outpatient Medications   Medication Sig Dispense Refill    varicella-zoster recombinant, PF, (SHINGRIX, PF,) 50 mcg/0.5 mL susr injection 0.5mL by IntraMUSCular route once now and then repeat in 2-6 months 0.5 mL 1    fluticasone furoate (ARNUITY ELLIPTA) 200 mcg/actuation dsdv inhaler Take 1 Puff by inhalation daily. 1 Inhaler 5    losartan-hydroCHLOROthiazide (HYZAAR) 50-12.5 mg per tablet TAKE 1 TABLET BY MOUTH DAILY. INDICATIONS: HYPERTENSION 90 Tab 1    nitroglycerin (NITROSTAT) 0.4 mg SL tablet TAKE 1 TABLET AND PLACE UNDER THE TONGUE EVERY 5 MINUTES FOR CHEST PAIN FOR UP TO 3 DOSES 25 Tab 0    carvedilol (COREG) 6.25 mg tablet TAKE ONE TABLET BY MOUTH TWICE DAILY WITH FOOD 180 Tab 1    clopidogrel (PLAVIX) 75 mg tab Take 1 Tab by mouth daily. 90 Tab 0    metFORMIN (GLUCOPHAGE) 500 mg tablet Take 1 Tab by mouth two (2) times daily (with meals). 180 Tab 2    polyethylene glycol (MIRALAX) 17 gram/dose powder Take 17 g by mouth daily. 510 g 0    cloNIDine HCl (CATAPRES) 0.1 mg tablet Take 1 Tab by mouth as needed. If systolic BP > 455 and/or diastolic BP > 90 90 Tab 0    rosuvastatin (CRESTOR) 10 mg tablet Take 1 Tab by mouth daily. 90 Tab 1    montelukast (SINGULAIR) 10 mg tablet Take 1 Tab by mouth daily.  30 Tab 3    fluticasone (FLONASE) 50 mcg/actuation nasal spray One spray each nostril 1 Bottle 2    butalbital-acetaminophen-caffeine (FIORICET, ESGIC) -40 mg per tablet Take 1 Tab by mouth every eight (8) hours as needed for Pain. Indications: TENSION-TYPE HEADACHE 20 Tab 0    albuterol (PROVENTIL HFA, VENTOLIN HFA, PROAIR HFA) 90 mcg/actuation inhaler Take 2 Puffs by inhalation every four (4) hours as needed for Wheezing. 1 Inhaler 1     No Known Allergies  Family History   Problem Relation Age of Onset    No Known Problems Mother     No Known Problems Father      Social History     Tobacco Use    Smoking status: Never Smoker    Smokeless tobacco: Never Used   Substance Use Topics    Alcohol use: No     Alcohol/week: 0.0 oz     Patient Active Problem List   Diagnosis Code    Dysuria R30.0    Essential hypertension I10    Urinary frequency R35.0    Fever R50.9    CAD (coronary artery disease) I25.10    Hyperlipidemia LDL goal <100 E78.5    Vitamin D deficiency E55.9    Visit for preventive health examination Z00.00    Controlled type 2 diabetes mellitus without complication (HCC) N14.7    Bronchitis, acute J20.9    Sinusitis J32.9    Tongue sore K14.6    Tonsillitis J03.90    COPD, moderate (HCC) J44.9    Routine general medical examination at a health care facility Z00.00    Coronary artery disease involving native coronary artery of native heart without angina pectoris I25.10    Chronic ethmoidal sinusitis J32.2    Bilateral impacted cerumen H61.23    Precordial pain R07.2    Costochondritis, acute M94.0    Arthritis of knee, right M17.11    Coronary artery disease involving native coronary artery of native heart with unstable angina pectoris (HCC) I25.110    Hx of long term use of blood thinners Z92.29       Depression Risk Factor Screening:     PHQ over the last two weeks 10/24/2018   Little interest or pleasure in doing things Not at all   Feeling down, depressed, irritable, or hopeless Not at all   Total Score PHQ 2 0     Alcohol Risk Factor Screening: You do not drink alcohol or very rarely.     Functional Ability and Level of Safety:   Hearing Loss  Hearing is good. Activities of Daily Living  The home contains: no safety equipment. Patient does total self care    Fall Risk  Fall Risk Assessment, last 12 mths 10/24/2018   Able to walk? Yes   Fall in past 12 months? No       Abuse Screen  Patient is not abused    Cognitive Screening   Evaluation of Cognitive Function:  Has your family/caregiver stated any concerns about your memory: no  Normal, MMSE  30/30    Patient Care Team   Patient Care Team:  Marco Antonio Roe MD as PCP - General (Family Practice)  Bernardo Akhtar MD (Cardiology)  Lisa Mckinley MD as Consulting Provider General acute hospital)    Assessment/Plan   Education and counseling provided:  Are appropriate based on today's review and evaluation    Diagnoses and all orders for this visit:    1. Medicare annual wellness visit, subsequent  -     TSH REFLEX TO T4  -     URINALYSIS W/ RFLX MICROSCOPIC    2. Essential hypertension with goal blood pressure less than 782/44  -     METABOLIC PANEL, COMPREHENSIVE    3. Controlled type 2 diabetes mellitus without complication, without long-term current use of insulin (HCC)  -     METABOLIC PANEL, COMPREHENSIVE  -     HEMOGLOBIN A1C WITH EAG    4. COPD, moderate (HCC)  -     CBC WITH AUTOMATED DIFF  -     fluticasone furoate (ARNUITY ELLIPTA) 200 mcg/actuation dsdv inhaler; Take 1 Puff by inhalation daily. 5. Hyperlipidemia LDL goal <520  -     METABOLIC PANEL, COMPREHENSIVE  -     LIPID PANEL    6. Advanced directives, counseling/discussion  -     ADVANCE CARE PLANNING FIRST 30 MINS    7. Screening for alcoholism  -     [place] ANNUAL ALCOHOL SCREEN 15 MIN    8. Screening for depression  -     DEPRESSION SCREEN ANNUAL    9. Special screening for malignant neoplasm of prostate  -     PSA SCREENING (SCREENING)    10.  Encounter for immunization  -     ADMIN INFLUENZA VIRUS VAC  -     INFLUENZA VACCINE INACTIVATED (IIV), SUBUNIT, ADJUVANTED, IM  -     varicella-zoster recombinant, PF, (SHINGRIX, PF,) 50 mcg/0.5 mL susr injection; 0.5mL by IntraMUSCular route once now and then repeat in 2-6 months    11. Chest pain, atypical  -     XR CHEST PA LAT; Future        Health Maintenance Due   Topic Date Due    DTaP/Tdap/Td series (1 - Tdap) 09/04/1967    Shingrix Vaccine Age 50> (1 of 2) 09/04/1996    EYE EXAM RETINAL OR DILATED Q1  10/11/2018     Discussed lifestyle issues and health guidance given  Patient was given an after visit summary which includes diagnoses, vital signs, current medications, instructions and references & authorized prescriptions . Results of labs will be conveyed to patient, once available. Pt verbalized instructions I provided and expressed understanding of discussion that was held today. Follow-up Disposition:  Return in about 4 months (around 2/24/2019) for fasting, follow up.

## 2018-10-24 NOTE — PROGRESS NOTES
Chief Complaint   Patient presents with    Annual Wellness Visit    Nasal Discharge    Immunization/Injection     Influenza High dose     1. Have you been to the ER, urgent care clinic since your last visit? Hospitalized since your last visit? No    2. Have you seen or consulted any other health care providers outside of the [address] [place] [address] since your last visit? Include any pap smears or colon screening. No    Ramiro Camarillo  is a 68 y.o.  male  who present for Influenza High dose  immunizations/injections. He/she denies any symptoms , reactions or allergies that would exclude them from being immunized today. Risks and adverse reactions were discussed and the VIS was given if applicable to them. All questions were addressed. He/She was observed for 10 min post injection. There were no reactions observed.     Sky Sood LPN

## 2018-10-24 NOTE — PATIENT INSTRUCTIONS
Medicare Wellness Visit, Male    The best way to live healthy is to have a lifestyle where you eat a well-balanced diet, exercise regularly, limit alcohol use, and quit all forms of tobacco/nicotine, if applicable. Regular preventive services are another way to keep healthy. Preventive services (vaccines, screening tests, monitoring & exams) can help personalize your care plan, which helps you manage your own care. Screening tests can find health problems at the earliest stages, when they are easiest to treat. [facility] follows the current, evidence-based guidelines published by the [facility] ([facility]) when recommending preventive services for our patients. Because we follow these guidelines, sometimes recommendations change over time as research supports it. (For example, a prostate screening blood test is no longer routinely recommended for men with no symptoms.)  Of course, you and your doctor may decide to screen more often for some diseases, based on your risk and co-morbidities (chronic disease you are already diagnosed with). Preventive services for you include:  - Medicare offers their members a free annual wellness visit, which is time for you and your primary care provider to discuss and plan for your preventive service needs. Take advantage of this benefit every year!  -All adults over age 72 should receive the recommended pneumonia vaccines. Current USPSTF guidelines recommend a series of two vaccines for the best pneumonia protection.   -All adults should have a flu vaccine yearly and an ECG.  All adults age 61 and older should receive a shingles vaccine once in their lifetime.    -All adults age 38-68 who are overweight should have a diabetes screening test once every three years.   -Other screening tests & preventive services for persons with diabetes include: an eye exam to screen for diabetic retinopathy, a kidney function test, a foot exam, and stricter control over your cholesterol.   -Cardiovascular screening for adults with routine risk involves an electrocardiogram (ECG) at intervals determined by the provider.   -Colorectal cancer screening should be done for adults age 54-65 with no increased risk factors for colorectal cancer. There are a number of acceptable methods of screening for this type of cancer. Each test has its own benefits and drawbacks. Discuss with your provider what is most appropriate for you during your annual wellness visit. The different tests include: colonoscopy (considered the best screening method), a fecal occult blood test, a fecal DNA test, and sigmoidoscopy.  -All adults born between [facility] should be screened once for Hepatitis C.  -An Abdominal Aortic Aneurysm (AAA) Screening is recommended for men age 73-68 who has ever smoked in their lifetime.      Here is a list of your current Health Maintenance items (your personalized list of preventive services) with a due date:  Health Maintenance Due   Topic Date Due    DTaP/Tdap/Td  (1 - Tdap) 09/04/1967    Shingles Vaccine (1 of 2) 09/04/1996    Flu Vaccine  08/01/2018    Eye Exam  10/11/2018

## 2018-10-24 NOTE — ACP (ADVANCE CARE PLANNING)
Advance Care Planning    Advance Care Planning (ACP) Provider Conversation Snapshot    Date of ACP Conversation: 10/24/18  Persons included in Conversation:  patient and family  Length of ACP Conversation in minutes:  16 minutes    Authorized Decision Maker (if patient is incapable of making informed decisions): alen Mendez, successor wife Herminio Mahajan  This person is: Other Legally Authorized Decision Maker (e.g. Next of Kin)          For Patients with Decision Making Capacity:   Values/Goals: Exploration of values, goals, and preferences if recovery is not expected, even with continued medical treatment in the event of:  Imminent death  Severe, permanent brain injury  \"In these circumstances, what matters most to you? \"  Care focused more on comfort or quality of life.     Conversation Outcomes / Follow-Up Plan:   Recommended completion of Advance Directive form after review of ACP materials and conversation with prospective healthcare agent   Recommended communicating the plan and making copies for the healthcare agent, personal physician, and others as appropriate (e.g., health system)  Recommended review of completed ACP document annually or upon change in health status  he feels he doesn't trust any one for her POA, but will give his son and wife authority

## 2018-10-24 NOTE — PROGRESS NOTES
HISTORY OF PRESENT ILLNESS  Evan Suazo is a 68 y.o. male. He was seen for follow up on diabetes, HTN, dyslipidemia, COPD along with annual wellness visit  HPI  Cardiovascular Review  The patient has hypertension, hyperlipidemia, coronary artery disease and status post coronary artery stenting. He reports taking medications as instructed, no medication side effects noted, no TIA's, no chest pain on exertion, no dyspnea on exertion, no swelling of ankles, no orthostatic dizziness or lightheadedness, no orthopnea or paroxysmal nocturnal dyspnea, no palpitations, no intermittent claudication symptoms, no muscle aches or pain. Diet and Lifestyle: generally follows a low fat low cholesterol diet, generally follows a low sodium diet, does not rigorously follow a diabetic diet, exercises regularly, nonsmoker. Lab review: labs reviewed and discussed with patient. He had normal Lexican stress test and Echo in 09/2014 and normal EKG in 01/2016  Medicines: Carvedilol 6.25 mg BID, Plavix, crestor 10 mg  Losartan/Hctz 50/12.5 mg, clonidine as needed  He was recently evaluated by cardiology and has been advised for cardiac cath due to abnormal stress test. Patient is in denial     Lab Results   Component Value Date/Time    Cholesterol, total 105 06/28/2018 10:01 AM    HDL Cholesterol 49 06/28/2018 10:01 AM    LDL, calculated 41 06/28/2018 10:01 AM    VLDL, calculated 15 06/28/2018 10:01 AM    Triglyceride 77 06/28/2018 10:01 AM          Endocrine Review  He is seen for diabetes. Since last visit: no change. Home glucose monitoring: is performed sporadically, nonfasting values range 100-140. He reports medication compliance: noncompliant some of the time. Medication side effects: none. Diabetic diet compliance: noncompliant some of the time. Further diabetic ROS: no polyuria or polydipsia, no chest pain, dyspnea or TIA's, no numbness, tingling or pain in extremities, no unusual visual symptoms, no hypoglycemia.   Lab review: labs reviewed and discussed with patient. Eye exam: due.  ,   Medications: Metformin  500 mg BID  on ACEI/ARBS :yes  On ASA, no as he is on Plavix  Podiatry visit: not done     Lab Results   Component Value Date/Time    Hemoglobin A1c 6.4 (H) 06/28/2018 10:01 AM              COPD  Patient complains of cough and shortness of breath. Symptoms began several years ago. Symptoms chronic dyspnea: severity = mild: course of sx: well controlled  able walk 5 blocks  cough: severity: moderate: sputum : clear: light  symptoms worse with exertion. does worsen with exertion. Sputum is clear in small amounts. Fever has been suspected fevers but not measured at home. Patient uses 1 pillows at night. Patient can walk 1 mile  before resting. Patient currently is not on home oxygen therapy. Richy Null Respiratory history: frequent episodes of bronchitis and COPD  He is on scheduled Fluticasone, Singulair and prn albuterol, but non compliant with treatment. Has worsening cough and chest pain along with wheezing for few days  Review of Systems   Constitutional: Negative for chills, fever and malaise/fatigue. HENT: Negative for congestion, ear pain, sore throat and tinnitus. Eyes: Negative for blurred vision, double vision, pain and discharge. Respiratory: Positive for cough and wheezing. Negative for shortness of breath. Cardiovascular: Positive for chest pain. Negative for palpitations and leg swelling. Gastrointestinal: Negative for abdominal pain, blood in stool, constipation, diarrhea, nausea and vomiting. Genitourinary: Negative for dysuria, frequency, hematuria and urgency. Musculoskeletal: Negative for back pain, joint pain and myalgias. Skin: Negative for rash. Neurological: Negative for dizziness, tremors, seizures and headaches. Endo/Heme/Allergies: Negative for polydipsia. Does not bruise/bleed easily. Psychiatric/Behavioral: Negative for depression and substance abuse.  The patient is not nervous/anxious. Physical Exam   Constitutional: He is oriented to person, place, and time. He appears well-developed and well-nourished. HENT:   Head: Normocephalic and atraumatic. Right Ear: External ear normal.   Mouth/Throat: Oropharynx is clear and moist. No oropharyngeal exudate. Eyes: Conjunctivae and EOM are normal. Pupils are equal, round, and reactive to light. No scleral icterus. Neck: Normal range of motion. Neck supple. No JVD present. No thyromegaly present. Cardiovascular: Normal rate, regular rhythm, normal heart sounds and intact distal pulses. No murmur heard. Pulmonary/Chest: Effort normal and breath sounds normal. He has no wheezes. Abdominal: Soft. Bowel sounds are normal. He exhibits no distension and no mass. Musculoskeletal: Normal range of motion. He exhibits no edema or tenderness. Feet:warm, good capillary refill, no trophic changes or ulcerative lesions, normal DP and PT pulses, normal monofilament exam and normal sensory exam     Lymphadenopathy:     He has no cervical adenopathy. Neurological: He is alert and oriented to person, place, and time. He has normal reflexes. No cranial nerve deficit. Skin: Skin is warm and dry. No rash noted. He is not diaphoretic. Scattered Bruises over skin   Psychiatric: He has a normal mood and affect. Nursing note and vitals reviewed. ASSESSMENT and PLAN  Diagnoses and all orders for this visit:    1. Medicare annual wellness visit, subsequent  -     TSH REFLEX TO T4  -     URINALYSIS W/ RFLX MICROSCOPIC    2. Essential hypertension with goal blood pressure less than 004/08  -     METABOLIC PANEL, COMPREHENSIVE    3. Controlled type 2 diabetes mellitus without complication, without long-term current use of insulin ([facility])  -     METABOLIC PANEL, COMPREHENSIVE  -     HEMOGLOBIN A1C WITH EAG    4.  COPD, moderate ([facility])  -     CBC WITH AUTOMATED DIFF  -     fluticasone furoate (ARNUITY ELLIPTA) 200 mcg/actuation dsdv inhaler; Take 1 Puff by inhalation daily. 5. Hyperlipidemia LDL goal <443  -     METABOLIC PANEL, COMPREHENSIVE  -     LIPID PANEL    6. Advanced directives, counseling/discussion  -     ADVANCE CARE PLANNING FIRST 30 MINS    7. Screening for alcoholism  -     [place] ANNUAL ALCOHOL SCREEN 15 MIN    8. Screening for depression  -     DEPRESSION SCREEN ANNUAL    9. Special screening for malignant neoplasm of prostate  -     PSA SCREENING (SCREENING)    10. Encounter for immunization  -     ADMIN INFLUENZA VIRUS VAC  -     INFLUENZA VACCINE INACTIVATED (IIV), SUBUNIT, ADJUVANTED, IM  -     varicella-zoster recombinant, PF, (SHINGRIX, PF,) 50 mcg/0.5 mL susr injection; 0.5mL by IntraMUSCular route once now and then repeat in 2-6 months    11. Chest pain, atypical  -     XR CHEST PA LAT; Future    Discussed lifestyle issues and health guidance given  Patient was given an after visit summary which includes diagnoses, vital signs, current medications, instructions and references & authorized prescriptions . Results of labs will be conveyed to patient, once available. Pt verbalized instructions I provided and expressed understanding of discussion that was held today. Follow-up Disposition:  Return in about 4 months (around 2/24/2019) for fasting, follow up.

## 2018-10-26 ENCOUNTER — OFFICE VISIT (OUTPATIENT)
Dept: INTERNAL MEDICINE CLINIC | Age: 73
End: 2018-10-26

## 2018-10-26 VITALS
HEIGHT: 63 IN | SYSTOLIC BLOOD PRESSURE: 133 MMHG | BODY MASS INDEX: 26.93 KG/M2 | HEART RATE: 92 BPM | OXYGEN SATURATION: 98 % | DIASTOLIC BLOOD PRESSURE: 83 MMHG | WEIGHT: 152 LBS

## 2018-10-26 DIAGNOSIS — M17.11 PRIMARY OSTEOARTHRITIS OF RIGHT KNEE: Primary | ICD-10-CM

## 2018-10-26 RX ORDER — TRIAMCINOLONE ACETONIDE 40 MG/ML
40 INJECTION, SUSPENSION INTRA-ARTICULAR; INTRAMUSCULAR ONCE
Qty: 1 ML | Refills: 0
Start: 2018-10-26 | End: 2018-10-26

## 2018-10-26 NOTE — PROGRESS NOTES
Chief Complaint   Patient presents with    Knee Pain     right follow up     he is a 68y.o. year old male who presents for follow up of injury. Follow Up Pain Assessment Encounter      Onset of Symptoms: years  ________________________________________________________________________  Description: Pain is now has worsened      Pain Scale:(1-10): 10  Duration:  continuous  Radiation: none  What makes it better?: none  What makes it worse?:walking  Medications tried: none  Modalities tried: at home exercise        Reviewed and agree with Nurse Note and duplicated in this note. Reviewed PmHx, RxHx, FmHx, SocHx, AllgHx and updated and dated in the chart.     Family History   Problem Relation Age of Onset    No Known Problems Mother     No Known Problems Father        Past Medical History:   Diagnosis Date    CAD (coronary artery disease)     3 st[place], [place],    Diabetes [facility])     Hypercholesterolemia     Hypertension       Social History     Socioeconomic History    Marital status:      Spouse name: Not on file    Number of children: Not on file    Years of education: Not on file    Highest education level: Not on file   Social Needs    Financial resource strain: Not on file    Food insecurity - worry: Not on file    Food insecurity - inability: Not on file   [employer] needs - medical: Not on file   [employer] needs - non-medical: Not on file   Occupational History    Not on file   Tobacco Use    Smoking status: Never Smoker    Smokeless tobacco: Never Used   Substance and Sexual Activity    Alcohol use: No     Alcohol/week: 0.0 oz    Drug use: No    Sexual activity: Not on file   Other Topics Concern    Not on file   Social History Narrative    Not on file        Review of Systems - negative except as listed above      Objective:     Vitals:    10/26/18 1543   BP: 133/83   Pulse: 92   SpO2: 98%   Weight: 152 lb (68.9 kg)   Height: 5' 3\" (1.6 m)       Physical Examination: General appearance - alert, well appearing, and in no distress  Back exam - full range of motion, no tenderness, palpable spasm or pain on motion  Neurological - alert, oriented, normal speech, no focal findings or movement disorder noted  Musculoskeletal - right knee exam:  The patient'sright Knee  is  normal to inspection. The ROM is normal and there is flexion to 60 Effusion is: absent The joint exhibits absent warmth and Crepitus is: mild. The Cliff test is:  Positive Joint Line Tenderness is  Positive medial.  The Thessaly Test is not done and the Lachman is  negative. The Anterior Drawer is Negative. The Posterior Drawer is:  negative. Valgus Stress (for MCL) is:  normal . Varus Stress (for LCL) is  normal  . The Sharon Test is negative and the Apprehension Sign:  negative  Patellar Grind Negative and Tracking is normal  Extremities - peripheral pulses normal, no pedal edema, no clubbing or cyanosis  Skin - normal coloration and turgor, no rashes, no suspicious skin lesions noted  Time Out taken at:  4:16 PM  10/26/2018    * Patient was identified by name and date of birth   * Agreement on procedure being performed was verified  * Risks and Benefits explained to the patient  * Procedure site verified and marked as necessary  * Patient was positioned for comfort  * Consent was signed and verified   In the presence of: Witness: Elsie [place]  Injection #: 1  Needle:  22 gauge  Procedure: This procedure was discussed with Heather Burns and other therapeutic options were considered (risks vs benefits). Heather Burns and I thought that an injection was merited. After informed consent was obtained, landmarks were identified(marked), and the right joint  was cleansed with ChlorPrep in the standard sterile manner. 3mL  1% lidocaine  and  1mL Kenalog  was then injected and needle tenotomy was not performed.   Procedure performed with ultrasound needle guidance. The needle was then withdrawn. T he procedure was well tolerated. The patient is asked to continue to rest the area for a few more days before resuming regular activities. It may be more painful for the first 1-2 days. NSAIDS are to be avoided. Watch for fever, or increased swelling or persistent pain in the joint. Call or return to clinic prn if such symptoms occur or there is failure to improve as anticipated. The procedure did provide relief of symptoms in the clinic. RTC in 4 weeks for reevaluation and possible reinjection. Given the patient's body habitus and the anatomically deep nature of this structure, sonographic guidance is recommended to prevent injury to neurovascular structures and confirm accuracy of injection. Furthermore, this patient has failed conservative treatment with physical therapy and modalities and the diagnostic and therapeutic accuracy is important. Assessment/ Plan:   Diagnoses and all orders for this visit:    1. Primary osteoarthritis of right knee  -     TRIAMCINOLONE ACETONIDE INJ  -     triamcinolone acetonide (KENALOG) 40 mg/mL injection; 1 mL by Intra artICUlar route once for 1 dose. -     MD ARTHROCENTESIS ASPIR&/INJ MAJOR JT/BURSA W/US       Follow-up Disposition:  Return if symptoms worsen or fail to improve. Pathophysiology, recovery and rehabilitation process discussed and questions answered   Counseling for 30 Minutes of the total visit duration   Pictures and figures used as necessary   Provided reassurance   Monitor response to injection   Discussed steroid side effects of fat atrophy, hypopigmentation, steroid flare or infection   Monitor response to home exercises   Recommend activity modification   Recommend  lower impact activities-walking, Eliptical, Nordic Track, cycling or swimming   Follow up in 4 week(s)          I have discussed the diagnosis with the patient and the intended plan as seen in the above orders.   The patient has received an after-visit summary and questions were answered concerning future plans. Medication Side Effects and Warnings were discussed with patient,  Patient Labs were reviewed and or requested, and  Patient Past Records were reviewed and or requested  yes     Pt agrees to call or return to clinic and/or go to closest ER with any worsening of symptoms. This may include, but not limited to increased fever (>100.4) with NSAIDS or Tylenol, increased edema, confusion, rash, worsening of presenting symptoms.

## 2018-10-28 LAB
ALBUMIN SERPL-MCNC: 4.1 G/DL (ref 3.5–4.8)
ALBUMIN/GLOB SERPL: 1.4 {RATIO} (ref 1.2–2.2)
ALP SERPL-CCNC: 71 IU/L (ref 39–117)
ALT SERPL-CCNC: 8 IU/L (ref 0–44)
APPEARANCE UR: CLEAR
AST SERPL-CCNC: 13 IU/L (ref 0–40)
BACTERIA #/AREA URNS HPF: ABNORMAL /[HPF]
BASOPHILS # BLD AUTO: 0 X10E3/UL (ref 0–0.2)
BASOPHILS NFR BLD AUTO: 0 %
BILIRUB SERPL-MCNC: 0.4 MG/DL (ref 0–1.2)
BILIRUB UR QL STRIP: NEGATIVE
BUN SERPL-MCNC: 13 MG/DL (ref 8–27)
BUN/CREAT SERPL: 14 (ref 10–24)
CALCIUM SERPL-MCNC: 9.4 MG/DL (ref 8.6–10.2)
CASTS URNS MICRO: ABNORMAL
CASTS URNS QL MICRO: PRESENT /LPF
CHLORIDE SERPL-SCNC: 99 MMOL/L (ref 96–106)
CHOLEST SERPL-MCNC: 107 MG/DL (ref 100–199)
CO2 SERPL-SCNC: 23 MMOL/L (ref 20–29)
COLOR UR: YELLOW
CREAT SERPL-MCNC: 0.95 MG/DL (ref 0.76–1.27)
EOSINOPHIL # BLD AUTO: 0 X10E3/UL (ref 0–0.4)
EOSINOPHIL NFR BLD AUTO: 0 %
EPI CELLS #/AREA URNS HPF: ABNORMAL /HPF
ERYTHROCYTE [DISTWIDTH] IN BLOOD BY AUTOMATED COUNT: 13.2 % (ref 12.3–15.4)
EST. AVERAGE GLUCOSE BLD GHB EST-MCNC: 143 MG/DL
GLOBULIN SER CALC-MCNC: 2.9 G/DL (ref 1.5–4.5)
GLUCOSE SERPL-MCNC: 185 MG/DL (ref 65–99)
GLUCOSE UR QL: NEGATIVE
HBA1C MFR BLD: 6.6 % (ref 4.8–5.6)
HCT VFR BLD AUTO: 41.4 % (ref 37.5–51)
HDLC SERPL-MCNC: 51 MG/DL
HGB BLD-MCNC: 13.8 G/DL (ref 13–17.7)
HGB UR QL STRIP: ABNORMAL
IMM GRANULOCYTES # BLD: 0 X10E3/UL (ref 0–0.1)
IMM GRANULOCYTES NFR BLD: 0 %
INTERPRETATION, 910389: NORMAL
KETONES UR QL STRIP: NEGATIVE
LDLC SERPL CALC-MCNC: 47 MG/DL (ref 0–99)
LEUKOCYTE ESTERASE UR QL STRIP: NEGATIVE
LYMPHOCYTES # BLD AUTO: 1.2 X10E3/UL (ref 0.7–3.1)
LYMPHOCYTES NFR BLD AUTO: 7 %
MCH RBC QN AUTO: 30.4 PG (ref 26.6–33)
MCHC RBC AUTO-ENTMCNC: 33.3 G/DL (ref 31.5–35.7)
MCV RBC AUTO: 91 FL (ref 79–97)
MICRO URNS: ABNORMAL
MONOCYTES # BLD AUTO: 0.2 X10E3/UL (ref 0.1–0.9)
MONOCYTES NFR BLD AUTO: 1 %
MUCOUS THREADS URNS QL MICRO: PRESENT
NEUTROPHILS # BLD AUTO: 14.8 X10E3/UL (ref 1.4–7)
NEUTROPHILS NFR BLD AUTO: 92 %
NITRITE UR QL STRIP: NEGATIVE
PH UR STRIP: 7 [PH] (ref 5–7.5)
PLATELET # BLD AUTO: 297 X10E3/UL (ref 150–379)
POTASSIUM SERPL-SCNC: 4.2 MMOL/L (ref 3.5–5.2)
PROT SERPL-MCNC: 7 G/DL (ref 6–8.5)
PROT UR QL STRIP: NEGATIVE
PSA SERPL-MCNC: 0.8 NG/ML (ref 0–4)
RBC # BLD AUTO: 4.54 X10E6/UL (ref 4.14–5.8)
RBC #/AREA URNS HPF: ABNORMAL /HPF
SODIUM SERPL-SCNC: 136 MMOL/L (ref 134–144)
SP GR UR: 1.01 (ref 1–1.03)
TRIGL SERPL-MCNC: 47 MG/DL (ref 0–149)
TSH SERPL DL<=0.005 MIU/L-ACNC: 0.63 UIU/ML (ref 0.45–4.5)
UROBILINOGEN UR STRIP-MCNC: 0.2 MG/DL (ref 0.2–1)
VLDLC SERPL CALC-MCNC: 9 MG/DL (ref 5–40)
WBC # BLD AUTO: 16.1 X10E3/UL (ref 3.4–10.8)
WBC #/AREA URNS HPF: ABNORMAL /HPF

## 2018-10-29 NOTE — PROGRESS NOTES
Please inform patient,  CBC shows elevated white blood cell count and elevated neutrophil along with very high fasting sugar. From cortisone shot he had in knee,a day prior to blood work.   Average sugar stable, so to ct on same Metformin dose  Kidney, liver, thyroid, prostate cancer screen,cholesterol,urine all other results  No change in any medicines   thanks

## 2018-11-01 NOTE — PROGRESS NOTES
Outbound call to patient. No answer second attempt. Unable to leave message as mailbox is full. Letter printed with results.

## 2018-11-17 NOTE — PROGRESS NOTES
Got message from patient to change his inhaler, as it was expensive  Arnuity was 200 $ copay. If this is covered, will send Rx to mail in pharmacy for him. I looked into formulary, for [employer], none is tier 1 or 2.

## 2018-11-19 NOTE — PROGRESS NOTES
Inbound call from patient returning nurse call. Patients name and  verified. Notified patient that new RX was sent to local pharmacy. Advised if insurance covers it provider will send to mail order.  Patient vebralized understanding

## 2018-11-19 NOTE — PROGRESS NOTES
Outbound call to patient. No answer. Left message for patient to return call to the office regarding medications.

## 2018-12-11 ENCOUNTER — TELEPHONE (OUTPATIENT)
Dept: FAMILY MEDICINE CLINIC | Age: 73
End: 2018-12-11

## 2018-12-11 NOTE — TELEPHONE ENCOUNTER
Dr Marciano Lara (Cardilogist) office is requesting patient LOV, Stress test, EKG and labs be fax to them  Appointment today at 2:00pm  Fax :[phone]  Best call back : [phone]  LOV:  October 27, 2018

## 2018-12-12 ENCOUNTER — TELEPHONE (OUTPATIENT)
Dept: CARDIOLOGY CLINIC | Age: 73
End: 2018-12-12

## 2018-12-12 ENCOUNTER — TELEPHONE (OUTPATIENT)
Dept: FAMILY MEDICINE CLINIC | Age: 73
End: 2018-12-12

## 2018-12-12 NOTE — TELEPHONE ENCOUNTER
Mary Wynn is calling on behalf of Pt from Dr. Merlin Hippo office in regards to being able to receive copies of Pt's stress test and MRI. She is requesting to have notes faxed over today.      Best call back   [phone]  Fax [phone]

## 2018-12-12 NOTE — TELEPHONE ENCOUNTER
Outbound call to [facility] and Dr. Nathalie Taveras office. Advised that patient came to office today and I notified patient he would have to obtain stress test results from provider that ordered the test. Also advised that patients MRI is over lower extremis. She verbalized understanding.

## 2018-12-14 ENCOUNTER — TELEPHONE (OUTPATIENT)
Dept: CARDIOLOGY CLINIC | Age: 73
End: 2018-12-14

## 2018-12-14 NOTE — TELEPHONE ENCOUNTER
Pt called requesting to schedule a time that he can stop by the office to  his nuc stress test disc from Aug 2017. Pt request to come pickup disc by 2:30pm possibly.   Phone # [phone]  Thanks

## 2018-12-14 NOTE — TELEPHONE ENCOUNTER
Attempted to reach patient by telephone. A message was left for return call. Verified patient with two types of identifiers. Let patient know we are unable to produce a CD of his stress test at this time. Patient verbalized understanding.

## 2018-12-17 ENCOUNTER — OP HISTORICAL/CONVERTED ENCOUNTER (OUTPATIENT)
Dept: OTHER | Age: 73
End: 2018-12-17

## 2019-01-02 ENCOUNTER — OFFICE VISIT (OUTPATIENT)
Dept: FAMILY MEDICINE CLINIC | Age: 74
End: 2019-01-02

## 2019-01-02 VITALS
TEMPERATURE: 98.4 F | WEIGHT: 149.6 LBS | HEIGHT: 63 IN | OXYGEN SATURATION: 97 % | DIASTOLIC BLOOD PRESSURE: 76 MMHG | SYSTOLIC BLOOD PRESSURE: 118 MMHG | HEART RATE: 72 BPM | RESPIRATION RATE: 14 BRPM | BODY MASS INDEX: 26.51 KG/M2

## 2019-01-02 DIAGNOSIS — E78.5 HYPERLIPIDEMIA LDL GOAL <100: ICD-10-CM

## 2019-01-02 DIAGNOSIS — I25.10 CORONARY ARTERY DISEASE INVOLVING NATIVE CORONARY ARTERY OF NATIVE HEART WITHOUT ANGINA PECTORIS: Chronic | ICD-10-CM

## 2019-01-02 DIAGNOSIS — J44.9 COPD, MODERATE (HCC): ICD-10-CM

## 2019-01-02 DIAGNOSIS — I10 ESSENTIAL HYPERTENSION: ICD-10-CM

## 2019-01-02 DIAGNOSIS — I25.110 CORONARY ARTERY DISEASE INVOLVING NATIVE CORONARY ARTERY OF NATIVE HEART WITH UNSTABLE ANGINA PECTORIS (HCC): Primary | Chronic | ICD-10-CM

## 2019-01-02 DIAGNOSIS — M17.11 ARTHRITIS OF KNEE, RIGHT: ICD-10-CM

## 2019-01-02 DIAGNOSIS — E11.9 CONTROLLED TYPE 2 DIABETES MELLITUS WITHOUT COMPLICATION, WITHOUT LONG-TERM CURRENT USE OF INSULIN (HCC): ICD-10-CM

## 2019-01-02 RX ORDER — DICLOFENAC SODIUM 100 MG/1
100 TABLET, FILM COATED, EXTENDED RELEASE ORAL DAILY
Qty: 30 TAB | Refills: 0 | Status: SHIPPED | OUTPATIENT
Start: 2019-01-02 | End: 2019-02-15 | Stop reason: SDUPTHER

## 2019-01-02 NOTE — PROGRESS NOTES
Chief Complaint   Patient presents with    Medication Evaluation    Knee Pain     1. Have you been to the ER, urgent care clinic since your last visit? Hospitalized since your last visit? Yes [facility] 12/17/2018    2. Have you seen or consulted any other health care providers outside of the [address] [place] [address] since your last visit? Include any pap smears or colon screening.  No

## 2019-01-02 NOTE — PATIENT INSTRUCTIONS
DASH Diet: Care Instructions  Your Care Instructions    The DASH diet is an eating plan that can help lower your blood pressure. DASH stands for Dietary Approaches to Stop Hypertension. Hypertension is high blood pressure. The DASH diet focuses on eating foods that are high in calcium, potassium, and magnesium. These nutrients can lower blood pressure. The foods that are highest in these nutrients are fruits, vegetables, low-fat dairy products, nuts, seeds, and legumes. But taking calcium, potassium, and magnesium supplements instead of eating foods that are high in those nutrients does not have the same effect. The DASH diet also includes whole grains, fish, and poultry. The DASH diet is one of several lifestyle changes your doctor may recommend to lower your high blood pressure. Your doctor may also want you to decrease the amount of sodium in your diet. Lowering sodium while following the DASH diet can lower blood pressure even further than just the DASH diet alone. Follow-up care is a key part of your treatment and safety. Be sure to make and go to all appointments, and call your doctor if you are having problems. It's also a good idea to know your test results and keep a list of the medicines you take. How can you care for yourself at home? Following the DASH diet  · Eat 4 to 5 servings of fruit each day. A serving is 1 medium-sized piece of fruit, ½ cup chopped or canned fruit, 1/4 cup dried fruit, or 4 ounces (½ cup) of fruit juice. Choose fruit more often than fruit juice. · Eat 4 to 5 servings of vegetables each day. A serving is 1 cup of lettuce or raw leafy vegetables, ½ cup of chopped or cooked vegetables, or 4 ounces (½ cup) of vegetable juice. Choose vegetables more often than vegetable juice. · Get 2 to 3 servings of low-fat and fat-free dairy each day. A serving is 8 ounces of milk, 1 cup of yogurt, or 1 ½ ounces of cheese. · Eat 6 to 8 servings of grains each day.  A serving is 1 slice of bread, 1 ounce of dry cereal, or ½ cup of cooked rice, pasta, or cooked cereal. Try to choose whole-grain products as much as possible. · Limit lean meat, poultry, and fish to 2 servings each day. A serving is 3 ounces, about the size of a deck of cards. · Eat 4 to 5 servings of nuts, seeds, and legumes (cooked dried beans, lentils, and split peas) each week. A serving is 1/3 cup of nuts, 2 tablespoons of seeds, or ½ cup of cooked beans or peas. · Limit fats and oils to 2 to 3 servings each day. A serving is 1 teaspoon of vegetable oil or 2 tablespoons of salad dressing. · Limit sweets and added sugars to 5 servings or less a week. A serving is 1 tablespoon jelly or jam, ½ cup sorbet, or 1 cup of lemonade. · Eat less than 2,300 milligrams (mg) of sodium a day. If you limit your sodium to 1,500 mg a day, you can lower your blood pressure even more. Tips for success  · Start small. Do not try to make dramatic changes to your diet all at once. You might feel that you are missing out on your favorite foods and then be more likely to not follow the plan. Make small changes, and stick with them. Once those changes become habit, add a few more changes. · Try some of the following:  ? Make it a goal to eat a fruit or vegetable at every meal and at snacks. This will make it easy to get the recommended amount of fruits and vegetables each day. ? Try yogurt topped with fruit and nuts for a snack or healthy dessert. ? Add lettuce, tomato, cucumber, and onion to sandwiches. ? Combine a ready-made pizza crust with low-fat mozzarella cheese and lots of vegetable toppings. Try using tomatoes, squash, spinach, broccoli, carrots, cauliflower, and onions. ? Have a variety of cut-up vegetables with a low-fat dip as an appetizer instead of chips and dip. ? Sprinkle sunflower seeds or chopped almonds over salads. Or try adding chopped walnuts or almonds to cooked vegetables.   ? Try some vegetarian meals using beans and peas. Add garbanzo or kidney beans to salads. Make burritos and tacos with mashed gli beans or black beans. Where can you learn more? Go to http://lv-shelbi.info/. Enter N835 in the search box to learn more about \"DASH Diet: Care Instructions. \"  Current as of: December 6, 2017  Content Version: 11.8  © 1828-1774 [employer]. Care instructions adapted under license by [employer] (which disclaims liability or warranty for this information). If you have questions about a medical condition or this instruction, always ask your healthcare professional. [address] any warranty or liability for your use of this information.

## 2019-01-02 NOTE — PROGRESS NOTES
HISTORY OF PRESENT ILLNESS  Wes Cardona is a 68 y.o. male. He was seen to discuss his medical issues and also for worsening right knee pain. HPI  Cardiovascular Review  The patient has hypertension, hyperlipidemia, coronary artery disease and status post coronary artery stenting. He reports taking medications as instructed, no medication side effects noted, no TIA's, no chest pain on exertion, no dyspnea on exertion, no swelling of ankles, no orthostatic dizziness or lightheadedness, no orthopnea or paroxysmal nocturnal dyspnea, no palpitations, no intermittent claudication symptoms, no muscle aches or pain. Diet and Lifestyle: generally follows a low fat low cholesterol diet, generally follows a low sodium diet, does not rigorously follow a diabetic diet, exercises regularly, nonsmoker. Lab review: labs reviewed and discussed with patient. He had normal Lexican stress test and Echo in 09/2014 and normal EKG in 01/2016  Medicines: Carvedilol 6.25 mg BID, crestor 10 mg  Losartan/Hctz 50/12.5 mg, clonidine as needed  He is s/p Cardiac cath ( 12/17/2018) by Dr Denver Amas. Records requested. As per patient, he was told cath was reassuring and stents are patent. He was advised to dc Plavix.        Endocrine Review  He is seen for diabetes. Since last visit: no change. Home glucose monitoring: is performed sporadically, nonfasting values range 100-140. He reports medication compliance: noncompliant some of the time. Medication side effects: none. Diabetic diet compliance: noncompliant some of the time. Further diabetic ROS: no polyuria or polydipsia, no chest pain, dyspnea or TIA's, no numbness, tingling or pain in extremities, no unusual visual symptoms, no hypoglycemia. Lab review: labs reviewed and discussed with patient.   Eye exam: due.  ,   Medications: Metformin  500 mg BID  on ACEI/ARBS :yes  On ASA, no as he is on Plavix  Podiatry visit: not done        COPD  Patient complains of cough and shortness of breath. Symptoms began several years ago. Symptoms chronic dyspnea: severity = mild: course of sx: well controlled  able walk 5 blocks  cough: severity: moderate: sputum : clear: light  symptoms worse with exertion. does worsen with exertion. Sputum is clear in small amounts. Fever has been suspected fevers but not measured at home. Patient uses 1 pillows at night. Patient can walk 1 mile  before resting. Patient currently is not on home oxygen therapy. Kyra Peaks Respiratory history: frequent episodes of bronchitis and COPD  He is on scheduled Fluticasone, Singulair and prn albuterol, but non compliant with treatment. Knee Pain  Patient complains of right knee pain and knee swelling. Onset of the symptoms was several months ago. Inciting event: none known. Current symptoms include pain location: right sided: anterior, medial, severity of pain = fairly severe, swelling: mild, knee gives out, knee locks, crepitus sensation and foreign body sensation. Associated symptoms: pain radiating down to right leg. Aggravating symptoms: any weight bearing. Patient's overall course: gradually worsening and course of pain: gradually worsening Patient has had prior knee problems. Patient denies fever. Evaluation to date: was seen by sports medicine. Treatment to date: steroid injections x 2. Review of Systems   Constitutional: Negative for chills, fever and malaise/fatigue. HENT: Negative for congestion, ear pain, sore throat and tinnitus. Eyes: Negative for blurred vision, double vision, pain and discharge. Respiratory: Negative for cough, shortness of breath and wheezing. Cardiovascular: Negative for chest pain, palpitations and leg swelling. Gastrointestinal: Negative for abdominal pain, blood in stool, constipation, diarrhea, nausea and vomiting. Genitourinary: Negative for dysuria, frequency, hematuria and urgency. Musculoskeletal: Negative for back pain, joint pain and myalgias.         Right knee pain   Skin: Negative for rash. Neurological: Negative for dizziness, tremors, seizures and headaches. Endo/Heme/Allergies: Negative for polydipsia. Does not bruise/bleed easily. Psychiatric/Behavioral: Negative for depression and substance abuse. The patient is not nervous/anxious. Physical Exam   Constitutional: He is oriented to person, place, and time. He appears well-developed and well-nourished. HENT:   Head: Normocephalic and atraumatic. Right Ear: External ear normal.   Mouth/Throat: Oropharynx is clear and moist. No oropharyngeal exudate. Eyes: Conjunctivae and EOM are normal. Pupils are equal, round, and reactive to light. No scleral icterus. Neck: Normal range of motion. Neck supple. No JVD present. No thyromegaly present. Cardiovascular: Normal rate, regular rhythm, normal heart sounds and intact distal pulses. No murmur heard. Pulmonary/Chest: Effort normal and breath sounds normal. He has no wheezes. Abdominal: Soft. Bowel sounds are normal. He exhibits no distension and no mass. Musculoskeletal: He exhibits no edema. Right knee: He exhibits decreased range of motion and swelling. Tenderness found. Medial joint line and lateral joint line tenderness noted. Lymphadenopathy:     He has no cervical adenopathy. Neurological: He is alert and oriented to person, place, and time. He has normal reflexes. No cranial nerve deficit. Skin: Skin is warm and dry. No rash noted. He is not diaphoretic. Psychiatric: He has a normal mood and affect. Nursing note and vitals reviewed. ASSESSMENT and PLAN  Diagnoses and all orders for this visit:    1. Coronary artery disease involving native coronary artery of native heart with unstable angina pectoris (HCC)  -     METABOLIC PANEL, COMPREHENSIVE  -     LIPID PANEL    2. COPD, moderate ([facility])  -     mometasone-formoterol (DULERA) 100-5 mcg/actuation HFA inhaler; Take 2 Puffs by inhalation two (2) times a day.     3. Coronary artery disease involving native coronary artery of native heart without angina pectoris  -     LIPID PANEL    4. Essential hypertension    5. Hyperlipidemia LDL goal <100  -     LIPID PANEL    6. Arthritis of knee, right  -     XR KNEE RT MAX 2 VWS; Future  -     diclofenac (VOLTAREN XR) 100 mg ER tablet; Take 1 Tab by mouth daily.  -     REFERRAL TO PHYSICAL THERAPY    7. Controlled type 2 diabetes mellitus without complication, without long-term current use of insulin (HCC)  -     HEMOGLOBIN A1C WITH EAG    discussed physical therapy for arthritis. As he is off Plavix, can start on NSAID  Discussed lifestyle issues and health guidance given  Patient was given an after visit summary which includes diagnoses, vital signs, current medications, instructions and references & authorized prescriptions . Results of labs will be conveyed to patient, once available. Pt verbalized instructions I provided and expressed understanding of discussion that was held today. Follow-up Disposition:  Return in about 6 weeks (around 2/13/2019) for follow up.

## 2019-02-06 ENCOUNTER — TELEPHONE (OUTPATIENT)
Dept: FAMILY MEDICINE CLINIC | Age: 74
End: 2019-02-06

## 2019-02-06 RX ORDER — OMEGA-3-ACID ETHYL ESTERS 1 G/1
2 CAPSULE, LIQUID FILLED ORAL 2 TIMES DAILY
Qty: 360 CAP | Refills: 1 | Status: SHIPPED | OUTPATIENT
Start: 2019-02-06 | End: 2019-09-03 | Stop reason: ALTCHOICE

## 2019-02-06 NOTE — TELEPHONE ENCOUNTER
Pharmacy requesting refill of Omega 3 acid ethyl esters 1 gram 4 x daily. Sent to [facility] on file.      Not an active medication on patients list.

## 2019-02-15 DIAGNOSIS — M17.11 ARTHRITIS OF KNEE, RIGHT: ICD-10-CM

## 2019-02-15 RX ORDER — DICLOFENAC SODIUM 100 MG/1
100 TABLET, FILM COATED, EXTENDED RELEASE ORAL DAILY
Qty: 30 TAB | Refills: 0 | Status: SHIPPED | OUTPATIENT
Start: 2019-02-15 | End: 2019-04-01 | Stop reason: SDUPTHER

## 2019-04-01 ENCOUNTER — OFFICE VISIT (OUTPATIENT)
Dept: FAMILY MEDICINE CLINIC | Age: 74
End: 2019-04-01

## 2019-04-01 VITALS
RESPIRATION RATE: 18 BRPM | TEMPERATURE: 98.2 F | BODY MASS INDEX: 26.4 KG/M2 | SYSTOLIC BLOOD PRESSURE: 118 MMHG | HEART RATE: 85 BPM | HEIGHT: 63 IN | WEIGHT: 149 LBS | OXYGEN SATURATION: 96 % | DIASTOLIC BLOOD PRESSURE: 76 MMHG

## 2019-04-01 DIAGNOSIS — J44.9 COPD, MODERATE (HCC): ICD-10-CM

## 2019-04-01 DIAGNOSIS — J20.9 ACUTE BRONCHITIS, UNSPECIFIED ORGANISM: Primary | ICD-10-CM

## 2019-04-01 DIAGNOSIS — M17.11 ARTHRITIS OF KNEE, RIGHT: ICD-10-CM

## 2019-04-01 RX ORDER — AZITHROMYCIN 500 MG/1
500 TABLET, FILM COATED ORAL DAILY
Qty: 5 TAB | Refills: 0 | Status: SHIPPED | OUTPATIENT
Start: 2019-04-01 | End: 2019-04-06

## 2019-04-01 RX ORDER — DICLOFENAC SODIUM 100 MG/1
100 TABLET, FILM COATED, EXTENDED RELEASE ORAL DAILY
Qty: 30 TAB | Refills: 0 | Status: SHIPPED | OUTPATIENT
Start: 2019-04-01 | End: 2019-05-19 | Stop reason: SDUPTHER

## 2019-04-01 RX ORDER — ALBUTEROL SULFATE 0.83 MG/ML
2.5 SOLUTION RESPIRATORY (INHALATION)
Qty: 24 EACH | Refills: 0 | Status: SHIPPED | OUTPATIENT
Start: 2019-04-01 | End: 2019-04-02 | Stop reason: SDUPTHER

## 2019-04-01 RX ORDER — CODEINE PHOSPHATE AND GUAIFENESIN 10; 100 MG/5ML; MG/5ML
5 SOLUTION ORAL
Qty: 120 ML | Refills: 0 | Status: SHIPPED | OUTPATIENT
Start: 2019-04-01 | End: 2019-04-08

## 2019-04-01 RX ORDER — NEBULIZER AND COMPRESSOR
1 EACH MISCELLANEOUS
Qty: 1 EACH | Refills: 0 | Status: SHIPPED | OUTPATIENT
Start: 2019-04-01 | End: 2019-04-02 | Stop reason: SDUPTHER

## 2019-04-01 NOTE — PATIENT INSTRUCTIONS
Bronchitis: Care Instructions  Your Care Instructions    Bronchitis is inflammation of the bronchial tubes, which carry air to the lungs. The tubes swell and produce mucus, or phlegm. The mucus and inflamed bronchial tubes make you cough. You may have trouble breathing. Most cases of bronchitis are caused by viruses like those that cause colds. Antibiotics usually do not help and they may be harmful. Bronchitis usually develops rapidly and lasts about 2 to 3 weeks in otherwise healthy people. Follow-up care is a key part of your treatment and safety. Be sure to make and go to all appointments, and call your doctor if you are having problems. It's also a good idea to know your test results and keep a list of the medicines you take. How can you care for yourself at home? · Take all medicines exactly as prescribed. Call your doctor if you think you are having a problem with your medicine. · Get some extra rest.  · Take an over-the-counter pain medicine, such as acetaminophen (Tylenol), ibuprofen (Advil, Motrin), or naproxen (Aleve) to reduce fever and relieve body aches. Read and follow all instructions on the label. · Do not take two or more pain medicines at the same time unless the doctor told you to. Many pain medicines have acetaminophen, which is Tylenol. Too much acetaminophen (Tylenol) can be harmful. · Take an over-the-counter cough medicine that contains dextromethorphan to help quiet a dry, hacking cough so that you can sleep. Avoid cough medicines that have more than one active ingredient. Read and follow all instructions on the label. · Breathe moist air from a humidifier, hot shower, or sink filled with hot water. The heat and moisture will thin mucus so you can cough it out. · Do not smoke. Smoking can make bronchitis worse. If you need help quitting, talk to your doctor about stop-smoking programs and medicines. These can increase your chances of quitting for good.   When should you call for help? Call 911 anytime you think you may need emergency care. For example, call if:    · You have severe trouble breathing.    Call your doctor now or seek immediate medical care if:    · You have new or worse trouble breathing.     · You cough up dark brown or bloody mucus (sputum).     · You have a new or higher fever.     · You have a new rash.    Watch closely for changes in your health, and be sure to contact your doctor if:    · You cough more deeply or more often, especially if you notice more mucus or a change in the color of your mucus.     · You are not getting better as expected. Where can you learn more? Go to http://lv-shelbi.info/. Enter H333 in the search box to learn more about \"Bronchitis: Care Instructions. \"  Current as of: September 5, 2018  Content Version: 11.9  © 1672-5331 [employer]. Care instructions adapted under license by [employer] (which disclaims liability or warranty for this information). If you have questions about a medical condition or this instruction, always ask your healthcare professional. [address] any warranty or liability for your use of this information.

## 2019-04-01 NOTE — PROGRESS NOTES
Chief Complaint   Patient presents with    Cough     symptoms occuring for  4 days with abdominal pain when coughing . Patient request if medications be prescribed today to go to [facility] pharmacy     Nasal Congestion     has been experiencing fever for 2 days 100.8 F top 101.0 F    Lethargy     1. Have you been to the ER, urgent care clinic since your last visit? Hospitalized since your last visit? No    2. Have you seen or consulted any other health care providers outside of the [address] [place] [address] since your last visit? Include any pap smears or colon screening.  No

## 2019-04-01 NOTE — PROGRESS NOTES
HISTORY OF PRESENT ILLNESS  Shiela Tesfaye is a 68 y.o. male. seen for worsening cough, fever, SOB and chest tightness x 3-4 days. HPI  Cough  Patient complains of nonproductive cough, sore throat and wheezing. Symptoms began 3-4 days ago. The cough is non-productive, with wheezing, with shortness of breath, with shortness of breath during the cough, chest is painful during coughing, worsening over time and is aggravated by pollens, cold air and reclining position Associated symptoms include:chills, change in voice, fever up to 100 F sore throat. Patient does not have new pets. Patient does have a history of COPD Patient does have a history of environmental allergens. Patient had no recent travel. Patient does not have a history of smoking. Patient  does  have previous Chest X-ray. Patient does not have had a PPD done. He is on scheduled asmanex and prn albuterol but not using asmanex at all and albuterol only occasionally. He is non compliant with his inhalers    Review of Systems   Constitutional: Positive for chills, fever and malaise/fatigue. HENT: Positive for congestion and sore throat. Negative for ear pain and tinnitus. Eyes: Negative for blurred vision, double vision, pain and discharge. Respiratory: Positive for cough, shortness of breath and wheezing. Cardiovascular: Negative for chest pain, palpitations and leg swelling. Gastrointestinal: Negative for abdominal pain, blood in stool, constipation, diarrhea, nausea and vomiting. Genitourinary: Negative for dysuria, frequency, hematuria and urgency. Musculoskeletal: Negative for back pain, joint pain and myalgias. Skin: Negative for rash. Neurological: Negative for dizziness, tremors, seizures and headaches. Endo/Heme/Allergies: Negative for polydipsia. Does not bruise/bleed easily. Psychiatric/Behavioral: Negative for depression and substance abuse. The patient is not nervous/anxious.         Physical Exam   Constitutional: No distress. HENT:   Right Ear: Tympanic membrane normal. No drainage. Tympanic membrane is not injected. No middle ear effusion. No decreased hearing is noted. Left Ear: Tympanic membrane normal. No drainage. Tympanic membrane is not injected. No middle ear effusion. No decreased hearing is noted. Nose: Mucosal edema present. No rhinorrhea. Right sinus exhibits maxillary sinus tenderness and frontal sinus tenderness. Left sinus exhibits maxillary sinus tenderness and frontal sinus tenderness. Mouth/Throat: Uvula is midline. Posterior oropharyngeal erythema present. No oropharyngeal exudate. Nasal mucosa congested, edematous   Cardiovascular: Regular rhythm and normal heart sounds. No murmur heard. Pulmonary/Chest: Effort normal. He has wheezes (expiratory wheezes). He has no rales. Bilateral coarse crackles on bases   Lymphadenopathy:        Head (right side): No tonsillar adenopathy present. Head (left side): No tonsillar adenopathy present. He has no cervical adenopathy. Nursing note and vitals reviewed. ASSESSMENT and PLAN  Diagnoses and all orders for this visit:    1. Acute bronchitis, unspecified organism  -     Nebulizer & Compressor machine; 1 Each by Does Not Apply route every four (4) hours as needed for Cough (wheezing). -     albuterol (PROVENTIL VENTOLIN) 2.5 mg /3 mL (0.083 %) nebulizer solution; 3 mL by Nebulization route every four (4) hours as needed for Wheezing.  -     azithromycin (ZITHROMAX) 500 mg tab; Take 1 Tab by mouth daily for 5 days.  -     guaiFENesin-codeine (ROBITUSSIN AC) 100-10 mg/5 mL solution; Take 5 mL by mouth three (3) times daily as needed for Cough or Congestion for up to 7 days. Max Daily Amount: 15 mL. 2. COPD, moderate ([address]  -     Nebulizer & Compressor machine; 1 Each by Does Not Apply route every four (4) hours as needed for Cough (wheezing). 3. Arthritis of knee, right  -     diclofenac (VOLTAREN XR) 100 mg ER tablet;  Take 1 Tab by mouth daily. discussed importance of his controller and rescue inhaler and also to take allergy pills regularly  Discussed lifestyle issues and health guidance given  Patient was given an after visit summary which includes diagnoses, vital signs, current medications, instructions and references & authorized prescriptions . Results of labs will be conveyed to patient, once available. Pt verbalized instructions I provided and expressed understanding of discussion that was held today.

## 2019-04-02 ENCOUNTER — TELEPHONE (OUTPATIENT)
Dept: FAMILY MEDICINE CLINIC | Age: 74
End: 2019-04-02

## 2019-04-02 DIAGNOSIS — J44.9 COPD, MODERATE (HCC): ICD-10-CM

## 2019-04-02 DIAGNOSIS — J20.9 ACUTE BRONCHITIS, UNSPECIFIED ORGANISM: ICD-10-CM

## 2019-04-02 RX ORDER — ALBUTEROL SULFATE 0.83 MG/ML
2.5 SOLUTION RESPIRATORY (INHALATION)
Qty: 24 EACH | Refills: 0 | Status: SHIPPED | OUTPATIENT
Start: 2019-04-02 | End: 2021-01-07 | Stop reason: SDUPTHER

## 2019-04-02 RX ORDER — NEBULIZER AND COMPRESSOR
1 EACH MISCELLANEOUS
Qty: 1 EACH | Refills: 0 | Status: SHIPPED | OUTPATIENT
Start: 2019-04-02 | End: 2019-07-29 | Stop reason: SDUPTHER

## 2019-04-02 NOTE — TELEPHONE ENCOUNTER
----- Message from Liz Ruffin sent at 4/1/2019  5:01 PM EDT -----  Regarding: DR Maritza Hood / Jerry Ball from [address] [facility] reports that they do not supply neubulizer's or compressors.  This order should be sent to [id number] \[facility]\"" [phone]

## 2019-04-02 NOTE — TELEPHONE ENCOUNTER
Outbound call to patient. Name and  verified. Advised patient that I received a call from [facility] stating that they do not supply nebulizer or compressors. [facility] Patient was appreciative of call.

## 2019-04-22 ENCOUNTER — OFFICE VISIT (OUTPATIENT)
Dept: FAMILY MEDICINE CLINIC | Age: 74
End: 2019-04-22

## 2019-04-22 VITALS
HEIGHT: 63 IN | RESPIRATION RATE: 16 BRPM | WEIGHT: 146 LBS | HEART RATE: 78 BPM | TEMPERATURE: 97.7 F | SYSTOLIC BLOOD PRESSURE: 114 MMHG | OXYGEN SATURATION: 98 % | BODY MASS INDEX: 25.87 KG/M2 | DIASTOLIC BLOOD PRESSURE: 70 MMHG

## 2019-04-22 DIAGNOSIS — E11.9 CONTROLLED TYPE 2 DIABETES MELLITUS WITHOUT COMPLICATION, WITHOUT LONG-TERM CURRENT USE OF INSULIN (HCC): Primary | ICD-10-CM

## 2019-04-22 DIAGNOSIS — R13.10 ODYNOPHAGIA: ICD-10-CM

## 2019-04-22 DIAGNOSIS — R13.19 ESOPHAGEAL DYSPHAGIA: ICD-10-CM

## 2019-04-22 RX ORDER — GLUCOSAM/CHONDRO/HERB 149/HYAL 750-100 MG
TABLET ORAL
COMMUNITY
Start: 2019-03-11 | End: 2019-04-22 | Stop reason: SDUPTHER

## 2019-04-22 RX ORDER — CLOBETASOL PROPIONATE 0.5 MG/G
OINTMENT TOPICAL
COMMUNITY
Start: 2019-04-04 | End: 2019-06-28

## 2019-04-22 RX ORDER — ASPIRIN 81 MG/1
TABLET ORAL
COMMUNITY
Start: 2019-03-11

## 2019-04-22 RX ORDER — NICOTINE POLACRILEX 2 MG
LOZENGE BUCCAL
COMMUNITY
Start: 2019-03-11

## 2019-04-22 NOTE — PATIENT INSTRUCTIONS
Learning About Swallowing Problems  What are swallowing problems? Certain health problems that affect the nervous system can cause trouble swallowing. These conditions include stroke, ALS (also known as Wendi Gehrig's disease), Parkinson's disease, and multiple sclerosis. The muscles and nerves that help move food through the throat and esophagus may not work right. Growths, such as cancer, and other problems with your esophagus can also make it hard to swallow. The esophagus is the tube that leads from your throat to your stomach. How are swallowing problems diagnosed? A doctor or speech therapist will examine you to check for swallowing problems. You may get swallowing tests to check how well your throat muscles work. For these tests, you swallow a special liquid that helps the doctor see your throat and esophagus on an X-ray or video screen. Other tests use a thin, flexible tube called a scope to check for problems with your esophagus. The doctor puts the scope in your mouth and down your throat to look at your esophagus. What are the symptoms? Symptoms of swallowing problems may include:  · Trouble getting food or liquids to go down on the first try. · Gagging, choking, or coughing when you swallow. · Having food or liquids come back up through your throat, mouth, or nose after you swallow. · Feeling like foods or liquids are stuck in some part of your throat or chest.  · Pain when you swallow. How are swallowing problems treated? How swallowing problems are treated depends on the cause. The main goals of treatment will be to help you eat and swallow safely and get good nutrition. This is important for your health and quality of life. You may learn exercises to train your throat muscles to work together so you're able to swallow better. Learning certain ways to put food in your mouth or to position your head while eating may also help.   Your doctor or a speech therapist may recommend changes to your diet to help make it easier to swallow. You may need to avoid certain foods or liquids. You also may need to change the thickness of foods or liquids in your diet. To eat and swallow safely, follow any instructions you get from your doctor or therapist. These ideas may help:  · Sit upright when eating, drinking, and taking pills. · Take small bites of food. Chew completely and swallow before taking another bite. · Take small sips of liquids. · If eating makes you tired, eat smaller but more frequent meals. · Tip your chin down when there is food in your mouth. Where can you learn more? Go to http://lv-shelbi.info/. Enter 575 [id number] [id number] in the search box to learn more about \"Learning About Swallowing Problems. \"  Current as of: September 23, 2018  Content Version: 11.9  © 0197-0731 [employer]. Care instructions adapted under license by [employer] (which disclaims liability or warranty for this information). If you have questions about a medical condition or this instruction, always ask your healthcare professional. [place] [address] any warranty or liability for your use of this information.

## 2019-04-22 NOTE — PROGRESS NOTES
Chief Complaint   Patient presents with    Other     patient has been experiencing episodes of eating and having to take liquid to get food down     Abdominal Pain     1. Have you been to the ER, urgent care clinic since your last visit? Hospitalized since your last visit? No    2. Have you seen or consulted any other health care providers outside of the [address] [place] [address] since your last visit? Include any pap smears or colon screening.  No

## 2019-04-22 NOTE — PROGRESS NOTES
HISTORY OF PRESENT ILLNESS  Farshad Calvo is a 68 y.o. male. Seen today for dysphagia and frequent reflux recently. HPI  GERD  Patient complains of gastroesophageal reflux with choking on food, cough, difficulty swallowing, dysphagia and heartburn. Symptoms have been present for a few months. He has had dysphagia for solids. He  has not lost weight. He denies melena, hematochezia, hematemesis, and coffee ground emesis. This has been associated with laryngitis, midepigastric pain, need to clear throat frequently, odynophagia, symptoms primarily relate to meals, and lying down after meals and upper abdominal discomfort. He denies abdominal bloating, belching, chest pain, choking on food, hematemesis, melena, midepigastric pain, shortness of breath, unexpected weight loss, upper abdominal discomfort and wheezing. Medical therapy in the past has included proton pump inhibitors. Review of Systems   Constitutional: Negative for chills, fever and malaise/fatigue. HENT: Negative for congestion, ear pain, sore throat and tinnitus. Eyes: Negative for blurred vision, double vision, pain and discharge. Respiratory: Negative for cough, shortness of breath and wheezing. Cardiovascular: Negative for chest pain, palpitations and leg swelling. Gastrointestinal: Positive for heartburn. Negative for abdominal pain, blood in stool, constipation, diarrhea, nausea and vomiting. Difficulty swallowing   Genitourinary: Negative for dysuria, frequency, hematuria and urgency. Musculoskeletal: Negative for back pain, joint pain and myalgias. Skin: Negative for rash. Neurological: Negative for dizziness, tremors, seizures and headaches. Endo/Heme/Allergies: Negative for polydipsia. Does not bruise/bleed easily. Psychiatric/Behavioral: Negative for depression and substance abuse. The patient is not nervous/anxious. Physical Exam   Constitutional: He is oriented to person, place, and time.  He appears well-developed and well-nourished. HENT:   Head: Normocephalic and atraumatic. Right Ear: External ear normal.   Mouth/Throat: Oropharynx is clear and moist. No oropharyngeal exudate. Eyes: Pupils are equal, round, and reactive to light. Conjunctivae and EOM are normal. No scleral icterus. Neck: Normal range of motion. Neck supple. No JVD present. No thyromegaly present. Cardiovascular: Normal rate, regular rhythm, normal heart sounds and intact distal pulses. No murmur heard. Pulmonary/Chest: Effort normal and breath sounds normal. He has no wheezes. Abdominal: Soft. Bowel sounds are normal. He exhibits no distension and no mass. Musculoskeletal: Normal range of motion. He exhibits no edema or tenderness. Lymphadenopathy:     He has no cervical adenopathy. Neurological: He is alert and oriented to person, place, and time. He has normal reflexes. No cranial nerve deficit. Skin: Skin is warm and dry. No rash noted. He is not diaphoretic. Psychiatric: He has a normal mood and affect. Nursing note and vitals reviewed. ASSESSMENT and PLAN  Diagnoses and all orders for this visit:    1. Controlled type 2 diabetes mellitus without complication, without long-term current use of insulin (HCC)  -     HEMOGLOBIN A1C WITH EAG  -     METABOLIC PANEL, COMPREHENSIVE    2. Esophageal dysphagia  -     XR BA SWALLOW ESOPHOGRAM; Future  -     CT CHEST W WO CONT; Future    3. Odynophagia  -     XR BA SWALLOW ESOPHOGRAM; Future  -     CT CHEST W WO CONT; Future      Discussed lifestyle issues and health guidance given  Patient was given an after visit summary which includes diagnoses, vital signs, current medications, instructions and references & authorized prescriptions . Results of labs will be conveyed to patient, once available. Pt verbalized instructions I provided and expressed understanding of discussion that was held today.   Follow-up and Dispositions    · Return if symptoms worsen or fail to improve.

## 2019-04-23 LAB
ALBUMIN SERPL-MCNC: 4.1 G/DL (ref 3.5–4.8)
ALBUMIN/GLOB SERPL: 1.4 {RATIO} (ref 1.2–2.2)
ALP SERPL-CCNC: 72 IU/L (ref 39–117)
ALT SERPL-CCNC: 11 IU/L (ref 0–44)
AST SERPL-CCNC: 18 IU/L (ref 0–40)
BILIRUB SERPL-MCNC: 0.4 MG/DL (ref 0–1.2)
BUN SERPL-MCNC: 16 MG/DL (ref 8–27)
BUN/CREAT SERPL: 18 (ref 10–24)
CALCIUM SERPL-MCNC: 9.1 MG/DL (ref 8.6–10.2)
CHLORIDE SERPL-SCNC: 102 MMOL/L (ref 96–106)
CO2 SERPL-SCNC: 25 MMOL/L (ref 20–29)
CREAT SERPL-MCNC: 0.88 MG/DL (ref 0.76–1.27)
EST. AVERAGE GLUCOSE BLD GHB EST-MCNC: 137 MG/DL
GLOBULIN SER CALC-MCNC: 2.9 G/DL (ref 1.5–4.5)
GLUCOSE SERPL-MCNC: 94 MG/DL (ref 65–99)
HBA1C MFR BLD: 6.4 % (ref 4.8–5.6)
POTASSIUM SERPL-SCNC: 4.3 MMOL/L (ref 3.5–5.2)
PROT SERPL-MCNC: 7 G/DL (ref 6–8.5)
SODIUM SERPL-SCNC: 139 MMOL/L (ref 134–144)

## 2019-04-23 NOTE — PROGRESS NOTES
Please inform patient,  Kidney and liver function normal  And hgba1c,average blood sugar is better,6.4.  No change in current medicines  To call  to get Barium and CT chest  thanks

## 2019-04-29 NOTE — PROGRESS NOTES
Unable to reach patient via telephone. Second attempt unable to leave message, mailbox is full. Letter printed with results.

## 2019-05-01 ENCOUNTER — HOSPITAL ENCOUNTER (OUTPATIENT)
Dept: GENERAL RADIOLOGY | Age: 74
Discharge: HOME | End: 2019-05-01
Attending: FAMILY MEDICINE
Payer: MEDICARE

## 2019-05-01 DIAGNOSIS — R13.10 ODYNOPHAGIA: ICD-10-CM

## 2019-05-01 DIAGNOSIS — R13.19 ESOPHAGEAL DYSPHAGIA: ICD-10-CM

## 2019-05-01 PROCEDURE — 74220 X-RAY XM ESOPHAGUS 1CNTRST: CPT

## 2019-05-02 ENCOUNTER — HOSPITAL ENCOUNTER (OUTPATIENT)
Dept: CT IMAGING | Age: 74
Discharge: HOME | End: 2019-05-02
Attending: FAMILY MEDICINE
Payer: MEDICARE

## 2019-05-02 DIAGNOSIS — R13.19 ESOPHAGEAL DYSPHAGIA: ICD-10-CM

## 2019-05-02 DIAGNOSIS — R13.10 ODYNOPHAGIA: ICD-10-CM

## 2019-05-02 PROCEDURE — 74011000258 HC RX REV CODE- 258: Performed by: FAMILY MEDICINE

## 2019-05-02 PROCEDURE — 71260 CT THORAX DX C+: CPT

## 2019-05-02 PROCEDURE — 74011636320 HC RX REV CODE- 636/320

## 2019-05-02 RX ORDER — SODIUM CHLORIDE 0.9 % (FLUSH) 0.9 %
10 SYRINGE (ML) INJECTION
Status: COMPLETED | OUTPATIENT
Start: 2019-05-02 | End: 2019-05-02

## 2019-05-02 RX ADMIN — SODIUM CHLORIDE 100 ML: 900 INJECTION, SOLUTION INTRAVENOUS at 18:45

## 2019-05-02 RX ADMIN — IOPAMIDOL 100 ML: 612 INJECTION, SOLUTION INTRAVENOUS at 18:44

## 2019-05-02 RX ADMIN — Medication 10 ML: at 18:45

## 2019-05-06 DIAGNOSIS — K44.9 HIATAL HERNIA: Primary | ICD-10-CM

## 2019-05-06 RX ORDER — PANTOPRAZOLE SODIUM 40 MG/1
40 TABLET, DELAYED RELEASE ORAL
Qty: 30 TAB | Refills: 2 | Status: SHIPPED | OUTPATIENT
Start: 2019-05-06 | End: 2019-05-16 | Stop reason: SDUPTHER

## 2019-05-06 NOTE — PROGRESS NOTES
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   Will start on PPI, Rx sent to pharmacy  thanks

## 2019-05-07 NOTE — PROGRESS NOTES
Inbound call from patient returning nurse call. Reviewed recent ct results with patient. Provided him with recommendations from doctor and advised that new RX was sent to the pharmacy. He verbalized understanding.

## 2019-05-07 NOTE — PROGRESS NOTES
Call placed to patient. No answer. Left message I was calling regarding test results for patient to return call to office.

## 2019-05-14 ENCOUNTER — TELEPHONE (OUTPATIENT)
Dept: FAMILY MEDICINE CLINIC | Age: 74
End: 2019-05-14

## 2019-05-14 NOTE — TELEPHONE ENCOUNTER
----- Message from [place] Parnell sent at 5/14/2019  8:26 AM EDT -----  Regarding: Dr. Rosi Canales experiencing severe back pain and requested appt this week or next week at 4:00 PM. Pt also available 5/15 at 3:30 PM. Pt declined appt early this afternoon and did not wish to be seen by another physician. No appt available.  Pt best contact number : [phone]    Please advice

## 2019-05-14 NOTE — TELEPHONE ENCOUNTER
Outbound call to patient. Name and  verified. Patient requested an appt this week or next at 4:00pm. Advised patient provider did not have any openings for those times. Offered patient 19 at 11:00 am. He stated he had to work was unable to come at that time. Offered to schedule patient with another provider. He stated he did not want to do that because he would have to go through and explain everything. Advised they would be able to see his chart and notes. Patient declined and stated that he did not like that.

## 2019-05-16 ENCOUNTER — OFFICE VISIT (OUTPATIENT)
Dept: FAMILY MEDICINE CLINIC | Age: 74
End: 2019-05-16

## 2019-05-16 VITALS
RESPIRATION RATE: 16 BRPM | OXYGEN SATURATION: 98 % | TEMPERATURE: 98 F | WEIGHT: 150 LBS | DIASTOLIC BLOOD PRESSURE: 74 MMHG | BODY MASS INDEX: 26.58 KG/M2 | HEART RATE: 73 BPM | HEIGHT: 63 IN | SYSTOLIC BLOOD PRESSURE: 116 MMHG

## 2019-05-16 DIAGNOSIS — K44.9 HIATAL HERNIA: ICD-10-CM

## 2019-05-16 DIAGNOSIS — M47.816 ARTHRITIS, LUMBAR SPINE: ICD-10-CM

## 2019-05-16 DIAGNOSIS — M54.16 LUMBAR RADICULOPATHY: Primary | ICD-10-CM

## 2019-05-16 RX ORDER — PREDNISONE 5 MG/1
TABLET ORAL
Qty: 21 TAB | Refills: 0 | Status: SHIPPED | OUTPATIENT
Start: 2019-05-16 | End: 2019-07-29 | Stop reason: ALTCHOICE

## 2019-05-16 RX ORDER — PANTOPRAZOLE SODIUM 40 MG/1
40 TABLET, DELAYED RELEASE ORAL
Qty: 30 TAB | Refills: 2 | Status: SHIPPED | OUTPATIENT
Start: 2019-05-16 | End: 2019-09-03 | Stop reason: ALTCHOICE

## 2019-05-16 NOTE — PROGRESS NOTES
Chief Complaint   Patient presents with    Back Pain     symptoms occuring for one month     Leg Pain     right sided leg pain radiating to back . \" electrical sensation radiating from leg to back. \"      1. Have you been to the ER, urgent care clinic since your last visit? Hospitalized since your last visit? No    2. Have you seen or consulted any other health care providers outside of the [address] [place] [address] since your last visit? Include any pap smears or colon screening.  No

## 2019-05-16 NOTE — PROGRESS NOTES
HISTORY OF PRESENT ILLNESS  Madyson Josue is a 68 y.o. male. seen for lower back pain, radiating to right leg and foot. HPI  Back Pain  Patient presents for presents evaluation of low back problems. Symptoms have been present for 2 weeks and include pain in lower back, more on right side (sharp, stabbing, shooting in character; 6/10 in severity), numbness in right foot, paresthesias in right foot and leg, stiffness in lower back. Initial inciting event: none. Symptoms are worst: evening. Alleviating factors identifiable by patient are none. Exacerbating factors identifiable by patient are standing, increased intrathoracic pressure (cough, sneeze, etc.). Treatments so far initiated by patient: none Previous lower back problems: none. Previous workup: had recent CT chest for dysphagia and incidentally found to have degenerative changes in spine. Previous treatments: none. Review of Systems   Constitutional: Negative for chills, fever and malaise/fatigue. HENT: Negative for congestion, ear pain, sore throat and tinnitus. Eyes: Negative for blurred vision, double vision, pain and discharge. Respiratory: Negative for cough, shortness of breath and wheezing. Cardiovascular: Negative for chest pain, palpitations and leg swelling. Gastrointestinal: Negative for abdominal pain, blood in stool, constipation, diarrhea, nausea and vomiting. Genitourinary: Negative for dysuria, frequency, hematuria and urgency. Musculoskeletal: Positive for back pain. Negative for joint pain and myalgias. Pain right hip radiating down to right leg and foot   Skin: Negative for rash. Neurological: Negative for dizziness, tremors, seizures and headaches. Endo/Heme/Allergies: Negative for polydipsia. Does not bruise/bleed easily. Psychiatric/Behavioral: Negative for depression and substance abuse. The patient is not nervous/anxious. Physical Exam   Constitutional: He is oriented to person, place, and time. He appears well-developed and well-nourished. Neck: Normal range of motion. Neck supple. Cardiovascular: Normal rate, regular rhythm, normal heart sounds and intact distal pulses. Pulmonary/Chest: Effort normal and breath sounds normal.   Abdominal: Soft. Bowel sounds are normal.   Musculoskeletal:        Lumbar back: He exhibits decreased range of motion, tenderness, bony tenderness, pain and spasm. Back:    SLR positive on right side. BARRY and FADIR negative   Neurological: He is alert and oriented to person, place, and time. He has normal strength. No cranial nerve deficit or sensory deficit. Reflex Scores:       Patellar reflexes are 2+ on the right side and 2+ on the left side. Achilles reflexes are 2+ on the right side and 2+ on the left side. Skin: Skin is warm and dry. Psychiatric: He has a normal mood and affect. Nursing note and vitals reviewed. ASSESSMENT and PLAN  Diagnoses and all orders for this visit:    1. Lumbar radiculopathy  -     predniSONE (STERAPRED) 5 mg dose pack; See administration instruction per 5mg dose pack    2. Arthritis, lumbar spine  -     predniSONE (STERAPRED) 5 mg dose pack; See administration instruction per 5mg dose pack    3. Hiatal hernia  -     pantoprazole (PROTONIX) 40 mg tablet; Take 1 Tab by mouth nightly. Discussed lifestyle issues and health guidance given  Patient was given an after visit summary which includes diagnoses, vital signs, current medications, instructions and references & authorized prescriptions . Results of labs will be conveyed to patient, once available. Pt verbalized instructions I provided and expressed understanding of discussion that was held today.

## 2019-05-16 NOTE — PATIENT INSTRUCTIONS
Low Back Arthritis: Exercises  Your Care Instructions  Here are some examples of typical rehabilitation exercises for your condition. Start each exercise slowly. Ease off the exercise if you start to have pain. Your doctor or physical therapist will tell you when you can start these exercises and which ones will work best for you. When you are not being active, find a comfortable position for rest. Some people are comfortable on the floor or a medium-firm bed with a small pillow under their head and another under their knees. Some people prefer to lie on their side with a pillow between their knees. Don't stay in one position for too long. Take short walks (10 to 20 minutes) every 2 to 3 hours. Avoid slopes, hills, and stairs until you feel better. Walk only distances you can manage without pain, especially leg pain. How to do the exercises  Pelvic tilt    1. Lie on your back with your knees bent. 2. \"Brace\" your stomach--tighten your muscles by pulling in and imagining your belly button moving toward your spine. 3. Press your lower back into the floor. You should feel your hips and pelvis rock back. 4. Hold for 6 seconds while breathing smoothly. 5. Relax and allow your pelvis and hips to rock forward. 6. Repeat 8 to 12 times. Back stretches    1. Get down on your hands and knees on the floor. 2. Relax your head and allow it to droop. Round your back up toward the ceiling until you feel a nice stretch in your upper, middle, and lower back. Hold this stretch for as long as it feels comfortable, or about 15 to 30 seconds. 3. Return to the starting position with a flat back while you are on your hands and knees. 4. Let your back sway by pressing your stomach toward the floor. Lift your buttocks toward the ceiling. 5. Hold this position for 15 to 30 seconds. 6. Repeat 2 to 4 times. Follow-up care is a key part of your treatment and safety.  Be sure to make and go to all appointments, and call your doctor if you are having problems. It's also a good idea to know your test results and keep a list of the medicines you take. Where can you learn more? Go to http://lv-shelbi.info/. Enter X590 in the search box to learn more about \"Low Back Arthritis: Exercises. \"  Current as of: September 20, 2018  Content Version: 11.9  © 6752-1424 [employer]. Care instructions adapted under license by [employer] (which disclaims liability or warranty for this information). If you have questions about a medical condition or this instruction, always ask your healthcare professional. [address] any warranty or liability for your use of this information.

## 2019-05-19 DIAGNOSIS — M17.11 ARTHRITIS OF KNEE, RIGHT: ICD-10-CM

## 2019-05-19 RX ORDER — DICLOFENAC SODIUM 100 MG/1
TABLET, FILM COATED, EXTENDED RELEASE ORAL
Qty: 30 TAB | Refills: 0 | Status: SHIPPED | OUTPATIENT
Start: 2019-05-19 | End: 2019-09-03 | Stop reason: ALTCHOICE

## 2019-06-13 ENCOUNTER — TELEPHONE (OUTPATIENT)
Dept: FAMILY MEDICINE CLINIC | Age: 74
End: 2019-06-13

## 2019-06-13 DIAGNOSIS — M94.0 COSTOCHONDRITIS: Primary | ICD-10-CM

## 2019-06-13 RX ORDER — DICLOFENAC SODIUM 10 MG/G
2 GEL TOPICAL 4 TIMES DAILY
Qty: 100 G | Refills: 0 | Status: SHIPPED | OUTPATIENT
Start: 2019-06-13 | End: 2019-07-29 | Stop reason: SDUPTHER

## 2019-06-13 NOTE — TELEPHONE ENCOUNTER
----- Message from Ivania Collado sent at 2019  7:13 AM EDT -----  Regarding: Dr. Inés Wilson  Please call the patient at [phone] he is having pain in his ribs he would like to come in as soon as possible for an appointment. Outbound call to pt,  verified, pt doesn't want a 1:20 apt for today, he wants something after 3-4 pm as he cant get off work, and only want to see Dr Yasmany Tipton, He states he is having swelling near heart / Left side of the rib, under shoulders  He cannot wear under garments, under his shirt as it hurt while wearing cloths,   When he touches the ribs it hurts, he states (Dr Yasmany Tipton is aware of this pain)  Requesting Dr Yasmany Tipton to send some medication for the pain.    Pharmacy verified   LOV :  May 16, 2019 01:40 PM

## 2019-06-13 NOTE — TELEPHONE ENCOUNTER
Called patient. He informed me that he has pain at same site, he showed to me on previous visits. He had costochondritis on previous visits. Denies SOB, cough,wheezing,fever, DUGGAN. Informed that I will send Rx for Diclofenac gel . And also to apply moist heat over area. If no improvement, to call office to make an appointment. He agreed.

## 2019-06-14 DIAGNOSIS — I25.10 CORONARY ARTERY DISEASE INVOLVING NATIVE CORONARY ARTERY OF NATIVE HEART WITHOUT ANGINA PECTORIS: Chronic | ICD-10-CM

## 2019-06-14 RX ORDER — CARVEDILOL 6.25 MG/1
TABLET ORAL
Qty: 180 TAB | Refills: 1 | Status: SHIPPED | OUTPATIENT
Start: 2019-06-14 | End: 2019-11-18 | Stop reason: SDUPTHER

## 2019-06-17 DIAGNOSIS — I10 ESSENTIAL HYPERTENSION WITH GOAL BLOOD PRESSURE LESS THAN 130/85: ICD-10-CM

## 2019-06-17 RX ORDER — LOSARTAN POTASSIUM AND HYDROCHLOROTHIAZIDE 12.5; 5 MG/1; MG/1
TABLET ORAL
Qty: 90 TAB | Refills: 1 | Status: SHIPPED | OUTPATIENT
Start: 2019-06-17 | End: 2019-11-18 | Stop reason: SDUPTHER

## 2019-06-17 RX ORDER — METFORMIN HYDROCHLORIDE 500 MG/1
500 TABLET ORAL 2 TIMES DAILY WITH MEALS
Qty: 180 TAB | Refills: 2 | Status: SHIPPED | OUTPATIENT
Start: 2019-06-17 | End: 2020-02-12 | Stop reason: SDUPTHER

## 2019-06-28 ENCOUNTER — OFFICE VISIT (OUTPATIENT)
Dept: FAMILY MEDICINE CLINIC | Age: 74
End: 2019-06-28

## 2019-06-28 VITALS
HEIGHT: 63 IN | BODY MASS INDEX: 26.22 KG/M2 | OXYGEN SATURATION: 96 % | RESPIRATION RATE: 16 BRPM | HEART RATE: 72 BPM | SYSTOLIC BLOOD PRESSURE: 105 MMHG | TEMPERATURE: 98.1 F | WEIGHT: 148 LBS | DIASTOLIC BLOOD PRESSURE: 73 MMHG

## 2019-06-28 DIAGNOSIS — R23.8 PAPULE OF SKIN: Primary | ICD-10-CM

## 2019-06-28 DIAGNOSIS — R21 RASH: ICD-10-CM
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
## 2019-06-28 RX ORDER — TRIAMCINOLONE ACETONIDE 1 MG/G
OINTMENT TOPICAL
Qty: 30 G | Refills: 0 | Status: SHIPPED | OUTPATIENT
Start: 2019-06-28 | End: 2019-07-08 | Stop reason: SDUPTHER

## 2019-06-28 NOTE — PATIENT INSTRUCTIONS
Rash: Care Instructions  Your Care Instructions  A rash is any irritation or inflammation of the skin. Rashes have many possible causes, including allergy, infection, illness, heat, and emotional stress. Follow-up care is a key part of your treatment and safety. Be sure to make and go to all appointments, and call your doctor if you are having problems. It's also a good idea to know your test results and keep a list of the medicines you take. How can you care for yourself at home? · Wash the area with water only. Soap can make dryness and itching worse. Pat dry. · Put cold, wet cloths on the rash to reduce itching. · Keep cool, and stay out of the sun. · Leave the rash open to the air as much of the time as possible. · Sometimes petroleum jelly (Vaseline) can help relieve the discomfort caused by a rash. A moisturizing lotion, such as Cetaphil, also may help. Calamine lotion may help for rashes caused by contact with something (such as a plant or soap) that irritated the skin. Use it 3 or 4 times a day. · If your doctor prescribed a cream, use it as directed. If your doctor prescribed medicine, take it exactly as directed. · If your rash itches so badly that it interferes with your normal activities, take an over-the-counter antihistamine, such as diphenhydramine (Benadryl) or loratadine (Claritin). Read and follow all instructions on the label. When should you call for help? Call your doctor now or seek immediate medical care if:    · You have signs of infection, such as:  ? Increased pain, swelling, warmth, or redness. ? Red streaks leading from the area. ? Pus draining from the area. ? A fever.     · You have joint pain along with the rash.    Watch closely for changes in your health, and be sure to contact your doctor if:    · Your rash is changing or getting worse.  For example, call if you have pain along with the rash, the rash is spreading, or you have new blisters.     · You do not get better after 1 week. Where can you learn more? Go to http://lv-shelbi.info/. Enter R422 in the search box to learn more about \"Rash: Care Instructions. \"  Current as of: April 17, 2018  Content Version: 11.9  © 1021-1445 [email or web address]. Care instructions adapted under license by [employer] (which disclaims liability or warranty for this information). If you have questions about a medical condition or this instruction, always ask your healthcare professional. [address] any warranty or liability for your use of this information.

## 2019-06-28 NOTE — PROGRESS NOTES
Chief Complaint   Patient presents with   [address]     on right leg x 3 days      1. Have you been to the ER, urgent care clinic since your last visit? Hospitalized since your last visit? No    2. Have you seen or consulted any other health care providers outside of the [address] [place] [address] since your last visit? Include any pap smears or colon screening.  No

## 2019-06-28 NOTE — PROGRESS NOTES
[facility] Note      Subjective:     Chief Complaint   Patient presents with    Insect Bite     on right leg x 3 days      Elizabeth Pérez is a 68y.o. year old male who presents for evaluation of the following:    Bump on Right Calf  Onset 3 days ago  Itchy  Unsure if bitten by something, no ticks seen  Asks if he needs an antibiotic  Tx: none   Denies fever, other rash site, sweats. Review of Systems   Pertinent positives and negative per HPI. All other systems  reviewed are negative for a Comprehensive ROS (10+).        Past Medical History:   Diagnosis Date    CAD (coronary artery disease)     3 stents, done one month apart in [place] in [place],    Diabetes [facility])     Hypercholesterolemia     Hypertension         Social History     Socioeconomic History    Marital status:      Spouse name: Not on file    Number of children: Not on file    Years of education: Not on file    Highest education level: Not on file   Occupational History    Not on file   Social Needs    Financial resource strain: Not on file    Food insecurity:     Worry: Not on file     Inability: Not on file    Transportation needs:     Medical: Not on file     Non-medical: Not on file   Tobacco Use    Smoking status: Never Smoker    Smokeless tobacco: Never Used   Substance and Sexual Activity    Alcohol use: No     Alcohol/week: 0.0 oz    Drug use: No    Sexual activity: Never   Lifestyle    Physical activity:     Days per week: Not on file     Minutes per session: Not on file    Stress: Not on file   Relationships    Social connections:     Talks on phone: Not on file     Gets together: Not on file     Attends Qu[employer] service: Not on file     Active member of club or organization: Not on file     Attends meetings of clubs or organizations: Not on file     Relationship status: Not on file    Intimate partner violence:     Fear of current or ex partner: Not on file Emotionally abused: Not on file     Physically abused: Not on file     Forced sexual activity: Not on file   Other Topics Concern    Not on file   Social History Narrative    Not on file       Family History   Problem Relation Age of Onset    No Known Problems Mother     No Known Problems Father        Current Outpatient Medications   Medication Sig    triamcinolone acetonide (KENALOG) 0.1 % ointment Apply thin film to affected areas 2 to 4 times daily    losartan-hydroCHLOROthiazide (HYZAAR) 50-12.5 mg per tablet TAKE 1 TABLET BY MOUTH DAILY. INDICATIONS: HYPERTENSION    metFORMIN (GLUCOPHAGE) 500 mg tablet TAKE 1 TAB BY MOUTH TWO (2) TIMES DAILY (WITH MEALS).  carvedilol (COREG) 6.25 mg tablet TAKE 1 TABLET TWICE DAILY WITH FOOD    diclofenac (VOLTAREN) 1 % gel Apply 2 g to affected area four (4) times daily. Over left side chest, over painful ribs    diclofenac (VOLTAREN XR) 100 mg ER tablet TAKE 1 TABLET BY MOUTH EVERY DAY    aspirin delayed-release 81 mg tablet     VITAMIN B-12 5,000 mcg subl     Nebulizer & Compressor machine 1 Each by Does Not Apply route every four (4) hours as needed for Cough (wheezing).  albuterol (PROVENTIL VENTOLIN) 2.5 mg /3 mL (0.083 %) nebulizer solution 3 mL by Nebulization route every four (4) hours as needed for Wheezing.  omega-3 acid ethyl esters (LOVAZA) 1 gram capsule Take 2 Caps by mouth two (2) times a day.  varicella-zoster recombinant, PF, (SHINGRIX, PF,) 50 mcg/0.5 mL susr injection 0.5mL by IntraMUSCular route once now and then repeat in 2-6 months    nitroglycerin (NITROSTAT) 0.4 mg SL tablet TAKE 1 TABLET AND PLACE UNDER THE TONGUE EVERY 5 MINUTES FOR CHEST PAIN FOR UP TO 3 DOSES    albuterol (PROVENTIL HFA, VENTOLIN HFA, PROAIR HFA) 90 mcg/actuation inhaler Take 2 Puffs by inhalation every four (4) hours as needed for Wheezing.  rosuvastatin (CRESTOR) 10 mg tablet Take 1 Tab by mouth daily.     montelukast (SINGULAIR) 10 mg tablet Take 1 Tab by mouth daily.  predniSONE (STERAPRED) 5 mg dose pack See administration instruction per 5mg dose pack    pantoprazole (PROTONIX) 40 mg tablet Take 1 Tab by mouth nightly.  multivit-min/FA/lycopen/lutein (SENIOR TABS PO)     methyl salicylate/menthol (MUSCLE RUB EX)     budesonide (PULMICORT) 180 mcg/actuation aepb inhaler Take 1 Puff by inhalation two (2) times a day.  cloNIDine HCl (CATAPRES) 0.1 mg tablet Take 1 Tab by mouth as needed. If systolic BP > 719 and/or diastolic BP > 90    fluticasone (FLONASE) 50 mcg/actuation nasal spray One spray each nostril     No current facility-administered medications for this visit. Objective:     Vitals:    06/28/19 0927   BP: 105/73   Pulse: 72   Resp: 16   Temp: 98.1 °F (36.7 °C)   TempSrc: Oral   SpO2: 96%   Weight: 148 lb (67.1 kg)   Height: 5' 3\" (1.6 m)       Physical Examination:  General: Alert, cooperative, no distress, appears stated age. Eyes: Conjunctivae clear. PERRL, EOMs intact. Ears: Normal external ear canals both ears. Nose: Nares normal.   Mouth/Throat: Lips, mucosa, and tongue normal.   Neck: Supple, symmetrical, trachea midline, no adenopathy. No thyroid enlargement/tenderness/nodules  Respiratory: Breathing comfortably, in no acute respiratory distress. Clear to auscultation bilaterally. Normal inspiratory and expiratory ratio. Cardiovascular: Regular rate and rhythm, S1, S2 normal, no murmur, click, rub or gallop. Extremities with no cyanosis or edema. Pulses 2+ and symmetric radial and DP  Abdomen: Soft, non-tender, non distended. MSK: Extremities normal, atraumatic, no effusion. Gait steady and unassisted. Skin: 3mm pink papule with 5mm area of erythema mild underlying induration without fluctuance or rubor. Central punctate noted. Lymph nodes: Cervical, supraclavicular nodes normal.  Neurologic: CNII-XII intact.      Office Visit on 04/22/2019   Component Date Value Ref Range Status    Hemoglobin A1c 04/22/2019 6.4* 4.8 - 5.6 % Final    Comment:          Prediabetes: 5.7 - 6.4           Diabetes: >6.4           Glycemic control for adults with diabetes: <7.0      Estimated average glucose 04/22/2019 137  mg/dL Final    Glucose 04/22/2019 94  65 - 99 mg/dL Final    BUN 04/22/2019 16  8 - 27 mg/dL Final    Creatinine 04/22/2019 0.88  0.76 - 1.27 mg/dL Final    GFR est non-AA 04/22/2019 86  >59 mL/min/1.73 Final    GFR est AA 04/22/2019 99  >59 mL/min/1.73 Final    BUN/Creatinine ratio 04/22/2019 18  10 - 24 Final    Sodium 04/22/2019 139  134 - 144 mmol/L Final    Potassium 04/22/2019 4.3  3.5 - 5.2 mmol/L Final    Chloride 04/22/2019 102  96 - 106 mmol/L Final    CO2 04/22/2019 25  20 - 29 mmol/L Final    Calcium 04/22/2019 9.1  8.6 - 10.2 mg/dL Final    Protein, total 04/22/2019 7.0  6.0 - 8.5 g/dL Final    Albumin 04/22/2019 4.1  3.5 - 4.8 g/dL Final    GLOBULIN, TOTAL 04/22/2019 2.9  1.5 - 4.5 g/dL Final    A-G Ratio 04/22/2019 1.4  1.2 - 2.2 Final    Bilirubin, total 04/22/2019 0.4  0.0 - 1.2 mg/dL Final    Alk. phosphatase 04/22/2019 72  39 - 117 IU/L Final    AST (SGOT) 04/22/2019 18  0 - 40 IU/L Final    ALT (SGPT) 04/22/2019 11  0 - 44 IU/L Final           Assessment/ Plan:   Diagnoses and all orders for this visit:    1. Papule of skin  -     triamcinolone acetonide (KENALOG) 0.1 % ointment; Apply thin film to affected areas 2 to 4 times daily    2. Bug bite, initial encounter  -     triamcinolone acetonide (KENALOG) 0.1 % ointment; Apply thin film to affected areas 2 to 4 times daily    3. Rash  -     triamcinolone acetonide (KENALOG) 0.1 % ointment; Apply thin film to affected areas 2 to 4 times daily        Leg bump with appearance of bg bite wit minimal skin irritation. No signs of cellulitis or abscess. Trial topical steroid for itching. Educated patient on red flag symptoms to warrant return to clinic or emergency room visit.     I have discussed the diagnosis with the patient and the intended plan as seen in the above orders. The patient has been offered or received an after-visit summary and questions were answered concerning future plans. I have discussed medication side effects and warnings with the patient as well. Follow-up and Dispositions    · Return if symptoms worsen or fail to improve.          Signed,    Amada Larson MD  6/28/2019

## 2019-07-08 DIAGNOSIS — R23.8 PAPULE OF SKIN: ICD-10-CM

## 2019-07-08 DIAGNOSIS — R21 RASH: ICD-10-CM
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
## 2019-07-08 NOTE — TELEPHONE ENCOUNTER
Pharmacy requesting medication refill 90 day supply     Requested Prescriptions     Pending Prescriptions Disp Refills    triamcinolone acetonide (KENALOG) 0.1 % ointment 30 g 0     Sig: Apply thin film to affected areas 2 to 4 times daily     Pharmacy on file verified  [facility])

## 2019-07-09 NOTE — TELEPHONE ENCOUNTER
----- Message from Abhijit Sharif sent at 7/9/2019 11:39 AM EDT -----  Regarding: Dr. Rama Oconnell / Anshul Goode (if not patient):  PATIENT     Relationship of caller (if not patient):  N/A     Best contact number(s):  [phone]    Name of medication and dosage if known:  TRIAMCINOLONE     Is patient out of this medication (yes/no):  YES    Pharmacy name:  [address] listed in chart? (yes/no):  YES    Pharmacy phone number:  [phone]    Details to clarify the request:  Pt says 90 day supply was called into [facility] pharmacy and they are charging him about $1000 for it. Pt says he will be sending meds back but needs new prescription called into [address] for 90 day supply so that his insurance can cover it.      Abhijit Sharif

## 2019-07-15 RX ORDER — TRIAMCINOLONE ACETONIDE 1 MG/G
OINTMENT TOPICAL
Qty: 30 G | Refills: 0 | Status: SHIPPED | OUTPATIENT
Start: 2019-07-15 | End: 2019-12-03 | Stop reason: ALTCHOICE

## 2019-07-16 ENCOUNTER — TELEPHONE (OUTPATIENT)
Dept: FAMILY MEDICINE CLINIC | Age: 74
End: 2019-07-16
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
## 2019-07-16 DIAGNOSIS — R21 RASH: ICD-10-CM

## 2019-07-16 DIAGNOSIS — R23.8 PAPULE OF SKIN: ICD-10-CM

## 2019-07-16 RX ORDER — ROSUVASTATIN CALCIUM 10 MG/1
10 TABLET, COATED ORAL DAILY
Qty: 90 TAB | Refills: 1 | Status: SHIPPED | OUTPATIENT
Start: 2019-07-16 | End: 2020-02-11

## 2019-07-16 RX ORDER — TRIAMCINOLONE ACETONIDE 1 MG/G
OINTMENT TOPICAL
Qty: 30 G | Refills: 0 | Status: CANCELLED | OUTPATIENT
Start: 2019-07-16

## 2019-07-16 NOTE — TELEPHONE ENCOUNTER
Pharmacy requesting medication refill     Medication: ATORVASTATIN 10 MG TABLET    Pharmacy on file verified  [facility])

## 2019-07-16 NOTE — TELEPHONE ENCOUNTER
Narciso Wynn is calling from [facility] requesting a refill for the following meds. She stated they never received the refill for it. Pharm on file verified. LOV 06/28/2019  LAST REFILL. 07/15/2019      Requested Prescriptions     Pending Prescriptions Disp Refills    triamcinolone acetonide (KENALOG) 0.1 % ointment 30 g 0     Sig: Apply thin film to affected areas 2 to 4 times daily     Pharm said never received refill from last week.

## 2019-07-19 ENCOUNTER — TELEPHONE (OUTPATIENT)
Dept: FAMILY MEDICINE CLINIC | Age: 74
End: 2019-07-19

## 2019-07-19 NOTE — TELEPHONE ENCOUNTER
Call placed to [facility]. Patients name and  verified.  Advised that medication had been discontinued

## 2019-07-19 NOTE — TELEPHONE ENCOUNTER
Pharmacy requesting medication refill     Medication: Atorvastatin 10MG Tab     Pharmacy on file verified  [facility])

## 2019-07-29 ENCOUNTER — OFFICE VISIT (OUTPATIENT)
Dept: FAMILY MEDICINE CLINIC | Age: 74
End: 2019-07-29

## 2019-07-29 VITALS
HEIGHT: 63 IN | HEART RATE: 76 BPM | BODY MASS INDEX: 26.22 KG/M2 | RESPIRATION RATE: 16 BRPM | OXYGEN SATURATION: 96 % | SYSTOLIC BLOOD PRESSURE: 112 MMHG | TEMPERATURE: 98.2 F | WEIGHT: 148 LBS | DIASTOLIC BLOOD PRESSURE: 70 MMHG

## 2019-07-29 DIAGNOSIS — R30.0 DYSURIA: ICD-10-CM

## 2019-07-29 DIAGNOSIS — M70.61 TROCHANTERIC BURSITIS OF RIGHT HIP: ICD-10-CM

## 2019-07-29 DIAGNOSIS — K44.9 HIATAL HERNIA: ICD-10-CM

## 2019-07-29 DIAGNOSIS — R13.19 ESOPHAGEAL DYSPHAGIA: Primary | ICD-10-CM

## 2019-07-29 DIAGNOSIS — J20.9 ACUTE BRONCHITIS, UNSPECIFIED ORGANISM: ICD-10-CM

## 2019-07-29 DIAGNOSIS — J44.9 COPD, MODERATE (HCC): ICD-10-CM

## 2019-07-29 DIAGNOSIS — E11.9 CONTROLLED TYPE 2 DIABETES MELLITUS WITHOUT COMPLICATION, WITHOUT LONG-TERM CURRENT USE OF INSULIN (HCC): ICD-10-CM

## 2019-07-29 RX ORDER — BENZONATATE 200 MG/1
200 CAPSULE ORAL
Qty: 20 CAP | Refills: 0 | Status: SHIPPED | OUTPATIENT
Start: 2019-07-29 | End: 2019-08-05

## 2019-07-29 RX ORDER — DICLOFENAC SODIUM 10 MG/G
4 GEL TOPICAL 4 TIMES DAILY
Qty: 100 G | Refills: 1 | Status: SHIPPED | OUTPATIENT
Start: 2019-07-29 | End: 2019-08-15 | Stop reason: SDUPTHER

## 2019-07-29 RX ORDER — NEBULIZER AND COMPRESSOR
1 EACH MISCELLANEOUS
Qty: 1 EACH | Refills: 0 | Status: SHIPPED | OUTPATIENT
Start: 2019-07-29

## 2019-07-29 NOTE — PROGRESS NOTES
HISTORY OF PRESENT ILLNESS  Darryle Hales is a 68 y.o. male. Seen today for persistent cough and also pain left hip,gluteal area,radiatng down to leg  HPI  Cough  Patient complains of nonproductive cough and sore throat. Symptoms began several months ago. The cough is non-productive, without wheezing, dyspnea or hemoptysis and is aggravated by dust, pollens, cold air, exercise and reclining position Associated symptoms include:heartburn, postnasal drip, change in voice. Patient does not have new pets. Patient does have a history of asthma. Patient does have a history of environmental allergens. Patient does not have recent travel. Patient does not have a history of smoking. Patient  had previous Chest X-ray. Patient does not have had a PPD done. He had Ba swallow which showed significant reflux  CT chest showed large hiatal hernia. He was started on PPI    Hip Pain  Patient complains of right hip pain. Onset of the symptoms was several weeks ago. Inciting event: none. Current symptoms include right sided hip pain. Severity = moderate  right sided buttock pain. Severity = moderate  location: lateral, over greater trochanter, posterior  radiates to: right sided  upper leg(s): posterior, right sided  lower leg(s): posterior  worse after period of inactivity. Associated symptoms: knee giving out, knee locking, crepitus sensation and popping sensation. Aggravating symptoms: standing, walking, rising after sitting. Patient's overall course: gradually worsening and course of pain: gradually worsening. Patient has had no prior hip problems. Previous visits for this problem: none. Evaluation to date: none. Treatment to date: prescription analgesics per med orders: somewhat effective. 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 HENT: Negative for congestion, ear pain, sore throat and tinnitus.     Eyes: Negative for blurred vision, double vision, pain and discharge. Respiratory: Positive for cough. Negative for shortness of breath and wheezing. Cardiovascular: Negative for chest pain, palpitations and leg swelling. Gastrointestinal: Positive for heartburn. Negative for abdominal pain, blood in stool, constipation, diarrhea, nausea and vomiting. Genitourinary: Positive for dysuria, frequency and urgency. Negative for hematuria. Musculoskeletal: Negative for back pain, joint pain and myalgias. Right hip pain   Skin: Negative for rash. Neurological: Negative for dizziness, tremors, seizures and headaches. Endo/Heme/Allergies: Negative for polydipsia. Does not bruise/bleed easily. Psychiatric/Behavioral: Negative for depression and substance abuse. The patient is not nervous/anxious. Physical Exam   Constitutional: He is oriented to person, place, and time. He appears well-developed and well-nourished. HENT:   Head: Normocephalic and atraumatic. Right Ear: External ear normal.   Mouth/Throat: Oropharynx is clear and moist. No oropharyngeal exudate. Eyes: Pupils are equal, round, and reactive to light. Conjunctivae and EOM are normal. No scleral icterus. Neck: Normal range of motion. Neck supple. No JVD present. No thyromegaly present. Cardiovascular: Normal rate, regular rhythm, normal heart sounds and intact distal pulses. No murmur heard. Pulmonary/Chest: Effort normal and breath sounds normal. He has no wheezes. Abdominal: Soft. Bowel sounds are normal. He exhibits no distension and no mass. Musculoskeletal: Normal range of motion. He exhibits no edema or tenderness. Back:         Legs:  BARRY positive with pain referred to lateral aspect of right hip   Lymphadenopathy:     He has no cervical adenopathy. Neurological: He is alert and oriented to person, place, and time. He has normal reflexes. No cranial nerve deficit.    Feet:warm, good capillary refill, no trophic changes or ulcerative lesions, normal DP and PT pulses and normal sensory exam     Skin: Skin is warm and dry. No rash noted. He is not diaphoretic. Psychiatric: He has a normal mood and affect. Nursing note and vitals reviewed. ASSESSMENT and PLAN  Diagnoses and all orders for this visit:    1. Esophageal dysphagia  -     REFERRAL TO GASTROENTEROLOGY    2. Hiatal hernia  -     REFERRAL TO GASTROENTEROLOGY    3. COPD, moderate (HCC)  -     Nebulizer & Compressor machine; 1 Each by Does Not Apply route every four (4) hours as needed for Cough (wheezing). 4. Acute bronchitis, unspecified organism  -     Nebulizer & Compressor machine; 1 Each by Does Not Apply route every four (4) hours as needed for Cough (wheezing). -     benzonatate (TESSALON) 200 mg capsule; Take 1 Cap by mouth three (3) times daily as needed for Cough for up to 7 days. 5. Trochanteric bursitis of right hip  -     diclofenac (VOLTAREN) 1 % gel; Apply 4 g to affected area four (4) times daily. Over right side hip joint    6. Dysuria  -     UA/M W/RFLX CULTURE, ROUTINE    7. Controlled type 2 diabetes mellitus without complication, without long-term current use of insulin ([facility])  -     MICROALBUMIN, UR, RAND W/ MICROALB/CREAT RATIO  -      DIABETES FOOT EXAM    discussed stretches and demo was given  Cough likely from chronic GERD vs chronic sinusitis. Discussed small portion of diet and taking PPI regularly until he sees GI provider  Discussed lifestyle issues and health guidance given  Patient was given an after visit summary which includes diagnoses, vital signs, current medications, instructions and references & authorized prescriptions . Results of labs will be conveyed to patient, once available. Pt verbalized instructions I provided and expressed understanding of discussion that was held today. Follow-up and Dispositions    · Return in about 3 months (around 10/29/2019) for medicare wellness.

## 2019-07-29 NOTE — PROGRESS NOTES
Chief Complaint   Patient presents with    Cough     Patient has lingering cough.  Numbness     Patient also states he has numbness in upper and lower right leg. 1. Have you been to the ER, urgent care clinic since your last visit? Hospitalized since your last visit? No    2. Have you seen or consulted any other health care providers outside of the [address] [place] [address] since your last visit? Include any pap smears or colon screening.  No

## 2019-07-29 NOTE — PATIENT INSTRUCTIONS

## 2019-07-31 LAB
ALBUMIN/CREAT UR: 6 MG/G CREAT (ref 0–30)
APPEARANCE UR: CLEAR
BACTERIA #/AREA URNS HPF: ABNORMAL /[HPF]
BACTERIA UR CULT: NO GROWTH
BILIRUB UR QL STRIP: NEGATIVE
CASTS URNS QL MICRO: ABNORMAL /LPF
COLOR UR: YELLOW
CREAT UR-MCNC: 75.1 MG/DL
EPI CELLS #/AREA URNS HPF: ABNORMAL /HPF (ref 0–10)
GLUCOSE UR QL: NEGATIVE
HGB UR QL STRIP: ABNORMAL
KETONES UR QL STRIP: NEGATIVE
LEUKOCYTE ESTERASE UR QL STRIP: ABNORMAL
MICRO URNS: ABNORMAL
MICROALBUMIN UR-MCNC: 4.5 UG/ML
MUCOUS THREADS URNS QL MICRO: PRESENT
NITRITE UR QL STRIP: NEGATIVE
PH UR STRIP: 7 [PH] (ref 5–7.5)
PROT UR QL STRIP: NEGATIVE
RBC #/AREA URNS HPF: ABNORMAL /HPF (ref 0–2)
SP GR UR: 1.01 (ref 1–1.03)
URINALYSIS REFLEX, 377202: ABNORMAL
UROBILINOGEN UR STRIP-MCNC: 0.2 MG/DL (ref 0.2–1)
WBC #/AREA URNS HPF: ABNORMAL /HPF (ref 0–5)

## 2019-08-02 NOTE — PROGRESS NOTES
Outbound call to patient. Name and  verified. Reviewed recent labs with patient. He verbalized understanding and was appreciative of call.

## 2019-08-15 ENCOUNTER — OFFICE VISIT (OUTPATIENT)
Dept: FAMILY MEDICINE CLINIC | Age: 74
End: 2019-08-15

## 2019-08-15 VITALS
HEIGHT: 63 IN | WEIGHT: 151 LBS | SYSTOLIC BLOOD PRESSURE: 110 MMHG | HEART RATE: 74 BPM | DIASTOLIC BLOOD PRESSURE: 81 MMHG | RESPIRATION RATE: 18 BRPM | OXYGEN SATURATION: 99 % | TEMPERATURE: 98.9 F | BODY MASS INDEX: 26.75 KG/M2

## 2019-08-15 DIAGNOSIS — N40.1 BENIGN PROSTATIC HYPERPLASIA WITH URINARY FREQUENCY: ICD-10-CM

## 2019-08-15 DIAGNOSIS — R35.0 BENIGN PROSTATIC HYPERPLASIA WITH URINARY FREQUENCY: ICD-10-CM

## 2019-08-15 DIAGNOSIS — N41.0 ACUTE PROSTATITIS: Primary | ICD-10-CM

## 2019-08-15 DIAGNOSIS — R30.0 DYSURIA: ICD-10-CM

## 2019-08-15 DIAGNOSIS — M70.61 TROCHANTERIC BURSITIS OF RIGHT HIP: ICD-10-CM

## 2019-08-15 RX ORDER — CIPROFLOXACIN 500 MG/1
500 TABLET ORAL 2 TIMES DAILY
Qty: 20 TAB | Refills: 0 | Status: SHIPPED | OUTPATIENT
Start: 2019-08-15 | End: 2019-08-25

## 2019-08-15 RX ORDER — CIPROFLOXACIN 500 MG/1
500 TABLET ORAL 2 TIMES DAILY
Qty: 20 TAB | Refills: 0 | Status: SHIPPED | OUTPATIENT
Start: 2019-08-15 | End: 2019-08-15 | Stop reason: SDUPTHER

## 2019-08-15 RX ORDER — DICLOFENAC SODIUM 10 MG/G
4 GEL TOPICAL 4 TIMES DAILY
Qty: 300 G | Refills: 0 | Status: SHIPPED | OUTPATIENT
Start: 2019-08-15

## 2019-08-15 NOTE — PROGRESS NOTES
HISTORY OF PRESENT ILLNESS  Grecia Garcia is a 68 y.o. male. Seen today for persistent dysuria and discomfort on urination. HPI  Urinary Tract Infection  Patient complains of dysuria, burning with urination, incomplete bladder emptying. Onset was a few weeks ago, unchanged since that time. . Patient denies foul smelling urine, nausea, vomiting, diarrhea, constipation, new onset enuresis, hematuria, incontinence, suprapubic pressure, bilaterally flank pain   Patient admits  dysuria, abnormal smelling urinePatient does not have a history of recurrent UTI. Patient does not have a history of pyelonephritis. Had UA and culture checked on last visit, results were normal    Review of Systems   Constitutional: Negative for chills, fever and malaise/fatigue. HENT: Negative for congestion, ear pain, sore throat and tinnitus. Eyes: Negative for blurred vision, double vision, pain and discharge. Respiratory: Negative for cough, shortness of breath and wheezing. Cardiovascular: Negative for chest pain, palpitations and leg swelling. Gastrointestinal: Negative for abdominal pain, blood in stool, constipation, diarrhea, nausea and vomiting. Genitourinary: Positive for dysuria, frequency and urgency. Negative for hematuria. Musculoskeletal: Negative for back pain, joint pain and myalgias. Skin: Negative for rash. Neurological: Negative for dizziness, tremors, seizures and headaches. Endo/Heme/Allergies: Negative for polydipsia. Does not bruise/bleed easily. Psychiatric/Behavioral: Negative for depression and substance abuse. The patient is not nervous/anxious. Physical Exam   Constitutional: He is oriented to person, place, and time. He appears well-developed and well-nourished. Neck: Normal range of motion. Neck supple. Cardiovascular: Normal rate, regular rhythm, normal heart sounds and intact distal pulses. Pulmonary/Chest: Effort normal and breath sounds normal.   Abdominal: Soft. Bowel sounds are normal.   Genitourinary:   Genitourinary Comments: He declined BEAU and prostate exam   Musculoskeletal: Normal range of motion. Neurological: He is alert and oriented to person, place, and time. No cranial nerve deficit. Skin: Skin is warm and dry. Psychiatric: He has a normal mood and affect. Nursing note and vitals reviewed. ASSESSMENT and PLAN  Diagnoses and all orders for this visit:    1. Acute prostatitis  -     ciprofloxacin HCl (CIPRO) 500 mg tablet; Take 1 Tab by mouth two (2) times a day for 10 days. 2. Trochanteric bursitis of right hip  -     diclofenac (VOLTAREN) 1 % gel; Apply 4 g to affected area four (4) times daily. Over right side hip joint    3. Dysuria  -     ciprofloxacin HCl (CIPRO) 500 mg tablet; Take 1 Tab by mouth two (2) times a day for 10 days. 4. Benign prostatic hyperplasia with urinary frequency  -     PSA W/ REFLX FREE PSA    started on empirical abx. Will check PSA  Discussed lifestyle issues and health guidance given  Patient was given an after visit summary which includes diagnoses, vital signs, current medications, instructions and references & authorized prescriptions . Results of labs will be conveyed to patient, once available. Pt verbalized instructions I provided and expressed understanding of discussion that was held today.

## 2019-08-15 NOTE — PATIENT INSTRUCTIONS
Prostatitis: Care Instructions  Your Care Instructions    The prostate gland is a small, walnut-shaped organ. It lies just below a man's bladder. It surrounds the urethra, the tube that carries urine through the penis and out of the body. Prostatitis is a painful condition caused by inflammation or infection of the prostate gland. Sometimes the condition is caused by bacteria, but often the cause is not known. Prostatitis caused by bacteria usually is treated with self-care and antibiotics. Follow-up care is a key part of your treatment and safety. Be sure to make and go to all appointments, and call your doctor if you are having problems. It's also a good idea to know your test results and keep a list of the medicines you take. How can you care for yourself at home? · If your doctor prescribed antibiotics, take them as directed. Do not stop taking them just because you feel better. You need to take the full course of antibiotics. · Take an over-the-counter pain medicine, such as acetaminophen (Tylenol), ibuprofen (Advil, Motrin), or naproxen (Aleve). Be safe with medicines. Read and follow all instructions on the label. · Take warm baths to help soothe pain. · Straining to pass stools can hurt when your prostate is inflamed. Avoid constipation. ? Include fruits, vegetables, beans, and whole grains in your diet each day. These foods are high in fiber. ? Drink plenty of fluids, enough so that your urine is light yellow or clear like water. If you have kidney, heart, or liver disease and have to limit fluids, talk with your doctor before you increase the amount of fluids you drink. ? Get some exercise every day. Build up slowly to 30 to 60 minutes a day on 5 or more days of the week. ? Take a fiber supplement, such as Citrucel or Metamucil, every day if needed. Read and follow all instructions on the label. ? Schedule time each day for a bowel movement. Having a daily routine may help.  Take your time and do not strain when having a bowel movement. · Avoid alcohol, caffeine, and spicy foods, especially if they make your symptoms worse. When should you call for help? Call your doctor now or seek immediate medical care if:    · You have symptoms of a urinary tract infection. These may include:  ? Pain or burning when you urinate. ? A frequent need to urinate without being able to pass much urine. ? Pain in the flank, which is just below the rib cage and above the waist on either side of the back. ? Blood in your urine. ? A fever.    Watch closely for changes in your health, and be sure to contact your doctor if:    · You cannot empty your bladder completely.     · You do not get better as expected. Where can you learn more? Go to http://lv-shelbi.info/. Enter Y396 in the search box to learn more about \"Prostatitis: Care Instructions. \"  Current as of: September 26, 2018  Content Version: 12.1  © 9353-8232 [employer]. Care instructions adapted under license by [employer] (which disclaims liability or warranty for this information). If you have questions about a medical condition or this instruction, always ask your healthcare professional. [address] any warranty or liability for your use of this information.

## 2019-08-16 LAB
PSA SERPL-MCNC: 1.3 NG/ML (ref 0–4)
REFLEX CRITERIA: NORMAL

## 2019-08-16 NOTE — PROGRESS NOTES
Please inform patient that screening for prostate cancer is normal  To complete course of antibiotic that was prescribed yesterday  thanks

## 2019-08-16 NOTE — PROGRESS NOTES
Call placed to patient. Name and  verified. reviewed recent labs with patient.  He verbalized understanding

## 2019-09-03 ENCOUNTER — OFFICE VISIT (OUTPATIENT)
Dept: FAMILY MEDICINE CLINIC | Age: 74
End: 2019-09-03

## 2019-09-03 VITALS
BODY MASS INDEX: 26.19 KG/M2 | SYSTOLIC BLOOD PRESSURE: 118 MMHG | WEIGHT: 147.8 LBS | RESPIRATION RATE: 16 BRPM | HEART RATE: 78 BPM | HEIGHT: 63 IN | OXYGEN SATURATION: 96 % | DIASTOLIC BLOOD PRESSURE: 64 MMHG | TEMPERATURE: 97.8 F

## 2019-09-03 DIAGNOSIS — R35.0 BENIGN PROSTATIC HYPERPLASIA WITH URINARY FREQUENCY: ICD-10-CM

## 2019-09-03 DIAGNOSIS — L27.2 ALLERGIC DERMATITIS DUE INGESTED FOOD: Primary | ICD-10-CM

## 2019-09-03 DIAGNOSIS — N40.1 BENIGN PROSTATIC HYPERPLASIA WITH URINARY FREQUENCY: ICD-10-CM

## 2019-09-03 RX ORDER — FAMOTIDINE 40 MG/1
40 TABLET, FILM COATED ORAL 2 TIMES DAILY
Qty: 20 TAB | Refills: 0 | Status: SHIPPED | OUTPATIENT
Start: 2019-09-03 | End: 2019-10-22 | Stop reason: ALTCHOICE

## 2019-09-03 RX ORDER — HYDROXYZINE 25 MG/1
25 TABLET, FILM COATED ORAL 2 TIMES DAILY
Qty: 20 TAB | Refills: 0 | Status: SHIPPED | OUTPATIENT
Start: 2019-09-03 | End: 2019-09-13

## 2019-09-03 RX ORDER — TAMSULOSIN HYDROCHLORIDE 0.4 MG/1
0.4 CAPSULE ORAL
Qty: 30 CAP | Refills: 0 | Status: SHIPPED | OUTPATIENT
Start: 2019-09-03 | End: 2019-10-22 | Stop reason: ALTCHOICE

## 2019-09-03 NOTE — PROGRESS NOTES
Chief Complaint   Patient presents with    Rash     x 2 days. itches     1. Have you been to the ER, urgent care clinic since your last visit? Hospitalized since your last visit? No    2. Have you seen or consulted any other health care providers outside of the [address] [place] [address] since your last visit? Include any pap smears or colon screening. No       Patient presents in office with c/o rash x 2 days all over body.

## 2019-09-03 NOTE — PROGRESS NOTES
HISTORY OF PRESENT ILLNESS  Jyl Fleischer is a 68 y.o. male. seen for itchy rash all over body since Saturday. His urinary symptoms have not improved  HPI  Dermatology Review  He is here to talk about itchy rash. He noticed it abrupt and 3 days ago, with gradually improving since that time. Location: whole body except face. Symptoms include erythema, bumps and itching. He reports: it started after he eat some food from temple and also he was on Cipro at that time for his dysuria. He denies: recent travel, changed in soaps/detergents, new pets. Treatment to date has included OTC Benadryl and topical steroid. Review of Systems   Constitutional: Negative for chills, fever and malaise/fatigue. HENT: Negative for congestion, ear pain, sore throat and tinnitus. Eyes: Negative for blurred vision, double vision, pain and discharge. Respiratory: Negative for cough, shortness of breath and wheezing. Cardiovascular: Negative for chest pain, palpitations and leg swelling. Gastrointestinal: Negative for abdominal pain, blood in stool, constipation, diarrhea, nausea and vomiting. Genitourinary: Positive for dysuria. Negative for frequency, hematuria and urgency. Musculoskeletal: Negative for back pain, joint pain and myalgias. Skin: Positive for itching and rash. Neurological: Negative for dizziness, tremors, seizures and headaches. Endo/Heme/Allergies: Negative for polydipsia. Does not bruise/bleed easily. Psychiatric/Behavioral: Negative for depression and substance abuse. The patient is not nervous/anxious. Physical Exam   Constitutional: He is oriented to person, place, and time. He appears well-developed and well-nourished. Neck: Normal range of motion. Neck supple. Cardiovascular: Normal rate, regular rhythm, normal heart sounds and intact distal pulses. Pulmonary/Chest: Effort normal and breath sounds normal.   Abdominal: Soft.  Bowel sounds are normal.   Musculoskeletal: Normal range of motion. Neurological: He is alert and oriented to person, place, and time. No cranial nerve deficit. Skin: Skin is warm and dry. Maculopapular lesions with scratch marks all over body except face   Psychiatric: He has a normal mood and affect. Nursing note and vitals reviewed. ASSESSMENT and PLAN  Diagnoses and all orders for this visit:    1. Allergic dermatitis due ingested food  -     famotidine (PEPCID) 40 mg tablet; Take 1 Tab by mouth two (2) times a day. -     hydrOXYzine HCl (ATARAX) 25 mg tablet; Take 1 Tab by mouth two (2) times a day for 10 days. 2. Benign prostatic hyperplasia with urinary frequency  -     tamsulosin (FLOMAX) 0.4 mg capsule; Take 1 Cap by mouth nightly. Discussed lifestyle issues and health guidance given  Patient was given an after visit summary which includes diagnoses, vital signs, current medications, instructions and references & authorized prescriptions . Results of labs will be conveyed to patient, once available. Pt verbalized instructions I provided and expressed understanding of discussion that was held today. Follow-up and Dispositions    · Return if symptoms worsen or fail to improve.

## 2019-09-03 NOTE — PATIENT INSTRUCTIONS
Benign Prostatic Hyperplasia: Care Instructions  Your Care Instructions    Benign prostatic hyperplasia, or BPH, is an enlarged prostate gland. The prostate is a small gland that makes some of the fluid in semen. Prostate enlargement happens to almost all men as they age. It is usually not serious. BPH does not cause prostate cancer. As the prostate gets bigger, it may partly block the flow of urine. You may have a hard time getting a urine stream started or completely stopped. BPH can cause dribbling. You may have a weak urine stream, or you may have to urinate more often than you used to, especially at night. Most men find these problems easy to manage. You do not need treatment unless your symptoms bother you a lot or you have other problems, such as bladder infections or stones. In these cases, medicines may help. Surgery is not needed unless the urine flow is blocked or the symptoms do not get better with medicine. Follow-up care is a key part of your treatment and safety. Be sure to make and go to all appointments, and call your doctor if you are having problems. It's also a good idea to know your test results and keep a list of the medicines you take. How can you care for yourself at home? · Take plenty of time to urinate. Try to relax. · Try \"double voiding. \" Urinate as much you can, relax for a few moments, and then try to urinate again. · Sit on the toilet to urinate. · Read or think of other things while you are waiting. · Turn on a faucet, or try to picture running water. Some men find that this helps get their urine flowing. · If dribbling is a problem, wash your penis daily to avoid skin irritation and infection. · Avoid caffeine and alcohol. These drinks will increase how often you need to urinate. Spread your fluid intake throughout the day. If the urge to urinate often wakes you at night, limit your fluid intake in the evening. Urinate right before you go to bed.   · Many over-the-counter cold and allergy medicines can make the symptoms of BPH worse. Avoid antihistamines, decongestants, and allergy pills, if you can. Read the warnings on the package. · If you take any prescription medicines, especially tranquilizers or antidepressants, ask your doctor or pharmacist whether they can cause urination problems. There may be other medicines you can use that do not cause urinary problems. · Be safe with medicines. Take your medicines exactly as prescribed. Call your doctor if you think you are having a problem with your medicine. When should you call for help? Call your doctor now or seek immediate medical care if:    · You cannot urinate at all.     · You have symptoms of a urinary infection. For example:  ? You have blood or pus in your urine. ? You have pain in your back just below your rib cage. This is called flank pain. ? You have a fever, chills, or body aches. ? It hurts to urinate. ? You have groin or belly pain.    Watch closely for changes in your health, and be sure to contact your doctor if:    · It hurts when you ejaculate.     · Your urinary problems get a lot worse or bother you a lot. Where can you learn more? Go to http://lv-shelbi.info/. Enter I107 in the search box to learn more about \"Benign Prostatic Hyperplasia: Care Instructions. \"  Current as of: September 26, 2018  Content Version: 12.1  © 5599-9783 [employer]. Care instructions adapted under license by [employer] (which disclaims liability or warranty for this information). If you have questions about a medical condition or this instruction, always ask your healthcare professional. [address] any warranty or liability for your use of this information.

## 2019-10-22 ENCOUNTER — OFFICE VISIT (OUTPATIENT)
Dept: FAMILY MEDICINE CLINIC | Age: 74
End: 2019-10-22

## 2019-10-22 VITALS
OXYGEN SATURATION: 98 % | BODY MASS INDEX: 26.22 KG/M2 | DIASTOLIC BLOOD PRESSURE: 79 MMHG | SYSTOLIC BLOOD PRESSURE: 130 MMHG | WEIGHT: 148 LBS | HEART RATE: 65 BPM | HEIGHT: 63 IN | RESPIRATION RATE: 16 BRPM | TEMPERATURE: 98.7 F

## 2019-10-22 DIAGNOSIS — Z23 ENCOUNTER FOR IMMUNIZATION: ICD-10-CM

## 2019-10-22 DIAGNOSIS — Z13.31 SCREENING FOR DEPRESSION: ICD-10-CM

## 2019-10-22 DIAGNOSIS — E11.9 CONTROLLED TYPE 2 DIABETES MELLITUS WITHOUT COMPLICATION, WITHOUT LONG-TERM CURRENT USE OF INSULIN (HCC): ICD-10-CM

## 2019-10-22 DIAGNOSIS — E78.5 HYPERLIPIDEMIA LDL GOAL <100: ICD-10-CM

## 2019-10-22 DIAGNOSIS — Z71.89 ADVANCED DIRECTIVES, COUNSELING/DISCUSSION: ICD-10-CM

## 2019-10-22 DIAGNOSIS — Z00.00 MEDICARE ANNUAL WELLNESS VISIT, SUBSEQUENT: Primary | ICD-10-CM

## 2019-10-22 DIAGNOSIS — N40.1 BENIGN PROSTATIC HYPERPLASIA WITH URINARY FREQUENCY: ICD-10-CM

## 2019-10-22 DIAGNOSIS — R35.0 BENIGN PROSTATIC HYPERPLASIA WITH URINARY FREQUENCY: ICD-10-CM

## 2019-10-22 DIAGNOSIS — I10 ESSENTIAL HYPERTENSION WITH GOAL BLOOD PRESSURE LESS THAN 130/85: ICD-10-CM

## 2019-10-22 DIAGNOSIS — I25.10 CORONARY ARTERY DISEASE INVOLVING NATIVE CORONARY ARTERY OF NATIVE HEART WITHOUT ANGINA PECTORIS: ICD-10-CM

## 2019-10-22 DIAGNOSIS — Z13.39 SCREENING FOR ALCOHOLISM: ICD-10-CM

## 2019-10-22 DIAGNOSIS — R30.0 DYSURIA: ICD-10-CM

## 2019-10-22 DIAGNOSIS — J44.9 COPD, MODERATE (HCC): ICD-10-CM

## 2019-10-22 RX ORDER — CIPROFLOXACIN 500 MG/1
500 TABLET ORAL 2 TIMES DAILY
Qty: 20 TAB | Refills: 0 | Status: SHIPPED | OUTPATIENT
Start: 2019-10-22 | End: 2019-11-01

## 2019-10-22 RX ORDER — TAMSULOSIN HYDROCHLORIDE 0.4 MG/1
0.4 CAPSULE ORAL
Qty: 30 CAP | Refills: 0 | Status: SHIPPED | OUTPATIENT
Start: 2019-10-22 | End: 2019-11-14 | Stop reason: SDUPTHER

## 2019-10-22 NOTE — PATIENT INSTRUCTIONS
Medicare Wellness Visit, Male    The best way to live healthy is to have a lifestyle where you eat a well-balanced diet, exercise regularly, limit alcohol use, and quit all forms of tobacco/nicotine, if applicable. Regular preventive services are another way to keep healthy. Preventive services (vaccines, screening tests, monitoring & exams) can help personalize your care plan, which helps you manage your own care. Screening tests can find health problems at the earliest stages, when they are easiest to treat. [facility] follows the current, evidence-based guidelines published by the [facility] ([facility]) when recommending preventive services for our patients. Because we follow these guidelines, sometimes recommendations change over time as research supports it. (For example, a prostate screening blood test is no longer routinely recommended for men with no symptoms.)  Of course, you and your doctor may decide to screen more often for some diseases, based on your risk and co-morbidities (chronic disease you are already diagnosed with). Preventive services for you include:  - Medicare offers their members a free annual wellness visit, which is time for you and your primary care provider to discuss and plan for your preventive service needs. Take advantage of this benefit every year!  -All adults over age 72 should receive the recommended pneumonia vaccines. Current USPSTF guidelines recommend a series of two vaccines for the best pneumonia protection.   -All adults should have a flu vaccine yearly and an ECG.  All adults age 61 and older should receive a shingles vaccine once in their lifetime.    -All adults age 38-68 who are overweight should have a diabetes screening test once every three years.   -Other screening tests & preventive services for persons with diabetes include: an eye exam to screen for diabetic retinopathy, a kidney function test, a foot exam, and stricter control over your cholesterol.   -Cardiovascular screening for adults with routine risk involves an electrocardiogram (ECG) at intervals determined by the provider.   -Colorectal cancer screening should be done for adults age 54-65 with no increased risk factors for colorectal cancer. There are a number of acceptable methods of screening for this type of cancer. Each test has its own benefits and drawbacks. Discuss with your provider what is most appropriate for you during your annual wellness visit. The different tests include: colonoscopy (considered the best screening method), a fecal occult blood test, a fecal DNA test, and sigmoidoscopy.  -All adults born between [facility] should be screened once for Hepatitis C.  -An Abdominal Aortic Aneurysm (AAA) Screening is recommended for men age 73-68 who has ever smoked in their lifetime. Here is a list of your current Health Maintenance items (your personalized list of preventive services) with a due date:  Health Maintenance Due   Topic Date Due    DTaP/Tdap/Td  (1 - Tdap) 09/04/1967    Shingles Vaccine (1 of 2) 09/04/1996    Pneumococcal Vaccine (2 of 2 - PPSV23) 10/01/2017    Flu Vaccine  08/01/2019    Glaucoma Screening   10/11/2019    Eye Exam  10/11/2019    Hemoglobin A1C    10/22/2019    Annual Well Visit  10/25/2019    Cholesterol Test   10/27/2019          Learning About Living Kamilla Parker  What is a living will? A living will is a legal form you use to write down the kind of care you want at the end of your life. It is used by the health professionals who will treat you if you aren't able to decide for yourself. If you put your wishes in writing, your loved ones and others will know what kind of care you want. They won't need to guess. This can ease your mind and be helpful to others. A living will is not the same as an estate or property will.  An estate will explains what you want to happen with your money and property after you die.  Is a living will a legal document? A living will is a legal document. Each state has its own laws about living ped[place]za. If you move to another state, make sure that your living will is legal in the state where you now live. Or you might use a universal form that has been approved by many states. This kind of form can sometimes be completed and stored online. Your electronic copy will then be available wherever you have a connection to the Internet. In most cases, doctors will respect your wishes even if you have a form from a different state. · You don't need an  to complete a living will. But legal advice can be helpful if your state's laws are unclear, your health history is complicated, or your family can't agree on what should be in your living will. · You can change your living will at any time. Some people find that their wishes about end-of-life care change as their health changes. · In addition to making a living will, think about completing a medical power of  form. This form lets you name the person you want to make end-of-life treatment decisions for you (your \"health care agent\") if you're not able to. Many hospitals and nursing homes will give you the forms you need to complete a living will and a medical power of . · Your living will is used only if you can't make or communicate decisions for yourself anymore. If you become able to make decisions again, you can accept or refuse any treatment, no matter what you wrote in your living will. · Your state may offer an online registry. This is a place where you can store your living will online so the doctors and nurses who need to treat you can find it right away. What should you think about when creating a living will? Talk about your end-of-life wishes with your family members and your doctor. Let them know what you want. That way the people making decisions for you won't be surprised by your choices.   Think about these questions as you make your living will:  · Do you know enough about life support methods that might be used? If not, talk to your doctor so you know what might be done if you can't breathe on your own, your heart stops, or you're unable to swallow. · What things would you still want to be able to do after you receive life-support methods? Would you want to be able to walk? To speak? To eat on your own? To live without the help of machines? · If you have a choice, where do you want to be cared for? In your home? At a hospital or nursing home? · Do you want certain Rastafari practices performed if you become very ill? · If you have a choice at the end of your life, where would you prefer to die? At home? In a hospital or nursing home? Somewhere else? · Would you prefer to be buried or cremated? · Do you want your organs to be donated after you die? What should you do with your living will? · Make sure that your family members and your health care agent have copies of your living will. · Give your doctor a copy of your living will to keep in your medical record. If you have more than one doctor, make sure that each one has a copy. · You may want to put a copy of your living will where it can be easily found. Where can you learn more? Go to http://lv-shelbi.info/. Enter H011 in the search box to learn more about \"Learning About Living [place]. \"  Current as of: August 8, 2016  Content Version: 11.3  © 6937-3345 [employer]. Care instructions adapted under license by [employer] (which disclaims liability or warranty for this information). If you have questions about a medical condition or this instruction, always ask your healthcare professional. [address] any warranty or liability for your use of this information.          Preventing Falls: Care Instructions  Your Care Instructions    Getting around your home safely can be a challenge if you have injuries or health problems that make it easy for you to fall. Loose rugs and furniture in walkways are among the dangers for many older people who have problems walking or who have poor eyesight. People who have conditions such as arthritis, osteoporosis, or dementia also have to be careful not to fall. You can make your home safer with a few simple measures. Follow-up care is a key part of your treatment and safety. Be sure to make and go to all appointments, and call your doctor if you are having problems. It's also a good idea to know your test results and keep a list of the medicines you take. How can you care for yourself at home? Taking care of yourself  · You may get dizzy if you do not drink enough water. To prevent dehydration, drink plenty of fluids, enough so that your urine is light yellow or clear like water. Choose water and other caffeine-free clear liquids. If you have kidney, heart, or liver disease and have to limit fluids, talk with your doctor before you increase the amount of fluids you drink. · Exercise regularly to improve your strength, muscle tone, and balance. Walk if you can. Swimming may be a good choice if you cannot walk easily. · Have your vision and hearing checked each year or any time you notice a change. If you have trouble seeing and hearing, you might not be able to avoid objects and could lose your balance. · Know the side effects of the medicines you take. Ask your doctor or pharmacist whether the medicines you take can affect your balance. Sleeping pills or sedatives can affect your balance. · Limit the amount of alcohol you drink. Alcohol can impair your balance and other senses. · Ask your doctor whether calluses or corns on your feet need to be removed. If you wear loose-fitting shoes because of calluses or corns, you can lose your balance and fall. · Talk to your doctor if you have numbness in your feet.   Preventing falls at home  · Remove raised doorway thresholds, throw rugs, and clutter. Repair loose carpet or raised areas in the floor. · Move furniture and electrical cords to keep them out of walking paths. · Use nonskid floor wax, and wipe up spills right away, especially on ceramic tile floors. · If you use a walker or cane, put rubber tips on it. If you use crutches, clean the bottoms of them regularly with an abrasive pad, such as steel wool. · Keep your house well lit, especially Darrelyn Rang, and outside walkways. Use night-lights in areas such as hallways and bathrooms. Add extra light switches or use remote switches (such as switches that go on or off when you clap your hands) to make it easier to turn lights on if you have to get up during the night. · Install sturdy handrails on stairways. · Move items in your cabinets so that the things you use a lot are on the lower shelves (about waist level). · Keep a cordless phone and a flashlight with new batteries by your bed. If possible, put a phone in each of the main rooms of your house, or carry a cell phone in case you fall and cannot reach a phone. Or, you can wear a device around your neck or wrist. You push a button that sends a signal for help. · Wear low-heeled shoes that fit well and give your feet good support. Use footwear with nonskid soles. Check the heels and soles of your shoes for wear. Repair or replace worn heels or soles. · Do not wear socks without shoes on wood floors. · Walk on the grass when the sidewalks are slippery. If you live in an area that gets snow and ice in the winter, sprinkle salt on slippery steps and sidewalks. Preventing falls in the bath  · Install grab bars and nonskid mats inside and outside your shower or tub and near the toilet and sinks. · Use shower chairs and bath benches. · Use a hand-held shower head that will allow you to sit while showering.   · Get into a tub or shower by putting the weaker leg in first. Get out of a tub or shower with your strong side first.  · Repair loose toilet seats and consider installing a raised toilet seat to make getting on and off the toilet easier. · Keep your bathroom door unlocked while you are in the shower. Where can you learn more? Go to http://awan-shelbi.info/. Enter [id number] [id number] in the search box to learn more about \"Preventing Falls: Care Instructions. \"  Current as of: March 16, 2018  Content Version: 11.8  © 3983-4328 [employer]. Care instructions adapted under license by [employer] (which disclaims liability or warranty for this information). If you have questions about a medical condition or this instruction, always ask your healthcare professional. [address] any warranty or liability for your use of this information. Advance Directives: Care Instructions  Your Care Instructions  An advance directive is a legal way to state your wishes at the end of your life. It tells your family and your doctor what to do if you can no longer say what you want. There are two main types of advance directives. You can change them any time that your wishes change. · A living will tells your family and your doctor your wishes about life support and other treatment. · A durable power of  for health care lets you name a person to make treatment decisions for you when you can't speak for yourself. This person is called a health care agent. If you do not have an advance directive, decisions about your medical care may be made by a doctor or a  who doesn't know you. It may help to think of an advance directive as a gift to the people who care for you. If you have one, they won't have to make tough decisions by themselves. Follow-up care is a key part of your treatment and safety. Be sure to make and go to all appointments, and call your doctor if you are having problems.  It's also a good idea to know your test results and keep a list of the medicines you take. How can you care for yourself at home? · Discuss your wishes with your loved ones and your doctor. This way, there are no surprises. · Many states have a unique form. Or you might use a universal form that has been approved by many states. This kind of form can sometimes be completed and stored online. Your electronic copy will then be available wherever you have a connection to the Internet. In most cases, doctors will respect your wishes even if you have a form from a different state. · You don't need a  to do an advance directive. But you may want to get legal advice. · Think about these questions when you prepare an advance directive:  ? Who do you want to make decisions about your medical care if you are not able to? Many people choose a family member or close friend. ? Do you know enough about life support methods that might be used? If not, talk to your doctor so you understand. ? What are you most afraid of that might happen? You might be afraid of having pain, losing your independence, or being kept alive by machines. ? Where would you prefer to die? Choices include your home, a hospital, or a nursing home. ? Would you like to have information about hospice care to support you and your family? ? Do you want to donate organs when you die? ? Do you want certain Gnosticism practices performed before you die? If so, put your wishes in the advance directive. · Read your advance directive every year, and make changes as needed. When should you call for help? Be sure to contact your doctor if you have any questions. Where can you learn more? Go to http://lv-shelbi.info/. Enter R264 in the search box to learn more about \"Advance Directives: Care Instructions. \"  Current as of: April 1, 2019  Content Version: 12.2  © 0802-9395 [employer].  Care instructions adapted under license by [employer] (which disclaims liability or warranty for this information). If you have questions about a medical condition or this instruction, always ask your healthcare professional. [place] [address] any warranty or liability for your use of this information.

## 2019-10-22 NOTE — PROGRESS NOTES
Chief Complaint   Patient presents with    Cholesterol Problem     Follow up.  Diabetes    Hypertension     1. Have you been to the ER, urgent care clinic since your last visit? Hospitalized since your last visit? No    2. Have you seen or consulted any other health care providers outside of the [address] [place] [address] since your last visit? Include any pap smears or colon screening.  No

## 2019-10-22 NOTE — PROGRESS NOTES
HISTORY OF PRESENT ILLNESS  Armond Michelle is a 69 y.o. male. Seen for follow up on DM,COPD,CAD, HTN,dyslipidemia and new concern of urinary burning along with his wellness check. HPI  Cardiovascular Review  The patient has hypertension, hyperlipidemia, coronary artery disease and status post coronary artery stenting.  He reports taking medications as instructed, no medication side effects noted, no TIA's, no chest pain on exertion, no dyspnea on exertion, no swelling of ankles, no orthostatic dizziness or lightheadedness, no orthopnea or paroxysmal nocturnal dyspnea, no palpitations, no intermittent claudication symptoms, no muscle aches or pain.  Diet and Lifestyle: generally follows a low fat low cholesterol diet, generally follows a low sodium diet, does not rigorously follow a diabetic diet, exercises regularly, nonsmoker.  Lab review: labs reviewed and discussed with patient.  He had normal Lexican stress test and Echo in 09/2014 and normal EKG in 01/2016  Medicines: Carvedilol 6.25 mg BID, crestor 10 mg  Losartan/Hctz 50/12.5 mg, clonidine as needed  He is s/p Cardiac cath ( 12/17/2018) by Dr Dulce Fine. Records requested. As per patient, he was told cath was reassuring and stents are patent. He was advised to dc Plavix. Is on aspirin only now.   Lab Results   Component Value Date/Time    Cholesterol, total 107 10/27/2018 09:17 AM    HDL Cholesterol 51 10/27/2018 09:17 AM    LDL, calculated 47 10/27/2018 09:17 AM    VLDL, calculated 9 10/27/2018 09:17 AM    Triglyceride 47 10/27/2018 09:17 AM             Endocrine Review  He is seen for diabetes.  Since last visit: no change.  Home glucose monitoring: is performed sporadically, nonfasting values range 100-140.  He reports medication compliance: noncompliant some of the time.  Medication side effects: none.  Diabetic diet compliance: noncompliant some of the time.  Further diabetic ROS: no polyuria or polydipsia, no chest pain, dyspnea or TIA's, no numbness, tingling or pain in extremities, no unusual visual symptoms, no hypoglycemia.  Lab review: labs reviewed and discussed with patient.  Eye exam: due.  ,   Medications: Metformin  500 mg BID  on ACEI/ARBS :yes  On ASA, yes  Podiatry visit: not done        Lab Results   Component Value Date/Time    Hemoglobin A1c 6.4 (H) 04/22/2019 01:50 PM        COPD  Patient complains of cough and shortness of breath. Symptoms began several years ago. Symptoms chronic dyspnea: severity = mild: course of sx: well controlled  able walk 5 blocks  cough: severity: moderate: sputum : clear: light  symptoms worse with exertion. does worsen with exertion. Sputum is clear in small amounts. Fever has been suspected fevers but not measured at home. Patient uses 1 pillows at night. Patient can walk 1 mile  before resting. Patient currently is not on home oxygen therapy. Regan Melo Respiratory history: frequent episodes of bronchitis and COPD  He is on scheduled Fluticasone, Singulair and prn albuterol, but non compliant with treatment. Urology Review  He is here today because of BPH. He reports his symptoms are poorly controlled. His symptoms include: nocturia x 3, dysuria, pain on tip of penis. He denies: urinary hesitancy, urinary frequency, double voiding, weak stream, perineal discomfort, hematuria, abdominal pain, flank pain, testicular pain. He is on the following medications: Flomax. He reports the following medication side effects: none. Lab review: labs reviewed and discussed with patient. Lab Results   Component Value Date/Time    Prostate Specific Ag 1.3 08/15/2019 02:35 PM    Prostate Specific Ag 0.8 10/27/2018 09:17 AM    Prostate Specific Ag 1.8 11/04/2017 10:18 AM     He still has persistent burning with urination        Review of Systems   Constitutional: Negative for chills, fever and malaise/fatigue. HENT: Negative for congestion, ear pain, sore throat and tinnitus.     Eyes: Negative for blurred vision, double vision, pain and discharge. Respiratory: Negative for cough, shortness of breath and wheezing. Cardiovascular: Negative for chest pain, palpitations and leg swelling. Gastrointestinal: Negative for abdominal pain, blood in stool, constipation, diarrhea, nausea and vomiting. Genitourinary: Positive for dysuria and frequency. Negative for hematuria and urgency. Musculoskeletal: Negative for back pain, joint pain and myalgias. Skin: Negative for rash. Neurological: Negative for dizziness, tremors, seizures and headaches. Endo/Heme/Allergies: Negative for polydipsia. Does not bruise/bleed easily. Psychiatric/Behavioral: Negative for depression and substance abuse. The patient is not nervous/anxious. Physical Exam   Constitutional: He is oriented to person, place, and time. He appears well-developed and well-nourished. HENT:   Head: Normocephalic and atraumatic. Right Ear: External ear normal.   Mouth/Throat: Oropharynx is clear and moist. No oropharyngeal exudate. Eyes: Pupils are equal, round, and reactive to light. Conjunctivae and EOM are normal. No scleral icterus. Neck: Normal range of motion. Neck supple. No JVD present. No thyromegaly present. Cardiovascular: Normal rate, regular rhythm, normal heart sounds and intact distal pulses. No murmur heard. Pulmonary/Chest: Effort normal and breath sounds normal. He has no wheezes. Abdominal: Soft. Bowel sounds are normal. He exhibits no distension and no mass. Musculoskeletal: He exhibits no edema. Right knee: He exhibits decreased range of motion and swelling. Tenderness found. Medial joint line and lateral joint line tenderness noted. Feet:warm, good capillary refill, no trophic changes or ulcerative lesions, normal DP and PT pulses, normal monofilament exam and normal sensory exam     Lymphadenopathy:     He has no cervical adenopathy. Neurological: He is alert and oriented to person, place, and time.  He has normal reflexes. No cranial nerve deficit. Skin: Skin is warm and dry. No rash noted. He is not diaphoretic. Psychiatric: He has a normal mood and affect. Nursing note and vitals reviewed. ASSESSMENT and PLAN  Diagnoses and all orders for this visit:    1. Medicare annual wellness visit, subsequent  -     TSH REFLEX TO T4    2. Controlled type 2 diabetes mellitus without complication, without long-term current use of insulin (HCC)  -     METABOLIC PANEL, COMPREHENSIVE  -     HEMOGLOBIN A1C WITH EAG    3. Dysuria  -     UA WITH REFLEX MICRO AND CULTURE  -     ciprofloxacin HCl (CIPRO) 500 mg tablet; Take 1 Tab by mouth two (2) times a day for 10 days. 4. COPD, moderate ([address]  -     CBC WITH AUTOMATED DIFF    5. Essential hypertension with goal blood pressure less than 954/79  -     METABOLIC PANEL, COMPREHENSIVE    6. Coronary artery disease involving native coronary artery of native heart without angina pectoris  -     METABOLIC PANEL, COMPREHENSIVE  -     LIPID PANEL    7. Hyperlipidemia LDL goal <584  -     METABOLIC PANEL, COMPREHENSIVE  -     LIPID PANEL    8. Advanced directives, counseling/discussion  -     ADVANCE CARE PLANNING FIRST 30 MINS    9. Screening for alcoholism  -     [place] ANNUAL ALCOHOL SCREEN 15 MIN    10. Screening for depression  -     DEPRESSION SCREEN ANNUAL    11. Encounter for immunization  -     ADMIN PNEUMOCOCCAL VACCINE  -     PNEUMOCOCCAL POLYSACCHARIDE VACCINE, 23-VALENT, ADULT OR IMMUNOSUPPRESSED PT DOSE,    12. Benign prostatic hyperplasia with urinary frequency  -     tamsulosin (FLOMAX) 0.4 mg capsule; Take 1 Cap by mouth nightly.     Other orders  -     varicella-zoster recombinant, PF, (SHINGRIX, PF,) 50 mcg/0.5 mL susr injection; 0.5mL by IntraMUSCular route once now and then repeat in 2-6 months      Discussed lifestyle issues and health guidance given  Patient was given an after visit summary which includes diagnoses, vital signs, current medications, instructions and references & authorized prescriptions . Results of labs will be conveyed to patient, once available. Pt verbalized instructions I provided and expressed understanding of discussion that was held today. Follow-up and Dispositions    · Return in about 4 months (around 2/22/2020) for fasting, follow up.

## 2019-10-22 NOTE — PROGRESS NOTES
This is the Subsequent Medicare Annual Wellness Exam, performed 12 months or more after the Initial AWV or the last Subsequent AWV    I have reviewed the patient's medical history in detail and updated the computerized patient record. We did a Medicare Wellness evaluation including a review of past, family, and social histories with attention to age/sex appropriate screening, risk assessment, preventive care and counseling. Any cognitive, fall risk, or ADL issues identified are addressed separately. A patient specific preventive care plan was provided and appropriate screening tests were ordered as specified. History     Past Medical History:   Diagnosis Date    CAD (coronary artery disease)     3 stents, [place] in [place],    Diabetes [facility])     Hypercholesterolemia     Hypertension       Past Surgical History:   Procedure Laterality Date    HX PTCA      2007    HX TONSILLECTOMY       Current Outpatient Medications   Medication Sig Dispense Refill    multivitamin with minerals (ONE-A-DAY 50 PLUS PO)       varicella-zoster recombinant, PF, (SHINGRIX, PF,) 50 mcg/0.5 mL susr injection 0.5mL by IntraMUSCular route once now and then repeat in 2-6 months 0.5 mL 1    tamsulosin (FLOMAX) 0.4 mg capsule Take 1 Cap by mouth nightly. 30 Cap 0    ciprofloxacin HCl (CIPRO) 500 mg tablet Take 1 Tab by mouth two (2) times a day for 10 days. 20 Tab 0    diclofenac (VOLTAREN) 1 % gel Apply 4 g to affected area four (4) times daily. Over right side hip joint 300 g 0    Nebulizer & Compressor machine 1 Each by Does Not Apply route every four (4) hours as needed for Cough (wheezing). 1 Each 0    rosuvastatin (CRESTOR) 10 mg tablet Take 1 Tab by mouth daily. 90 Tab 1    triamcinolone acetonide (KENALOG) 0.1 % ointment Apply thin film to affected areas 2 to 4 times daily 30 g 0    losartan-hydroCHLOROthiazide (HYZAAR) 50-12.5 mg per tablet TAKE 1 TABLET BY MOUTH DAILY.  INDICATIONS: HYPERTENSION 90 Tab 1    metFORMIN (GLUCOPHAGE) 500 mg tablet TAKE 1 TAB BY MOUTH TWO (2) TIMES DAILY (WITH MEALS). 180 Tab 2    carvedilol (COREG) 6.25 mg tablet TAKE 1 TABLET TWICE DAILY WITH FOOD 180 Tab 1    aspirin delayed-release 81 mg tablet       multivit-min/FA/lycopen/lutein (SENIOR TABS PO)       methyl salicylate/menthol (MUSCLE RUB EX)       VITAMIN B-12 5,000 mcg subl       albuterol (PROVENTIL VENTOLIN) 2.5 mg /3 mL (0.083 %) nebulizer solution 3 mL by Nebulization route every four (4) hours as needed for Wheezing. 24 Each 0    nitroglycerin (NITROSTAT) 0.4 mg SL tablet TAKE 1 TABLET AND PLACE UNDER THE TONGUE EVERY 5 MINUTES FOR CHEST PAIN FOR UP TO 3 DOSES 25 Tab 0    albuterol (PROVENTIL HFA, VENTOLIN HFA, PROAIR HFA) 90 mcg/actuation inhaler Take 2 Puffs by inhalation every four (4) hours as needed for Wheezing. 1 Inhaler 1    cloNIDine HCl (CATAPRES) 0.1 mg tablet Take 1 Tab by mouth as needed. If systolic BP > 281 and/or diastolic BP > 90 90 Tab 0    montelukast (SINGULAIR) 10 mg tablet Take 1 Tab by mouth daily. 30 Tab 3    fluticasone (FLONASE) 50 mcg/actuation nasal spray One spray each nostril 1 Bottle 2    budesonide (PULMICORT) 180 mcg/actuation aepb inhaler Take 1 Puff by inhalation two (2) times a day.  1 Inhaler 3     No Known Allergies  Family History   Problem Relation Age of Onset    No Known Problems Mother     No Known Problems Father      Social History     Tobacco Use    Smoking status: Never Smoker    Smokeless tobacco: Never Used   Substance Use Topics    Alcohol use: No     Alcohol/week: 0.0 standard drinks     Patient Active Problem List   Diagnosis Code    Dysuria R30.0    Essential hypertension with goal blood pressure less than 130/85 I10    Urinary frequency R35.0    Fever R50.9    CAD (coronary artery disease) I25.10    Hyperlipidemia LDL goal <100 E78.5    Vitamin D deficiency E55.9    Visit for preventive health examination Z00.00    Controlled type 2 diabetes mellitus without complication ([facility]) S51.3    Bronchitis, acute J20.9    Sinusitis J32.9    Tongue sore K14.6    Tonsillitis J03.90    COPD, moderate ([facility]) J44.9    Routine general medical examination at a health care facility Z00.00    Coronary artery disease involving native coronary artery of native heart without angina pectoris I25.10    Chronic ethmoidal sinusitis J32.2    Bilateral impacted cerumen H61.23    Precordial pain R07.2    Costochondritis, acute M94.0    Arthritis of knee, right M17.11    Coronary artery disease involving native coronary artery of native heart with unstable angina pectoris ([facility]) I25.110    Hx of long term use of blood thinners Z92.29       Depression Risk Factor Screening:     3 most recent PHQ Screens 10/22/2019   Little interest or pleasure in doing things Not at all   Feeling down, depressed, irritable, or hopeless Not at all   Total Score PHQ 2 0     Alcohol Risk Factor Screening: You do not drink alcohol or very rarely. Functional Ability and Level of Safety:   Hearing Loss  Hearing is good. Activities of Daily Living  The home contains: no safety equipment. Patient does total self care    Fall Risk  Fall Risk Assessment, last 12 mths 10/22/2019   Able to walk? Yes   Fall in past 12 months? No       Abuse Screen  Patient is not abused    Cognitive Screening   Evaluation of Cognitive Function:  Has your family/caregiver stated any concerns about your memory: no  Normal, MMSE    Patient Care Team   Patient Care Team:  Nickie Ramirez MD as PCP - General (Family Practice)  Damien Snyder MD (Cardiology)  Osmel Silva MD as Consulting Provider [facility])    Assessment/Plan   Education and counseling provided:  Are appropriate based on today's review and evaluation    Diagnoses and all orders for this visit:    1. Medicare annual wellness visit, subsequent  -     TSH REFLEX TO T4    2.  Controlled type 2 diabetes mellitus without complication, without long-term current use of insulin (HCC)  -     METABOLIC PANEL, COMPREHENSIVE  -     HEMOGLOBIN A1C WITH EAG    3. Dysuria  -     UA WITH REFLEX MICRO AND CULTURE  -     ciprofloxacin HCl (CIPRO) 500 mg tablet; Take 1 Tab by mouth two (2) times a day for 10 days. 4. COPD, moderate ([address]  -     CBC WITH AUTOMATED DIFF    5. Essential hypertension with goal blood pressure less than 993/58  -     METABOLIC PANEL, COMPREHENSIVE    6. Coronary artery disease involving native coronary artery of native heart without angina pectoris  -     METABOLIC PANEL, COMPREHENSIVE  -     LIPID PANEL    7. Hyperlipidemia LDL goal <581  -     METABOLIC PANEL, COMPREHENSIVE  -     LIPID PANEL    8. Advanced directives, counseling/discussion  -     ADVANCE CARE PLANNING FIRST 30 MINS    9. Screening for alcoholism  -     [place] ANNUAL ALCOHOL SCREEN 15 MIN    10. Screening for depression  -     DEPRESSION SCREEN ANNUAL    11. Encounter for immunization  -     ADMIN PNEUMOCOCCAL VACCINE  -     PNEUMOCOCCAL POLYSACCHARIDE VACCINE, 23-VALENT, ADULT OR IMMUNOSUPPRESSED PT DOSE,    12. Benign prostatic hyperplasia with urinary frequency  -     tamsulosin (FLOMAX) 0.4 mg capsule; Take 1 Cap by mouth nightly. Other orders  -     varicella-zoster recombinant, PF, (SHINGRIX, PF,) 50 mcg/0.5 mL susr injection; 0.5mL by IntraMUSCular route once now and then repeat in 2-6 months        Health Maintenance Due   Topic Date Due    DTaP/Tdap/Td series (1 - Tdap) 09/04/1967    Shingrix Vaccine Age 50> (1 of 2) 09/04/1996    Influenza Age 5 to Adult  08/01/2019    GLAUCOMA SCREENING Q2Y  10/11/2019    EYE EXAM RETINAL OR DILATED  10/11/2019    HEMOGLOBIN A1C Q6M  10/22/2019    LIPID PANEL Q1  10/27/2019     Discussed lifestyle issues and health guidance given  Patient was given an after visit summary which includes diagnoses, vital signs, current medications, instructions and references & authorized prescriptions . Results of labs will be conveyed to patient, once available. Pt verbalized instructions I provided and expressed understanding of discussion that was held today. Follow-up and Dispositions    · Return in about 4 months (around 2/22/2020) for fasting, follow up.

## 2019-10-22 NOTE — ACP (ADVANCE CARE PLANNING)
Advance Care Planning    Advance Care Planning (ACP) Provider Conversation Snapshot    Date of ACP Conversation: 10/22/19  Persons included in Conversation:  patient  Length of ACP Conversation in minutes:  16 minutes    Authorized Decision Maker (if patient is incapable of making informed decisions): son, Niyah Pyle  This person is: Other Legally Authorized Decision Maker (e.g. Next of Kin)            For Patients with Decision Making Capacity:   Values/Goals: Exploration of values, goals, and preferences if recovery is not expected, even with continued medical treatment in the event of:  Imminent death  Severe, permanent brain injury  \"In these circumstances, what matters most to you? \"  want his son to decide,when time comes    Conversation Outcomes / Follow-Up Plan:   Recommended completion of Advance Directive form after review of ACP materials and conversation with prospective healthcare agent   Recommended communicating the plan and making copies for the healthcare agent, personal physician, and others as appropriate (e.g., health system)  Recommended review of completed ACP document annually or upon change in health status

## 2019-10-23 RX ORDER — CEFUROXIME AXETIL 250 MG/1
250 TABLET ORAL 2 TIMES DAILY
Qty: 10 TAB | Refills: 0 | Status: SHIPPED | OUTPATIENT
Start: 2019-10-23 | End: 2019-12-03 | Stop reason: ALTCHOICE

## 2019-11-08 LAB
ALBUMIN SERPL-MCNC: 3.8 G/DL (ref 3.5–4.8)
ALBUMIN/GLOB SERPL: 1.3 {RATIO} (ref 1.2–2.2)
ALP SERPL-CCNC: 62 IU/L (ref 39–117)
ALT SERPL-CCNC: 16 IU/L (ref 0–44)
APPEARANCE UR: CLEAR
AST SERPL-CCNC: 21 IU/L (ref 0–40)
BACTERIA #/AREA URNS HPF: ABNORMAL /[HPF]
BACTERIA UR CULT: NORMAL
BASOPHILS # BLD AUTO: 0 X10E3/UL (ref 0–0.2)
BASOPHILS NFR BLD AUTO: 0 %
BILIRUB SERPL-MCNC: 0.4 MG/DL (ref 0–1.2)
BILIRUB UR QL STRIP: NEGATIVE
BUN SERPL-MCNC: 14 MG/DL (ref 8–27)
BUN/CREAT SERPL: 16 (ref 10–24)
CALCIUM SERPL-MCNC: 9 MG/DL (ref 8.6–10.2)
CASTS URNS QL MICRO: ABNORMAL /LPF
CHLORIDE SERPL-SCNC: 100 MMOL/L (ref 96–106)
CHOLEST SERPL-MCNC: 125 MG/DL (ref 100–199)
CO2 SERPL-SCNC: 23 MMOL/L (ref 20–29)
COLOR UR: YELLOW
CREAT SERPL-MCNC: 0.85 MG/DL (ref 0.76–1.27)
EOSINOPHIL # BLD AUTO: 0.4 X10E3/UL (ref 0–0.4)
EOSINOPHIL NFR BLD AUTO: 6 %
EPI CELLS #/AREA URNS HPF: ABNORMAL /HPF (ref 0–10)
ERYTHROCYTE [DISTWIDTH] IN BLOOD BY AUTOMATED COUNT: 12.2 % (ref 12.3–15.4)
EST. AVERAGE GLUCOSE BLD GHB EST-MCNC: 131 MG/DL
GLOBULIN SER CALC-MCNC: 3 G/DL (ref 1.5–4.5)
GLUCOSE SERPL-MCNC: 152 MG/DL (ref 65–99)
GLUCOSE UR QL: NEGATIVE
HBA1C MFR BLD: 6.2 % (ref 4.8–5.6)
HCT VFR BLD AUTO: 36.2 % (ref 37.5–51)
HDLC SERPL-MCNC: 49 MG/DL
HGB BLD-MCNC: 12.6 G/DL (ref 13–17.7)
HGB UR QL STRIP: ABNORMAL
IMM GRANULOCYTES # BLD AUTO: 0 X10E3/UL (ref 0–0.1)
IMM GRANULOCYTES NFR BLD AUTO: 0 %
INTERPRETATION, 910389: NORMAL
KETONES UR QL STRIP: NEGATIVE
LDLC SERPL CALC-MCNC: 61 MG/DL (ref 0–99)
LEUKOCYTE ESTERASE UR QL STRIP: ABNORMAL
LYMPHOCYTES # BLD AUTO: 2.1 X10E3/UL (ref 0.7–3.1)
LYMPHOCYTES NFR BLD AUTO: 28 %
MCH RBC QN AUTO: 30.4 PG (ref 26.6–33)
MCHC RBC AUTO-ENTMCNC: 34.8 G/DL (ref 31.5–35.7)
MCV RBC AUTO: 87 FL (ref 79–97)
MICRO URNS: ABNORMAL
MONOCYTES # BLD AUTO: 0.7 X10E3/UL (ref 0.1–0.9)
MONOCYTES NFR BLD AUTO: 9 %
MUCOUS THREADS URNS QL MICRO: PRESENT
NEUTROPHILS # BLD AUTO: 4.2 X10E3/UL (ref 1.4–7)
NEUTROPHILS NFR BLD AUTO: 57 %
NITRITE UR QL STRIP: NEGATIVE
PH UR STRIP: 6 [PH] (ref 5–7.5)
PLATELET # BLD AUTO: 306 X10E3/UL (ref 150–450)
POTASSIUM SERPL-SCNC: 4 MMOL/L (ref 3.5–5.2)
PROT SERPL-MCNC: 6.8 G/DL (ref 6–8.5)
PROT UR QL STRIP: NEGATIVE
RBC # BLD AUTO: 4.15 X10E6/UL (ref 4.14–5.8)
RBC #/AREA URNS HPF: ABNORMAL /HPF (ref 0–2)
SODIUM SERPL-SCNC: 139 MMOL/L (ref 134–144)
SP GR UR: 1.01 (ref 1–1.03)
TRIGL SERPL-MCNC: 77 MG/DL (ref 0–149)
TSH SERPL DL<=0.005 MIU/L-ACNC: 1.57 UIU/ML (ref 0.45–4.5)
URINALYSIS REFLEX, 377202: ABNORMAL
UROBILINOGEN UR STRIP-MCNC: 0.2 MG/DL (ref 0.2–1)
VLDLC SERPL CALC-MCNC: 15 MG/DL (ref 5–40)
WBC # BLD AUTO: 7.5 X10E3/UL (ref 3.4–10.8)
WBC #/AREA URNS HPF: ABNORMAL /HPF (ref 0–5)

## 2019-11-09 DIAGNOSIS — R31.29 OTHER MICROSCOPIC HEMATURIA: Primary | ICD-10-CM

## 2019-11-09 NOTE — PROGRESS NOTES
Please inform patient,  CBC shows normal white cell count . hgb is low. Urine also negative for infection again, but positive for blood. either he has small stone or bladder ulcer. I recommend to follow up with urology and will make referral  Kidney,liver,cholesterol,diabetes results are stable. No change in medicines.   Referral done  thanks

## 2019-11-11 NOTE — PROGRESS NOTES
Call placed to patient. Name and  verified. reviewed recent labs with patient. Advised of provider recommendation.  appt already scheduled for urology

## 2019-11-14 DIAGNOSIS — R35.0 BENIGN PROSTATIC HYPERPLASIA WITH URINARY FREQUENCY: ICD-10-CM

## 2019-11-14 DIAGNOSIS — N40.1 BENIGN PROSTATIC HYPERPLASIA WITH URINARY FREQUENCY: ICD-10-CM

## 2019-11-14 RX ORDER — TAMSULOSIN HYDROCHLORIDE 0.4 MG/1
CAPSULE ORAL
Qty: 30 CAP | Refills: 0 | Status: SHIPPED | OUTPATIENT
Start: 2019-11-14 | End: 2019-12-03 | Stop reason: SDUPTHER

## 2019-11-18 DIAGNOSIS — I10 ESSENTIAL HYPERTENSION WITH GOAL BLOOD PRESSURE LESS THAN 130/85: ICD-10-CM

## 2019-11-18 DIAGNOSIS — I25.10 CORONARY ARTERY DISEASE INVOLVING NATIVE CORONARY ARTERY OF NATIVE HEART WITHOUT ANGINA PECTORIS: Chronic | ICD-10-CM

## 2019-11-19 RX ORDER — CARVEDILOL 6.25 MG/1
TABLET ORAL
Qty: 180 TAB | Refills: 0 | Status: SHIPPED | OUTPATIENT
Start: 2019-11-19 | End: 2020-02-11

## 2019-11-19 RX ORDER — LOSARTAN POTASSIUM AND HYDROCHLOROTHIAZIDE 12.5; 5 MG/1; MG/1
TABLET ORAL
Qty: 90 TAB | Refills: 0 | Status: SHIPPED | OUTPATIENT
Start: 2019-11-19 | End: 2020-05-20

## 2019-11-29 ENCOUNTER — OFFICE VISIT (OUTPATIENT)
Dept: FAMILY MEDICINE CLINIC | Age: 74
End: 2019-11-29

## 2019-11-29 VITALS
OXYGEN SATURATION: 96 % | SYSTOLIC BLOOD PRESSURE: 134 MMHG | HEART RATE: 74 BPM | RESPIRATION RATE: 17 BRPM | HEIGHT: 63 IN | WEIGHT: 148 LBS | TEMPERATURE: 97.5 F | DIASTOLIC BLOOD PRESSURE: 87 MMHG | BODY MASS INDEX: 26.22 KG/M2

## 2019-11-29 VITALS — BODY MASS INDEX: 26.22 KG/M2 | RESPIRATION RATE: 19 BRPM | HEIGHT: 63 IN

## 2019-11-29 DIAGNOSIS — J30.9 ALLERGIC RHINITIS, UNSPECIFIED SEASONALITY, UNSPECIFIED TRIGGER: ICD-10-CM

## 2019-11-29 DIAGNOSIS — R06.02 SHORTNESS OF BREATH: Primary | ICD-10-CM

## 2019-11-29 DIAGNOSIS — J44.9 COPD, MODERATE (HCC): ICD-10-CM

## 2019-11-29 RX ORDER — ALBUTEROL SULFATE 90 UG/1
2 AEROSOL, METERED RESPIRATORY (INHALATION)
Qty: 18 G | Refills: 1 | Status: SHIPPED | OUTPATIENT
Start: 2019-11-29 | End: 2019-12-03 | Stop reason: ALTCHOICE

## 2019-11-29 NOTE — PROGRESS NOTES
1. Have you been to the ER, urgent care clinic since your last visit? Hospitalized since your last visit? No 
 
2. Have you seen or consulted any other health care providers outside of the [address] [place] [address] since your last visit? Include any pap smears or colon screening.  No

## 2019-11-29 NOTE — PROGRESS NOTES
Chief Complaint   Patient presents with    Cough     x 2 days     Breathing Problem     x 2 days      1. Have you been to the ER, urgent care clinic since your last visit? Hospitalized since your last visit? No    2. Have you seen or consulted any other health care providers outside of the [address] [place] [address] since your last visit? Include any pap smears or colon screening.  No

## 2019-11-29 NOTE — PATIENT INSTRUCTIONS
For your symptoms: Your symptoms may improve with an oral antihistamine. These are available over the counter and include:  Loratadine/claritin  Cetirizine/Zyrtec  Fexofenadine/Allegra  Levocetirizine/Xyzal    · Your symptoms may improve with a nasal steroid. These are available over the counter and include:  · Flonase (aka fluticasone)  · Nasocort (aka triamcinolone)  · Nasonex (aka mometasone)  · Rhinocort (aka budesonide)    · Increase fluid intake, especially water to thin mucous and boost the immune system. · Avoid sugar and dairy while congested since they thicken mucous. · Get plenty of rest!    · Gargle 3 times daily and as needed in Listerine or warm salt water vinegar solutions (1 tsp salt, 1 tsp vinegar in 1 cup lukewarm water.)    · Use OTC nasal saline spray up each nostril four times daily. You could also consider using a netipot with distilled water. · Use humidifier at bedtime. · Use OTC Mucinex 600 mg twice daily to loosen mucous. · Use OTC Tylenol  (up to 650mg every 6 hours) or Ibuprofen (up to 800 mg every 8 hours) as needed for pain, fever or headaches. ·  Avoid decongestants and Ibuprofen if you have high blood pressure! Return to the doctor for evaluation:  · If mucous is consistently discolored yellow or green throughout the day for more than a week  · If you develop worsening facial pain  · If you develop a fever that will not go away  · If your symptoms worsen instead of improve           Allergies: Care Instructions  Your Care Instructions    Allergies occur when your body's defense system (immune system) overreacts to certain substances. The immune system treats a harmless substance as if it were a harmful germ or virus. Many things can cause this overreaction, including pollens, medicine, food, dust, animal dander, and mold. Allergies can be mild or severe. Mild allergies can be managed with home treatment.  But medicine may be needed to prevent problems. Managing your allergies is an important part of staying healthy. Your doctor may suggest that you have allergy testing to help find out what is causing your allergies. When you know what things trigger your symptoms, you can avoid them. This can prevent allergy symptoms and other health problems. For severe allergies that cause reactions that affect your whole body (anaphylactic reactions), your doctor may prescribe a shot of epinephrine to carry with you in case you have a severe reaction. Learn how to give yourself the shot and keep it with you at all times. Make sure it is not . Follow-up care is a key part of your treatment and safety. Be sure to make and go to all appointments, and call your doctor if you are having problems. It's also a good idea to know your test results and keep a list of the medicines you take. How can you care for yourself at home? · If you have been told by your doctor that dust or dust mites are causing your allergy, decrease the dust around your bed:  ? Wash sheets, pillowcases, and other bedding in hot water every week. ? Use dust-proof covers for pillows, duvets, and mattresses. Avoid plastic covers because they tear easily and do not \"breathe. \" Wash as instructed on the label. ? Do not use any blankets and pillows that you do not need. ? Use blankets that you can wash in your washing machine. ? Consider removing drapes and carpets, which attract and hold dust, from your bedroom. · If you are allergic to house dust and mites, do not use home humidifiers. Your doctor can suggest ways you can control dust and mites. · Look for signs of cockroaches. Cockroaches cause allergic reactions. Use cockroach baits to get rid of them. Then, clean your home well. Cockroaches like areas where grocery bags, newspapers, empty bottles, or cardboard boxes are stored. Do not keep these inside your home, and keep trash and food containers sealed.  Seal off any spots where cockroaches might enter your home. · If you are allergic to mold, get rid of furniture, rugs, and drapes that smell musty. Check for mold in the bathroom. · If you are allergic to outdoor pollen or mold spores, use air-conditioning. Change or clean all filters every month. Keep windows closed. · If you are allergic to pollen, stay inside when pollen counts are high. Use a vacuum  with a HEPA filter or a double-thickness filter at least two times each week. · Stay inside when air pollution is bad. Avoid paint fumes, perfumes, and other strong odors. · Avoid conditions that make your allergies worse. Stay away from smoke. Do not smoke or let anyone else smoke in your house. Do not use fireplaces or wood-burning stoves. · If you are allergic to your pets, change the air filter in your furnace every month. Use high-efficiency filters. · If you are allergic to pet dander, keep pets outside or out of your bedroom. Old carpet and cloth furniture can hold a lot of animal dander. You may need to replace them. When should you call for help? Give an epinephrine shot if:    · You think you are having a severe allergic reaction.     · You have symptoms in more than one body area, such as mild nausea and an itchy mouth.    After giving an epinephrine shot call 911, even if you feel better.   Call 911 if:    · You have symptoms of a severe allergic reaction. These may include:  ? Sudden raised, red areas (hives) all over your body. ? Swelling of the throat, mouth, lips, or tongue. ? Trouble breathing. ? Passing out (losing consciousness). Or you may feel very lightheaded or suddenly feel weak, confused, or restless.     · You have been given an epinephrine shot, even if you feel better.    Call your doctor now or seek immediate medical care if:    · You have symptoms of an allergic reaction, such as:  ? A rash or hives (raised, red areas on the skin). ? Itching. ? Swelling. ? Belly pain, nausea, or vomiting.  Watch closely for changes in your health, and be sure to contact your doctor if:    · You do not get better as expected. Where can you learn more? Go to http://lv-shelbi.info/. Enter A927 in the search box to learn more about \"Allergies: Care Instructions. \"  Current as of: April 7, 2019  Content Version: 12.2  © 1560-3730 [employer]. Care instructions adapted under license by [employer] (which disclaims liability or warranty for this information). If you have questions about a medical condition or this instruction, always ask your healthcare professional. [place] [address] any warranty or liability for your use of this information. Shortness of Breath: Care Instructions  Your Care Instructions  Shortness of breath has many causes. Sometimes conditions such as anxiety can lead to shortness of breath. Some people get mild shortness of breath when they exercise. Trouble breathing also can be a symptom of a serious problem, such as asthma, lung disease, emphysema, heart problems, and pneumonia. If your shortness of breath continues, you may need tests and treatment. Watch for any changes in your breathing and other symptoms. Follow-up care is a key part of your treatment and safety. Be sure to make and go to all appointments, and call your doctor if you are having problems. It's also a good idea to know your test results and keep a list of the medicines you take. How can you care for yourself at home? · Do not smoke or allow others to smoke around you. If you need help quitting, talk to your doctor about stop-smoking programs and medicines. These can increase your chances of quitting for good. · Get plenty of rest and sleep. · Take your medicines exactly as prescribed. Call your doctor if you think you are having a problem with your medicine. · Find healthy ways to deal with stress. ? Exercise daily. ? Get plenty of sleep.   ? Eat regularly and well. When should you call for help? Call 911 anytime you think you may need emergency care. For example, call if:    · You have severe shortness of breath.     · You have symptoms of a heart attack. These may include:  ? Chest pain or pressure, or a strange feeling in the chest.  ? Sweating. ? Shortness of breath. ? Nausea or vomiting. ? Pain, pressure, or a strange feeling in the back, neck, jaw, or upper belly or in one or both shoulders or arms. ? Lightheadedness or sudden weakness. ? A fast or irregular heartbeat. After you call 911, the  may tell you to chew 1 adult-strength or 2 to 4 low-dose aspirin. Wait for an ambulance. Do not try to drive yourself.    Call your doctor now or seek immediate medical care if:    · Your shortness of breath gets worse or you start to wheeze. Wheezing is a high-pitched sound when you breathe.     · You wake up at night out of breath or have to prop your head up on several pillows to breathe.     · You are short of breath after only light activity or while at rest.    Watch closely for changes in your health, and be sure to contact your doctor if:    · You do not get better over the next 1 to 2 days. Where can you learn more? Go to http://lv-shelbi.info/. Enter S780 in the search box to learn more about \"Shortness of Breath: Care Instructions. \"  Current as of: June 9, 2019  Content Version: 12.2  © 3938-9309 [employer]. Care instructions adapted under license by [employer] (which disclaims liability or warranty for this information). If you have questions about a medical condition or this instruction, always ask your healthcare professional. [address] any warranty or liability for your use of this information.

## 2019-11-29 NOTE — PROGRESS NOTES
[facility] Note      Subjective:     Chief Complaint   Patient presents with    Cough     x 2 days     Breathing Problem     x 2 days      Joann Lugo is a 69y.o. year old male who presents for evaluation of the following:      Shortness of Breath  Onset yesterday  \"I need an inhaler\"  Heavy breathing with walking yesterday, twice  Improved with using granddaughter proair inhaler  Endorses associated, dry cough and cold symptoms  Charted history of COPD.   - 2015 PFT with recommendation for repeat testing  Tx: Singulair  Denies wheezing, phlegm production, chest tightness, chest pain, leg pain, leg swelling, recent surgery, recent travel, fever      Review of Systems   Pertinent positives and negative per HPI. All other systems  reviewed are negative for a Comprehensive ROS (10+).        Past Medical History:   Diagnosis Date    CAD (coronary artery disease)     3 stents, done one month apart in [place] in [place],    Diabetes [facility])     Hypercholesterolemia     Hypertension         Social History     Socioeconomic History    Marital status:      Spouse name: Not on file    Number of children: Not on file    Years of education: Not on file    Highest education level: Not on file   Occupational History    Not on file   Social Needs    Financial resource strain: Not on file    Food insecurity:     Worry: Not on file     Inability: Not on file    Transportation needs:     Medical: Not on file     Non-medical: Not on file   Tobacco Use    Smoking status: Never Smoker    Smokeless tobacco: Never Used   Substance and Sexual Activity    Alcohol use: No     Alcohol/week: 0.0 standard drinks    Drug use: No    Sexual activity: Never   Lifestyle    Physical activity:     Days per week: Not on file     Minutes per session: Not on file    Stress: Not on file   Relationships    Social connections:     Talks on phone: Not on file     Gets together: Not on file Attends Zoroastrianism service: Not on file     Active member of club or organization: Not on file     Attends meetings of clubs or organizations: Not on file     Relationship status: Not on file    Intimate partner violence:     Fear of current or ex partner: Not on file     Emotionally abused: Not on file     Physically abused: Not on file     Forced sexual activity: Not on file   Other Topics Concern    Not on file   Social History Narrative    Not on file       Family History   Problem Relation Age of Onset    No Known Problems Mother     No Known Problems Father        Current Outpatient Medications   Medication Sig    carvedilol (COREG) 6.25 mg tablet TAKE 1 TABLET TWICE DAILY WITH FOOD    losartan-hydroCHLOROthiazide (HYZAAR) 50-12.5 mg per tablet TAKE 1 TABLET EVERY DAY FOR HYPERTENSION    tamsulosin (FLOMAX) 0.4 mg capsule TAKE 1 CAPSULE BY MOUTH EVERY DAY AT NIGHT    cefUROXime (CEFTIN) 250 mg tablet Take 1 Tab by mouth two (2) times a day.  multivitamin with minerals (ONE-A-DAY 50 PLUS PO)     varicella-zoster recombinant, PF, (SHINGRIX, PF,) 50 mcg/0.5 mL susr injection 0.5mL by IntraMUSCular route once now and then repeat in 2-6 months    diclofenac (VOLTAREN) 1 % gel Apply 4 g to affected area four (4) times daily. Over right side hip joint    Nebulizer & Compressor machine 1 Each by Does Not Apply route every four (4) hours as needed for Cough (wheezing).  rosuvastatin (CRESTOR) 10 mg tablet Take 1 Tab by mouth daily.  triamcinolone acetonide (KENALOG) 0.1 % ointment Apply thin film to affected areas 2 to 4 times daily    metFORMIN (GLUCOPHAGE) 500 mg tablet TAKE 1 TAB BY MOUTH TWO (2) TIMES DAILY (WITH MEALS).     aspirin delayed-release 81 mg tablet     multivit-min/FA/lycopen/lutein (SENIOR TABS PO)     methyl salicylate/menthol (MUSCLE RUB EX)     VITAMIN B-12 5,000 mcg subl     nitroglycerin (NITROSTAT) 0.4 mg SL tablet TAKE 1 TABLET AND PLACE UNDER THE TONGUE EVERY 5 MINUTES FOR CHEST PAIN FOR UP TO 3 DOSES    cloNIDine HCl (CATAPRES) 0.1 mg tablet Take 1 Tab by mouth as needed. If systolic BP > 897 and/or diastolic BP > 90    montelukast (SINGULAIR) 10 mg tablet Take 1 Tab by mouth daily.  fluticasone (FLONASE) 50 mcg/actuation nasal spray One spray each nostril    albuterol (PROVENTIL VENTOLIN) 2.5 mg /3 mL (0.083 %) nebulizer solution 3 mL by Nebulization route every four (4) hours as needed for Wheezing.  budesonide (PULMICORT) 180 mcg/actuation aepb inhaler Take 1 Puff by inhalation two (2) times a day.  albuterol (PROVENTIL HFA, VENTOLIN HFA, PROAIR HFA) 90 mcg/actuation inhaler Take 2 Puffs by inhalation every four (4) hours as needed for Wheezing. No current facility-administered medications for this visit. Objective:     Vitals:    11/29/19 1050 11/29/19 1117   BP: 134/90 134/87   Pulse: 74    Resp: 17    Temp: 97.5 °F (36.4 °C)    TempSrc: Oral    SpO2: 96%    Weight: 148 lb (67.1 kg)    Height: 5' 3\" (1.6 m)        Physical Examination:  General: Alert, cooperative, no distress, appears stated age. Eyes: Conjunctivae clear. PERRL, EOMs intact. Ears: Normal external ear canals both ears. Nose: Nares normal. Septum midline. Mucosa normal. No drainage or sinus tenderness  - audible nasal congestion, sniffling. Mouth/Throat: Lips, mucosa, and tongue normal. No oropharyngeal erythema. No tonsillar enlargement or exudate. Neck: Supple, symmetrical, trachea midline, no adenopathy. No thyroid enlargement/tenderness/nodules  Respiratory: Breathing comfortably, in no acute respiratory distress. Clear to auscultation bilaterally. Normal inspiratory and expiratory ratio. Cardiovascular: Regular rate and rhythm, S1, S2 normal, no murmur, click, rub or gallop.   -Extremities no edema   - Oxygen saturation 97-98 with brisk walking in office about 100ft.  - Mild shortness of breath during 100ft walking still able to complete 4-5 word sentences. - Mild dyspnea after walk in exam room lungs clear with heavier breathing   Abdomen: Soft, non-tender, not distended. Bowel sounds normal.   MSK: Extremities normal, atraumatic, no effusion. Gait steady and unassisted. Skin: Skin color, texture, turgor normal. No rashes or lesions on exposed skin. Lymph nodes: Cervical, supraclavicular nodes normal.  Neurologic: CNII-XII intact. Psychiatric: Affect appropriate. Office Visit on 10/22/2019   Component Date Value Ref Range Status    WBC 11/05/2019 7.5  3.4 - 10.8 x10E3/uL Final    RBC 11/05/2019 4.15  4.14 - 5.80 x10E6/uL Final    HGB 11/05/2019 12.6* 13.0 - 17.7 g/dL Final    HCT 11/05/2019 36.2* 37.5 - 51.0 % Final    MCV 11/05/2019 87  79 - 97 fL Final    MCH 11/05/2019 30.4  26.6 - 33.0 pg Final    MCHC 11/05/2019 34.8  31.5 - 35.7 g/dL Final    RDW 11/05/2019 12.2* 12.3 - 15.4 % Final    PLATELET 76/67/8783 854  150 - 450 x10E3/uL Final    NEUTROPHILS 11/05/2019 57  Not Estab. % Final    Lymphocytes 11/05/2019 28  Not Estab. % Final    MONOCYTES 11/05/2019 9  Not Estab. % Final    EOSINOPHILS 11/05/2019 6  Not Estab. % Final    BASOPHILS 11/05/2019 0  Not Estab. % Final    ABS. NEUTROPHILS 11/05/2019 4.2  1.4 - 7.0 x10E3/uL Final    Abs Lymphocytes 11/05/2019 2.1  0.7 - 3.1 x10E3/uL Final    ABS. MONOCYTES 11/05/2019 0.7  0.1 - 0.9 x10E3/uL Final    ABS. EOSINOPHILS 11/05/2019 0.4  0.0 - 0.4 x10E3/uL Final    ABS. BASOPHILS 11/05/2019 0.0  0.0 - 0.2 x10E3/uL Final    IMMATURE GRANULOCYTES 11/05/2019 0  Not Estab. % Final    ABS. IMM. GRANS.  11/05/2019 0.0  0.0 - 0.1 x10E3/uL Final    Glucose 11/05/2019 152* 65 - 99 mg/dL Final    BUN 11/05/2019 14  8 - 27 mg/dL Final    Creatinine 11/05/2019 0.85  0.76 - 1.27 mg/dL Final    GFR est non-AA 11/05/2019 86  >59 mL/min/1.73 Final    GFR est AA 11/05/2019 100  >59 mL/min/1.73 Final    BUN/Creatinine ratio 11/05/2019 16  10 - 24 Final    Sodium 11/05/2019 139  134 - 144 mmol/L Final    Potassium 11/05/2019 4.0  3.5 - 5.2 mmol/L Final    Chloride 11/05/2019 100  96 - 106 mmol/L Final    CO2 11/05/2019 23  20 - 29 mmol/L Final    Calcium 11/05/2019 9.0  8.6 - 10.2 mg/dL Final    Protein, total 11/05/2019 6.8  6.0 - 8.5 g/dL Final    Albumin 11/05/2019 3.8  3.5 - 4.8 g/dL Final    GLOBULIN, TOTAL 11/05/2019 3.0  1.5 - 4.5 g/dL Final    A-G Ratio 11/05/2019 1.3  1.2 - 2.2 Final    Bilirubin, total 11/05/2019 0.4  0.0 - 1.2 mg/dL Final    Alk. phosphatase 11/05/2019 62  39 - 117 IU/L Final    AST (SGOT) 11/05/2019 21  0 - 40 IU/L Final    ALT (SGPT) 11/05/2019 16  0 - 44 IU/L Final    Hemoglobin A1c 11/05/2019 6.2* 4.8 - 5.6 % Final    Comment:          Prediabetes: 5.7 - 6.4           Diabetes: >6.4           Glycemic control for adults with diabetes: <7.0      Estimated average glucose 11/05/2019 131  mg/dL Final    TSH 11/05/2019 1.570  0.450 - 4.500 uIU/mL Final    Specific Gravity 11/05/2019 1.012  1.005 - 1.030 Final    pH (UA) 11/05/2019 6.0  5.0 - 7.5 Final    Color 11/05/2019 Yellow  Yellow Final    Appearance 11/05/2019 Clear  Clear Final    Leukocyte Esterase 11/05/2019 1+* Negative Final    Protein 11/05/2019 Negative  Negative/Trace Final    Glucose 11/05/2019 Negative  Negative Final    Ketone 11/05/2019 Negative  Negative Final    Blood 11/05/2019 Trace* Negative Final    Bilirubin 11/05/2019 Negative  Negative Final    Urobilinogen 11/05/2019 0.2  0.2 - 1.0 mg/dL Final    Nitrites 11/05/2019 Negative  Negative Final    Microscopic Examination 11/05/2019 See additional order   Final    Microscopic was indicated and was performed.  URINALYSIS REFLEX 11/05/2019 Comment   Final    This specimen has reflexed to a Urine Culture.     Cholesterol, total 11/05/2019 125  100 - 199 mg/dL Final    Triglyceride 11/05/2019 77  0 - 149 mg/dL Final    HDL Cholesterol 11/05/2019 49  >39 mg/dL Final    VLDL, calculated 11/05/2019 15  5 - 40 mg/dL Final    LDL, calculated 11/05/2019 61  0 - 99 mg/dL Final    WBC 11/05/2019 6-10* 0 - 5 /hpf Final    RBC 11/05/2019 0-2  0 - 2 /hpf Final    Epithelial cells 11/05/2019 None seen  0 - 10 /hpf Final    Casts 11/05/2019 None seen  None seen /lpf Final    Mucus 11/05/2019 Present  Not Estab. Final    Bacteria 11/05/2019 None seen  None seen/Few Final    Urine Culture, Routine 11/05/2019    Final                    Value:Culture shows less than 10,000 colony forming units of bacteria per  milliliter of urine. This colony count is not generally considered  to be clinically significant.  INTERPRETATION 11/05/2019 Note   Final    Comment: Medical Director's Note: This is an amended report on  11/8/2019. The following panels were previously not  reported: CBC With Differential/Platelet. Please review this  report in its entirety, since changes may affect result  interpretation(s) and/or treatment/follow-up suggestions. Supplemental report is available. Assessment/ Plan:   Diagnoses and all orders for this visit:    1. Shortness of breath  -     albuterol (PROVENTIL HFA, VENTOLIN HFA, PROAIR HFA) 90 mcg/actuation inhaler; Take 2 Puffs by inhalation every four (4) hours as needed for Shortness of Breath. Dispense ProAir brand or insurance formulary preference. -     PULMONARY FUNCTION TEST; Future    2. Allergic rhinitis, unspecified seasonality, unspecified trigger    3. COPD, moderate ([address]      Shortness of breath seems to be related to chronic lung disease, Exact diagnosis unclear but noted COPD on file. No cardinal symptoms suggestive of COPD exacerbation at this time. Will refill albuterol inhaler since patient has none. Patient request ProAir brand. Recommend PFT to evaluate lung function. Apparent allergic rhinitis. No SIRS.    Trial of otc meds for symptom relief discussed and listed in patient instructions- nasal steroid + mucinex + antihistamine + sinus rinse + otc analgesia + humidifier prn  - Continue antihistamine and singular. Educated patient on red flag symptoms to warrant return to clinic or emergency room visit. I have discussed the diagnosis with the patient and the intended plan as seen in the above orders. The patient has been offered or received an after-visit summary and questions were answered concerning future plans. I have discussed medication side effects and warnings with the patient as well. Follow-up and Dispositions    · Return in about 4 weeks (around 12/27/2019) for Follow Up with PCP.          Signed,    Anish Shaver MD  11/29/2019

## 2019-12-03 ENCOUNTER — OFFICE VISIT (OUTPATIENT)
Dept: FAMILY MEDICINE CLINIC | Age: 74
End: 2019-12-03

## 2019-12-03 VITALS
RESPIRATION RATE: 16 BRPM | SYSTOLIC BLOOD PRESSURE: 130 MMHG | TEMPERATURE: 95.9 F | HEIGHT: 63 IN | DIASTOLIC BLOOD PRESSURE: 79 MMHG | OXYGEN SATURATION: 98 % | HEART RATE: 71 BPM | BODY MASS INDEX: 25.69 KG/M2 | WEIGHT: 145 LBS

## 2019-12-03 DIAGNOSIS — Z23 ENCOUNTER FOR IMMUNIZATION: ICD-10-CM

## 2019-12-03 DIAGNOSIS — J44.9 COPD, MODERATE (HCC): Primary | ICD-10-CM

## 2019-12-03 DIAGNOSIS — N40.1 BENIGN PROSTATIC HYPERPLASIA WITH URINARY FREQUENCY: ICD-10-CM

## 2019-12-03 DIAGNOSIS — K44.9 HIATAL HERNIA: ICD-10-CM

## 2019-12-03 DIAGNOSIS — R35.0 BENIGN PROSTATIC HYPERPLASIA WITH URINARY FREQUENCY: ICD-10-CM

## 2019-12-03 DIAGNOSIS — J44.9 COPD, MODERATE (HCC): ICD-10-CM

## 2019-12-03 RX ORDER — TAMSULOSIN HYDROCHLORIDE 0.4 MG/1
CAPSULE ORAL
Qty: 30 CAP | Refills: 1 | Status: SHIPPED | OUTPATIENT
Start: 2019-12-03 | End: 2019-12-03 | Stop reason: SDUPTHER

## 2019-12-03 RX ORDER — BENZONATATE 200 MG/1
200 CAPSULE ORAL
Qty: 20 CAP | Refills: 0 | Status: SHIPPED | OUTPATIENT
Start: 2019-12-03 | End: 2019-12-10

## 2019-12-03 RX ORDER — TAMSULOSIN HYDROCHLORIDE 0.4 MG/1
CAPSULE ORAL
Qty: 90 CAP | Refills: 0 | Status: SHIPPED | OUTPATIENT
Start: 2019-12-03 | End: 2020-03-30 | Stop reason: SDUPTHER

## 2019-12-03 RX ORDER — OMEPRAZOLE 40 MG/1
40 CAPSULE, DELAYED RELEASE ORAL DAILY
Qty: 30 CAP | Refills: 1 | Status: SHIPPED | OUTPATIENT
Start: 2019-12-03 | End: 2019-12-03 | Stop reason: SDUPTHER

## 2019-12-03 RX ORDER — OMEPRAZOLE 40 MG/1
40 CAPSULE, DELAYED RELEASE ORAL DAILY
Qty: 90 CAP | Refills: 0 | Status: SHIPPED | OUTPATIENT
Start: 2019-12-03 | End: 2020-03-30 | Stop reason: SDUPTHER

## 2019-12-03 RX ORDER — AZITHROMYCIN 250 MG/1
TABLET, FILM COATED ORAL
Qty: 6 TAB | Refills: 0 | Status: SHIPPED | OUTPATIENT
Start: 2019-12-03 | End: 2019-12-08

## 2019-12-03 NOTE — PROGRESS NOTES
HISTORY OF PRESENT ILLNESS  Yosvany Hennessy is a 69 y.o. male. seen for persistent cough and urinary symptoms. Has been very non compliant with medicines ( due to cost and copay)  HPI  COPD  Patient complains of cough and shortness of breath. Symptoms began several years ago. Symptoms chronic dyspnea: severity = mild: course of sx: well controlled  able walk 5 blocks  cough: severity: moderate: sputum : clear: light  symptoms worse with exertion. does worsen with exertion. Sputum is clear in small amounts. Fever has been suspected  but not measured at home. Patient uses 1 pillows at night. Patient can walk 1 mile  before resting. Patient currently is not on home oxygen therapy. Lyly Nevarez Respiratory history: frequent episodes of bronchitis and COPD  He is on scheduled Fluticasone, Singulair and prn albuterol, but non compliant with treatment.     Urology Review  He is here today because of BPH. He reports his symptoms are poorly controlled. His symptoms include: nocturia x 3, dysuria, pain on tip of penis. He denies: urinary hesitancy, urinary frequency, double voiding, weak stream, perineal discomfort, hematuria, abdominal pain, flank pain, testicular pain. He is on the following medications: Flomax. He reports the following medication side effects: none. Lab review: labs reviewed and discussed with patient. He was referred to urology,had initial exam and was recommended to get CT pelvis   He still has burning on urination. Review of Systems   Constitutional: Negative for chills, fever and malaise/fatigue. HENT: Positive for congestion. Negative for ear pain, sore throat and tinnitus. Eyes: Negative for blurred vision, double vision, pain and discharge. Respiratory: Positive for cough and shortness of breath. Negative for wheezing. Cardiovascular: Negative for chest pain, palpitations and leg swelling. Gastrointestinal: Positive for heartburn and nausea.  Negative for abdominal pain, blood in stool, constipation, diarrhea and vomiting. Genitourinary: Positive for dysuria and urgency. Negative for frequency and hematuria. Musculoskeletal: Negative for back pain, joint pain and myalgias. Skin: Negative for rash. Neurological: Negative for dizziness, tremors, seizures and headaches. Endo/Heme/Allergies: Negative for polydipsia. Does not bruise/bleed easily. Psychiatric/Behavioral: Negative for depression and substance abuse. The patient is not nervous/anxious. Physical Exam  Vitals signs and nursing note reviewed. Constitutional:       General: He is not in acute distress. HENT:      Right Ear: Tympanic membrane normal. No decreased hearing noted. No drainage. No middle ear effusion. Tympanic membrane is not injected. Left Ear: Tympanic membrane normal. No decreased hearing noted. No drainage. No middle ear effusion. Tympanic membrane is not injected. Nose: Mucosal edema present. No rhinorrhea. Right Sinus: Maxillary sinus tenderness and frontal sinus tenderness present. Left Sinus: Maxillary sinus tenderness and frontal sinus tenderness present. Mouth/Throat:      Pharynx: Uvula midline. Posterior oropharyngeal erythema present. No oropharyngeal exudate. Cardiovascular:      Rate and Rhythm: Regular rhythm. Heart sounds: Normal heart sounds. No murmur. Pulmonary:      Effort: Pulmonary effort is normal.      Breath sounds: Wheezing (expiratory wheezes) present. No rales. Lymphadenopathy:      Head:      Right side of head: No tonsillar adenopathy. Left side of head: No tonsillar adenopathy. Cervical: No cervical adenopathy. ASSESSMENT and PLAN  Diagnoses and all orders for this visit:    1. COPD, moderate ([address]  -     ipratropium-albuterol (COMBIVENT RESPIMAT)  mcg/actuation inhaler; Take 1 Puff by inhalation every four (4) hours as needed for Wheezing or Shortness of Breath. -     benzonatate (TESSALON) 200 mg capsule;  Take 1 Cap by mouth three (3) times daily as needed for Cough for up to 7 days. -     azithromycin (ZITHROMAX) 250 mg tablet; Take 2 tablets today, then take 1 tablet daily    2. Benign prostatic hyperplasia with urinary frequency  -     tamsulosin (FLOMAX) 0.4 mg capsule; TAKE 1 CAPSULE BY MOUTH EVERY DAY AT NIGHT    3. Hiatal hernia  -     omeprazole (PRILOSEC) 40 mg capsule; Take 1 Cap by mouth daily. 4. Encounter for immunization  -     ADMIN INFLUENZA VIRUS VAC  -     INFLUENZA VACCINE INACTIVATED (IIV), SUBUNIT, ADJUVANTED, IM    discussed importance of using his controller inhaler regularly and to use nebulizer more often. Discussed lifestyle issues and health guidance given  Patient was given an after visit summary which includes diagnoses, vital signs, current medications, instructions and references & authorized prescriptions . Results of labs will be conveyed to patient, once available. Pt verbalized instructions I provided and expressed understanding of discussion that was held today.

## 2019-12-03 NOTE — PATIENT INSTRUCTIONS
Chronic Obstructive Pulmonary Disease (COPD): Care Instructions  Your Care Instructions    Chronic obstructive pulmonary disease (COPD) is a general term for a group of lung diseases, including emphysema and chronic bronchitis. People with COPD have decreased airflow in and out of the lungs, which makes it hard to breathe. The airways also can get clogged with thick mucus. Cigarette smoking is a major cause of COPD. Although there is no cure for COPD, you can slow its progress. Following your treatment plan and taking care of yourself can help you feel better and live longer. Follow-up care is a key part of your treatment and safety. Be sure to make and go to all appointments, and call your doctor if you are having problems. It's also a good idea to know your test results and keep a list of the medicines you take. How can you care for yourself at home?   Staying healthy    · Do not smoke. This is the most important step you can take to prevent more damage to your lungs. If you need help quitting, talk to your doctor about stop-smoking programs and medicines. These can increase your chances of quitting for good.     · Avoid colds and flu. Get a pneumococcal vaccine shot. If you have had one before, ask your doctor whether you need a second dose. Get the flu vaccine every fall. If you must be around people with colds or the flu, wash your hands often.     · Avoid secondhand smoke, air pollution, and high altitudes. Also avoid cold, dry air and hot, humid air. Stay at home with your windows closed when air pollution is bad.    Medicines and oxygen therapy    · Take your medicines exactly as prescribed. Call your doctor if you think you are having a problem with your medicine.     · You may be taking medicines such as:  ? Bronchodilators. These help open your airways and make breathing easier. Bronchodilators are either short-acting (work for 6 to 9 hours) or long-acting (work for 24 hours).  You inhale most bronchodilators, so they start to act quickly. Always carry your quick-relief inhaler with you in case you need it while you are away from home. ? Corticosteroids (prednisone, budesonide). These reduce airway inflammation. They come in pill or inhaled form. You must take these medicines every day for them to work well.     · A spacer may help you get more inhaled medicine to your lungs. Ask your doctor or pharmacist if a spacer is right for you. If it is, ask how to use it properly.     · Do not take any vitamins, over-the-counter medicine, or herbal products without talking to your doctor first.     · If your doctor prescribed antibiotics, take them as directed. Do not stop taking them just because you feel better. You need to take the full course of antibiotics.     · Oxygen therapy boosts the amount of oxygen in your blood and helps you breathe easier. Use the flow rate your doctor has recommended, and do not change it without talking to your doctor first.   Activity    · Get regular exercise. Walking is an easy way to get exercise. Start out slowly, and walk a little more each day.     · Pay attention to your breathing. You are exercising too hard if you cannot talk while you are exercising.     · Take short rest breaks when doing household chores and other activities.     · Learn breathing methods--such as breathing through pursed lips--to help you become less short of breath.     · If your doctor has not set you up with a pulmonary rehabilitation program, talk to him or her about whether rehab is right for you. Rehab includes exercise programs, education about your disease and how to manage it, help with diet and other changes, and emotional support. Diet    · Eat regular, healthy meals. Use bronchodilators about 1 hour before you eat to make it easier to eat. Eat several small meals instead of three large ones.  Drink beverages at the end of the meal. Avoid foods that are hard to chew.     · Eat foods that contain protein so that you do not lose muscle mass.     · Talk with your doctor if you gain too much weight or if you lose weight without trying.    Mental health    · Talk to your family, friends, or a therapist about your feelings. It is normal to feel frightened, angry, hopeless, helpless, and even guilty. Talking openly about bad feelings can help you cope. If these feelings last, talk to your doctor. When should you call for help? Call 911 anytime you think you may need emergency care. For example, call if:    · You have severe trouble breathing.    Call your doctor now or seek immediate medical care if:    · You have new or worse trouble breathing.     · You cough up blood.     · You have a fever.    Watch closely for changes in your health, and be sure to contact your doctor if:    · You cough more deeply or more often, especially if you notice more mucus or a change in the color of your mucus.     · You have new or worse swelling in your legs or belly.     · You are not getting better as expected. Where can you learn more? Go to http://lv-shelbi.info/. [place] in the search box to learn more about \"Chronic Obstructive Pulmonary Disease (COPD): Care Instructions. \"  Current as of: June 9, 2019  Content Version: 12.2  © 6580-0046 [employer]. Care instructions adapted under license by [employer] (which disclaims liability or warranty for this information). If you have questions about a medical condition or this instruction, always ask your healthcare professional. [place] [address] any warranty or liability for your use of this information.

## 2019-12-03 NOTE — PROGRESS NOTES
Chief Complaint   Patient presents with    Cough     x 2 day s     1. Have you been to the ER, urgent care clinic since your last visit? Hospitalized since your last visit? No    2. Have you seen or consulted any other health care providers outside of the [address] [place] [address] since your last visit? Include any pap smears or colon screening. No       Patient presents in office with c/o cough onset 2 days ago. Was seen in practice by another provider.

## 2019-12-04 ENCOUNTER — DOCUMENTATION ONLY (OUTPATIENT)
Dept: FAMILY MEDICINE CLINIC | Age: 74
End: 2019-12-04

## 2020-01-13 ENCOUNTER — TELEPHONE (OUTPATIENT)
Dept: FAMILY MEDICINE CLINIC | Age: 75
End: 2020-01-13

## 2020-01-13 NOTE — TELEPHONE ENCOUNTER
----- Message from Jared Barrera sent at 1/13/2020  4:33 PM EST -----  Regarding: /Telephone  General Message/Vendor Calls    Caller's first and last name:  [facility]     Reason for call:  Inform of something seen on CT Scan     Callback required yes/no and why:  Yes, to discuss the CT if needed     Best contact number(s):  [phone]    Details to clarify the request:  Ti Damian can fax over a copy.     Jared Barrera

## 2020-01-15 NOTE — TELEPHONE ENCOUNTER
Called urology office. Zaire Muse was on lunch.  Requested to fax patient's records to my office and gave her office fax number

## 2020-01-16 ENCOUNTER — OFFICE VISIT (OUTPATIENT)
Dept: FAMILY MEDICINE CLINIC | Age: 75
End: 2020-01-16

## 2020-01-16 VITALS
RESPIRATION RATE: 18 BRPM | WEIGHT: 150 LBS | BODY MASS INDEX: 26.58 KG/M2 | OXYGEN SATURATION: 98 % | SYSTOLIC BLOOD PRESSURE: 100 MMHG | HEIGHT: 63 IN | TEMPERATURE: 98.4 F | DIASTOLIC BLOOD PRESSURE: 61 MMHG | HEART RATE: 67 BPM

## 2020-01-16 DIAGNOSIS — R31.21 ASYMPTOMATIC MICROSCOPIC HEMATURIA: ICD-10-CM

## 2020-01-16 DIAGNOSIS — N20.0 RENAL CALCULUS, LEFT: ICD-10-CM

## 2020-01-16 DIAGNOSIS — K86.9 PANCREATIC LESION: Primary | ICD-10-CM

## 2020-01-16 RX ORDER — PANTOPRAZOLE SODIUM 40 MG/1
TABLET, DELAYED RELEASE ORAL
COMMUNITY
Start: 2020-01-15 | End: 2020-03-30 | Stop reason: ALTCHOICE

## 2020-01-16 NOTE — PATIENT INSTRUCTIONS
Blood in the Urine: Care Instructions  Your Care Instructions    Blood in the urine, or hematuria, may make the urine look red, brown, or pink. There may be blood every time you urinate or just from time to time. You cannot always see blood in the urine, but it will show up in a urine test.  Blood in the urine may be serious. It should always be checked by a doctor. Your doctor may recommend more tests, including an X-ray, a CT scan, or a cystoscopy (which lets a doctor look inside the urethra and bladder). Blood in the urine can be a sign of another problem. Common causes are bladder infections and kidney stones. An injury to your groin or your genital area can also cause bleeding in the urinary tract. Very hard exercise--such as running a marathon--can cause blood in the urine. Blood in the urine can also be a sign of kidney disease or cancer in the bladder or kidney. Many cases of blood in the urine are caused by a harmless condition that runs in families. This is called benign familial hematuria. It does not need any treatment. Sometimes your urine may look red or brown even though it does not contain blood. For example, not getting enough fluids (dehydration), taking certain medicines, or having a liver problem can change the color of your urine. Eating foods such as beets, rhubarb, or blackberries or foods with red food coloring can make your urine look red or pink. Follow-up care is a key part of your treatment and safety. Be sure to make and go to all appointments, and call your doctor if you are having problems. It's also a good idea to know your test results and keep a list of the medicines you take. When should you call for help? Call your doctor now or seek immediate medical care if:    · You have symptoms of a urinary infection. For example:  ? You have pus in your urine. ? You have pain in your back just below your rib cage. This is called flank pain. ?  You have a fever, chills, or body aches.  ? It hurts to urinate. ? You have groin or belly pain.     · You have more blood in your urine.    Watch closely for changes in your health, and be sure to contact your doctor if:    · You have new urination problems.     · You do not get better as expected. Where can you learn more? Go to http://lv-shelbi.info/. Enter C432 in the search box to learn more about \"Blood in the Urine: Care Instructions. \"  Current as of: December 19, 2018  Content Version: 12.2  © 9147-5220 [employer], [employer]. Care instructions adapted under license by [employer] (which disclaims liability or warranty for this information). If you have questions about a medical condition or this instruction, always ask your healthcare professional. [address] any warranty or liability for your use of this information.

## 2020-01-16 NOTE — PROGRESS NOTES
Chief Complaint   Patient presents with    Other     Patient is in the ofice today to discuss recent CT Scan. 1. Have you been to the ER, urgent care clinic since your last visit? Hospitalized since your last visit? No    2. Have you seen or consulted any other health care providers outside of the [address] [place] [address] since your last visit? Include any pap smears or colon screening.  No  \

## 2020-01-16 NOTE — PROGRESS NOTES
HISTORY OF PRESENT ILLNESS  Tianna Cardona is a 69 y.o. male. seen to discuss his visit with urology and concern regarding CT  Scan findings  HPI  Patient was seen by urology, Dr Nazario Prudent for his persistent sx of dysuria and urethral pain on micturition. Also his lab findings were persistent for microscopic hematuria  He had negative urine cultures repeatedly. Evaluated by urology and had CT IVP, which showed small nonobstructing left lower pole stone, enlarged prostate,right renal cyst,no renal,ureter or bladder mass or no filling defect  But CT showed an oval circumscribed nodule in superior parenchyma of pancreatic tail ( 11 x 16 x 10 mm) , isodense to spleen and likely representing intrapancreatic spenule, also showing stability from jl CT on 05/2019    Patient has upcoming EGD and colonoscopy scheduled for next month and he is concerned about findings. Review of Systems   Constitutional: Negative for chills, fever and malaise/fatigue. HENT: Negative for congestion, ear pain, sore throat and tinnitus. Eyes: Negative for blurred vision, double vision, pain and discharge. Respiratory: Negative for cough, shortness of breath and wheezing. Cardiovascular: Negative for chest pain, palpitations and leg swelling. Gastrointestinal: Negative for abdominal pain, blood in stool, constipation, diarrhea, nausea and vomiting. Genitourinary: Positive for dysuria. Negative for frequency, hematuria and urgency. Musculoskeletal: Negative for back pain, joint pain and myalgias. Skin: Negative for rash. Neurological: Negative for dizziness, tremors, seizures and headaches. Endo/Heme/Allergies: Negative for polydipsia. Does not bruise/bleed easily. Psychiatric/Behavioral: Negative for depression and substance abuse. The patient is not nervous/anxious. Physical Exam  Vitals signs and nursing note reviewed. Constitutional:       Appearance: He is well-developed. He is not diaphoretic.    HENT: Head: Normocephalic and atraumatic. Right Ear: External ear normal.      Mouth/Throat:      Pharynx: No oropharyngeal exudate. Eyes:      General: No scleral icterus. Conjunctiva/sclera: Conjunctivae normal.      Pupils: Pupils are equal, round, and reactive to light. Neck:      Musculoskeletal: Normal range of motion and neck supple. Thyroid: No thyromegaly. Vascular: No JVD. Cardiovascular:      Rate and Rhythm: Normal rate and regular rhythm. Heart sounds: Normal heart sounds. No murmur. Pulmonary:      Effort: Pulmonary effort is normal.      Breath sounds: Normal breath sounds. No wheezing. Abdominal:      General: Bowel sounds are normal. There is no distension. Palpations: Abdomen is soft. There is no mass. Musculoskeletal: Normal range of motion. General: No tenderness. Lymphadenopathy:      Cervical: No cervical adenopathy. Skin:     General: Skin is warm and dry. Findings: No rash. Neurological:      Mental Status: He is alert and oriented to person, place, and time. Cranial Nerves: No cranial nerve deficit. Deep Tendon Reflexes: Reflexes are normal and symmetric. Reflexes normal.         ASSESSMENT and PLAN  Diagnoses and all orders for this visit:    1. Pancreatic lesion  -     MRI ABD W MRCP W CONT; Future    2. Renal calculus, left    3. Asymptomatic microscopic hematuria    discussed possible ddx of pancreatic findings  He was recommended o get SPECT CT of liver and spleen, but will get MRI as he is concerned to r/o malignancy  Discussed plenty water and compliance with his medicines  Discussed lifestyle issues and health guidance given  Patient was given an after visit summary which includes diagnoses, vital signs, current medications, instructions and references & authorized prescriptions . Results of labs will be conveyed to patient, once available.   Pt verbalized instructions I provided and expressed understanding of discussion that was held today.

## 2020-01-27 ENCOUNTER — HOSPITAL ENCOUNTER (OUTPATIENT)
Dept: MRI IMAGING | Age: 75
Discharge: HOME | End: 2020-01-27
Attending: FAMILY MEDICINE
Payer: MEDICARE

## 2020-01-27 DIAGNOSIS — K86.9 PANCREATIC LESION: ICD-10-CM

## 2020-01-27 PROCEDURE — 74182 MRI ABDOMEN W/CONTRAST: CPT

## 2020-01-27 PROCEDURE — A9585 GADOBUTROL INJECTION: HCPCS | Performed by: FAMILY MEDICINE

## 2020-01-27 PROCEDURE — 74011250636 HC RX REV CODE- 250/636: Performed by: FAMILY MEDICINE

## 2020-01-27 RX ADMIN — GADOBUTROL 8 ML: 604.72 INJECTION INTRAVENOUS at 19:48

## 2020-02-01 NOTE — PROGRESS NOTES
Called patient yesterday and Discussed MRI result and further plan with patient . Texted DR Osmel Valera about his MRI finding  thanks

## 2020-02-11 DIAGNOSIS — I25.10 CORONARY ARTERY DISEASE INVOLVING NATIVE CORONARY ARTERY OF NATIVE HEART WITHOUT ANGINA PECTORIS: Chronic | ICD-10-CM

## 2020-02-11 RX ORDER — ROSUVASTATIN CALCIUM 10 MG/1
TABLET, COATED ORAL
Qty: 90 TAB | Refills: 1 | Status: SHIPPED | OUTPATIENT
Start: 2020-02-11 | End: 2020-05-26 | Stop reason: SDUPTHER

## 2020-02-11 RX ORDER — CARVEDILOL 6.25 MG/1
TABLET ORAL
Qty: 180 TAB | Refills: 0 | Status: SHIPPED | OUTPATIENT
Start: 2020-02-11 | End: 2020-04-19 | Stop reason: SDUPTHER

## 2020-02-12 RX ORDER — METFORMIN HYDROCHLORIDE 500 MG/1
500 TABLET ORAL 2 TIMES DAILY WITH MEALS
Qty: 14 TAB | Refills: 0 | Status: SHIPPED | OUTPATIENT
Start: 2020-02-12 | End: 2020-02-17 | Stop reason: SDUPTHER

## 2020-02-13 ENCOUNTER — ANESTHESIA EVENT (OUTPATIENT)
Dept: ENDOSCOPY | Age: 75
End: 2020-02-13
Payer: MEDICARE

## 2020-02-13 ENCOUNTER — HOSPITAL ENCOUNTER (OUTPATIENT)
Age: 75
Setting detail: OUTPATIENT SURGERY
Discharge: HOME | End: 2020-02-13
Attending: SPECIALIST | Admitting: SPECIALIST
Payer: MEDICARE

## 2020-02-13 ENCOUNTER — ANESTHESIA (OUTPATIENT)
Dept: ENDOSCOPY | Age: 75
End: 2020-02-13
Payer: MEDICARE

## 2020-02-13 VITALS
WEIGHT: 145 LBS | BODY MASS INDEX: 25.69 KG/M2 | DIASTOLIC BLOOD PRESSURE: 66 MMHG | SYSTOLIC BLOOD PRESSURE: 90 MMHG | HEART RATE: 80 BPM | RESPIRATION RATE: 16 BRPM | OXYGEN SATURATION: 100 %

## 2020-02-13 PROCEDURE — 74011250636 HC RX REV CODE- 250/636: Performed by: NURSE ANESTHETIST, CERTIFIED REGISTERED

## 2020-02-13 PROCEDURE — 76060000031 HC ANESTHESIA FIRST 0.5 HR: Performed by: SPECIALIST

## 2020-02-13 PROCEDURE — C1726 CATH, BAL DIL, NON-VASCULAR: HCPCS | Performed by: SPECIALIST

## 2020-02-13 PROCEDURE — 76040000019: Performed by: SPECIALIST

## 2020-02-13 PROCEDURE — 77030018712 HC DEV BLLN INFL BSC -B: Performed by: SPECIALIST

## 2020-02-13 PROCEDURE — 77030021593 HC FCPS BIOP ENDOSC BSC -A: Performed by: SPECIALIST

## 2020-02-13 PROCEDURE — 88305 TISSUE EXAM BY PATHOLOGIST: CPT

## 2020-02-13 PROCEDURE — 74011000250 HC RX REV CODE- 250: Performed by: NURSE ANESTHETIST, CERTIFIED REGISTERED

## 2020-02-13 RX ORDER — SODIUM CHLORIDE 9 MG/ML
INJECTION, SOLUTION INTRAVENOUS
Status: DISCONTINUED | OUTPATIENT
Start: 2020-02-13 | End: 2020-02-13 | Stop reason: HOSPADM

## 2020-02-13 RX ORDER — SODIUM CHLORIDE 9 MG/ML
50 INJECTION, SOLUTION INTRAVENOUS CONTINUOUS
Status: DISCONTINUED | OUTPATIENT
Start: 2020-02-13 | End: 2020-02-13 | Stop reason: HOSPADM

## 2020-02-13 RX ORDER — ATROPINE SULFATE 0.1 MG/ML
0.5 INJECTION INTRAVENOUS
Status: DISCONTINUED | OUTPATIENT
Start: 2020-02-13 | End: 2020-02-13 | Stop reason: HOSPADM

## 2020-02-13 RX ORDER — NALOXONE HYDROCHLORIDE 0.4 MG/ML
0.4 INJECTION, SOLUTION INTRAMUSCULAR; INTRAVENOUS; SUBCUTANEOUS
Status: DISCONTINUED | OUTPATIENT
Start: 2020-02-13 | End: 2020-02-13 | Stop reason: HOSPADM

## 2020-02-13 RX ORDER — DEXTROMETHORPHAN/PSEUDOEPHED 2.5-7.5/.8
1.2 DROPS ORAL
Status: DISCONTINUED | OUTPATIENT
Start: 2020-02-13 | End: 2020-02-13 | Stop reason: HOSPADM

## 2020-02-13 RX ORDER — PROPOFOL 10 MG/ML
INJECTION, EMULSION INTRAVENOUS AS NEEDED
Status: DISCONTINUED | OUTPATIENT
Start: 2020-02-13 | End: 2020-02-13 | Stop reason: HOSPADM

## 2020-02-13 RX ORDER — FLUMAZENIL 0.1 MG/ML
0.2 INJECTION INTRAVENOUS
Status: DISCONTINUED | OUTPATIENT
Start: 2020-02-13 | End: 2020-02-13 | Stop reason: HOSPADM

## 2020-02-13 RX ORDER — LIDOCAINE HYDROCHLORIDE 20 MG/ML
INJECTION, SOLUTION EPIDURAL; INFILTRATION; INTRACAUDAL; PERINEURAL AS NEEDED
Status: DISCONTINUED | OUTPATIENT
Start: 2020-02-13 | End: 2020-02-13 | Stop reason: HOSPADM

## 2020-02-13 RX ORDER — SODIUM CHLORIDE 0.9 % (FLUSH) 0.9 %
5-40 SYRINGE (ML) INJECTION AS NEEDED
Status: DISCONTINUED | OUTPATIENT
Start: 2020-02-13 | End: 2020-02-13 | Stop reason: HOSPADM

## 2020-02-13 RX ORDER — SODIUM CHLORIDE 0.9 % (FLUSH) 0.9 %
5-40 SYRINGE (ML) INJECTION EVERY 8 HOURS
Status: DISCONTINUED | OUTPATIENT
Start: 2020-02-13 | End: 2020-02-13 | Stop reason: HOSPADM

## 2020-02-13 RX ORDER — EPINEPHRINE 0.1 MG/ML
1 INJECTION INTRACARDIAC; INTRAVENOUS
Status: DISCONTINUED | OUTPATIENT
Start: 2020-02-13 | End: 2020-02-13 | Stop reason: HOSPADM

## 2020-02-13 RX ADMIN — PROPOFOL 50 MG: 10 INJECTION, EMULSION INTRAVENOUS at 15:21

## 2020-02-13 RX ADMIN — PROPOFOL 25 MG: 10 INJECTION, EMULSION INTRAVENOUS at 15:29

## 2020-02-13 RX ADMIN — PROPOFOL 25 MG: 10 INJECTION, EMULSION INTRAVENOUS at 15:24

## 2020-02-13 RX ADMIN — PROPOFOL 25 MG: 10 INJECTION, EMULSION INTRAVENOUS at 15:34

## 2020-02-13 RX ADMIN — PROPOFOL 25 MG: 10 INJECTION, EMULSION INTRAVENOUS at 15:25

## 2020-02-13 RX ADMIN — PROPOFOL 25 MG: 10 INJECTION, EMULSION INTRAVENOUS at 15:23

## 2020-02-13 RX ADMIN — SODIUM CHLORIDE: 900 INJECTION, SOLUTION INTRAVENOUS at 14:45

## 2020-02-13 RX ADMIN — PROPOFOL 25 MG: 10 INJECTION, EMULSION INTRAVENOUS at 15:27

## 2020-02-13 RX ADMIN — PROPOFOL 75 MG: 10 INJECTION, EMULSION INTRAVENOUS at 15:20

## 2020-02-13 RX ADMIN — LIDOCAINE HYDROCHLORIDE 40 MG: 20 INJECTION, SOLUTION EPIDURAL; INFILTRATION; INTRACAUDAL; PERINEURAL at 15:20

## 2020-02-13 NOTE — ROUTINE PROCESS
Sirena Andrew  1945  [id number]    Situation:  Verbal report received from: Rosalba Pierce RN  Procedure: Procedure(s):  COLONOSCOPY/EGD  ESOPHAGOGASTRODUODENOSCOPY (EGD)  ESOPHAGOGASTRODUODENAL (EGD) BIOPSY  ESOPHAGEAL DILATION  ENDOSCOPIC POLYPECTOMY    Background:    Preoperative diagnosis: DYSPHAGIA, GASTROESOPHAGEAL REFLUX DISEASE, ESSENTIAL (PRIMARY) HYPERTENSION,TYPE 2 DIABETES MELLITUS  Postoperative diagnosis: esophageal stricture  esophagitis  sigmoid polyp  gastritis    :  Dr. Martin Jalloh  Assistant(s): Endoscopy Technician-1: Sivakumar Anguiano  Endoscopy RN-1: Jayleen Sher RN    Specimens:   ID Type Source Tests Collected by Time Destination   1 : Antrum Preservative Stomach, Antrum  Regina Pires MD 2/13/2020 1524 Pathology   2 : distal esophagus Preservative Esophagus, Distal  Regina Pires MD 2/13/2020 1525 Pathology   3 : sigmoid polyp Preservative Sigmoid  Regina Pires MD 2/13/2020 1539 Pathology     H. Pylori  no    Assessment:  Intra-procedure medications   Anesthesia gave intra-procedure sedation and medications, see anesthesia flow sheet yes    Intravenous fluids: NS@ KVO     Vital signs stable     Abdominal assessment: round and soft     Recommendation:  Discharge patient per MD order.   Family  Permission to share finding with family or friend yes

## 2020-02-13 NOTE — ANESTHESIA PREPROCEDURE EVALUATION
Relevant Problems   No relevant active problems       Anesthetic History   No history of anesthetic complications            Review of Systems / Medical History  Patient summary reviewed, nursing notes reviewed and pertinent labs reviewed    Pulmonary  Within defined limits                 Neuro/Psych   Within defined limits           Cardiovascular  Within defined limits  Hypertension          CAD         GI/Hepatic/Renal  Within defined limits              Endo/Other  Within defined limits  Diabetes         Other Findings              Physical Exam    Airway  Mallampati: II  TM Distance: > 6 cm  Neck ROM: normal range of motion   Mouth opening: Normal     Cardiovascular  Regular rate and rhythm,  S1 and S2 normal,  no murmur, click, rub, or gallop             Dental  No notable dental hx       Pulmonary  Breath sounds clear to auscultation               Abdominal  GI exam deferred       Other Findings            Anesthetic Plan    ASA: 3  Anesthesia type: MAC            Anesthetic plan and risks discussed with: Patient

## 2020-02-13 NOTE — PERIOP NOTES

## 2020-02-13 NOTE — PROCEDURES
[address]  [address]                 Colonoscopy Procedure Note    Indications:   See Preoperative Diagnosis above  Referring Physician: Lisa Berg MD  Anesthesia/Sedation: MAC anesthesia Propofol  Endoscopist:  Dr. Anni Parekh  Assistant:  Endoscopy Technician-1: Phillip Mohs  Endoscopy RN-1: Chavo Cerda RN  Preoperative diagnosis: DYSPHAGIA, GASTROESOPHAGEAL REFLUX DISEASE, ESSENTIAL (PRIMARY) HYPERTENSION,TYPE 2 DIABETES MELLITUS  Postoperative diagnosis: esophageal stricture  esophagitis  sigmoid polyp  gastritis    Procedure in Detail:  Informed consent was obtained for the procedure, including sedation. Risks of perforation, hemorrhage, adverse drug reaction, and aspiration were discussed. The patient was placed in the left lateral decubitus position. Based on the pre-procedure assessment, including review of the patient's medical history, medications, allergies, and review of systems, he had been deemed to be an appropriate candidate for moderate sedation; he was therefore sedated with the medications listed above. The patient was monitored continuously with ECG tracing, pulse oximetry, blood pressure monitoring, and direct observations. A rectal examination was performed. The [id number] was inserted into the rectum and advanced under direct vision to the cecum, which was identified by the ileocecal valve and appendiceal orifice. The quality of the colonic preparation was good. A careful inspection was made as the colonoscope was withdrawn, including a retroflexed view of the rectum; findings and interventions are described below. Appropriate photodocumentation was obtained.     Findings:  Rectum: normal  Sigmoid: 3 mm polyp removed with cold biopsy  Descending Colon: normal  Transverse Colon: normal  Ascending Colon: normal  Cecum: normal      Specimens:    colon polyp    EBL: None    Complications: None; patient tolerated the procedure well. Recommendations:     - Await pathology. - If adenoma is present, repeat colonoscopy in 5 years. - High fiber diet. Refer to Dr Dwayne Tovar for evaluation of pancreatic tumor.         Signed By: Shahla Cardenas MD                        February 13, 2020

## 2020-02-13 NOTE — H&P
Colonoscopy History and Physical      The patient was seen and examined. Date of last colonoscopy: none, Polyps  No      The airway was assessed and documented. The problem list, past medical history, and medications were reviewed. Patient Active Problem List   Diagnosis Code    Dysuria R30.0    Essential hypertension with goal blood pressure less than 130/85 I10    Urinary frequency R35.0    Fever R50.9    CAD (coronary artery disease) I25.10    Hyperlipidemia LDL goal <100 E78.5    Vitamin D deficiency E55.9    Visit for preventive health examination Z00.00    Controlled type 2 diabetes mellitus without complication (HCC) M82.5    Bronchitis, acute J20.9    Sinusitis J32.9    Tongue sore K14.6    Tonsillitis J03.90    COPD, moderate (HCC) J44.9    Routine general medical examination at a health care facility Z00.00    Coronary artery disease involving native coronary artery of native heart without angina pectoris I25.10    Chronic ethmoidal sinusitis J32.2    Bilateral impacted cerumen H61.23    Precordial pain R07.2    Costochondritis, acute M94.0    Arthritis of knee, right M17.11    Coronary artery disease involving native coronary artery of native heart with unstable angina pectoris (HCC) I25.110    Hx of long term use of blood thinners Z92.29     Social History     Socioeconomic History    Marital status:      Spouse name: Not on file    Number of children: Not on file    Years of education: Not on file    Highest education level: Not on file   Occupational History    Not on file   Social Needs    Financial resource strain: Not on file    Food insecurity:     Worry: Not on file     Inability: Not on file    Transportation needs:     Medical: Not on file     Non-medical: Not on file   Tobacco Use    Smoking status: Never Smoker    Smokeless tobacco: Never Used   Substance and Sexual Activity    Alcohol use: No     Alcohol/week: 0.0 standard drinks    Drug use:  No  Sexual activity: Never   Lifestyle    Physical activity:     Days per week: Not on file     Minutes per session: Not on file    Stress: Not on file   Relationships    Social connections:     Talks on phone: Not on file     Gets together: Not on file     Attends Anabaptism service: Not on file     Active member of club or organization: Not on file     Attends meetings of clubs or organizations: Not on file     Relationship status: Not on file    Intimate partner violence:     Fear of current or ex partner: Not on file     Emotionally abused: Not on file     Physically abused: Not on file     Forced sexual activity: Not on file   Other Topics Concern    Not on file   Social History Narrative    Not on file     Past Medical History:   Diagnosis Date    CAD (coronary artery disease)     3 [place], [place] ,    Diabetes [facility])     Hypercholesterolemia     Hypertension          Prior to Admission Medications   Prescriptions Last Dose Informant Patient Reported? Taking? Nebulizer & Compressor machine   No No   Si Each by Does Not Apply route every four (4) hours as needed for Cough (wheezing). VITAMIN B-12 5,000 mcg subl   Yes No   albuterol (PROVENTIL VENTOLIN) 2.5 mg /3 mL (0.083 %) nebulizer solution   No No   Sig: 3 mL by Nebulization route every four (4) hours as needed for Wheezing. aspirin delayed-release 81 mg tablet   Yes No   budesonide (PULMICORT) 180 mcg/actuation aepb inhaler   No No   Sig: Take 1 Puff by inhalation two (2) times a day. carvediloL (COREG) 6.25 mg tablet   No No   Sig: TAKE 1 TABLET TWICE DAILY WITH FOOD   diclofenac (VOLTAREN) 1 % gel   No No   Sig: Apply 4 g to affected area four (4) times daily.  Over right side hip joint   fluticasone (FLONASE) 50 mcg/actuation nasal spray   No No   Sig: One spray each nostril   ipratropium-albuterol (COMBIVENT RESPIMAT)  mcg/actuation inhaler   No No   Sig: Take 1 Puff by inhalation every four (4) hours as needed for Wheezing or Shortness of Breath. losartan-hydroCHLOROthiazide (HYZAAR) 50-12.5 mg per tablet   No No   Sig: TAKE 1 TABLET EVERY DAY FOR HYPERTENSION   metFORMIN (GLUCOPHAGE) 500 mg tablet   No No   Sig: Take 1 Tab by mouth two (2) times daily (with meals). montelukast (SINGULAIR) 10 mg tablet   No No   Sig: Take 1 Tab by mouth daily. multivit-min/FA/lycopen/lutein (SENIOR TABS PO)   Yes No   nitroglycerin (NITROSTAT) 0.4 mg SL tablet   No No   Sig: TAKE 1 TABLET AND PLACE UNDER THE TONGUE EVERY 5 MINUTES FOR CHEST PAIN FOR UP TO 3 DOSES   omeprazole (PRILOSEC) 40 mg capsule   No No   Sig: Take 1 Cap by mouth daily. pantoprazole (PROTONIX) 40 mg tablet   Yes No   rosuvastatin (CRESTOR) 10 mg tablet   No No   Sig: TAKE 1 TABLET EVERY DAY   tamsulosin (FLOMAX) 0.4 mg capsule   No No   Sig: TAKE 1 CAPSULE BY MOUTH EVERY DAY AT NIGHT      Facility-Administered Medications: None       The patient was seen and examined in the endoscopy suite. The airway was assessed and docuemented. The problem list and medications were reviewed. Chief complaint, history of present illness, and review of systems and Past medical History are positive for: GERD, dysphagia, HTN, Diabetes and colon cancer screening    The heart, lungs and mental status were satisfactory for the administration of sedation and for the procedure. I discussed with the patient the objectives, risks, consequences and alternatives to the procedure.      Plan: Endoscopic procedure with sedation     Jose Manuel Odonnell MD   2/13/2020  1:19 PM

## 2020-02-13 NOTE — PROCEDURES
[address] 65  [address]                 NAME:  Tanner Grace   :      MRN:   [id number]     Date/Time:  2020 3:41 PM    Esophagogastroduodenoscopy (EGD) Procedure Note    :  Chere Dance, MD    Referring Provider:  Marimar Sky MD    Anethesia/Sedation:  MAC anesthesia Propofol    Preoperative diagnosis: DYSPHAGIA, GASTROESOPHAGEAL REFLUX DISEASE, ESSENTIAL (PRIMARY) HYPERTENSION,TYPE 2 DIABETES MELLITUS    Postoperative diagnosis: esophageal stricture  esophagitis  sigmoid polyp  gastritis    Procedure Details     After infom consent was obtained for the procedure, with all risks and benefits of procedure explained the patient was taken to the endoscopy suite and placed in the left lateral decubitus position. Following sequential administration of sedation as per above, the [id number] gastroscope was inserted into the mouth and advanced under direct vision to second portion of the duodenum. A careful inspection was made as the gastroscope was withdrawn, including a retroflexed view of the proximal stomach; findings and interventions are described below. Findings:  Esophagus:Grade 2 distal esophagitis, stricture at GE junction dilated with 12- 15 mm balloon. Biopsies done. Stomach:Antral gastritis, biopsies done. Duodenum/jejunum:normal      Therapies:  esophageal dilation with 12-15 mm sized balloon    Specimens: esophageal, gastric bx           EBL: None    Complications:   None; patient tolerated the procedure well. Impression:    See Postoperative diagnosis above    Recommendations:  -Acid suppression with a proton pump inhibitor. , -Await pathology. , -Repeat EGD in 3-6 months    Discharge disposition:  Home in the company of  when able to ambulate    Chere Dance, MD

## 2020-02-13 NOTE — DISCHARGE INSTRUCTIONS
[facility].  [address]  [phone]                               DISCHARGE INSTRUCTIONS    Wilder Session  [id number]  1945    DISCOMFORT:  Redness at IV site- apply warm compress to area; if redness or soreness persist- contact your physician  There may be a slight amount of blood passed from the rectum  Gaseous discomfort- walking, belching will help relieve any discomfort  You may not operate a vehicle for 12 hours  You may not  engage in an occupation involving machinery or appliances for rest of today  You may not  drink alcoholic beverages for at least 12 hours  Avoid making any critical decisions for at least 24 hour    DIET:   Regular diet. - however -  remember your colon is empty and a heavy meal will produce gas. Avoid these foods:  vegetables, fried / greasy foods, carbonated drinks for today      ACTIVITY  You may resume your normal daily activities   Spend the remainder of the day resting -  avoid any strenuous activity. CALL M.D. ANY SIGN OF   Increasing pain, nausea, vomiting  Abdominal distension (swelling)  New increased bleeding (oral or rectal)  Fever (chills)  Pain in chest area  Bloody discharge from nose or mouth  Shortness of breath        ENDOSCOPY FINDINGS:   Your endoscopy showed gastritis, esophagitis and esophageal stricture which was dilated with balloon. You may not  take any Advil, Aspirin, Ibuprofen, Motrin, Aleve, or Goodys for 10 days, ONLY  Tylenol as needed for pain. Post procedure diagnosis:  esophageal stricture  esophagitis  sigmoid polyp  gastritis    Follow-up Instructions:   Call Dr. Palma Irwin MD for any questions or problems. If we took a biopsy please call the office within 2 weeks to discuss your   pathology results. Telephone # [phone].  Office appointment in 6 weeks

## 2020-02-17 RX ORDER — METFORMIN HYDROCHLORIDE 500 MG/1
500 TABLET ORAL 2 TIMES DAILY WITH MEALS
Qty: 14 TAB | Refills: 0 | Status: SHIPPED | OUTPATIENT
Start: 2020-02-17 | End: 2020-04-19 | Stop reason: SDUPTHER

## 2020-02-17 NOTE — ANESTHESIA POSTPROCEDURE EVALUATION
Procedure(s):  COLONOSCOPY/EGD  ESOPHAGOGASTRODUODENOSCOPY (EGD)  ESOPHAGOGASTRODUODENAL (EGD) BIOPSY  ESOPHAGEAL DILATION  ENDOSCOPIC POLYPECTOMY.     MAC    <BSHSIANPOST>    Vitals Value Taken Time   BP 90/66 2/13/2020  3:57 PM   Temp     Pulse 80 2/13/2020  3:57 PM   Resp 16 2/13/2020  3:57 PM   SpO2 100 % 2/13/2020  3:57 PM

## 2020-02-17 NOTE — TELEPHONE ENCOUNTER
----- Message from 56Judith Manzanares sent at 2/15/2020  4:34 PM EST -----  Regarding: Dr. Emily Madden (if not patient): n/a  Relationship of caller (if not patient): n/a  Best contact number(s): [phone]  Name of medication and dosage if known: Metformin 500 mg  Is patient out of this medication (yes/no): Yes  Pharmacy name: [facility] listed in chart? (yes/no): Yes  Pharmacy phone number: n/a  Date of last visit: 01/16/2020  Details to clarify the request: Pt needs a refill. .  Requested Prescriptions     Pending Prescriptions Disp Refills    metFORMIN (GLUCOPHAGE) 500 mg tablet 14 Tab 0     Sig: Take 1 Tab by mouth two (2) times daily (with meals).

## 2020-02-20 ENCOUNTER — TELEPHONE (OUTPATIENT)
Dept: FAMILY MEDICINE CLINIC | Age: 75
End: 2020-02-20

## 2020-02-20 NOTE — TELEPHONE ENCOUNTER
----- Message from Rosalind Dee sent at 2/20/2020 10:58 AM EST -----  Regarding: Bala/telephone  Pts daughter in law Isael Peter is requesting his test results. Ms Wong Divers number is [phone].

## 2020-02-21 NOTE — TELEPHONE ENCOUNTER
Call placed to patients daughter in law. Advised she is not on patients PHI so I could not disclose any information she expressed understanding.

## 2020-02-24 NOTE — TELEPHONE ENCOUNTER
----- Message from Gabe Miranda sent at 2/24/2020  2:08 PM EST -----  Regarding: /Telephone  General Message/Vendor Calls    Caller's first and last name:      Reason for call:  Colonoscopy and Endoscopy     Callback required yes/no and why:  Yes, to discuss results of the procedures.      Best contact number(s):  [phone]    Details to clarify the request:      Gabe Miranda

## 2020-02-26 NOTE — TELEPHONE ENCOUNTER
Inbound call from patient patients name and  verified. Patient was requesting results from his Colonoscopy with Dr. Shaggy Zamarripa. Advised patient he would need to contact their office regarding the results. He expressed understanding.

## 2020-03-10 ENCOUNTER — TELEPHONE (OUTPATIENT)
Dept: FAMILY MEDICINE CLINIC | Age: 75
End: 2020-03-10

## 2020-03-10 NOTE — TELEPHONE ENCOUNTER
----- Message from Michelle Iraheta sent at 3/10/2020  7:12 AM EDT -----  Regarding: Dr. Clover Peraza  Appointment not available    Caller's first and last name and relationship to patient (if not the patient):      Best contact number: [phone]      Preferred date and time: Some time Next week after 3:00 PM      Scheduled appointment date and time: No appt scheduled      Reason for appointment: recheck on blood work      Details to clarify the request: Pt needs an appt some time next week after 3:00 PM to have a recheck on blood work     [address] [place] [address] Drive the patent and offered him the first available appt. After 3:30 pm per his request. But he would like something sooner. Please advice.

## 2020-03-11 ENCOUNTER — OFFICE VISIT (OUTPATIENT)
Dept: FAMILY MEDICINE CLINIC | Age: 75
End: 2020-03-11

## 2020-03-11 VITALS
WEIGHT: 150 LBS | RESPIRATION RATE: 18 BRPM | SYSTOLIC BLOOD PRESSURE: 125 MMHG | TEMPERATURE: 98.6 F | HEART RATE: 79 BPM | BODY MASS INDEX: 26.58 KG/M2 | DIASTOLIC BLOOD PRESSURE: 86 MMHG | OXYGEN SATURATION: 98 % | HEIGHT: 63 IN

## 2020-03-11 DIAGNOSIS — B37.0 ORAL THRUSH: Primary | ICD-10-CM

## 2020-03-11 DIAGNOSIS — D49.0 PANCREATIC ENDOCRINE TUMOR: ICD-10-CM

## 2020-03-11 DIAGNOSIS — J30.1 SEASONAL ALLERGIC RHINITIS DUE TO POLLEN: ICD-10-CM

## 2020-03-11 RX ORDER — POLYETHYLENE GLYCOL-3350, SODIUM CHLORIDE, POTASSIUM CHLORIDE AND SODIUM BICARBONATE 420; 11.2; 5.72; 1.48 G/438.4G; G/438.4G; G/438.4G; G/438.4G
POWDER, FOR SOLUTION ORAL
COMMUNITY
Start: 2020-02-10 | End: 2020-03-11 | Stop reason: ALTCHOICE

## 2020-03-11 RX ORDER — AZELASTINE HCL 205.5 UG/1
1 SPRAY NASAL 2 TIMES DAILY
Qty: 1 BOTTLE | Refills: 0 | Status: SHIPPED | OUTPATIENT
Start: 2020-03-11 | End: 2021-04-22 | Stop reason: SDUPTHER

## 2020-03-11 RX ORDER — NYSTATIN 100000 [USP'U]/ML
1 SUSPENSION ORAL 4 TIMES DAILY
Qty: 473 ML | Refills: 0 | Status: SHIPPED | OUTPATIENT
Start: 2020-03-11 | End: 2020-09-11 | Stop reason: ALTCHOICE

## 2020-03-11 RX ORDER — CLINDAMYCIN HYDROCHLORIDE 300 MG/1
300 CAPSULE ORAL 3 TIMES DAILY
Qty: 30 CAP | Refills: 0 | Status: SHIPPED | OUTPATIENT
Start: 2020-03-11 | End: 2020-03-21

## 2020-03-11 NOTE — PATIENT INSTRUCTIONS
Thrush in Children: Care Instructions  Your Care Instructions  Diantha Boros is a yeast infection inside the mouth. It can look like milk, formula, or cottage cheese but is hard to remove. If you scrape the thrush away, the skin underneath may bleed. Your child might get thrush after using antibiotics. Often there is not a specific cause. It sometimes occurs at the same time as a diaper rash. Diantha Boros in infants and young children isn't a serious problem. It usually goes away on its own. Some children may need antifungal medicine. Follow-up care is a key part of your child's treatment and safety. Be sure to make and go to all appointments, and call your doctor if your child is having problems. It's also a good idea to know your child's test results and keep a list of the medicines your child takes. How can you care for your child at home? · Clean bottle nipples and pacifiers regularly in boiling water. · If you are breastfeeding, use an antifungal medicine, such as nystatin (Mycostatin), on your nipples. Dry your nipples after breastfeeding. · If your child is eating solid foods, you can massage plain, unflavored yogurt around the inside of your child's mouth. Check the label to make sure that the yogurt contains live cultures. Yogurt may help healthy bacteria grow in the mouth. These bacteria can stop yeast growth. · Be safe with medicines. Have your child take medicines exactly as prescribed. Call your doctor if you think your child is having a problem with his or her medicine. When should you call for help? Watch closely for changes in your child's health, and be sure to contact your doctor if:    · Your child will not eat or drink.     · You have trouble giving or applying the medicine to your child.     · Your child still has thrush after 7 days.     · Your child gets a new diaper rash.     · Your child is not acting normally.     · Your child has a fever. Where can you learn more?   Go to http://william.info/. Enter V150 in the search box to learn more about \"Thrush in Children: Care Instructions. \"  Current as of: December 12, 2018  Content Version: 12.2  © 7198-6492 [employer]. Care instructions adapted under license by [employer] (which disclaims liability or warranty for this information). If you have questions about a medical condition or this instruction, always ask your healthcare professional. [address] any warranty or liability for your use of this information.

## 2020-03-11 NOTE — PROGRESS NOTES
HISTORY OF PRESENT ILLNESS  Chandrakant Lockwood is a 69 y.o. male. Seen today for oral sores and dryness of mouth. HPI  He had EGD done along with esophageal dilatation last month . Also he had colonoscopy and polypectomy done and result was hyperplastic polyp. He has not followed up with surgeon for pancreatic tumor. As per him, since his EGD, he has noticed extreme dryness of mouth along with painful sores on tongue and palate. He is not sure,if it is from any of her meds. He feels his taste sensation has been altered and also food seems very hot and spicy than usual    Denies weight loss. nReview of Systems   Constitutional: Negative for chills, fever and malaise/fatigue. HENT: Positive for congestion. Negative for ear pain, sore throat and tinnitus. Oral sores   Eyes: Negative for blurred vision, double vision, pain and discharge. Respiratory: Negative for cough, shortness of breath and wheezing. Cardiovascular: Negative for chest pain, palpitations and leg swelling. Gastrointestinal: Negative for abdominal pain, blood in stool, constipation, diarrhea, nausea and vomiting. Genitourinary: Negative for dysuria, frequency, hematuria and urgency. Musculoskeletal: Negative for back pain, joint pain and myalgias. Skin: Negative for rash. Neurological: Negative for dizziness, tremors, seizures and headaches. Endo/Heme/Allergies: Negative for polydipsia. Does not bruise/bleed easily. Psychiatric/Behavioral: Negative for depression and substance abuse. The patient is not nervous/anxious. Physical Exam  Vitals signs and nursing note reviewed. Constitutional:       General: He is not in acute distress. Appearance: He is well-developed. HENT:      Right Ear: Tympanic membrane normal. No decreased hearing noted. No drainage. No middle ear effusion. Tympanic membrane is not injected. Left Ear: Tympanic membrane normal. No decreased hearing noted. No drainage.   No middle ear effusion. Tympanic membrane is not injected. Nose: Mucosal edema present. No rhinorrhea. Right Sinus: Maxillary sinus tenderness and frontal sinus tenderness present. Left Sinus: Maxillary sinus tenderness and frontal sinus tenderness present. Mouth/Throat:      Mouth: Mucous membranes are dry. Tongue: Lesions (erythematous patchy lesions on margins of tongue and palate and also under surface of tongue.) present. Palate: Lesions present. Pharynx: Uvula midline. Posterior oropharyngeal erythema present. No oropharyngeal exudate. Neck:      Musculoskeletal: Normal range of motion and neck supple. Cardiovascular:      Rate and Rhythm: Normal rate and regular rhythm. Heart sounds: Normal heart sounds. No murmur. Pulmonary:      Effort: Pulmonary effort is normal.      Breath sounds: Normal breath sounds. No wheezing or rales. Abdominal:      General: Bowel sounds are normal.      Palpations: Abdomen is soft. Musculoskeletal: Normal range of motion. Lymphadenopathy:      Head:      Right side of head: No submental, submandibular or tonsillar adenopathy. Left side of head: No submental, submandibular or tonsillar adenopathy. Cervical: No cervical adenopathy. Skin:     General: Skin is warm and dry. Neurological:      Mental Status: He is alert and oriented to person, place, and time. Cranial Nerves: No cranial nerve deficit. ASSESSMENT and PLAN  Diagnoses and all orders for this visit:    1. Oral thrush  -     nystatin (MYCOSTATIN) 100,000 unit/mL suspension; Take 5 mL by mouth four (4) times daily. swish and spit  -     clindamycin (CLEOCIN) 300 mg capsule; Take 1 Cap by mouth three (3) times daily for 10 days. 2. Pancreatic endocrine tumor  -     REFERRAL TO GENERAL SURGERY    3. Seasonal allergic rhinitis due to pollen  -     azelastine (ASTEPRO) 0.15 % (205.5 mcg); 1 Spray by Both Nostrils route two (2) times a day.       Discussed lifestyle issues and health guidance given  Patient was given an after visit summary which includes diagnoses, vital signs, current medications, instructions and references & authorized prescriptions . Results of labs will be conveyed to patient, once available. Pt verbalized instructions I provided and expressed understanding of discussion that was held today. Follow-up and Dispositions    · Return if symptoms worsen or fail to improve.

## 2020-03-11 NOTE — PROGRESS NOTES
Chief Complaint   Patient presents with    Other     Patient is in the office today with complaints of very dry mouth, and sneezing. 1. Have you been to the ER, urgent care clinic since your last visit? Hospitalized since your last visit? No    2. Have you seen or consulted any other health care providers outside of the [address] [place] [address] since your last visit? Include any pap smears or colon screening.  No

## 2020-03-27 ENCOUNTER — TELEPHONE (OUTPATIENT)
Dept: FAMILY MEDICINE CLINIC | Age: 75
End: 2020-03-27

## 2020-03-27 NOTE — TELEPHONE ENCOUNTER
Outbound call placed to patient. No answer. Left message for patient to give us a call back.     Per MD, patient needs fasting follow up appointment around 3/30/2020

## 2020-03-27 NOTE — TELEPHONE ENCOUNTER
----- Message from Shira Garcia sent at 3/27/2020  7:47 AM EDT -----  Regarding: Dr. Smiley Grimm Message/Vendor Calls    Caller's first and last name: Michelle Agosto      Reason for call: appt with Dr. Jayshree King required yes/no and why: yes      Best contact number(s): [phone]      Details to clarify the request: Pt stated he was informed by Dr. Elieser Bruce to come in the first week of April to have 3 month f/up. Pt would like someone from the office to call him back       Tori Patrick       Please advice, does pt need VV or OV?

## 2020-03-27 NOTE — TELEPHONE ENCOUNTER
Emmie,   I think he is due for his blood work for diabetes and cholesterol  We can add him on schedule for Monday and fasting appointment if possible  thanks

## 2020-03-30 ENCOUNTER — OFFICE VISIT (OUTPATIENT)
Dept: FAMILY MEDICINE CLINIC | Age: 75
End: 2020-03-30

## 2020-03-30 VITALS
TEMPERATURE: 98.5 F | OXYGEN SATURATION: 98 % | DIASTOLIC BLOOD PRESSURE: 69 MMHG | SYSTOLIC BLOOD PRESSURE: 108 MMHG | HEART RATE: 75 BPM | WEIGHT: 149 LBS | HEIGHT: 63 IN | BODY MASS INDEX: 26.4 KG/M2 | RESPIRATION RATE: 18 BRPM

## 2020-03-30 DIAGNOSIS — I25.10 CORONARY ARTERY DISEASE INVOLVING NATIVE CORONARY ARTERY OF NATIVE HEART WITHOUT ANGINA PECTORIS: Primary | ICD-10-CM

## 2020-03-30 DIAGNOSIS — E78.5 HYPERLIPIDEMIA LDL GOAL <100: ICD-10-CM

## 2020-03-30 DIAGNOSIS — K44.9 HIATAL HERNIA: ICD-10-CM

## 2020-03-30 DIAGNOSIS — I10 ESSENTIAL HYPERTENSION WITH GOAL BLOOD PRESSURE LESS THAN 130/85: ICD-10-CM

## 2020-03-30 DIAGNOSIS — N40.1 BENIGN PROSTATIC HYPERPLASIA WITH URINARY FREQUENCY: ICD-10-CM

## 2020-03-30 DIAGNOSIS — E11.9 CONTROLLED TYPE 2 DIABETES MELLITUS WITHOUT COMPLICATION, WITHOUT LONG-TERM CURRENT USE OF INSULIN (HCC): ICD-10-CM

## 2020-03-30 DIAGNOSIS — K14.4 ATROPHIC GLOSSITIS: ICD-10-CM

## 2020-03-30 DIAGNOSIS — R35.0 BENIGN PROSTATIC HYPERPLASIA WITH URINARY FREQUENCY: ICD-10-CM

## 2020-03-30 DIAGNOSIS — B37.0 ORAL THRUSH: ICD-10-CM

## 2020-03-30 DIAGNOSIS — J44.9 COPD, MODERATE (HCC): ICD-10-CM

## 2020-03-30 RX ORDER — FOLIC ACID 1 MG/1
1 TABLET ORAL DAILY
Qty: 90 TAB | Refills: 0 | Status: SHIPPED | OUTPATIENT
Start: 2020-03-30 | End: 2020-06-21

## 2020-03-30 RX ORDER — OMEPRAZOLE 40 MG/1
40 CAPSULE, DELAYED RELEASE ORAL DAILY
Qty: 90 CAP | Refills: 0 | Status: SHIPPED | OUTPATIENT
Start: 2020-03-30 | End: 2021-04-26 | Stop reason: SDUPTHER

## 2020-03-30 RX ORDER — TAMSULOSIN HYDROCHLORIDE 0.4 MG/1
CAPSULE ORAL
Qty: 90 CAP | Refills: 0 | Status: SHIPPED | OUTPATIENT
Start: 2020-03-30 | End: 2021-01-27 | Stop reason: ALTCHOICE

## 2020-03-30 NOTE — PROGRESS NOTES
Chief Complaint   Patient presents with    Follow-up     Patient is in the office today for a follow up. 1. Have you been to the ER, urgent care clinic since your last visit? Hospitalized since your last visit? No    2. Have you seen or consulted any other health care providers outside of the [address] [place] [address] since your last visit? Include any pap smears or colon screening.  No

## 2020-03-30 NOTE — TELEPHONE ENCOUNTER
----- Message from Mihir Hinton sent at 3/30/2020  8:15 AM EDT -----  Regarding: Dr. Kyle Soriano Message/Vendor Calls    Caller's first and last name:Everton Hernandez      Reason for call: Patient was told by Dr. Maggie Luis to call this morning to discuss health issues      Callback required yes/no and why:yes      Best contact number(s):[phone]      Details to clarify the request:Please call the patient back      Mihir Hinton

## 2020-03-30 NOTE — PROGRESS NOTES
HISTORY OF PRESENT ILLNESS  Eliel Watson is a 69 y.o. male. seen for 4 months follow up on DM, HTN, COPD, CAD along with concern of persistent burning on tongue and loss of taste. HPI  Cardiovascular Review  The patient has hypertension, hyperlipidemia, coronary artery disease and status post coronary artery stenting.  He reports taking medications as instructed, no medication side effects noted, no TIA's, no chest pain on exertion, no dyspnea on exertion, no swelling of ankles, no orthostatic dizziness or lightheadedness, no orthopnea or paroxysmal nocturnal dyspnea, no palpitations, no intermittent claudication symptoms, no muscle aches or pain.  Diet and Lifestyle: generally follows a low fat low cholesterol diet, generally follows a low sodium diet, does not rigorously follow a diabetic diet, exercises regularly, nonsmoker.  Lab review: labs reviewed and discussed with patient.  He had normal Lexican stress test and Echo in 09/2014 and normal EKG in 01/2016  Medicines: Carvedilol 6.25 mg BID, crestor 10 mg  Losartan/Hctz 50/12.5 mg, clonidine as needed  He is s/p Cardiac cath ( 12/17/2018) by Dr Nakul Calero. Records requested. As per patient, he was told cath was reassuring and stents are patent. He was advised to dc Plavix. Is on aspirin only now.   Lab Results   Component Value Date/Time    Cholesterol, total 125 11/05/2019 08:39 AM    HDL Cholesterol 49 11/05/2019 08:39 AM    LDL, calculated 61 11/05/2019 08:39 AM    VLDL, calculated 15 11/05/2019 08:39 AM    Triglyceride 77 11/05/2019 08:39 AM          Endocrine Review  He is seen for diabetes.  Since last visit: no change.  Home glucose monitoring: is performed sporadically, nonfasting values range 100-140.  He reports medication compliance: noncompliant some of the time.  Medication side effects: none.  Diabetic diet compliance: noncompliant some of the time.  Further diabetic ROS: no polyuria or polydipsia, no chest pain, dyspnea or TIA's, no numbness, tingling or pain in extremities, no unusual visual symptoms, no hypoglycemia.  Lab review: labs reviewed and discussed with patient.  Eye exam: due.  ,   Medications: Metformin  500 mg BID  on ACEI/ARBS :yes  On ASA, yes  Podiatry visit: not done     Lab Results   Component Value Date/Time    Hemoglobin A1c 6.2 (H) 11/05/2019 08:39 AM        COPD  Patient complains of cough and shortness of breath. Symptoms began several years ago. Symptoms chronic dyspnea: severity = mild: course of sx: well controlled  able walk 5 blocks  cough: severity: moderate: sputum : clear: light  symptoms worse with exertion. does worsen with exertion. Sputum is clear in small amounts. Fever has been suspected fevers but not measured at home. Patient uses 1 pillows at night. Patient can walk 1 mile  before resting. Patient currently is not on home oxygen therapy. [place] Respiratory history: frequent episodes of bronchitis and COPD  He is on scheduled Fluticasone, Singulair and prn albuterol, but non compliant with treatment.     Urology Review  He is here today because of BPH. He reports his symptoms are well controlled. His symptoms include: nocturia x 2, dysuria, pain on tip of penis. He denies: urinary hesitancy, urinary frequency, double voiding, weak stream, perineal discomfort, hematuria, abdominal pain, flank pain, testicular pain. He is on the following medications: Flomax. He reports the following medication side effects: none. Lab review: labs reviewed and discussed with patient. Lab Results   Component Value Date/Time    Prostate Specific Ag 1.3 08/15/2019 02:35 PM    Prostate Specific Ag 0.8 10/27/2018 09:17 AM    Prostate Specific Ag 1.8 11/04/2017 10:18 AM     As per him, since his EGD, he has noticed extreme dryness of mouth along with painful sores on tongue and palate. He is not sure,if it is from any of her meds.  He feels his taste sensation has been altered and also food seems very hot and spicy than usual. On last OV, he was diagnosed with oral thrush and was started on Nystatin gargles and was also given Rx of Clinda for inflammed sore on tongue. Since he is on clinda , his tongue has been very sensitive . He takes Clinda, just before he goes to bed. Review of Systems   Constitutional: Negative for chills, fever and malaise/fatigue. HENT: Positive for congestion. Negative for ear pain, sore throat and tinnitus. Loss of taste,   Eyes: Negative for blurred vision, double vision, pain and discharge. Respiratory: Negative for cough, shortness of breath and wheezing. Cardiovascular: Negative for chest pain, palpitations and leg swelling. Gastrointestinal: Negative for abdominal pain, blood in stool, constipation, diarrhea, nausea and vomiting. Genitourinary: Negative for dysuria, frequency, hematuria and urgency. Musculoskeletal: Negative for back pain, joint pain and myalgias. Skin: Negative for rash. Neurological: Negative for dizziness, tremors, seizures and headaches. Endo/Heme/Allergies: Negative for polydipsia. Does not bruise/bleed easily. Psychiatric/Behavioral: Negative for depression and substance abuse. The patient is not nervous/anxious. Physical Exam  Vitals signs and nursing note reviewed. Constitutional:       Appearance: He is well-developed. He is not diaphoretic. HENT:      Head: Normocephalic and atraumatic. Right Ear: External ear normal.      Mouth/Throat:      Tongue: Lesions (his previous thrush has been resolved, but tongue has lost pappilae and is very shiny) present. Pharynx: No oropharyngeal exudate. Eyes:      General: No scleral icterus. Conjunctiva/sclera: Conjunctivae normal.      Pupils: Pupils are equal, round, and reactive to light. Neck:      Musculoskeletal: Normal range of motion and neck supple. Thyroid: No thyromegaly. Vascular: No JVD. Cardiovascular:      Rate and Rhythm: Normal rate and regular rhythm.       Heart sounds: Normal heart sounds. No murmur. Pulmonary:      Effort: Pulmonary effort is normal.      Breath sounds: Normal breath sounds. No wheezing. Abdominal:      General: Bowel sounds are normal. There is no distension. Palpations: Abdomen is soft. There is no mass. Musculoskeletal: Normal range of motion. General: No tenderness. Comments: Feet:warm, good capillary refill, no trophic changes or ulcerative lesions, normal DP and PT pulses and normal monofilament exam     Lymphadenopathy:      Cervical: No cervical adenopathy. Skin:     General: Skin is warm and dry. Findings: No rash. Neurological:      Mental Status: He is alert and oriented to person, place, and time. Cranial Nerves: No cranial nerve deficit. Deep Tendon Reflexes: Reflexes are normal and symmetric. Reflexes normal.         ASSESSMENT and PLAN  Diagnoses and all orders for this visit:    1. Coronary artery disease involving native coronary artery of native heart without angina pectoris  -     METABOLIC PANEL, COMPREHENSIVE    2. Essential hypertension with goal blood pressure less than 983/42  -     METABOLIC PANEL, COMPREHENSIVE    3. Controlled type 2 diabetes mellitus without complication, without long-term current use of insulin (HCC)  -     METABOLIC PANEL, COMPREHENSIVE  -     HEMOGLOBIN A1C WITH EAG  -     MICROALBUMIN, UR, RAND W/ MICROALB/CREAT RATIO    4. Hyperlipidemia LDL goal <756  -     METABOLIC PANEL, COMPREHENSIVE  -     LIPID PANEL    5. Benign prostatic hyperplasia with urinary frequency  -     tamsulosin (FLOMAX) 0.4 mg capsule; TAKE 1 CAPSULE BY MOUTH EVERY DAY AT NIGHT    6. COPD, moderate (HCC)  -     CBC WITH AUTOMATED DIFF    7. Oral thrush    8. Atrophic glossitis  -     folic acid (FOLVITE) 1 mg tablet; Take 1 Tab by mouth daily. 9. Hiatal hernia  -     omeprazole (PRILOSEC) 40 mg capsule; Take 1 Cap by mouth daily.     Discussed lifestyle issues and health guidance given  Recommended to stop Clinda  Patient was given an after visit summary which includes diagnoses, vital signs, current medications, instructions and references & authorized prescriptions . Results of labs will be conveyed to patient, once available. Pt verbalized instructions I provided and expressed understanding of discussion that was held today.

## 2020-03-30 NOTE — TELEPHONE ENCOUNTER
----- Message from Zoe Mckeon sent at 3/30/2020  8:35 AM EDT -----  Regarding: Dr. Dariana Keene      Patient received message from Dr. Crow Haney regarding medication. Please have nurse to follow up with patient to clarify Dr Claudetta Footman instructions left for him on 3/27/20. Best contact number is [phone].

## 2020-03-30 NOTE — TELEPHONE ENCOUNTER
[phone] attempted to call patient but don't want to talk to me patient wants to talk to Dr Fabian Upton only I advised will send message to Dr. Fabian Upton

## 2020-03-30 NOTE — PATIENT INSTRUCTIONS
Learning About Chronic Bronchitis  What is chronic bronchitis? Chronic bronchitis is long-term swelling and the buildup of mucus in the airways of your lungs. The airways (bronchial tubes) get inflamed and make a lot of mucus. This can narrow or block the airways, making it hard for you to breathe. It is a form of COPD (chronic obstructive pulmonary disease). Chronic bronchitis is usually caused by smoking. But chemical fumes, dust, or air pollution also can cause it over time. What can you expect when you have chronic bronchitis? Chronic bronchitis gets worse over time. You cannot undo the damage to your lungs. Over time, you may find that:  · You get short of breath even when you do simple things like get dressed or fix a meal.  · It is hard to eat or exercise. · You lose weight and feel weaker. Over many years, the swelling and mucus from chronic bronchitis make it more likely that you will get lung infections. But there are things you can do to prevent more damage and feel better. What are the symptoms? The main symptoms of chronic bronchitis are:  · A cough that will not go away. · Mucus that comes up when you cough. · Shortness of breath that gets worse when you exercise. At times, your symptoms may suddenly flare up and get much worse. This is a called an exacerbation (say \"egg-ZAYUNG-er-BAY-luis felpie\"). When this happens, your usual symptoms quickly get worse and stay bad. This can be dangerous. You may have to go to the hospital.  How can you keep chronic bronchitis from getting worse? Don't smoke. That is the best way to keep chronic bronchitis from getting worse. If you already smoke, it is never too late to stop. If you need help quitting, talk to your doctor about stop-smoking programs and medicines. These can increase your chances of quitting for good. You can do other things to keep chronic bronchitis from getting worse:  · Avoid bad air.  Air pollution, chemical fumes, and dust also can make chronic bronchitis worse. · Get a flu shot every year. A shot may keep the flu from turning into something more serious, like pneumonia. A flu shot also may lower your chances of having a flare-up. · Get a pneumococcal shot. A shot can prevent some of the serious complications of pneumonia. Ask your doctor how often you should get this shot. How is chronic bronchitis treated? Chronic bronchitis is treated with medicines and oxygen. You also can take steps at home to stay healthy and keep your condition from getting worse. Medicines and oxygen therapy  · You may be taking medicines such as:  ? Bronchodilators. These help open your airways and make breathing easier. Bronchodilators are either short-acting (work for 6 to 9 hours) or long-acting (work for 24 hours). You inhale most bronchodilators, so they start to act quickly. Always carry your quick-relief inhaler with you in case you need it while you are away from home. ? Corticosteroids. These reduce airway inflammation. They come in pill or inhaled form. You must take these medicines every day for them to work well. ? Antibiotics. These medicines are used when you have a bacterial lung infection. · Take your medicines exactly as prescribed. Call your doctor if you think you are having a problem with your medicine. · Oxygen therapy boosts the amount of oxygen in your blood and helps you breathe easier. Use the flow rate your doctor has recommended, and do not change it without talking to your doctor first.  Other care at home  · If your doctor recommends it, get more exercise. Walking is a good choice. Bit by bit, increase the amount you walk every day. Try for at least 30 minutes on most days of the week. · Learn breathing methods--such as breathing through pursed lips--to help you become less short of breath. · If your doctor has not set you up with a pulmonary rehabilitation program, talk to him or her about whether rehab is right for you.  Rehab includes exercise programs, education about your disease and how to manage it, help with diet and other changes, and emotional support. · Eat regular, healthy meals. Use bronchodilators about 1 hour before you eat to make it easier to eat. Eat several small meals instead of three large ones. Drink beverages at the end of the meal. Avoid foods that are hard to chew. Follow-up care is a key part of your treatment and safety. Be sure to make and go to all appointments, and call your doctor if you are having problems. It's also a good idea to know your test results and keep a list of the medicines you take. Where can you learn more? Go to http://lv-shelbi.info/  Enter E857 in the search box to learn more about \"Learning About Chronic Bronchitis. \"  Current as of: June 9, 2019Content Version: 12.4  © 5604-1726 [employer]. Care instructions adapted under license by [employer] (which disclaims liability or warranty for this information). If you have questions about a medical condition or this instruction, always ask your healthcare professional. [address] any warranty or liability for your use of this information.

## 2020-04-01 LAB
ALBUMIN SERPL-MCNC: 4.2 G/DL (ref 3.7–4.7)
ALBUMIN/CREAT UR: <4 MG/G CREAT (ref 0–29)
ALBUMIN/GLOB SERPL: 1.4 {RATIO} (ref 1.2–2.2)
ALP SERPL-CCNC: 67 IU/L (ref 39–117)
ALT SERPL-CCNC: 14 IU/L (ref 0–44)
AST SERPL-CCNC: 17 IU/L (ref 0–40)
BASOPHILS # BLD AUTO: 0 X10E3/UL (ref 0–0.2)
BASOPHILS NFR BLD AUTO: 1 %
BILIRUB SERPL-MCNC: 0.5 MG/DL (ref 0–1.2)
BUN SERPL-MCNC: 11 MG/DL (ref 8–27)
BUN/CREAT SERPL: 10 (ref 10–24)
CALCIUM SERPL-MCNC: 9.4 MG/DL (ref 8.6–10.2)
CHLORIDE SERPL-SCNC: 99 MMOL/L (ref 96–106)
CHOLEST SERPL-MCNC: 118 MG/DL (ref 100–199)
CO2 SERPL-SCNC: 24 MMOL/L (ref 20–29)
CREAT SERPL-MCNC: 1.1 MG/DL (ref 0.76–1.27)
CREAT UR-MCNC: 70 MG/DL
EOSINOPHIL # BLD AUTO: 0.4 X10E3/UL (ref 0–0.4)
EOSINOPHIL NFR BLD AUTO: 7 %
ERYTHROCYTE [DISTWIDTH] IN BLOOD BY AUTOMATED COUNT: 12.7 % (ref 11.6–15.4)
EST. AVERAGE GLUCOSE BLD GHB EST-MCNC: 137 MG/DL
GLOBULIN SER CALC-MCNC: 2.9 G/DL (ref 1.5–4.5)
GLUCOSE SERPL-MCNC: 117 MG/DL (ref 65–99)
HBA1C MFR BLD: 6.4 % (ref 4.8–5.6)
HCT VFR BLD AUTO: 38.3 % (ref 37.5–51)
HDLC SERPL-MCNC: 46 MG/DL
HGB BLD-MCNC: 13.4 G/DL (ref 13–17.7)
IMM GRANULOCYTES # BLD AUTO: 0 X10E3/UL (ref 0–0.1)
IMM GRANULOCYTES NFR BLD AUTO: 0 %
INTERPRETATION, 910389: NORMAL
LDLC SERPL CALC-MCNC: 54 MG/DL (ref 0–99)
LYMPHOCYTES # BLD AUTO: 1.7 X10E3/UL (ref 0.7–3.1)
LYMPHOCYTES NFR BLD AUTO: 29 %
MCH RBC QN AUTO: 30.5 PG (ref 26.6–33)
MCHC RBC AUTO-ENTMCNC: 35 G/DL (ref 31.5–35.7)
MCV RBC AUTO: 87 FL (ref 79–97)
MICROALBUMIN UR-MCNC: <3 UG/ML
MONOCYTES # BLD AUTO: 0.5 X10E3/UL (ref 0.1–0.9)
MONOCYTES NFR BLD AUTO: 9 %
NEUTROPHILS # BLD AUTO: 3.3 X10E3/UL (ref 1.4–7)
NEUTROPHILS NFR BLD AUTO: 54 %
PLATELET # BLD AUTO: 293 X10E3/UL (ref 150–450)
POTASSIUM SERPL-SCNC: 4.4 MMOL/L (ref 3.5–5.2)
PROT SERPL-MCNC: 7.1 G/DL (ref 6–8.5)
RBC # BLD AUTO: 4.39 X10E6/UL (ref 4.14–5.8)
SODIUM SERPL-SCNC: 140 MMOL/L (ref 134–144)
TRIGL SERPL-MCNC: 92 MG/DL (ref 0–149)
VLDLC SERPL CALC-MCNC: 18 MG/DL (ref 5–40)
WBC # BLD AUTO: 6 X10E3/UL (ref 3.4–10.8)

## 2020-04-01 NOTE — PROGRESS NOTES
Tahir Crawford,  I have reviewed your results  Blood count,kidney,liver,cholesterol results are excellent. Sugar is borderline up, please make sure you take Metformin very regularly and watch diet strictly  Let me know if you have any questions.   thanks

## 2020-04-07 ENCOUNTER — OFFICE VISIT (OUTPATIENT)
Dept: SURGERY | Age: 75
End: 2020-04-07

## 2020-04-07 VITALS
OXYGEN SATURATION: 96 % | DIASTOLIC BLOOD PRESSURE: 82 MMHG | RESPIRATION RATE: 18 BRPM | WEIGHT: 148.2 LBS | HEART RATE: 79 BPM | HEIGHT: 64 IN | BODY MASS INDEX: 25.3 KG/M2 | SYSTOLIC BLOOD PRESSURE: 121 MMHG | TEMPERATURE: 98.7 F

## 2020-04-07 DIAGNOSIS — K86.89 MASS OF PANCREAS: Primary | ICD-10-CM

## 2020-04-07 NOTE — LETTER
4/7/20 Patient: Chari Ray YOB: 1945 Date of Visit: 4/7/2020 Jeny Mai MD 
[address] [facility] [place] [facility] [id number] VIA In Basket Dear Jeny Mai MD, Thank you for referring Mr. Zion Watson to [facility] for evaluation. My notes for this consultation are attached. If you have questions, please do not hesitate to call me. I look forward to following your patient along with you.  
 
 
Sincerely, 
 
Eliseo Vieira MD

## 2020-04-07 NOTE — PROGRESS NOTES
[facility] History and Physical    Chief Complaint: Pancreatic tail mass    History of Present Illness:      Dulce Gunter is a 69 y.o. male who was kindly referred by Dr. Cristina Holstein for evaluation of a pancreatic tail mass. The patient had complaints of worsening acid reflux disease and thus underwent a CT scan. This demonstrated a possible enhancing lesion in the tail of the pancreas which was thought to potentially represent an intrapancreatic splenule. This was further evaluated with MRI where this lesion was again identified. This measured 14 mm in greatest diameter and enhanced but not exactly like the spleen. This gives suspicion of possibly a pancreatic neuroendocrine tumor. It was not felt that this looked like a pancreatic adenocarcinoma given the imaging characteristics. The patient has had a EGD and colonoscopy by Dr. Drake Dang that were otherwise fairly unremarkable. He denies any other GI symptoms or complaints. He is not losing weight. He is eating well. He is an established diabetic but states that this is stable. He denies any diarrhea or flushing.     Past Medical History:   Diagnosis Date    Acid indigestion     CAD (coronary artery disease)     3 stents, done one month apart in [facility] in 2007,    Diabetes [facility])     Heart disease     Hypercholesterolemia     Hypertension        Past Surgical History:   Procedure Laterality Date    CARDIAC SURG PROCEDURE UNLIST      stents, 3    COLONOSCOPY N/A 2/13/2020    COLONOSCOPY/EGD performed by Jo-Ann Vieira MD at [address] [place] [address] HX PTCA      2007    HX TONSILLECTOMY      VASCULAR SURGERY PROCEDURE UNLIST      angioplasty       Social History     Socioeconomic History    Marital status:      Spouse name: Not on file    Number of children: Not on file    Years of education: Not on file    Highest education level: Not on file   Occupational History    Not on file   Social Needs    Financial resource strain: Not on file    Food insecurity     Worry: Not on file     Inability: Not on file    Transportation needs     Medical: Not on file     Non-medical: Not on file   Tobacco Use    Smoking status: Never Smoker    Smokeless tobacco: Never Used   Substance and Sexual Activity    Alcohol use: No     Alcohol/week: 0.0 standard drinks    Drug use: No    Sexual activity: Never   Lifestyle    Physical activity     Days per week: Not on file     Minutes per session: Not on file    Stress: Not on file   Relationships    Social connections     Talks on phone: Not on file     Gets together: Not on file     Attends Christian service: Not on file     Active member of club or organization: Not on file     Attends meetings of clubs or organizations: Not on file     Relationship status: Not on file    Intimate partner violence     Fear of current or ex partner: Not on file     Emotionally abused: Not on file     Physically abused: Not on file     Forced sexual activity: Not on file   Other Topics Concern    Not on file   Social History Narrative    Not on file       Family History   Problem Relation Age of Onset    No Known Problems Mother     No Known Problems Father          Current Outpatient Medications:     folic acid (FOLVITE) 1 mg tablet, Take 1 Tab by mouth daily. , Disp: 90 Tab, Rfl: 0    tamsulosin (FLOMAX) 0.4 mg capsule, TAKE 1 CAPSULE BY MOUTH EVERY DAY AT NIGHT, Disp: 90 Cap, Rfl: 0    omeprazole (PRILOSEC) 40 mg capsule, Take 1 Cap by mouth daily. , Disp: 90 Cap, Rfl: 0    nystatin (MYCOSTATIN) 100,000 unit/mL suspension, Take 5 mL by mouth four (4) times daily. swish and spit, Disp: 473 mL, Rfl: 0    azelastine (ASTEPRO) 0.15 % (205.5 mcg), 1 Spray by Both Nostrils route two (2) times a day., Disp: 1 Bottle, Rfl: 0    metFORMIN (GLUCOPHAGE) 500 mg tablet, Take 1 Tab by mouth two (2) times daily (with meals). , Disp: 14 Tab, Rfl: 0    rosuvastatin (CRESTOR) 10 mg tablet, TAKE 1 TABLET EVERY DAY, Disp: 90 Tab, Rfl: 1    carvediloL (COREG) 6.25 mg tablet, TAKE 1 TABLET TWICE DAILY WITH FOOD, Disp: 180 Tab, Rfl: 0    ipratropium-albuterol (COMBIVENT RESPIMAT)  mcg/actuation inhaler, Take 1 Puff by inhalation every four (4) hours as needed for Wheezing or Shortness of Breath., Disp: 3 Inhaler, Rfl: 0    losartan-hydroCHLOROthiazide (HYZAAR) 50-12.5 mg per tablet, TAKE 1 TABLET EVERY DAY FOR HYPERTENSION, Disp: 90 Tab, Rfl: 0    diclofenac (VOLTAREN) 1 % gel, Apply 4 g to affected area four (4) times daily. Over right side hip joint, Disp: 300 g, Rfl: 0    Nebulizer & Compressor machine, 1 Each by Does Not Apply route every four (4) hours as needed for Cough (wheezing). , Disp: 1 Each, Rfl: 0    aspirin delayed-release 81 mg tablet, , Disp: , Rfl:     multivit-min/FA/lycopen/lutein (SENIOR TABS PO), , Disp: , Rfl:     VITAMIN B-12 5,000 mcg subl, , Disp: , Rfl:     albuterol (PROVENTIL VENTOLIN) 2.5 mg /3 mL (0.083 %) nebulizer solution, 3 mL by Nebulization route every four (4) hours as needed for Wheezing., Disp: 24 Each, Rfl: 0    nitroglycerin (NITROSTAT) 0.4 mg SL tablet, TAKE 1 TABLET AND PLACE UNDER THE TONGUE EVERY 5 MINUTES FOR CHEST PAIN FOR UP TO 3 DOSES, Disp: 25 Tab, Rfl: 0    montelukast (SINGULAIR) 10 mg tablet, Take 1 Tab by mouth daily. , Disp: 30 Tab, Rfl: 3    fluticasone (FLONASE) 50 mcg/actuation nasal spray, One spray each nostril, Disp: 1 Bottle, Rfl: 2    budesonide (PULMICORT) 180 mcg/actuation aepb inhaler, Take 1 Puff by inhalation two (2) times a day., Disp: 1 Inhaler, Rfl: 3    No Known Allergies    ROS   Constitutional: Negative  Ears, Nose, Mouth, Throat, and Face: Negative  Respiratory: Negative  Cardiovascular: Negative  Gastrointestinal: As in HPI  Genitourinary: Negative  Integument/Breast: Negative  Hematologic/Lymphatic: Negative  Behavioral/Psychiatric: Negative  Allergic/Immunologic: Negative      Physical Exam:     Visit Vitals  /82 (BP 1 Location: Left arm, BP Patient Position: Sitting)   Pulse 79   Temp 98.7 °F (37.1 °C) (Oral)   Resp 18   Ht 5' 4\" (1.626 m)   Wt 148 lb 3.2 oz (67.2 kg)   SpO2 96%   BMI 25.44 kg/m²       General - alert and oriented, no apparent distress  HEENT - NC/AT. No scleral icterus  Pulm - CTAB, normal inspiratory effort  CV - RRR, no M/R/G  Abd - soft, NT, ND. No palpable masses organomegaly noted. Ext - warm, well perfused, no edema  Lymphatics - no cervical, supraclavicular or inguinal adenopathy noted. Skin - supple, no rashes  Psychiatric - normal affect, good mood    Labs  None    Imaging  MRI Results (most recent):  Results from Hospital Encounter encounter on 01/27/20   MRI ABD W MRCP W CONT    Narrative Procedure: MRI of the abdomen with MRCP    DATE: 1/27/2020 7:51 PM    TECHNIQUE: Multiplanar MRI of the abdomen was performed with and without  contrast including T2-weighted fat-sat and nonfat sat images, axial in phase and  out of phase imaging, and multiphase postcontrast imaging as well as 3-D MRCP. Contrast: 8 mL of Gadavist    COMPARISON: CT dated 5/2/2019    INDICATION: pancreatic lesion    FINDINGS:    Liver: No focal liver masses are identified. Gallbladder: Unremarkable    Biliary tree: Unremarkable    Spleen: Unremarkable    Pancreas: In the tail the pancreas, there is a 14 mm x 10 mm lesion which  demonstrates arterial enhancement and persistent enhancement on the delayed  images. This lesion demonstrates slightly increased T2 signal on T2 fat sat  images and corresponds to the lesion noted on previous CT. Adrenals: Unremarkable    Kidneys: Small T2 hyperintense lesions in the left kidney and larger T2  hyperintense lesion in the right kidney all which demonstrate no enhancement  most likely representing simple cysts. Vascular: No evidence of aneurysm    Soft tissues and osseous structures: Moderate size hiatal hernia. Impression IMPRESSION:    1.   Enhancing lesion in the tail the pancreas. Although this may represent an  intrapancreatic splenule, the lesion appears to enhance more than the adjacent  spleen particularly on the CT and early arterial phase MRI. This may represent a  small neuroendocrine tumor. Lesion may be beneath the resolution of an  octreotide scan which can be attempted. Alternatively, short-term follow-up MRI  can be obtained to document stability. I have reviewed and agree with all of the pertinent images    Assessment:     Chantale Christianson is a 69 y.o. male with an enhancing pancreatic tail lesion. The etiology of this is not completely clear. Recommendations:     1. I discussed the potential diagnoses with the patient. If this is a intrapancreatic splenule then no further follow-up or work-up would be necessary. If this is a pancreatic neuroendocrine tumor I discussed options of either surveillance of this given the size less than 2 cm in a patient over 79years of age versus potential need for surgical resection with a distal pancreatectomy. He does not appear to have any symptomatic functional component if this is a pancreatic neuroendocrine tumor. A liver/spleen scan may help identify if this is a intrapancreatic splenule but given the small size I think the sensitivity of this would be low. I will discuss the case with Dr. Veronica Jaramillo to see if this would potentially be amenable to an endoscopic ultrasound and biopsy for definitive diagnosis. I will plan to see the patient back after endoscopic ultrasound to discuss a further plan going forward. 45 mins of time was spent with the patient of which > 50% of the time involved face-to-face counseling of the patient regarding the proposed treatment plan.       Lucy Bello MD  4/7/2020    CC: Hershell Denver, MD Dr. Ozzie Matters Dr. Dannis Gaul

## 2020-04-07 NOTE — PROGRESS NOTES
1. Have you been to the ER, urgent care clinic since your last visit? Hospitalized since your last visit? No    2. Have you seen or consulted any other health care providers outside of the [address] [place] [address] since your last visit? Include any pap smears or colon screening.  No]

## 2020-04-08 NOTE — PROGRESS NOTES
Outbound call to patient. Name and  verified. Reviewed recent labs with patient he expressed understanding.

## 2020-04-18 DIAGNOSIS — I25.10 CORONARY ARTERY DISEASE INVOLVING NATIVE CORONARY ARTERY OF NATIVE HEART WITHOUT ANGINA PECTORIS: Chronic | ICD-10-CM

## 2020-04-19 RX ORDER — CARVEDILOL 6.25 MG/1
TABLET ORAL
Qty: 180 TAB | Refills: 0 | Status: SHIPPED | OUTPATIENT
Start: 2020-04-19 | End: 2020-11-09

## 2020-04-19 RX ORDER — METFORMIN HYDROCHLORIDE 500 MG/1
TABLET ORAL
Qty: 180 TAB | Refills: 1 | Status: SHIPPED | OUTPATIENT
Start: 2020-04-19 | End: 2020-07-18

## 2020-05-20 DIAGNOSIS — I10 ESSENTIAL HYPERTENSION WITH GOAL BLOOD PRESSURE LESS THAN 130/85: ICD-10-CM

## 2020-05-20 RX ORDER — LOSARTAN POTASSIUM AND HYDROCHLOROTHIAZIDE 12.5; 5 MG/1; MG/1
TABLET ORAL
Qty: 90 TAB | Refills: 0 | Status: SHIPPED | OUTPATIENT
Start: 2020-05-20 | End: 2020-05-26 | Stop reason: SDUPTHER

## 2020-05-26 DIAGNOSIS — I10 ESSENTIAL HYPERTENSION WITH GOAL BLOOD PRESSURE LESS THAN 130/85: ICD-10-CM

## 2020-05-26 NOTE — TELEPHONE ENCOUNTER
Patient request new prescriptions to be sent to [facility] for Hyzaar 50-12.5 mg and Crestor 10 mg. Previous prescriptions where sent to [facility]. Please review and sign if appropriate. Thank you. Last visit 03/30/2020 MD Gina Campuzano   Next appointment None   Previous refill encounter(s) 02/11/2020 Crestor #90 with 1 refill, 05/20/2020 Hyzaar #90     Requested Prescriptions     Pending Prescriptions Disp Refills    losartan-hydroCHLOROthiazide (HYZAAR) 50-12.5 mg per tablet 90 Tab 0     Sig: Take 1 Tab by mouth daily. For hypertension    rosuvastatin (CRESTOR) 10 mg tablet 90 Tab 0     Sig: Take 1 Tab by mouth daily.

## 2020-05-26 NOTE — TELEPHONE ENCOUNTER
----- Message from Cristine Hudson sent at 5/23/2020  2:25 PM EDT -----  Regarding: Brittany Lockhart/refill  Patient called to request refills for his \"Losartan\" and \"Crestor\" be sent to the [facility] pharmacy on file.  Best contact number is [phone]

## 2020-05-26 NOTE — TELEPHONE ENCOUNTER
Pt req refill    .   Requested Prescriptions     Pending Prescriptions Disp Refills    losartan-hydroCHLOROthiazide (HYZAAR) 50-12.5 mg per tablet 90 Tab 0    rosuvastatin (CRESTOR) 10 mg tablet 90 Tab 1             BCB#[phone]

## 2020-05-27 RX ORDER — ROSUVASTATIN CALCIUM 10 MG/1
10 TABLET, COATED ORAL DAILY
Qty: 90 TAB | Refills: 0 | Status: SHIPPED | OUTPATIENT
Start: 2020-05-27 | End: 2020-08-21 | Stop reason: SDUPTHER

## 2020-05-27 RX ORDER — LOSARTAN POTASSIUM AND HYDROCHLOROTHIAZIDE 12.5; 5 MG/1; MG/1
1 TABLET ORAL DAILY
Qty: 90 TAB | Refills: 0 | Status: SHIPPED | OUTPATIENT
Start: 2020-05-27 | End: 2020-08-21 | Stop reason: SDUPTHER

## 2020-06-21 DIAGNOSIS — K14.4 ATROPHIC GLOSSITIS: ICD-10-CM

## 2020-06-21 RX ORDER — FOLIC ACID 1 MG/1
TABLET ORAL
Qty: 90 TAB | Refills: 0 | Status: SHIPPED | OUTPATIENT
Start: 2020-06-21 | End: 2020-09-22 | Stop reason: SDUPTHER

## 2020-08-04 RX ORDER — OMEGA-3-ACID ETHYL ESTERS 1 G/1
1 CAPSULE, LIQUID FILLED ORAL 2 TIMES DAILY WITH MEALS
Qty: 180 CAP | Refills: 1 | Status: SHIPPED | OUTPATIENT
Start: 2020-08-04 | End: 2020-10-16 | Stop reason: SDUPTHER

## 2020-08-04 NOTE — TELEPHONE ENCOUNTER
"    Preventive Care at the 3 Year Visit  Growth Measurements & Percentiles  Head Circumference: 19.8\" (50.3 cm) (87 %, Source: CDC 0-36 Months) 87 %ile based on [facility] 0-36 Months head circumference-for-age data using vitals from 10/26/2017.   Weight: 27 lbs 6 oz / 12.4 kg (actual weight) / 42 %ile based on CDC 2-20 Years weight-for-age data using vitals from 10/26/2017.   Length: 2' 11\" / 88.9 cm 75 %ile based on CDC 2-20 Years stature-for-age data using vitals from 10/26/2017.   Weight for length: 28 %ile based on [facility] 2-20 Years weight-for-recumbent length data using vitals from 10/26/2017.    Your child s next Preventive Check-up will be at 4 years of age    Development  At this age, your child may:    climb and go down steps alone, one step at a time, holding the railing or holding someone s hand    open doors and climb on furniture    use a cup and spoon well    kick a ball    throw a ball overhand    take off clothing    stack five or six blocks    have a vocabulary of at least 20 to 50 words, make two-word phrases and call himself by name    respond to two-part verbal commands    show interest in toilet training    enjoy imitating adults    show interest in helping get dressed, and washing and drying his hands    use toys well    Feeding Tips    Let your child feed himself.  It will be messy, but this is another step toward independence.    Give your child healthy snacks like fruits and vegetables.    Do not to let your child eat non-food things such as dirt, rocks or paper.  Call the clinic if your child will not stop this behavior.    Sleep    You may move your child from a crib to a regular bed, however, do not rush this until your child is ready.  This is important if your child climbs out of the crib.    Your child may or may not take naps.  If your toddler does not nap, you may want to start a  quiet time.     He or she may  fight  sleep as a way of controlling his or her surroundings. Continue your " MD Steven Puga,    [facility] is requesting an urgent rx for Omega-3 Acid ethylesters 1g capsule. Noted that therapy was completed 9/3/19. Not on current med list.  Attached requested rx if appropriate. Request for 1g 4 times daily. Please review. ThanksZeinab    Last Visit: 3/30/20  MD Steven Puga  Next Appointment: Not scheduled  Previous Refill Encounter(s): 2/6/19  D/c'd 9/3/19    Requested Prescriptions     Pending Prescriptions Disp Refills    omega-3 acid ethyl esters (LOVAZA) 1 gram capsule 360 Cap 0     Sig: Take 1 Cap by mouth four (4) times daily. regular nighttime routine: bath, brushing teeth and reading. This will help your child take charge of the nighttime process.    Praise your child for positive behavior.    Let your child talk about nightmares.  Provide comfort and reassurance.    If your toddler has night terrors, he may cry, look terrified, be confused and look glassy-eyed.  This typically occurs during the first half of the night and can last up to 15 minutes.  Your toddler should fall asleep after the episode.  It s common if your toddler doesn t remember what happened in the morning.  Night terrors are not a problem.  Try to not let your toddler get too tired before bed.      Safety    Use an approved toddler car seat every time your child rides in the car.   At [age] years of age, you may turn the car seat to face forward.  The seat must still be in the back seat.  Every child needs to be in the back seat through age 12.    Keep all medicines, cleaning supplies and poisons out of your child s reach.  Call the poison control center or your health care provider for directions in case your child swallows poison.    Put the poison control number on all phones:  1-500.511.3685.    Use sunscreen with a SPF of more than 15 when your toddler is outside.    Do not let your child play with plastic bags or latex balloons.    Always watch your child when playing outside near a street.    Make a safe play area, if possible.    Always watch your child near water.    Do not let your child run around while eating.  This will prevent choking.    Give your child safe toys.  Do not let him or her play with toys that have small or sharp parts.    Never leave your child alone in the bathtub or near water.    Do not leave your child alone in the car, even if he or she is asleep.    What Your Toddler Needs    Make sure your child is getting consistent discipline at home and at day care.  Talk with your  provider if this isn t the case.    If you choose to use   time-out,  calmly but firmly tell your child why they are in time-out.  Time-out should be immediate.  The time-out spot should be non-threatening (for example - sit on a step).  You can use a timer that beeps at one minute, or ask your child to  come back when you are ready to say sorry.   Treat your child normally when the time-out is over.    Limit screen time (TV, computer, video games) to less than 2 hours per day.    Dental Care    Brush your child s teeth one to two times each day with a soft-bristled toothbrush.    Use a small amount (no more than pea size) of fluoridated toothpaste two times daily.    Let your child play with the toothbrush after brushing.    Your pediatric provider will speak with you regarding the need to make regular dental appointments for cleanings and check-ups starting when your child s first tooth appears.  (Your child may need fluoride supplements if you have well water.)

## 2020-08-21 ENCOUNTER — TELEPHONE (OUTPATIENT)
Dept: FAMILY MEDICINE CLINIC | Age: 75
End: 2020-08-21

## 2020-08-21 DIAGNOSIS — I10 ESSENTIAL HYPERTENSION WITH GOAL BLOOD PRESSURE LESS THAN 130/85: ICD-10-CM

## 2020-08-21 RX ORDER — LOSARTAN POTASSIUM AND HYDROCHLOROTHIAZIDE 12.5; 5 MG/1; MG/1
1 TABLET ORAL DAILY
Qty: 90 TAB | Refills: 1 | Status: SHIPPED | OUTPATIENT
Start: 2020-08-21 | End: 2021-02-22 | Stop reason: ALTCHOICE

## 2020-08-21 RX ORDER — ROSUVASTATIN CALCIUM 10 MG/1
10 TABLET, COATED ORAL DAILY
Qty: 90 TAB | Refills: 1 | Status: SHIPPED | OUTPATIENT
Start: 2020-08-21 | End: 2020-12-21 | Stop reason: SDUPTHER

## 2020-08-21 NOTE — TELEPHONE ENCOUNTER
Patient called and left VM on personal phone that he needs Rx to be send to [facility]  Rx sent as per request

## 2020-09-02 DIAGNOSIS — E11.9 CONTROLLED TYPE 2 DIABETES MELLITUS WITHOUT COMPLICATION, WITHOUT LONG-TERM CURRENT USE OF INSULIN (HCC): Primary | ICD-10-CM

## 2020-09-02 RX ORDER — METFORMIN HYDROCHLORIDE 500 MG/1
TABLET ORAL
Qty: 180 TAB | Refills: 0 | Status: SHIPPED | OUTPATIENT
Start: 2020-09-02 | End: 2021-04-26 | Stop reason: SDUPTHER

## 2020-09-11 ENCOUNTER — OFFICE VISIT (OUTPATIENT)
Dept: FAMILY MEDICINE CLINIC | Age: 75
End: 2020-09-11
Payer: MEDICARE

## 2020-09-11 VITALS
DIASTOLIC BLOOD PRESSURE: 67 MMHG | WEIGHT: 150 LBS | BODY MASS INDEX: 25.61 KG/M2 | RESPIRATION RATE: 16 BRPM | HEIGHT: 64 IN | OXYGEN SATURATION: 97 % | SYSTOLIC BLOOD PRESSURE: 116 MMHG | HEART RATE: 66 BPM | TEMPERATURE: 98.6 F

## 2020-09-11 DIAGNOSIS — Z23 ENCOUNTER FOR IMMUNIZATION: ICD-10-CM

## 2020-09-11 DIAGNOSIS — E78.5 HYPERLIPIDEMIA LDL GOAL <100: ICD-10-CM

## 2020-09-11 DIAGNOSIS — R35.0 BENIGN PROSTATIC HYPERPLASIA WITH URINARY FREQUENCY: ICD-10-CM

## 2020-09-11 DIAGNOSIS — K59.04 CHRONIC IDIOPATHIC CONSTIPATION: ICD-10-CM

## 2020-09-11 DIAGNOSIS — M16.12 ARTHRITIS OF LEFT HIP: ICD-10-CM

## 2020-09-11 DIAGNOSIS — I10 ESSENTIAL HYPERTENSION WITH GOAL BLOOD PRESSURE LESS THAN 130/85: ICD-10-CM

## 2020-09-11 DIAGNOSIS — E11.9 CONTROLLED TYPE 2 DIABETES MELLITUS WITHOUT COMPLICATION, WITHOUT LONG-TERM CURRENT USE OF INSULIN (HCC): Primary | ICD-10-CM

## 2020-09-11 DIAGNOSIS — J44.9 COPD, MODERATE (HCC): ICD-10-CM

## 2020-09-11 DIAGNOSIS — D17.1 LIPOMA OF TORSO: ICD-10-CM

## 2020-09-11 DIAGNOSIS — N40.1 BENIGN PROSTATIC HYPERPLASIA WITH URINARY FREQUENCY: ICD-10-CM

## 2020-09-11 DIAGNOSIS — I25.10 CORONARY ARTERY DISEASE INVOLVING NATIVE CORONARY ARTERY OF NATIVE HEART WITHOUT ANGINA PECTORIS: ICD-10-CM

## 2020-09-11 PROCEDURE — 99215 OFFICE O/P EST HI 40 MIN: CPT | Performed by: FAMILY MEDICINE

## 2020-09-11 PROCEDURE — G8432 DEP SCR NOT DOC, RNG: HCPCS | Performed by: FAMILY MEDICINE

## 2020-09-11 PROCEDURE — G8754 DIAS BP LESS 90: HCPCS | Performed by: FAMILY MEDICINE

## 2020-09-11 PROCEDURE — 3044F HG A1C LEVEL LT 7.0%: CPT | Performed by: FAMILY MEDICINE

## 2020-09-11 PROCEDURE — 3017F COLORECTAL CA SCREEN DOC REV: CPT | Performed by: FAMILY MEDICINE

## 2020-09-11 PROCEDURE — G8752 SYS BP LESS 140: HCPCS | Performed by: FAMILY MEDICINE

## 2020-09-11 PROCEDURE — 2022F DILAT RTA XM EVC RTNOPTHY: CPT | Performed by: FAMILY MEDICINE

## 2020-09-11 PROCEDURE — 90653 IIV ADJUVANT VACCINE IM: CPT | Performed by: FAMILY MEDICINE

## 2020-09-11 PROCEDURE — 1101F PT FALLS ASSESS-DOCD LE1/YR: CPT | Performed by: FAMILY MEDICINE

## 2020-09-11 PROCEDURE — G8427 DOCREV CUR MEDS BY ELIG CLIN: HCPCS | Performed by: FAMILY MEDICINE

## 2020-09-11 PROCEDURE — G8419 CALC BMI OUT NRM PARAM NOF/U: HCPCS | Performed by: FAMILY MEDICINE

## 2020-09-11 PROCEDURE — G0008 ADMIN INFLUENZA VIRUS VAC: HCPCS | Performed by: FAMILY MEDICINE

## 2020-09-11 PROCEDURE — G8536 NO DOC ELDER MAL SCRN: HCPCS | Performed by: FAMILY MEDICINE

## 2020-09-11 RX ORDER — POLYETHYLENE GLYCOL 3350 17 G/17G
17 POWDER, FOR SOLUTION ORAL DAILY
Qty: 510 G | Refills: 1 | Status: SHIPPED | OUTPATIENT
Start: 2020-09-11 | End: 2021-01-27 | Stop reason: ALTCHOICE

## 2020-09-11 NOTE — PROGRESS NOTES
Chief Complaint   Patient presents with    Cholesterol Problem     Follow up    Diabetes     1. Have you been to the ER, urgent care clinic since your last visit? Hospitalized since your last visit? No    2. Have you seen or consulted any other health care providers outside of the [address] [place] [address] since your last visit? Include any pap smears or colon screening.  No

## 2020-09-11 NOTE — PATIENT INSTRUCTIONS

## 2020-09-11 NOTE — PROGRESS NOTES
HISTORY OF PRESENT ILLNESS  Carolann Fisher is a 77 y.o. male. Seen for follow up on array of issues and new concern of left side abdominal pain ( left groin) and lump on right flank area,  He has not followed up with surgeon for pancreatic mass. He is in denial of any further work up. HPI  Cardiovascular Review  The patient has hypertension, hyperlipidemia, coronary artery disease and status post coronary artery stenting.  He reports taking medications as instructed, no medication side effects noted, no TIA's, no chest pain on exertion, no dyspnea on exertion, no swelling of ankles, no orthostatic dizziness or lightheadedness, no orthopnea or paroxysmal nocturnal dyspnea, no palpitations, no intermittent claudication symptoms, no muscle aches or pain.  Diet and Lifestyle: generally follows a low fat low cholesterol diet, generally follows a low sodium diet, does not rigorously follow a diabetic diet, exercises regularly, nonsmoker.  Lab review: labs reviewed and discussed with patient.  He had normal Lexican stress test and Echo in 09/2014 and normal EKG in 01/2016  Medicines: Carvedilol 6.25 mg BID, crestor 10 mg  Losartan/Hctz 50/12.5 mg, clonidine as needed  He is s/p Cardiac cath ( 12/17/2018) by Dr David Knowles. Records requested. As per patient, he was told cath was reassuring and stents are patent. He was advised to dc Plavix. Is on aspirin only now.   Lab Results   Component Value Date/Time    Cholesterol, total 118 03/31/2020 09:44 AM    HDL Cholesterol 46 03/31/2020 09:44 AM    LDL, calculated 54 03/31/2020 09:44 AM    VLDL, calculated 18 03/31/2020 09:44 AM    Triglyceride 92 03/31/2020 09:44 AM          Endocrine Review  He is seen for diabetes.  Since last visit: no change.  Home glucose monitoring: is performed sporadically, nonfasting values range 100-140.  He reports medication compliance: noncompliant some of the time.  Medication side effects: none.  Diabetic diet compliance: noncompliant some of the time.  Further diabetic ROS: no polyuria or polydipsia, no chest pain, dyspnea or TIA's, no numbness, tingling or pain in extremities, no unusual visual symptoms, no hypoglycemia.  Lab review: labs reviewed and discussed with patient.  Eye exam: due.  ,   Medications: Metformin  500 mg BID  on ACEI/ARBS :yes  On ASA, yes  Podiatry visit: not done  Lab Results   Component Value Date/Time    Hemoglobin A1c 6.4 (H) 03/31/2020 09:44 AM         COPD  Patient complains of cough and shortness of breath. Symptoms began several years ago. Symptoms chronic dyspnea: severity = mild: course of sx: well controlled  able walk 5 blocks  cough: severity: moderate: sputum : clear: light  symptoms worse with exertion. does worsen with exertion. Sputum is clear in small amounts. Fever has been suspected fevers but not measured at home. Patient uses 1 pillows at night. Patient can walk 1 mile  before resting. Patient currently is not on home oxygen therapy. Isidro Mahmood Respiratory history: frequent episodes of bronchitis and COPD  He is on scheduled Fluticasone, Singulair and prn albuterol, but non compliant with treatment.     Urology Review  He is here today because of BPH.  He reports his symptoms are well controlled.  His symptoms include: nocturia x 2, dysuria, pain on tip of penis.  He denies: urinary hesitancy, urinary frequency, double voiding, weak stream, perineal discomfort, hematuria, abdominal pain, flank pain, testicular pain.  He is on the following medications: Flomax.  He reports the following medication side effects: none.  Lab review: labs reviewed and discussed with patient.    Lab Results   Component Value Date/Time    Prostate Specific Ag 1.3 08/15/2019 02:35 PM    Prostate Specific Ag 0.8 10/27/2018 09:17 AM    Prostate Specific Ag 1.8 11/04/2017 10:18 AM     Lump:  Armond Michelle is a 77 y.o. male who was seen today for evaluation of a right soft tissue mass and located on right flank area.  The patient reports that the mass has been present for 1 month. The onset of the mass was gradual.   Associated symptoms include enlargement. Patient denies pain, bleeding, fatigue, weight loss, fevers, night sweats. Prior treatments, if any: none. Hip Pain  Patient complains of left groin pain. Onset of the symptoms was several months ago. Inciting event: none. Current symptoms include left sided hip pain. Severity = fairly severe  location: anterior  radiates to: inguinal region: left sided  worse with weight bearing  aggravated by walking. Associated symptoms: none. Aggravating symptoms: standing, walking, rising after sitting. Patient's course of pain: gradually worsening. Patient has had no prior hip problems. Previous visits for this problem: none. Evaluation to date: none. Treatment to date: none. Review of Systems   Constitutional: Negative for chills, fever and malaise/fatigue. HENT: Negative for congestion, ear pain, sore throat and tinnitus. Eyes: Negative for blurred vision, double vision, pain and discharge. Respiratory: Negative for cough, shortness of breath and wheezing. Cardiovascular: Negative for chest pain, palpitations and leg swelling. Gastrointestinal: Positive for constipation. Negative for abdominal pain, blood in stool, diarrhea, nausea and vomiting. Genitourinary: Negative for dysuria, frequency, hematuria and urgency. Musculoskeletal: Negative for back pain, joint pain and myalgias. Left groin pain   Skin: Negative for rash. Lump right side flank   Neurological: Negative for dizziness, tremors, seizures and headaches. Endo/Heme/Allergies: Negative for polydipsia. Does not bruise/bleed easily. Psychiatric/Behavioral: Negative for depression and substance abuse. The patient is not nervous/anxious. Physical Exam  Vitals signs and nursing note reviewed. Constitutional:       Appearance: He is well-developed. He is not diaphoretic.    HENT:      Head: Normocephalic and atraumatic. Right Ear: External ear normal.      Mouth/Throat:      Pharynx: No oropharyngeal exudate. Eyes:      General: No scleral icterus. Conjunctiva/sclera: Conjunctivae normal.      Pupils: Pupils are equal, round, and reactive to light. Neck:      Musculoskeletal: Normal range of motion and neck supple. Thyroid: No thyromegaly. Vascular: No JVD. Cardiovascular:      Rate and Rhythm: Normal rate and regular rhythm. Heart sounds: Normal heart sounds. No murmur. Pulmonary:      Effort: Pulmonary effort is normal.      Breath sounds: Normal breath sounds. No wheezing. Abdominal:      General: Bowel sounds are normal. There is no distension. Palpations: Abdomen is soft. There is no mass. Tenderness: There is no right CVA tenderness. Hernia: There is no hernia in the left inguinal area or right inguinal area. Musculoskeletal:      Left hip: He exhibits decreased range of motion and tenderness. Comments: Feet:warm, good capillary refill, no trophic changes or ulcerative lesions, normal DP and PT pulses, normal monofilament exam and normal sensory exam    Tenderness on anterior left hip. FADIR positive on left side with pain referred to inguinal area   Lymphadenopathy:      Cervical: No cervical adenopathy. Skin:     General: Skin is warm and dry. Findings: No rash. Neurological:      Mental Status: He is alert and oriented to person, place, and time. Cranial Nerves: No cranial nerve deficit. Deep Tendon Reflexes: Reflexes are normal and symmetric. Reflexes normal.         ASSESSMENT and PLAN  Diagnoses and all orders for this visit:    1. Controlled type 2 diabetes mellitus without complication, without long-term current use of insulin ([facility])  -      DIABETES FOOT EXAM  -     METABOLIC PANEL, COMPREHENSIVE  -     HEMOGLOBIN A1C WITH EAG  -     MICROALBUMIN, UR, RAND W/ MICROALB/CREAT RATIO    2.  Encounter for immunization  - INFLUENZA VIRUS VACCINE, HIGH DOSE SEASONAL, PRESERVATIVE FREE  -     ADMIN INFLUENZA VIRUS VAC    3. Essential hypertension with goal blood pressure less than 195/70  -     METABOLIC PANEL, COMPREHENSIVE    4. Coronary artery disease involving native coronary artery of native heart without angina pectoris  -     METABOLIC PANEL, COMPREHENSIVE  -     LIPID PANEL    5. Hyperlipidemia LDL goal <328  -     METABOLIC PANEL, COMPREHENSIVE  -     LIPID PANEL    6. Benign prostatic hyperplasia with urinary frequency  -     PSA W/ REFLX FREE PSA    7. COPD, moderate ([address]  -     CBC WITH AUTOMATED DIFF    8. Lipoma of torso    9. Chronic idiopathic constipation  -     polyethylene glycol (MIRALAX) 17 gram/dose powder; Take 17 g by mouth daily. 10. Arthritis of left hip    discussed referral to specialist and also home exercise  Reassured for lipoma  Discussed lifestyle issues and health guidance given  Patient was given an after visit summary which includes diagnoses, vital signs, current medications, instructions and references & authorized prescriptions . Results of labs will be conveyed to patient, once available. Pt verbalized instructions I provided and expressed understanding of discussion that was held today. Follow-up and Dispositions    · Return in about 4 months (around 1/11/2021) for follow up, fasting.

## 2020-09-12 LAB
ALBUMIN SERPL-MCNC: 4.2 G/DL (ref 3.7–4.7)
ALBUMIN/GLOB SERPL: 1.5 {RATIO} (ref 1.2–2.2)
ALP SERPL-CCNC: 63 IU/L (ref 39–117)
ALT SERPL-CCNC: 10 IU/L (ref 0–44)
AST SERPL-CCNC: 15 IU/L (ref 0–40)
BASOPHILS # BLD AUTO: 0 X10E3/UL (ref 0–0.2)
BASOPHILS NFR BLD AUTO: 0 %
BILIRUB SERPL-MCNC: 0.7 MG/DL (ref 0–1.2)
BUN SERPL-MCNC: 15 MG/DL (ref 8–27)
BUN/CREAT SERPL: 14 (ref 10–24)
CALCIUM SERPL-MCNC: 9.2 MG/DL (ref 8.6–10.2)
CHLORIDE SERPL-SCNC: 101 MMOL/L (ref 96–106)
CHOLEST SERPL-MCNC: 109 MG/DL (ref 100–199)
CO2 SERPL-SCNC: 24 MMOL/L (ref 20–29)
CREAT SERPL-MCNC: 1.09 MG/DL (ref 0.76–1.27)
EOSINOPHIL # BLD AUTO: 0.3 X10E3/UL (ref 0–0.4)
EOSINOPHIL NFR BLD AUTO: 3 %
ERYTHROCYTE [DISTWIDTH] IN BLOOD BY AUTOMATED COUNT: 12.9 % (ref 11.6–15.4)
EST. AVERAGE GLUCOSE BLD GHB EST-MCNC: 134 MG/DL
GLOBULIN SER CALC-MCNC: 2.8 G/DL (ref 1.5–4.5)
GLUCOSE SERPL-MCNC: 106 MG/DL (ref 65–99)
HBA1C MFR BLD: 6.3 % (ref 4.8–5.6)
HCT VFR BLD AUTO: 37.2 % (ref 37.5–51)
HDLC SERPL-MCNC: 49 MG/DL
HGB BLD-MCNC: 12.5 G/DL (ref 13–17.7)
IMM GRANULOCYTES # BLD AUTO: 0 X10E3/UL (ref 0–0.1)
IMM GRANULOCYTES NFR BLD AUTO: 0 %
INTERPRETATION, 910389: NORMAL
LDLC SERPL CALC-MCNC: 42 MG/DL (ref 0–99)
LYMPHOCYTES # BLD AUTO: 2.3 X10E3/UL (ref 0.7–3.1)
LYMPHOCYTES NFR BLD AUTO: 31 %
MCH RBC QN AUTO: 29.4 PG (ref 26.6–33)
MCHC RBC AUTO-ENTMCNC: 33.6 G/DL (ref 31.5–35.7)
MCV RBC AUTO: 88 FL (ref 79–97)
MONOCYTES # BLD AUTO: 0.6 X10E3/UL (ref 0.1–0.9)
MONOCYTES NFR BLD AUTO: 8 %
NEUTROPHILS # BLD AUTO: 4.3 X10E3/UL (ref 1.4–7)
NEUTROPHILS NFR BLD AUTO: 58 %
PLATELET # BLD AUTO: 280 X10E3/UL (ref 150–450)
POTASSIUM SERPL-SCNC: 4.1 MMOL/L (ref 3.5–5.2)
PROT SERPL-MCNC: 7 G/DL (ref 6–8.5)
PSA SERPL-MCNC: 1 NG/ML (ref 0–4)
RBC # BLD AUTO: 4.25 X10E6/UL (ref 4.14–5.8)
REFLEX CRITERIA: NORMAL
SODIUM SERPL-SCNC: 136 MMOL/L (ref 134–144)
TRIGL SERPL-MCNC: 97 MG/DL (ref 0–149)
VLDLC SERPL CALC-MCNC: 18 MG/DL (ref 5–40)
WBC # BLD AUTO: 7.6 X10E3/UL (ref 3.4–10.8)

## 2020-09-15 LAB
ALBUMIN/CREAT UR: 5 MG/G CREAT (ref 0–29)
CREAT UR-MCNC: 215.5 MG/DL
MICROALBUMIN UR-MCNC: 10 UG/ML

## 2020-09-22 DIAGNOSIS — K14.4 ATROPHIC GLOSSITIS: ICD-10-CM

## 2020-09-22 RX ORDER — FOLIC ACID 1 MG/1
TABLET ORAL
Qty: 90 TAB | Refills: 0 | Status: SHIPPED | OUTPATIENT
Start: 2020-09-22 | End: 2021-03-04 | Stop reason: SDUPTHER

## 2020-10-16 RX ORDER — OMEGA-3-ACID ETHYL ESTERS 1 G/1
1 CAPSULE, LIQUID FILLED ORAL 2 TIMES DAILY WITH MEALS
Qty: 180 CAP | Refills: 1 | Status: SHIPPED | OUTPATIENT
Start: 2020-10-16 | End: 2020-12-04 | Stop reason: SDUPTHER

## 2020-10-16 NOTE — TELEPHONE ENCOUNTER
MD Luciana Byrne,    Receiving an Urgent request from an [address] for patient's [place]-3 Lovaza caps. It looks like the previous order transmission failed to the [facility] from 8/4/20. Last Visit: 9/11/20    Next Appointment: 1/7/21    Previous Refill Encounter(s): 8/4/20  180 + 1    Requested Prescriptions     Pending Prescriptions Disp Refills    omega-3 acid ethyl esters (LOVAZA) 1 gram capsule 180 Cap 1     Sig: Take 1 Cap by mouth two (2) times daily (with meals).

## 2020-11-09 ENCOUNTER — VIRTUAL VISIT (OUTPATIENT)
Dept: FAMILY MEDICINE CLINIC | Age: 75
End: 2020-11-09
Payer: MEDICARE

## 2020-11-09 ENCOUNTER — NURSE TRIAGE (OUTPATIENT)
Dept: OTHER | Facility: CLINIC | Age: 75
End: 2020-11-09

## 2020-11-09 ENCOUNTER — TELEPHONE (OUTPATIENT)
Dept: FAMILY MEDICINE CLINIC | Age: 75
End: 2020-11-09

## 2020-11-09 DIAGNOSIS — J20.9 ACUTE BRONCHITIS, UNSPECIFIED ORGANISM: Primary | ICD-10-CM

## 2020-11-09 DIAGNOSIS — I25.10 CORONARY ARTERY DISEASE INVOLVING NATIVE CORONARY ARTERY OF NATIVE HEART WITHOUT ANGINA PECTORIS: Chronic | ICD-10-CM

## 2020-11-09 DIAGNOSIS — J44.9 COPD, MODERATE (HCC): ICD-10-CM

## 2020-11-09 PROCEDURE — 3017F COLORECTAL CA SCREEN DOC REV: CPT | Performed by: FAMILY MEDICINE

## 2020-11-09 PROCEDURE — G8756 NO BP MEASURE DOC: HCPCS | Performed by: FAMILY MEDICINE

## 2020-11-09 PROCEDURE — 99213 OFFICE O/P EST LOW 20 MIN: CPT | Performed by: FAMILY MEDICINE

## 2020-11-09 PROCEDURE — G8427 DOCREV CUR MEDS BY ELIG CLIN: HCPCS | Performed by: FAMILY MEDICINE

## 2020-11-09 PROCEDURE — G8432 DEP SCR NOT DOC, RNG: HCPCS | Performed by: FAMILY MEDICINE

## 2020-11-09 PROCEDURE — 1101F PT FALLS ASSESS-DOCD LE1/YR: CPT | Performed by: FAMILY MEDICINE

## 2020-11-09 RX ORDER — AZITHROMYCIN 250 MG/1
TABLET, FILM COATED ORAL
Qty: 6 TAB | Refills: 0 | Status: SHIPPED | OUTPATIENT
Start: 2020-11-09 | End: 2020-11-14

## 2020-11-09 RX ORDER — CARVEDILOL 6.25 MG/1
TABLET ORAL
Qty: 180 TAB | Refills: 0 | Status: SHIPPED | OUTPATIENT
Start: 2020-11-09 | End: 2021-03-04 | Stop reason: SDUPTHER

## 2020-11-09 RX ORDER — BENZONATATE 200 MG/1
200 CAPSULE ORAL
Qty: 20 CAP | Refills: 0 | Status: SHIPPED | OUTPATIENT
Start: 2020-11-09 | End: 2020-11-16

## 2020-11-09 NOTE — TELEPHONE ENCOUNTER
MD Nicholas Tanner,    Patient called to relay message of inhaler he uses. Brand name is Cipla - Ashthalin (Salbutamol)     I was able to find per internet but is not listed in database.      Thanks, Melanie Jenkins

## 2020-11-09 NOTE — TELEPHONE ENCOUNTER
Received call from [address]. Call soft transferred to [facility] to schedule appointment. Reason for Disposition   Cough    Answer Assessment - Initial Assessment Questions  1. ONSET: \"When did the cough begin? \"       1 week  2. SEVERITY: \"How bad is the cough today? \"      Able to sleep  3. RESPIRATORY DISTRESS: \"Describe your breathing. \"      no  4. FEVER: \"Do you have a fever? \" If so, ask: \"What is your temperature, how was it measured, and when did it start? \"      no  5. HEMOPTYSIS: \"Are you coughing up any blood? \" If so ask: \"How much? \" (flecks, streaks, tablespoons, etc.)     no  6. TREATMENT: \"What have you done so far to treat the cough? \" (e.g., meds, fluids, humidifier)      Pt has been taking left over antibiotic  7. CARDIAC HISTORY: \"Do you have any history of heart disease? \" (e.g., heart attack, congestive heart failure)      3 stents  8. LUNG HISTORY: \"Do you have any history of lung disease? \"  (e.g., pulmonary embolus, asthma, emphysema)      no  9. PE RISK FACTORS: \"Do you have a history of blood clots? \" (or: recent major surgery, recent prolonged travel, bedridden)     no  10. OTHER SYMPTOMS: \"Do you have any other symptoms? (e.g., runny nose, wheezing, chest pain)        no    Protocols used: COUGH - ACUTE NON-PRODUCTIVE-ADULT-    Caller agrees with discharge disposition. Care advice given. Thank you for allowing me to participate in the care of your patient. The  patient was connected to triage in response to information provided to the [facility]. Please do not respond through this encounter as the response is not directed to a shared pool.

## 2020-11-09 NOTE — PATIENT INSTRUCTIONS
Bronchitis: Care Instructions Your Care Instructions Bronchitis is inflammation of the bronchial tubes, which carry air to the lungs. The tubes swell and produce mucus, or phlegm. The mucus and inflamed bronchial tubes make you cough. You may have trouble breathing. Most cases of bronchitis are caused by viruses like those that cause colds. Antibiotics usually do not help and they may be harmful. Bronchitis usually develops rapidly and lasts about 2 to 3 weeks in otherwise healthy people. Follow-up care is a key part of your treatment and safety. Be sure to make and go to all appointments, and call your doctor if you are having problems. It's also a good idea to know your test results and keep a list of the medicines you take. How can you care for yourself at home? · Take all medicines exactly as prescribed. Call your doctor if you think you are having a problem with your medicine. · Get some extra rest. 
· Take an over-the-counter pain medicine, such as acetaminophen (Tylenol), ibuprofen (Advil, Motrin), or naproxen (Aleve) to reduce fever and relieve body aches. Read and follow all instructions on the label. · Do not take two or more pain medicines at the same time unless the doctor told you to. Many pain medicines have acetaminophen, which is Tylenol. Too much acetaminophen (Tylenol) can be harmful. · Take an over-the-counter cough medicine that contains dextromethorphan to help quiet a dry, hacking cough so that you can sleep. Avoid cough medicines that have more than one active ingredient. Read and follow all instructions on the label. · Breathe moist air from a humidifier, hot shower, or sink filled with hot water. The heat and moisture will thin mucus so you can cough it out. · Do not smoke. Smoking can make bronchitis worse. If you need help quitting, talk to your doctor about stop-smoking programs and medicines. These can increase your chances of quitting for good. When should you call for help? Call 911 anytime you think you may need emergency care. For example, call if: 
  · You have severe trouble breathing. Call your doctor now or seek immediate medical care if: 
  · You have new or worse trouble breathing.  
  · You cough up dark brown or bloody mucus (sputum).  
  · You have a new or higher fever.  
  · You have a new rash. Watch closely for changes in your health, and be sure to contact your doctor if: 
  · You cough more deeply or more often, especially if you notice more mucus or a change in the color of your mucus.  
  · You are not getting better as expected. Where can you learn more? Go to http://lv-shelbi.info/ Enter H333 in the search box to learn more about \"Bronchitis: Care Instructions. \" Current as of: February 24, 2020               Content Version: 12.6 © 1866-5508 [employer]. Care instructions adapted under license by [employer] (which disclaims liability or warranty for this information). If you have questions about a medical condition or this instruction, always ask your healthcare professional. [address] any warranty or liability for your use of this information.

## 2020-11-28 ENCOUNTER — HOSPITAL ENCOUNTER (OUTPATIENT)
Dept: PREADMISSION TESTING | Age: 75
Discharge: HOME | End: 2020-11-28
Attending: INTERNAL MEDICINE
Payer: MEDICARE

## 2020-11-28 ENCOUNTER — TRANSCRIBE ORDER (OUTPATIENT)
Dept: REGISTRATION | Age: 75
End: 2020-11-28

## 2020-11-28 DIAGNOSIS — Z01.812 PRE-PROCEDURE LAB EXAM: Primary | ICD-10-CM

## 2020-11-28 DIAGNOSIS — Z01.812 PRE-PROCEDURE LAB EXAM: ICD-10-CM

## 2020-11-28 PROCEDURE — 87635 SARS-COV-2 COVID-19 AMP PRB: CPT

## 2020-11-29 LAB — SARS-COV-2, COV2NT: NOT DETECTED

## 2020-11-30 DIAGNOSIS — E78.5 HYPERLIPIDEMIA LDL GOAL <100: Primary | ICD-10-CM

## 2020-12-02 ENCOUNTER — ANESTHESIA EVENT (OUTPATIENT)
Dept: ENDOSCOPY | Age: 75
End: 2020-12-02
Payer: MEDICARE

## 2020-12-02 ENCOUNTER — ANESTHESIA (OUTPATIENT)
Dept: ENDOSCOPY | Age: 75
End: 2020-12-02
Payer: MEDICARE

## 2020-12-02 ENCOUNTER — APPOINTMENT (OUTPATIENT)
Dept: ULTRASOUND IMAGING | Age: 75
End: 2020-12-02
Attending: INTERNAL MEDICINE
Payer: MEDICARE

## 2020-12-02 ENCOUNTER — HOSPITAL ENCOUNTER (OUTPATIENT)
Age: 75
Setting detail: OUTPATIENT SURGERY
Discharge: HOME | End: 2020-12-02
Attending: INTERNAL MEDICINE | Admitting: INTERNAL MEDICINE
Payer: MEDICARE

## 2020-12-02 VITALS
HEART RATE: 73 BPM | HEIGHT: 63 IN | TEMPERATURE: 97.8 F | DIASTOLIC BLOOD PRESSURE: 55 MMHG | BODY MASS INDEX: 26.22 KG/M2 | WEIGHT: 148 LBS | OXYGEN SATURATION: 100 % | SYSTOLIC BLOOD PRESSURE: 70 MMHG | RESPIRATION RATE: 15 BRPM

## 2020-12-02 LAB
GLUCOSE BLD STRIP.AUTO-MCNC: 103 MG/DL (ref 65–100)
SERVICE CMNT-IMP: ABNORMAL

## 2020-12-02 PROCEDURE — 74011250636 HC RX REV CODE- 250/636: Performed by: NURSE ANESTHETIST, CERTIFIED REGISTERED

## 2020-12-02 PROCEDURE — 88173 CYTOPATH EVAL FNA REPORT: CPT

## 2020-12-02 PROCEDURE — 88342 IMHCHEM/IMCYTCHM 1ST ANTB: CPT

## 2020-12-02 PROCEDURE — 82962 GLUCOSE BLOOD TEST: CPT

## 2020-12-02 PROCEDURE — 74011250636 HC RX REV CODE- 250/636: Performed by: STUDENT IN AN ORGANIZED HEALTH CARE EDUCATION/TRAINING PROGRAM

## 2020-12-02 PROCEDURE — 76060000032 HC ANESTHESIA 0.5 TO 1 HR: Performed by: INTERNAL MEDICINE

## 2020-12-02 PROCEDURE — 2709999900 HC NON-CHARGEABLE SUPPLY: Performed by: INTERNAL MEDICINE

## 2020-12-02 PROCEDURE — 88341 IMHCHEM/IMCYTCHM EA ADD ANTB: CPT

## 2020-12-02 PROCEDURE — 88305 TISSUE EXAM BY PATHOLOGIST: CPT

## 2020-12-02 PROCEDURE — 88172 CYTP DX EVAL FNA 1ST EA SITE: CPT

## 2020-12-02 PROCEDURE — 77030036673 HC NDL BIOP ENDOSC SHRKCOR PRELD COVD -E: Performed by: INTERNAL MEDICINE

## 2020-12-02 PROCEDURE — 76040000007: Performed by: INTERNAL MEDICINE

## 2020-12-02 PROCEDURE — 74011000250 HC RX REV CODE- 250: Performed by: NURSE ANESTHETIST, CERTIFIED REGISTERED

## 2020-12-02 RX ORDER — SODIUM CHLORIDE 0.9 % (FLUSH) 0.9 %
5-40 SYRINGE (ML) INJECTION AS NEEDED
Status: DISCONTINUED | OUTPATIENT
Start: 2020-12-02 | End: 2020-12-02 | Stop reason: HOSPADM

## 2020-12-02 RX ORDER — NALOXONE HYDROCHLORIDE 0.4 MG/ML
0.4 INJECTION, SOLUTION INTRAMUSCULAR; INTRAVENOUS; SUBCUTANEOUS
Status: DISCONTINUED | OUTPATIENT
Start: 2020-12-02 | End: 2020-12-02 | Stop reason: HOSPADM

## 2020-12-02 RX ORDER — SODIUM CHLORIDE 9 MG/ML
50 INJECTION, SOLUTION INTRAVENOUS CONTINUOUS
Status: DISCONTINUED | OUTPATIENT
Start: 2020-12-02 | End: 2020-12-02 | Stop reason: HOSPADM

## 2020-12-02 RX ORDER — SODIUM CHLORIDE 9 MG/ML
INJECTION, SOLUTION INTRAVENOUS
Status: DISCONTINUED | OUTPATIENT
Start: 2020-12-02 | End: 2020-12-02 | Stop reason: HOSPADM

## 2020-12-02 RX ORDER — ATROPINE SULFATE 0.1 MG/ML
0.5 INJECTION INTRAVENOUS
Status: DISCONTINUED | OUTPATIENT
Start: 2020-12-02 | End: 2020-12-02 | Stop reason: HOSPADM

## 2020-12-02 RX ORDER — PROPOFOL 10 MG/ML
INJECTION, EMULSION INTRAVENOUS AS NEEDED
Status: DISCONTINUED | OUTPATIENT
Start: 2020-12-02 | End: 2020-12-02 | Stop reason: HOSPADM

## 2020-12-02 RX ORDER — FLUMAZENIL 0.1 MG/ML
0.2 INJECTION INTRAVENOUS
Status: DISCONTINUED | OUTPATIENT
Start: 2020-12-02 | End: 2020-12-02 | Stop reason: HOSPADM

## 2020-12-02 RX ORDER — EPINEPHRINE 0.1 MG/ML
1 INJECTION INTRACARDIAC; INTRAVENOUS
Status: DISCONTINUED | OUTPATIENT
Start: 2020-12-02 | End: 2020-12-02 | Stop reason: HOSPADM

## 2020-12-02 RX ORDER — DEXTROMETHORPHAN/PSEUDOEPHED 2.5-7.5/.8
1.2 DROPS ORAL
Status: DISCONTINUED | OUTPATIENT
Start: 2020-12-02 | End: 2020-12-02 | Stop reason: HOSPADM

## 2020-12-02 RX ORDER — LIDOCAINE HYDROCHLORIDE 20 MG/ML
INJECTION, SOLUTION EPIDURAL; INFILTRATION; INTRACAUDAL; PERINEURAL AS NEEDED
Status: DISCONTINUED | OUTPATIENT
Start: 2020-12-02 | End: 2020-12-02 | Stop reason: HOSPADM

## 2020-12-02 RX ORDER — PHENYLEPHRINE HCL IN 0.9% NACL 0.4MG/10ML
SYRINGE (ML) INTRAVENOUS AS NEEDED
Status: DISCONTINUED | OUTPATIENT
Start: 2020-12-02 | End: 2020-12-02 | Stop reason: HOSPADM

## 2020-12-02 RX ADMIN — SODIUM CHLORIDE: 900 INJECTION, SOLUTION INTRAVENOUS at 14:53

## 2020-12-02 RX ADMIN — PHENYLEPHRINE HYDROCHLORIDE 120 MCG: 10 INJECTION INTRAVENOUS at 14:51

## 2020-12-02 RX ADMIN — PHENYLEPHRINE HYDROCHLORIDE 120 MCG: 10 INJECTION INTRAVENOUS at 14:58

## 2020-12-02 RX ADMIN — PROPOFOL 50 MG: 10 INJECTION, EMULSION INTRAVENOUS at 14:48

## 2020-12-02 RX ADMIN — PROPOFOL 20 MG: 10 INJECTION, EMULSION INTRAVENOUS at 14:38

## 2020-12-02 RX ADMIN — PHENYLEPHRINE HYDROCHLORIDE 120 MCG: 10 INJECTION INTRAVENOUS at 15:03

## 2020-12-02 RX ADMIN — PROPOFOL 70 MG: 10 INJECTION, EMULSION INTRAVENOUS at 14:34

## 2020-12-02 RX ADMIN — SODIUM CHLORIDE: 900 INJECTION, SOLUTION INTRAVENOUS at 14:13

## 2020-12-02 RX ADMIN — PROPOFOL 60 MG: 10 INJECTION, EMULSION INTRAVENOUS at 14:45

## 2020-12-02 RX ADMIN — PROPOFOL 20 MG: 10 INJECTION, EMULSION INTRAVENOUS at 14:41

## 2020-12-02 RX ADMIN — PROPOFOL 50 MG: 10 INJECTION, EMULSION INTRAVENOUS at 14:51

## 2020-12-02 RX ADMIN — LIDOCAINE HYDROCHLORIDE 80 MG: 20 INJECTION, SOLUTION EPIDURAL; INFILTRATION; INTRACAUDAL; PERINEURAL at 14:34

## 2020-12-02 RX ADMIN — PHENYLEPHRINE HYDROCHLORIDE 80 MCG: 10 INJECTION INTRAVENOUS at 14:47

## 2020-12-02 RX ADMIN — PROPOFOL 50 MG: 10 INJECTION, EMULSION INTRAVENOUS at 14:54

## 2020-12-02 RX ADMIN — PROPOFOL 30 MG: 10 INJECTION, EMULSION INTRAVENOUS at 14:37

## 2020-12-02 RX ADMIN — PHENYLEPHRINE HYDROCHLORIDE 80 MCG: 10 INJECTION INTRAVENOUS at 14:54

## 2020-12-02 RX ADMIN — PROPOFOL 50 MG: 10 INJECTION, EMULSION INTRAVENOUS at 14:56

## 2020-12-02 NOTE — H&P
[address] [facility].  [address] [facility] [address]  [address], [address]   [phone]                                History and Physical     NAME: Phillip Sandoval   :  [birth year]   MRN:  [id number]     HPI:  The patient was seen and examined. Past Surgical History:   Procedure Laterality Date    CARDIAC SURG PROCEDURE UNLIST      stents, 3    COLONOSCOPY N/A 2020    COLONOSCOPY/EGD performed by Vashti Silverio MD at [address] [facility]          HX TONSILLECTOMY      VASCULAR SURGERY PROCEDURE UNLIST      angioplasty     Past Medical History:   Diagnosis Date    Acid indigestion     CAD (coronary artery disease)     3 stents, done one month apart in [address] [place] [address] in ,    Diabetes [facility])     Heart disease     Hypercholesterolemia     Hypertension      Social History     Tobacco Use    Smoking status: Never Smoker    Smokeless tobacco: Never Used   Substance Use Topics    Alcohol use: No     Alcohol/week: 0.0 standard drinks    Drug use: No     No Known Allergies  Family History   Problem Relation Age of Onset    No Known Problems Mother     No Known Problems Father      Current Facility-Administered Medications   Medication Dose Route Frequency    0.9% sodium chloride infusion  50 mL/hr IntraVENous CONTINUOUS    sodium chloride (NS) flush 5-40 mL  5-40 mL IntraVENous PRN    naloxone (NARCAN) injection 0.4 mg  0.4 mg IntraVENous Multiple    flumazeniL (ROMAZICON) 0.1 mg/mL injection 0.2 mg  0.2 mg IntraVENous Multiple    simethicone (MYLICON) 25UN/2.6HB oral drops 80 mg  1.2 mL Oral Multiple    atropine injection 0.5 mg  0.5 mg IntraVENous ONCE PRN    EPINEPHrine (ADRENALIN) 0.1 mg/mL syringe 1 mg  1 mg Endoscopically ONCE PRN         PHYSICAL EXAM:  General: WD, WN. Alert, cooperative, no acute distress    HEENT: NC, Atraumatic. PERRLA, EOMI. Anicteric sclerae. Lungs:  CTA Bilaterally. No Wheezing/Rhonchi/Rales.   Heart:  Regular  rhythm,  No murmur, No Rubs, No Gallops  Abdomen: Soft, Non distended, Non tender. +Bowel sounds, no HSM  Extremities: No c/c/e  Neurologic:  CN 2-12 gi, Alert and oriented X 3. No acute neurological distress   Psych:   Good insight. Not anxious nor agitated. The heart, lungs and mental status were satisfactory for the administration of MAC sedation and for the procedure. Mallampati score: 2     The patient was counseled at length about the risks of isabelle Covid-19 in the norm-operative and post-operative states including the recovery window of their procedure. The patient was made aware that isabelle Covid-19 after a surgical procedure may worsen their prognosis for recovering from the virus and lend to a higher morbidity and or mortality risk. The patient was given the options of postponing their procedure. All of the risks, benefits, and alternatives were discussed. The patient does  wish to proceed with the procedure.       Assessment:   · Pancreas tail lesion    Plan:   · Endoscopic procedure  · MAC sedation   ·

## 2020-12-02 NOTE — PROCEDURES
[address] [facility]  [address] [address] [address], [address]   [phone]                                Endoscopic Ultrasound    NAME:  Jonnie Hood   :   [birth year]   MRN:   [id number]       Date/Time:  2020   Procedure Type: Linear Upper EUS with FNB      Indications: Abnormal CT scan showing pancreas tail mass    Pre-operative Diagnosis: see indication above    Post-operative Diagnosis:  See findings below    : Enrique Eric MD    Surgical Assistant: Endoscopy Technician-1: Luca Franz  Endoscopy RN-1: Loren Pompa RN    Implants: none    Referring Provider: Lily Baron MD -Norman Galindo MD    Anethesia/Sedation:  MAC anesthesia      Procedure Details   After infom consent was obtained for the procedure, with all risks and benefits of procedure explained the patient was taken to the endoscopy suite and placed in the left lateral decubitus position. Following sequential administration of sedation as per above, the linear echoendoscope was inserted into the mouth and advanced under direct vision to second portion of the duodenum. A careful inspection was made as the gastroscope was withdrawn, including a retroflexed view of the proximal stomach; findings and interventions are described below. Findings:     Endoscopic:   Esophagus: Small sliding hiatal hernia was noted. Non obstructing Schatzki's ring was noted   Stomach: normal    Duodenum/jejunum: normal    Ultrasound:   Esophagus: not examined   Stomach: not examined   Pancreas:     Areas examined: the entire gland    Parenchyma: A well-defined heterogeneous lesion was noted in the pancreas tail. This measured about 14 mm x 11 mm. Some vascularity was noted on the medial margin. No vascular infiltration or perilesional lymph nodes were noted.   Rest of the pancreas was normal    Pancreatic Duct: normal findings   Liver:     Parenchyma: not examined    Gallbladder: not examined    Bile Duct: the common bile duct was normal in caliber and without any filling defect               Lymph Node: no adenopathy        Specimen Removed:  Biopsy of  the tail of the pancreas    Complications: None. EBL:  None.     Interventions: Fine needle aspirate for biopsy of  pancreas tail using a 22 gauge needle with 3 of passes with preliminary results suggesting indetermined      Recommendations:   -Await cytology results  -Continue current medications  -Follow-up with surgery (Dr. Kavon Amor)    Hamzah Ruffin MD  12/2/2020  3:11 PM

## 2020-12-02 NOTE — PROGRESS NOTES

## 2020-12-02 NOTE — ANESTHESIA POSTPROCEDURE EVALUATION
Post-Anesthesia Evaluation and Assessment    Patient: Flavio Spivey MRN: [id number]  SSN: [id number]    YOB: 1945  Age: 77 y.o. Sex: male      I have evaluated the patient and they are stable and ready for discharge from the PACU. Cardiovascular Function/Vital Signs  Visit Vitals  BP (!) 92/41   Pulse 72   Temp 36.4 °C (97.6 °F)   Resp 16   Ht 5' 3\" (1.6 m)   Wt 67.1 kg (148 lb)   SpO2 99%   BMI 26.22 kg/m²       Patient is status post MAC anesthesia for Procedure(s):  . LINEAR EUS   :-  ESOPHAGOGASTRODUODENOSCOPY (EGD)  FINE NEEDLE ASPIRATION. Nausea/Vomiting: None    Postoperative hydration reviewed and adequate. Pain:  Pain Scale 1: Numeric (0 - 10) (12/02/20 1343)  Pain Intensity 1: 0 (12/02/20 1343)   Managed    Neurological Status: At baseline    Mental Status, Level of Consciousness: Alert and  oriented to person, place, and time    Pulmonary Status:   O2 Device: Nasal cannula (12/02/20 1501)   Adequate oxygenation and airway patent    Complications related to anesthesia: None    Post-anesthesia assessment completed. No concerns    Signed By: Macrina Candelaria MD     December 2, 2020              Procedure(s):  . LINEAR EUS   :-  ESOPHAGOGASTRODUODENOSCOPY (EGD)  FINE NEEDLE ASPIRATION. MAC    <[email or web address]>    INITIAL Post-op Vital signs:   Vitals Value Taken Time   BP     Temp     Pulse     Resp     SpO2 97 % 12/2/2020  3:13 PM   Vitals shown include unvalidated device data.

## 2020-12-02 NOTE — DISCHARGE INSTRUCTIONS
[address]  [address] [facility] [address]  [address] [place] [address]   [phone]                     DISCHARGE INSTRUCTIONS    Saad Mendez  [id number]  1945    DISCOMFORT:  Sore throat- throat lozenges or warm salt water gargle  redness at IV site- apply warm compress to area; if redness or soreness persist- contact your physician  Gaseous discomfort- walking, belching will help relieve any discomfort    DIET  You may eat and drink after you leave. You may resume your regular diet - however -  remember your colon is empty and a heavy meal will produce gas. Avoid these foods:  vegetables, fried / greasy foods, carbonated drinks   You may not drink alcoholic beverages for at least 12 hours    ACTIVITY  You may resume your normal daily activities   Spend the remainder of the day resting -  avoid any strenuous activity. You may not operate a vehicle for 12 hours  You may not engage in an occupation involving machinery or appliances for rest of today  Avoid making any critical decisions for at least 24 hour    CALL M.D. ANY SIGN OF   Increasing pain, nausea, vomiting  Abdominal distension (swelling)  New increased bleeding (oral or rectal)  Fever (chills)  Pain in chest area  Bloody discharge from nose or mouth  Shortness of breath    Follow-up Instructions:   Call Dr. Josephine Tafoya for any questions or problems. If we took a biopsy please call the office within 2 weeks to discuss your pathology results. Telephone # [phone]       ENDOSCOPY FINDINGS:   1. Hiatal Hernia  2. Schatzki's Ring  3. Pancreas Tail Mass           Learning About Coronavirus (COVID-19)  Coronavirus (COVID-19): Overview  What is coronavirus ([id number])? The coronavirus disease (COVID-19) is caused by a virus. It is an illness that was first found in [place], [place], in December 2019. It has since spread worldwide. The virus can cause fever, cough, and trouble breathing.  In severe cases, it can cause pneumonia and make it hard to breathe without help. It can cause death. Coronaviruses are a large group of viruses. They cause the common cold. They also cause more serious illnesses like Middle East respiratory syndrome (MERS) and severe acute respiratory syndrome (SARS). COVID-19 is caused by a novel coronavirus. That means it's a new type that has not been seen in people before. This virus spreads person-to-person through droplets from coughing and sneezing. It can also spread when you are close to someone who is infected. And it can spread when you touch something that has the virus on it, such as a doorknob or a tabletop. What can you do to protect yourself from coronavirus (COVID-19)? The best way to protect yourself from getting sick is to:  · Avoid areas where there is an outbreak. · Avoid contact with people who may be infected. · Wash your hands often with soap or alcohol-based hand sanitizers. · Avoid crowds and try to stay at least 6 feet away from other people. · Wash your hands often, especially after you cough or sneeze. Use soap and water, and scrub for at least 20 seconds. If soap and water aren't available, use an alcohol-based hand . · Avoid touching your mouth, nose, and eyes. What can you do to avoid spreading the virus to others? To help avoid spreading the virus to others:  · Cover your mouth with a tissue when you cough or sneeze. Then throw the tissue in the trash. · Use a disinfectant to clean things that you touch often. · Stay home if you are sick or have been exposed to the virus. Don't go to school, work, or public areas. And don't use public transportation. · If you are sick:  ? Leave your home only if you need to get medical care. But call the doctor's office first so they know you're coming. And wear a face mask, if you have one.  ? If you have a face mask, wear it whenever you're around other people. It can help stop the spread of the virus when you cough or sneeze. ?  Clean and disinfect your home every day. Use household  and disinfectant wipes or sprays. Take special care to clean things that you grab with your hands. These include doorknobs, remote controls, phones, and handles on your refrigerator and microwave. And don't forget countertops, tabletops, bathrooms, and computer keyboards. When to call for help  Call 911 anytime you think you may need emergency care. For example, call if:  · You have severe trouble breathing. (You can't talk at all.)  · You have constant chest pain or pressure. · You are severely dizzy or lightheaded. · You are confused or can't think clearly. · Your face and lips have a blue color. · You pass out (lose consciousness) or are very hard to wake up. Call your doctor now if you develop symptoms such as:  · Shortness of breath. · Fever. · Cough. If you need to get care, call ahead to the doctor's office for instructions before you go. Make sure you wear a face mask, if you have one, to prevent exposing other people to the virus. Where can you get the latest information? The following health organizations are tracking and studying this virus. Their websites contain the most up-to-date information. Michele LogoneX also learn what to do if you think you may have been exposed to the virus. · U.S. Centers for Disease Control and Prevention (CDC): The CDC provides updated news about the disease and travel advice. The website also tells you how to prevent the spread of infection. www.cdc.gov  · World Health Organization [facility]): WHO offers information about the virus outbreaks. WHO also has travel advice. www.who.int  Current as of: April 1, 2020               Content Version: 12.4  © 5306-3455 Healthwise, Incorporated.    Care instructions adapted under license by your healthcare professional. If you have questions about a medical condition or this instruction, always ask your healthcare professional. Cholo Garcia disclaims any warranty or liability for your use of this information.

## 2020-12-04 RX ORDER — OMEGA-3-ACID ETHYL ESTERS 1 G/1
1 CAPSULE, LIQUID FILLED ORAL 2 TIMES DAILY WITH MEALS
Qty: 180 CAP | Refills: 1 | Status: SHIPPED | OUTPATIENT
Start: 2020-12-04 | End: 2021-01-21 | Stop reason: SDUPTHER

## 2020-12-04 NOTE — TELEPHONE ENCOUNTER
Chief Complaint   Patient presents with    Medication Refill    Medication Refill     omega 3 acid ethyl jax 1 gram      Received fax for medication refill, 90 day supply. Prescription sent to [facility] last. Received fax from same pharmacy. Please review.   Patricia Antonio LPN

## 2020-12-21 DIAGNOSIS — E78.5 HYPERLIPIDEMIA LDL GOAL <100: Primary | ICD-10-CM

## 2020-12-21 RX ORDER — ROSUVASTATIN CALCIUM 10 MG/1
10 TABLET, COATED ORAL DAILY
Qty: 90 TAB | Refills: 1 | Status: SHIPPED | OUTPATIENT
Start: 2020-12-21 | End: 2021-05-25 | Stop reason: SDUPTHER

## 2021-01-02 ENCOUNTER — TELEPHONE (OUTPATIENT)
Dept: FAMILY MEDICINE CLINIC | Age: 76
End: 2021-01-02

## 2021-01-02 DIAGNOSIS — J44.9 COPD, MODERATE (HCC): ICD-10-CM

## 2021-01-05 ENCOUNTER — TELEPHONE (OUTPATIENT)
Dept: FAMILY MEDICINE CLINIC | Age: 76
End: 2021-01-05

## 2021-01-05 ENCOUNTER — TELEPHONE (OUTPATIENT)
Dept: SURGERY | Age: 76
End: 2021-01-05

## 2021-01-05 ENCOUNTER — VIRTUAL VISIT (OUTPATIENT)
Dept: SURGERY | Age: 76
End: 2021-01-05
Payer: MEDICARE

## 2021-01-05 DIAGNOSIS — J44.9 COPD, MODERATE (HCC): Primary | ICD-10-CM

## 2021-01-05 DIAGNOSIS — D3A.8 PRIMARY PANCREATIC NEUROENDOCRINE TUMOR: Primary | ICD-10-CM

## 2021-01-05 PROCEDURE — 99213 OFFICE O/P EST LOW 20 MIN: CPT | Performed by: SURGERY

## 2021-01-05 PROCEDURE — G8419 CALC BMI OUT NRM PARAM NOF/U: HCPCS | Performed by: SURGERY

## 2021-01-05 PROCEDURE — G8536 NO DOC ELDER MAL SCRN: HCPCS | Performed by: SURGERY

## 2021-01-05 PROCEDURE — G8510 SCR DEP NEG, NO PLAN REQD: HCPCS | Performed by: SURGERY

## 2021-01-05 PROCEDURE — 1101F PT FALLS ASSESS-DOCD LE1/YR: CPT | Performed by: SURGERY

## 2021-01-05 PROCEDURE — G8427 DOCREV CUR MEDS BY ELIG CLIN: HCPCS | Performed by: SURGERY

## 2021-01-05 PROCEDURE — 3017F COLORECTAL CA SCREEN DOC REV: CPT | Performed by: SURGERY

## 2021-01-05 PROCEDURE — G8756 NO BP MEASURE DOC: HCPCS | Performed by: SURGERY

## 2021-01-05 NOTE — PROGRESS NOTES
1. Have you been to the ER, urgent care clinic since your last visit? Hospitalized since your last visit? NO     2. Have you seen or consulted any other health care providers outside of the [address] [place] [address] since your last visit? Include any pap smears or colon screening.  No

## 2021-01-05 NOTE — TELEPHONE ENCOUNTER
Patient's daughter called stating her father has been exposed to [place] and wanted to find out if they could do a three way phone call to discuss surgical plans. She stated he isn't able to do virtual. Patient's daughter wants to know if can still keep appointment. I rescheduled appointment for 1/19.  Please call or advise

## 2021-01-05 NOTE — PROGRESS NOTES
I was in the office while conducting this encounter. Consent:  He and/or his healthcare decision maker is aware that this patient-initiated Telehealth encounter is a billable service, with coverage as determined by his insurance carrier. He is aware that he may receive a bill and has provided verbal consent to proceed: Yes    This virtual visit was conducted telephone encounter only. -  I affirm this is a Patient Initiated Episode with an Established Patient who has not had a related appointment within my department in the past 7 days or scheduled within the next 24 hours. Note: this encounter is not billable if this call serves to triage the patient into an appointment for the relevant concern. Total Time: minutes: 11-20 minutes. [facility]      Clinic Note - Follow up    [address] Sadie Cloud returns for scheduled follow up today. He is known to me for a previously incidentally identified mass in the tail of the pancreas. The patient recently underwent an EUS with biopsy. This showed the mass to still be 14 mm in greatest dimension. 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 The patient returns today for follow-up after his biopsy. He reports a recent possible Covid exposure and thus his appointment is being done virtually. He states he has been feeling well and at his normal baseline health. He does have diabetes which has been under stable control. He denies any complaints of diarrhea, abdominal pain, weight loss or difficulty eating. He does have ongoing acid reflux issues. The patient denies any changes to the [facility], PSHx, SHx or FHx since their last visit except as mentioned above. ROS is negative except as mentioned in the HPI. Objective     There were no vitals taken for this visit. PE  Not performed for this virtual encounter    Labs  None      Imaging  None      Assessment     Laylaradha Bañuelos is a 77 y. 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 This appears to have been stable in size over the past year. Plan     I had a lengthy discussion with the patient regarding options of observation for well differentiated pancreatic neuroendocrine tumors less than 2 cm versus surgical resection. I discussed that a surgical resection would be a robotic distal pancreatectomy and splenectomy. He prefers to avoid surgery if possible and monitor this with serial imaging and lab work. He has not had any recent imaging so I will send him for a repeat CT C/A/P and chromogranin A now with plans to repeat this again in 6 months. We did discuss the possibility of progression of this disease as well as potential development of metastatic disease. If this shows interval growth then I would recommend surgical resection at that time.       Ladarius Guidry MD  1/5/2021    CC: MD Dr. Maximus Roldan Dr.

## 2021-01-06 RX ORDER — FLUTICASONE PROPIONATE 44 UG/1
2 AEROSOL, METERED RESPIRATORY (INHALATION) 2 TIMES DAILY
Qty: 3 INHALER | Refills: 0 | Status: SHIPPED | OUTPATIENT
Start: 2021-01-06 | End: 2022-03-08 | Stop reason: SDUPTHER

## 2021-01-06 NOTE — TELEPHONE ENCOUNTER
MD Errol Paulson,    Receiving request from [facility] for flovent hfa 44mcg/act inhaler for patient. Not listed on current med list.  Fluticasone spray is listed. Please advise.   Thanks, Jerica Murillo

## 2021-01-07 DIAGNOSIS — J20.9 ACUTE BRONCHITIS, UNSPECIFIED ORGANISM: ICD-10-CM

## 2021-01-07 RX ORDER — ALBUTEROL SULFATE 0.83 MG/ML
2.5 SOLUTION RESPIRATORY (INHALATION)
Qty: 24 EACH | Refills: 0 | Status: SHIPPED | OUTPATIENT
Start: 2021-01-07

## 2021-01-07 NOTE — TELEPHONE ENCOUNTER
Last visit 11/09/2020 Virtual visit MD Bertin Lanier   Next appointment 01/27/2021 MD Bertin Lanier   Previous refill encounter(s)   04/02/2019 Albuterol Neb Sol #24 each    Requested Prescriptions     Pending Prescriptions Disp Refills    albuterol (PROVENTIL VENTOLIN) 2.5 mg /3 mL (0.083 %) nebu 24 Each 0     Sig: 3 mL by Nebulization route every four (4) hours as needed for Wheezing.

## 2021-01-21 RX ORDER — OMEGA-3-ACID ETHYL ESTERS 1 G/1
1 CAPSULE, LIQUID FILLED ORAL 2 TIMES DAILY WITH MEALS
Qty: 180 CAP | Refills: 1 | Status: SHIPPED | OUTPATIENT
Start: 2021-01-21

## 2021-01-21 NOTE — TELEPHONE ENCOUNTER
Chief Complaint   Patient presents with    Medication Refill    Medication Refill     OMEGA 3 ACID ETHYL ESTERS 1 GRAM CAPSULE     Patient scheduled for 1/25/2021.   Fran Guevara LPN

## 2021-01-26 ENCOUNTER — HOSPITAL ENCOUNTER (OUTPATIENT)
Dept: CT IMAGING | Age: 76
Discharge: HOME | End: 2021-01-26
Attending: SURGERY
Payer: MEDICARE

## 2021-01-26 DIAGNOSIS — D3A.8 PRIMARY PANCREATIC NEUROENDOCRINE TUMOR: ICD-10-CM

## 2021-01-26 LAB — CREAT BLD-MCNC: 1 MG/DL (ref 0.6–1.3)

## 2021-01-26 PROCEDURE — 74177 CT ABD & PELVIS W/CONTRAST: CPT

## 2021-01-26 PROCEDURE — 74178 CT ABD&PLV WO CNTR FLWD CNTR: CPT

## 2021-01-26 PROCEDURE — 82565 ASSAY OF CREATININE: CPT

## 2021-01-26 PROCEDURE — 74011000636 HC RX REV CODE- 636: Performed by: SURGERY

## 2021-01-26 PROCEDURE — 71260 CT THORAX DX C+: CPT

## 2021-01-26 RX ADMIN — IOPAMIDOL 100 ML: 755 INJECTION, SOLUTION INTRAVENOUS at 09:15

## 2021-01-27 ENCOUNTER — OFFICE VISIT (OUTPATIENT)
Dept: FAMILY MEDICINE CLINIC | Age: 76
End: 2021-01-27
Payer: MEDICARE

## 2021-01-27 VITALS
HEART RATE: 75 BPM | DIASTOLIC BLOOD PRESSURE: 69 MMHG | SYSTOLIC BLOOD PRESSURE: 102 MMHG | RESPIRATION RATE: 18 BRPM | HEIGHT: 63 IN | WEIGHT: 150.2 LBS | OXYGEN SATURATION: 96 % | TEMPERATURE: 97.4 F | BODY MASS INDEX: 26.61 KG/M2

## 2021-01-27 DIAGNOSIS — E78.5 HYPERLIPIDEMIA LDL GOAL <100: ICD-10-CM

## 2021-01-27 DIAGNOSIS — Z00.00 MEDICARE ANNUAL WELLNESS VISIT, SUBSEQUENT: Primary | ICD-10-CM

## 2021-01-27 DIAGNOSIS — I25.10 CORONARY ARTERY DISEASE INVOLVING NATIVE CORONARY ARTERY OF NATIVE HEART WITHOUT ANGINA PECTORIS: ICD-10-CM

## 2021-01-27 DIAGNOSIS — E11.9 CONTROLLED TYPE 2 DIABETES MELLITUS WITHOUT COMPLICATION, WITHOUT LONG-TERM CURRENT USE OF INSULIN (HCC): ICD-10-CM

## 2021-01-27 DIAGNOSIS — I10 ESSENTIAL HYPERTENSION WITH GOAL BLOOD PRESSURE LESS THAN 130/85: ICD-10-CM

## 2021-01-27 DIAGNOSIS — J44.9 COPD, MODERATE (HCC): ICD-10-CM

## 2021-01-27 DIAGNOSIS — Z13.31 SCREENING FOR DEPRESSION: ICD-10-CM

## 2021-01-27 DIAGNOSIS — N40.1 BENIGN PROSTATIC HYPERPLASIA WITH URINARY FREQUENCY: ICD-10-CM

## 2021-01-27 DIAGNOSIS — Z71.89 ADVANCED DIRECTIVES, COUNSELING/DISCUSSION: ICD-10-CM

## 2021-01-27 DIAGNOSIS — R35.0 BENIGN PROSTATIC HYPERPLASIA WITH URINARY FREQUENCY: ICD-10-CM

## 2021-01-27 PROCEDURE — 3046F HEMOGLOBIN A1C LEVEL >9.0%: CPT | Performed by: FAMILY MEDICINE

## 2021-01-27 PROCEDURE — 99215 OFFICE O/P EST HI 40 MIN: CPT | Performed by: FAMILY MEDICINE

## 2021-01-27 PROCEDURE — 3017F COLORECTAL CA SCREEN DOC REV: CPT | Performed by: FAMILY MEDICINE

## 2021-01-27 PROCEDURE — G8419 CALC BMI OUT NRM PARAM NOF/U: HCPCS | Performed by: FAMILY MEDICINE

## 2021-01-27 PROCEDURE — 2022F DILAT RTA XM EVC RTNOPTHY: CPT | Performed by: FAMILY MEDICINE

## 2021-01-27 PROCEDURE — 99497 ADVNCD CARE PLAN 30 MIN: CPT | Performed by: FAMILY MEDICINE

## 2021-01-27 PROCEDURE — G8536 NO DOC ELDER MAL SCRN: HCPCS | Performed by: FAMILY MEDICINE

## 2021-01-27 PROCEDURE — G0439 PPPS, SUBSEQ VISIT: HCPCS | Performed by: FAMILY MEDICINE

## 2021-01-27 PROCEDURE — 93000 ELECTROCARDIOGRAM COMPLETE: CPT | Performed by: FAMILY MEDICINE

## 2021-01-27 PROCEDURE — 1101F PT FALLS ASSESS-DOCD LE1/YR: CPT | Performed by: FAMILY MEDICINE

## 2021-01-27 PROCEDURE — G8427 DOCREV CUR MEDS BY ELIG CLIN: HCPCS | Performed by: FAMILY MEDICINE

## 2021-01-27 PROCEDURE — G8510 SCR DEP NEG, NO PLAN REQD: HCPCS | Performed by: FAMILY MEDICINE

## 2021-01-27 PROCEDURE — G8752 SYS BP LESS 140: HCPCS | Performed by: FAMILY MEDICINE

## 2021-01-27 PROCEDURE — G8754 DIAS BP LESS 90: HCPCS | Performed by: FAMILY MEDICINE

## 2021-01-27 NOTE — PATIENT INSTRUCTIONS
Medicare Wellness Visit, Male    The best way to live healthy is to have a lifestyle where you eat a well-balanced diet, exercise regularly, limit alcohol use, and quit all forms of tobacco/nicotine, if applicable. Regular preventive services are another way to keep healthy. Preventive services (vaccines, screening tests, monitoring & exams) can help personalize your care plan, which helps you manage your own care. Screening tests can find health problems at the earliest stages, when they are easiest to treat. [facility] follows the current, evidence-based guidelines published by the [facility] ([facility]) when recommending preventive services for our patients. Because we follow these guidelines, sometimes recommendations change over time as research supports it. (For example, a prostate screening blood test is no longer routinely recommended for men with no symptoms). Of course, you and your doctor may decide to screen more often for some diseases, based on your risk and co-morbidities (chronic disease you are already diagnosed with). Preventive services for you include:  - Medicare offers their members a free annual wellness visit, which is time for you and your primary care provider to discuss and plan for your preventive service needs. Take advantage of this benefit every year!  -All adults over age 72 should receive the recommended pneumonia vaccines. Current USPSTF guidelines recommend a series of two vaccines for the best pneumonia protection.   -All adults should have a flu vaccine yearly and tetanus vaccine every 10 years.  -All adults age 48 and older should receive the shingles vaccines (series of two vaccines).        -All adults age 38-68 who are overweight should have a diabetes screening test once every three years.   -Other screening tests & preventive services for persons with diabetes include: an eye exam to screen for diabetic retinopathy, a kidney function test, a foot exam, and stricter control over your cholesterol.   -Cardiovascular screening for adults with routine risk involves an electrocardiogram (ECG) at intervals determined by the provider.   -Colorectal cancer screening should be done for adults age 54-65 with no increased risk factors for colorectal cancer. There are a number of acceptable methods of screening for this type of cancer. Each test has its own benefits and drawbacks. Discuss with your provider what is most appropriate for you during your annual wellness visit. The different tests include: colonoscopy (considered the best screening method), a fecal occult blood test, a fecal DNA test, and sigmoidoscopy.  -All adults born between [facility] should be screened once for Hepatitis C.  -An Abdominal Aortic Aneurysm (AAA) Screening is recommended for men age 73-68 who has ever smoked in their lifetime. Here is a list of your current Health Maintenance items (your personalized list of preventive services) with a due date:  Health Maintenance Due   Topic Date Due    COVID-19 Vaccine (1 of 2) 09/04/1962    DTaP/Tdap/Td  (1 - Tdap) 09/04/1967    Shingles Vaccine (1 of 2) 09/04/1996    Glaucoma Screening   10/11/2019    Eye Exam  10/11/2019          Learning About Marvin Mooney  What is a living will? A living will is a legal form you use to write down the kind of care you want at the end of your life. It is used by the health professionals who will treat you if you aren't able to decide for yourself. If you put your wishes in writing, your loved ones and others will know what kind of care you want. They won't need to guess. This can ease your mind and be helpful to others. A living will is not the same as an estate or property will. An estate will explains what you want to happen with your money and property after you die. Is a living will a legal document? A living will is a legal document.  Each state has its own laws about living pedroza. If you move to another state, make sure that your living will is legal in the state where you now live. Or you might use a universal form that has been approved by many states. This kind of form can sometimes be completed and stored online. Your electronic copy will then be available wherever you have a connection to the Internet. In most cases, doctors will respect your wishes even if you have a form from a different state. · You don't need an  to complete a living will. But legal advice can be helpful if your state's laws are unclear, your health history is complicated, or your family can't agree on what should be in your living will. · You can change your living will at any time. Some people find that their wishes about end-of-life care change as their health changes. · In addition to making a living will, think about completing a medical power of  form. This form lets you name the person you want to make end-of-life treatment decisions for you (your \"health care agent\") if you're not able to. Many hospitals and nursing homes will give you the forms you need to complete a living will and a medical power of . · Your living will is used only if you can't make or communicate decisions for yourself anymore. If you become able to make decisions again, you can accept or refuse any treatment, no matter what you wrote in your living will. · Your state may offer an online registry. This is a place where you can store your living will online so the doctors and nurses who need to treat you can find it right away. What should you think about when creating a living will? Talk about your end-of-life wishes with your family members and your doctor. Let them know what you want. That way the people making decisions for you won't be surprised by your choices.   Think about these questions as you make your living will:  · Do you know enough about life support methods that might be used? If not, talk to your doctor so you know what might be done if you can't breathe on your own, your heart stops, or you're unable to swallow. · What things would you still want to be able to do after you receive life-support methods? Would you want to be able to walk? To speak? To eat on your own? To live without the help of machines? · If you have a choice, where do you want to be cared for? In your home? At a hospital or nursing home? · Do you want certain Zoroastrian practices performed if you become very ill? · If you have a choice at the end of your life, where would you prefer to die? At home? In a hospital or nursing home? Somewhere else? · Would you prefer to be buried or cremated? · Do you want your organs to be donated after you die? What should you do with your living will? · Make sure that your family members and your health care agent have copies of your living will. · Give your doctor a copy of your living will to keep in your medical record. If you have more than one doctor, make sure that each one has a copy. · You may want to put a copy of your living will where it can be easily found. Where can you learn more? Go to http://www.gray.com/. Enter S333 in the search box to learn more about \"Learning About Living Perroy. \"  Current as of: August 8, 2016  Content Version: 11.3  © 2751-2156 [employer]. Care instructions adapted under license by [employer] (which disclaims liability or warranty for this information). If you have questions about a medical condition or this instruction, always ask your healthcare professional. [place] [address] any warranty or liability for your use of this information. Preventing Falls: Care Instructions  Your Care Instructions    Getting around your home safely can be a challenge if you have injuries or health problems that make it easy for you to fall.  Loose rugs and furniture in walkways are among the dangers for many older people who have problems walking or who have poor eyesight. People who have conditions such as arthritis, osteoporosis, or dementia also have to be careful not to fall. You can make your home safer with a few simple measures. Follow-up care is a key part of your treatment and safety. Be sure to make and go to all appointments, and call your doctor if you are having problems. It's also a good idea to know your test results and keep a list of the medicines you take. How can you care for yourself at home? Taking care of yourself  · You may get dizzy if you do not drink enough water. To prevent dehydration, drink plenty of fluids, enough so that your urine is light yellow or clear like water. Choose water and other caffeine-free clear liquids. If you have kidney, heart, or liver disease and have to limit fluids, talk with your doctor before you increase the amount of fluids you drink. · Exercise regularly to improve your strength, muscle tone, and balance. Walk if you can. Swimming may be a good choice if you cannot walk easily. · Have your vision and hearing checked each year or any time you notice a change. If you have trouble seeing and hearing, you might not be able to avoid objects and could lose your balance. · Know the side effects of the medicines you take. Ask your doctor or pharmacist whether the medicines you take can affect your balance. Sleeping pills or sedatives can affect your balance. · Limit the amount of alcohol you drink. Alcohol can impair your balance and other senses. · Ask your doctor whether calluses or corns on your feet need to be removed. If you wear loose-fitting shoes because of calluses or corns, you can lose your balance and fall. · Talk to your doctor if you have numbness in your feet. Preventing falls at home  · Remove raised doorway thresholds, throw rugs, and clutter.  Repair loose carpet or raised areas in the floor.  · Move furniture and electrical cords to keep them out of walking paths. · Use nonskid floor wax, and wipe up spills right away, especially on ceramic tile floors. · If you use a walker or cane, put rubber tips on it. If you use crutches, clean the bottoms of them regularly with an abrasive pad, such as steel wool. · Keep your house well lit, especially Zacarias Wendi, and outside walkways. Use night-lights in areas such as hallways and bathrooms. Add extra light switches or use remote switches (such as switches that go on or off when you clap your hands) to make it easier to turn lights on if you have to get up during the night. · Install sturdy handrails on stairways. · Move items in your cabinets so that the things you use a lot are on the lower shelves (about waist level). · Keep a cordless phone and a flashlight with new batteries by your bed. If possible, put a phone in each of the main rooms of your house, or carry a cell phone in case you fall and cannot reach a phone. Or, you can wear a device around your neck or wrist. You push a button that sends a signal for help. · Wear low-heeled shoes that fit well and give your feet good support. Use footwear with nonskid soles. Check the heels and soles of your shoes for wear. Repair or replace worn heels or soles. · Do not wear socks without shoes on wood floors. · Walk on the grass when the sidewalks are slippery. If you live in an area that gets snow and ice in the winter, sprinkle salt on slippery steps and sidewalks. Preventing falls in the bath  · Install grab bars and nonskid mats inside and outside your shower or tub and near the toilet and sinks. · Use shower chairs and bath benches. · Use a hand-held shower head that will allow you to sit while showering.   · Get into a tub or shower by putting the weaker leg in first. Get out of a tub or shower with your strong side first.  · Repair loose toilet seats and consider installing a raised toilet seat to make getting on and off the toilet easier. · Keep your bathroom door unlocked while you are in the shower. Where can you learn more? Go to http://www.grove.com/. Enter [id number] [id number] in the search box to learn more about \"Preventing Falls: Care Instructions. \"  Current as of: March 16, 2018  Content Version: 11.8  © 3406-7326 [employer]. Care instructions adapted under license by [employer] (which disclaims liability or warranty for this information). If you have questions about a medical condition or this instruction, always ask your healthcare professional. [address] any warranty or liability for your use of this information.

## 2021-01-27 NOTE — PROGRESS NOTES
HISTORY OF PRESENT ILLNESS  Efrem Pelaez is a 77 y.o. male. seen for follow up on several medical conditions and also to discuss his recent diagnosis of neuroendocrine tumor of pancreas. HPI  Cardiovascular Review  The patient has hypertension, hyperlipidemia, coronary artery disease and status post coronary artery stenting.  He reports taking medications as instructed, no medication side effects noted, no TIA's, no chest pain on exertion, no dyspnea on exertion, no swelling of ankles, no orthostatic dizziness or lightheadedness, no orthopnea or paroxysmal nocturnal dyspnea, no palpitations, no intermittent claudication symptoms, no muscle aches or pain.  Diet and Lifestyle: generally follows a low fat low cholesterol diet, generally follows a low sodium diet, does not rigorously follow a diabetic diet, exercises regularly, nonsmoker.  Lab review: labs reviewed and discussed with patient.  He had normal Lexican stress test and Echo in 09/2014 and normal EKG in 01/2016  Medicines: Carvedilol 6.25 mg BID, crestor 10 mg  Losartan/Hctz 50/12.5 mg, clonidine as needed  He is s/p Cardiac cath ( 12/17/2018) by Dr Carroll Jim. Records requested. As per patient, he was told cath was reassuring and stents are patent. He was advised to dc Plavix. Is on aspirin only now. EKG was done in office today.  normal EKG, normal sinus rhythm, unchanged from previous tracings.       Endocrine Review  He is seen for diabetes.  Since last visit: no change.  Home glucose monitoring: is performed sporadically, nonfasting values range 100-140.  He reports medication compliance: noncompliant some of the time.  Medication side effects: none.  Diabetic diet compliance: noncompliant some of the time.  Further diabetic ROS: no polyuria or polydipsia, no chest pain, dyspnea or TIA's, no numbness, tingling or pain in extremities, no unusual visual symptoms, no hypoglycemia.  Lab review: labs reviewed and discussed with patient.  Eye exam: due.  , Medications: Metformin  500 mg BID  on ACEI/ARBS :yes  On ASA, yes  Podiatry visit: not done    COPD  Patient complains of cough and shortness of breath. Symptoms began several years ago. Symptoms chronic dyspnea: severity = mild: course of sx: well controlled  able walk 5 blocks  cough: severity: moderate: sputum : clear: light  symptoms worse with exertion. does worsen with exertion. Sputum is clear in small amounts. Fever has been suspected fevers but not measured at home. Patient uses 1 pillows at night. Patient can walk 1 mile  before resting. Patient currently is not on home oxygen therapy. [facility] Respiratory history: frequent episodes of bronchitis and COPD  He is on scheduled Fluticasone, Singulair and prn albuterol, but non compliant with treatment.     Urology Review  He is here today because of BPH.  He reports his symptoms are well controlled.  His symptoms include: nocturia x 2, dysuria, pain on tip of penis.  He denies: urinary hesitancy, urinary frequency, double voiding, weak stream, perineal discomfort, hematuria, abdominal pain, flank pain, testicular pain.  He is on the following medications: Flomax.  He reports the following medication side effects: none.  Lab review: labs reviewed and discussed with patient.    He stopped his Flomax for few months now. Review of Systems   Constitutional: Negative for chills, fever and malaise/fatigue. HENT: Negative for congestion, ear pain, sore throat and tinnitus. Eyes: Negative for blurred vision, double vision, pain and discharge. Respiratory: Negative for cough, shortness of breath and wheezing. Cardiovascular: Negative for chest pain, palpitations and leg swelling. Gastrointestinal: Negative for abdominal pain, blood in stool, constipation, diarrhea, nausea and vomiting. Genitourinary: Negative for dysuria, frequency, hematuria and urgency. Musculoskeletal: Negative for back pain, joint pain and myalgias. Skin: Negative for rash. Neurological: Negative for dizziness, tremors, seizures and headaches. Endo/Heme/Allergies: Negative for polydipsia. Does not bruise/bleed easily. Psychiatric/Behavioral: Negative for depression and substance abuse. The patient is not nervous/anxious. Physical Exam  Vitals signs and nursing note reviewed. Constitutional:       Appearance: He is well-developed. He is not diaphoretic. HENT:      Head: Normocephalic and atraumatic. Right Ear: External ear normal.      Mouth/Throat:      Pharynx: No oropharyngeal exudate. Eyes:      General: No scleral icterus. Conjunctiva/sclera: Conjunctivae normal.      Pupils: Pupils are equal, round, and reactive to light. Neck:      Musculoskeletal: Normal range of motion and neck supple. Thyroid: No thyromegaly. Vascular: No JVD. Cardiovascular:      Rate and Rhythm: Normal rate and regular rhythm. Heart sounds: Normal heart sounds. No murmur. Pulmonary:      Effort: Pulmonary effort is normal.      Breath sounds: Normal breath sounds. No wheezing. Abdominal:      General: Bowel sounds are normal. There is no distension. Palpations: Abdomen is soft. There is no mass. Musculoskeletal: Normal range of motion. General: No tenderness. Lymphadenopathy:      Cervical: No cervical adenopathy. Skin:     General: Skin is warm and dry. Findings: No rash. Neurological:      Mental Status: He is alert and oriented to person, place, and time. Cranial Nerves: No cranial nerve deficit. Deep Tendon Reflexes: Reflexes are normal and symmetric. Reflexes normal.         ASSESSMENT and PLAN  Diagnoses and all orders for this visit:    1. Medicare annual wellness visit, subsequent  -     TSH 3RD GENERATION; Future  -     URINALYSIS W/ RFLX MICROSCOPIC; Future    2.  COPD, moderate ([address]  Assessment & Plan:  Well Controlled, based on history, physical exam and review of pertinent labs, studies and medications; meds reconciled; continue current treatment plan, lifestyle modifications recommended, medication compliance emphasized. Orders:  -     CBC WITH AUTOMATED DIFF; Future    3. Hyperlipidemia LDL goal <100  -     AMB POC EKG ROUTINE W/ 12 LEADS, INTER & REP  -     METABOLIC PANEL, COMPREHENSIVE; Future  -     LIPID PANEL; Future    4. Controlled type 2 diabetes mellitus without complication, without long-term current use of insulin ([facility] [address]  Assessment & Plan:  Well Controlled, based on history, physical exam and review of pertinent labs, studies and medications; meds reconciled; continue current treatment plan, lifestyle modifications recommended, medication compliance emphasized. Orders:  -     AMB POC EKG ROUTINE W/ 12 LEADS, INTER & REP  -     HM DIABETES FOOT EXAM  -     METABOLIC PANEL, COMPREHENSIVE; Future  -     LIPID PANEL; Future  -     MICROALBUMIN, UR, RAND W/ MICROALB/CREAT RATIO; Future  -     HEMOGLOBIN A1C WITH EAG; Future    5. Essential hypertension with goal blood pressure less than 130/85  -     AMB POC EKG ROUTINE W/ 12 LEADS, INTER & REP  -     METABOLIC PANEL, COMPREHENSIVE; Future    6. Benign prostatic hyperplasia with urinary frequency  -     PSA W/ REFLX FREE PSA; Future    7. Coronary artery disease involving native coronary artery of native heart without angina pectoris  -     AMB POC EKG ROUTINE W/ 12 LEADS, INTER & REP  -     METABOLIC PANEL, COMPREHENSIVE; Future  -     LIPID PANEL; Future    8. Advanced directives, counseling/discussion  -     ADVANCE CARE PLANNING FIRST 30 MINS    9. Screening for depression  -     DEPRESSION SCREEN ANNUAL    Discussed lifestyle issues and health guidance given  Patient was given an after visit summary which includes diagnoses, vital signs, current medications, instructions and references & authorized prescriptions . Results of labs will be conveyed to patient, once available.   Pt verbalized instructions I provided and expressed understanding of discussion that was held today. Follow-up and Dispositions    · Return in about 4 months (around 5/27/2021) for follow up, fasting.

## 2021-01-27 NOTE — ASSESSMENT & PLAN NOTE
Stable, based on history, physical exam and review of pertinent labs, studies and medications; meds reconciled; continue current treatment plan, lifestyle modifications recommended, medication compliance emphasized. , This condition is managed by Specialist.

## 2021-01-27 NOTE — PROGRESS NOTES
Chief Complaint   Patient presents with    Hypertension     Follow up    Diabetes     Follow up   [facility] Annual Wellness Visit       1. Have you been to the ER, urgent care clinic since your last visit? Hospitalized since your last visit? No    2. Have you seen or consulted any other health care providers outside of the [address] [place] [address] since your last visit? Include any pap smears or colon screening. No    3 most recent PHQ Screens 1/27/2021   Little interest or pleasure in doing things Not at all   Feeling down, depressed, irritable, or hopeless Not at all   Total Score PHQ 2 0       Abuse Screening Questionnaire 1/27/2021   Do you ever feel afraid of your partner? N   Are you in a relationship with someone who physically or mentally threatens you? N   Is it safe for you to go home? Y       ADL Assessment 1/27/2021   Feeding yourself No Help Needed   Getting from bed to chair No Help Needed   Getting dressed No Help Needed   Bathing or showering No Help Needed   Walk across the room (includes cane/walker) No Help Needed   Using the telphone No Help Needed   Taking your medications No Help Needed   Preparing meals No Help Needed   Managing money (expenses/bills) No Help Needed   Moderately strenuous housework (laundry) No Help Needed   Shopping for personal items (toiletries/medicines) No Help Needed   Shopping for groceries No Help Needed   Driving No Help Needed   Climbing a flight of stairs No Help Needed   Getting to places beyond walking distances No Help Needed       Fall Risk Assessment, last 12 mths 1/27/2021   Able to walk? Yes   Fall in past 12 months? 0   Do you feel unsteady?  0   Are you worried about falling 0         Health Maintenance Due   Topic Date Due    COVID-19 Vaccine (1 of 2) 09/04/1962    DTaP/Tdap/Td series (1 - Tdap) 09/04/1967    Shingrix Vaccine Age 50> (1 of 2) 09/04/1996    GLAUCOMA SCREENING Q2Y  10/11/2019    Eye Exam Retinal or Dilated  10/11/2019

## 2021-01-27 NOTE — PROGRESS NOTES
This is the Subsequent Medicare Annual Wellness Exam, performed 12 months or more after the Initial AWV or the last Subsequent AWV    I have reviewed the patient's medical history in detail and updated the computerized patient record. We did a Medicare Wellness evaluation including a review of past, family, and social histories with attention to age/sex appropriate screening, risk assessment, preventive care and counseling. Any cognitive, fall risk, or ADL issues identified are addressed separately. A patient specific preventive care plan was provided and appropriate screening tests were ordered as specified. Depression Risk Factor Screening:     3 most recent PHQ Screens 1/27/2021   Little interest or pleasure in doing things Not at all   Feeling down, depressed, irritable, or hopeless Not at all   Total Score PHQ 2 0       Alcohol Risk Screen    Do you average more than 1 drink per night or more than 7 drinks a week: No    In the past three months have you have had more than 4 drinks containing alcohol on one occasion: No        Functional Ability and Level of Safety:    Hearing: Hearing is good. Activities of Daily Living: The home contains: handrails and grab bars  Patient does total self care      Ambulation: with no difficulty     Fall Risk:  Fall Risk Assessment, last 12 mths 1/27/2021   Able to walk? Yes   Fall in past 12 months? 0   Do you feel unsteady? 0   Are you worried about falling 0      Abuse Screen:  Patient is not abused       Cognitive Screening    Has your family/caregiver stated any concerns about your memory: no     Cognitive Screening: Normal - MMSE (Mini Mental Status Exam)  30/30 .still works as  in high school. Assessment/Plan   Education and counseling provided:  Are appropriate based on today's review and evaluation    Diagnoses and all orders for this visit:    1. Medicare annual wellness visit, subsequent  -     TSH 3RD GENERATION;  Future  - URINALYSIS W/ RFLX MICROSCOPIC; Future    2. COPD, moderate ([facility]  Assessment & Plan:  Well Controlled, based on history, physical exam and review of pertinent labs, studies and medications; meds reconciled; continue current treatment plan, lifestyle modifications recommended, medication compliance emphasized. Orders:  -     CBC WITH AUTOMATED DIFF; Future    3. Hyperlipidemia LDL goal <100  -     AMB POC EKG ROUTINE W/ 12 LEADS, INTER & REP  -     METABOLIC PANEL, COMPREHENSIVE; Future  -     LIPID PANEL; Future    4. Controlled type 2 diabetes mellitus without complication, without long-term current use of insulin ([facility]  Assessment & Plan:  Well Controlled, based on history, physical exam and review of pertinent labs, studies and medications; meds reconciled; continue current treatment plan, lifestyle modifications recommended, medication compliance emphasized. Orders:  -     AMB POC EKG ROUTINE W/ 12 LEADS, INTER & REP  -     HM DIABETES FOOT EXAM  -     METABOLIC PANEL, COMPREHENSIVE; Future  -     LIPID PANEL; Future  -     MICROALBUMIN, UR, RAND W/ MICROALB/CREAT RATIO; Future  -     HEMOGLOBIN A1C WITH EAG; Future    5. Essential hypertension with goal blood pressure less than 130/85  -     AMB POC EKG ROUTINE W/ 12 LEADS, INTER & REP  -     METABOLIC PANEL, COMPREHENSIVE; Future    6. Benign prostatic hyperplasia with urinary frequency  -     PSA W/ REFLX FREE PSA; Future    7. Coronary artery disease involving native coronary artery of native heart without angina pectoris  -     AMB POC EKG ROUTINE W/ 12 LEADS, INTER & REP  -     METABOLIC PANEL, COMPREHENSIVE; Future  -     LIPID PANEL; Future    8. Advanced directives, counseling/discussion  -     ADVANCE CARE PLANNING FIRST 30 MINS    9.  Screening for depression  -     [address] Maintenance Due     Health Maintenance Due   Topic Date Due    COVID-19 Vaccine (1 of 2) 09/04/1962    DTaP/Tdap/Td series (1 - Tdap) 09/04/1967    Shingrix Vaccine Age 50> (1 of 2) 09/04/1996    GLAUCOMA SCREENING Q2Y  10/11/2019    Eye Exam Retinal or Dilated  10/11/2019       Patient Care Team   Patient Care Team:  Heidy Lewis MD as PCP - General (Family Medicine)  Heidy Lewis MD as PCP [facility]  Davon Madera MD (Cardiology)  Francis Paige MD as Consulting Provider (Family Medicine)    History     Patient Active Problem List   Diagnosis Code    Dysuria R30.0    Essential hypertension with goal blood pressure less than 130/85 I10    Urinary frequency R35.0    Fever R50.9    CAD (coronary artery disease) I25.10    Hyperlipidemia LDL goal <100 E78.5    Vitamin D deficiency E55.9    Visit for preventive health examination Z00.00    Controlled type 2 diabetes mellitus without complication ([address] I69.9    Bronchitis, acute J20.9    Sinusitis J32.9    Tongue sore K14.6    Tonsillitis J03.90    COPD, moderate ([address] J44.9    Routine general medical examination at a health care facility Z00.00    Coronary artery disease involving native coronary artery of native heart without angina pectoris I25.10    Chronic ethmoidal sinusitis J32.2    Bilateral impacted cerumen H61.23    Precordial pain R07.2    Costochondritis, acute M94.0    Arthritis of knee, right M17.11    Coronary artery disease involving native coronary artery of native heart with unstable angina pectoris (HCC) I25.110    Hx of long term use of blood thinners Z92.29    Primary pancreatic neuroendocrine tumor D3A.8     Past Medical History:   Diagnosis Date    Acid indigestion     CAD (coronary artery disease)     3 stents, [facility] one [facility] in [facility] in 2007,    Diabetes [facility])     Heart disease     Hypercholesterolemia     Hypertension       Past Surgical History:   Procedure Laterality Date    COLONOSCOPY N/A 2/13/2020    COLONOSCOPY/EGD performed by Jt Salvador MD at [facility] ENDOSCOPY    HX PTCA      2007    HX TONSILLECTOMY      [place] CARDIAC SURG PROCEDURE UNLIST      stents, 3    VASCULAR SURGERY PROCEDURE UNLIST      angioplasty     Current Outpatient Medications   Medication Sig Dispense Refill    omega-3 acid ethyl esters (LOVAZA) 1 gram capsule Take 1 Cap by mouth two (2) times daily (with meals). 180 Cap 1    albuterol (PROVENTIL VENTOLIN) 2.5 mg /3 mL (0.083 %) nebu 3 mL by Nebulization route every four (4) hours as needed for Wheezing. 24 Each 0    fluticasone propionate (FLOVENT HFA) 44 mcg/actuation inhaler Take 2 Puffs by inhalation two (2) times a day. 3 Inhaler 0    rosuvastatin (CRESTOR) 10 mg tablet Take 1 Tab by mouth daily. 90 Tab 1    carvediloL (COREG) 6.25 mg tablet TAKE 1 TABLET TWICE DAILY WITH FOOD 843 Tab 0    folic acid (FOLVITE) 1 mg tablet TAKE 1 TABLET BY MOUTH EVERY DAY 90 Tab 0    metFORMIN (GLUCOPHAGE) 500 mg tablet TAKE 1 TABLET TWICE DAILY WITH MEALS 180 Tab 0    losartan-hydroCHLOROthiazide (HYZAAR) 50-12.5 mg per tablet Take 1 Tab by mouth daily. For hypertension 90 Tab 1    omeprazole (PRILOSEC) 40 mg capsule Take 1 Cap by mouth daily. 90 Cap 0    azelastine (ASTEPRO) 0.15 % (205.5 mcg) 1 Spray by Both Nostrils route two (2) times a day. 1 Bottle 0    diclofenac (VOLTAREN) 1 % gel Apply 4 g to affected area four (4) times daily. Over right side hip joint 300 g 0    Nebulizer & Compressor machine 1 Each by Does Not Apply route every four (4) hours as needed for Cough (wheezing). 1 Each 0    aspirin delayed-release 81 mg tablet       multivit-min/FA/lycopen/lutein (SENIOR TABS PO)       VITAMIN B-12 5,000 mcg subl       nitroglycerin (NITROSTAT) 0.4 mg SL tablet TAKE 1 TABLET AND PLACE UNDER THE TONGUE EVERY 5 MINUTES FOR CHEST PAIN FOR UP TO 3 DOSES 25 Tab 0    montelukast (SINGULAIR) 10 mg tablet Take 1 Tab by mouth daily.  30 Tab 3    ipratropium-albuterol (COMBIVENT RESPIMAT)  mcg/actuation inhaler Take 1 Puff by inhalation every four (4) hours as needed for Wheezing or Shortness of Breath. 3 Inhaler 0    fluticasone (FLONASE) 50 mcg/actuation nasal spray One spray each nostril 1 Bottle 2     No Known Allergies    Family History   Problem Relation Age of Onset    No Known Problems Mother     No Known Problems Father      Social History     Tobacco Use    Smoking status: Never Smoker    Smokeless tobacco: Never Used   Substance Use Topics    Alcohol use: No     Alcohol/week: 0.0 standard drinks   Discussed lifestyle issues and health guidance given  Patient was given an after visit summary which includes diagnoses, vital signs, current medications, instructions and references & authorized prescriptions . Results of labs will be conveyed to patient, once available. Pt verbalized instructions I provided and expressed understanding of discussion that was held today. Follow-up and Dispositions    · Return in about 4 months (around 5/27/2021) for follow up, fasting.

## 2021-01-28 LAB
ALBUMIN SERPL-MCNC: 3.8 G/DL (ref 3.5–5)
ALBUMIN/GLOB SERPL: 1.1 {RATIO} (ref 1.1–2.2)
ALP SERPL-CCNC: 78 U/L (ref 45–117)
ALT SERPL-CCNC: 17 U/L (ref 12–78)
ANION GAP SERPL CALC-SCNC: 2 MMOL/L (ref 5–15)
APPEARANCE UR: CLEAR
AST SERPL-CCNC: 15 U/L (ref 15–37)
BACTERIA URNS QL MICRO: NEGATIVE /HPF
BASOPHILS # BLD: 0 K/UL (ref 0–0.1)
BASOPHILS NFR BLD: 0 % (ref 0–1)
BILIRUB SERPL-MCNC: 0.4 MG/DL (ref 0.2–1)
BILIRUB UR QL: NEGATIVE
BUN SERPL-MCNC: 14 MG/DL (ref 6–20)
BUN/CREAT SERPL: 15 (ref 12–20)
CALCIUM SERPL-MCNC: 9.4 MG/DL (ref 8.5–10.1)
CHLORIDE SERPL-SCNC: 104 MMOL/L (ref 97–108)
CHOLEST SERPL-MCNC: 132 MG/DL
CO2 SERPL-SCNC: 31 MMOL/L (ref 21–32)
COLOR UR: ABNORMAL
CREAT SERPL-MCNC: 0.96 MG/DL (ref 0.7–1.3)
CREAT UR-MCNC: 102 MG/DL
DIFFERENTIAL METHOD BLD: NORMAL
EOSINOPHIL # BLD: 0.2 K/UL (ref 0–0.4)
EOSINOPHIL NFR BLD: 3 % (ref 0–7)
EPITH CASTS URNS QL MICRO: ABNORMAL /LPF
ERYTHROCYTE [DISTWIDTH] IN BLOOD BY AUTOMATED COUNT: 12.4 % (ref 11.5–14.5)
EST. AVERAGE GLUCOSE BLD GHB EST-MCNC: 140 MG/DL
GLOBULIN SER CALC-MCNC: 3.6 G/DL (ref 2–4)
GLUCOSE SERPL-MCNC: 102 MG/DL (ref 65–100)
GLUCOSE UR STRIP.AUTO-MCNC: NEGATIVE MG/DL
HBA1C MFR BLD: 6.5 % (ref 4–5.6)
HCT VFR BLD AUTO: 38.8 % (ref 36.6–50.3)
HDLC SERPL-MCNC: 51 MG/DL
HDLC SERPL: 2.6 {RATIO} (ref 0–5)
HGB BLD-MCNC: 12.3 G/DL (ref 12.1–17)
HGB UR QL STRIP: ABNORMAL
IMM GRANULOCYTES # BLD AUTO: 0 K/UL (ref 0–0.04)
IMM GRANULOCYTES NFR BLD AUTO: 0 % (ref 0–0.5)
KETONES UR QL STRIP.AUTO: NEGATIVE MG/DL
LDLC SERPL CALC-MCNC: 61.6 MG/DL (ref 0–100)
LEUKOCYTE ESTERASE UR QL STRIP.AUTO: ABNORMAL
LIPID PROFILE,FLP: NORMAL
LYMPHOCYTES # BLD: 1.7 K/UL (ref 0.8–3.5)
LYMPHOCYTES NFR BLD: 28 % (ref 12–49)
MCH RBC QN AUTO: 28.7 PG (ref 26–34)
MCHC RBC AUTO-ENTMCNC: 31.7 G/DL (ref 30–36.5)
MCV RBC AUTO: 90.4 FL (ref 80–99)
MICROALBUMIN UR-MCNC: 1.4 MG/DL
MICROALBUMIN/CREAT UR-RTO: 14 MG/G (ref 0–30)
MONOCYTES # BLD: 0.6 K/UL (ref 0–1)
MONOCYTES NFR BLD: 9 % (ref 5–13)
NEUTS SEG # BLD: 3.7 K/UL (ref 1.8–8)
NEUTS SEG NFR BLD: 60 % (ref 32–75)
NITRITE UR QL STRIP.AUTO: NEGATIVE
NRBC # BLD: 0 K/UL (ref 0–0.01)
NRBC BLD-RTO: 0 PER 100 WBC
PH UR STRIP: 5 [PH] (ref 5–8)
PLATELET # BLD AUTO: 326 K/UL (ref 150–400)
PMV BLD AUTO: 9.9 FL (ref 8.9–12.9)
POTASSIUM SERPL-SCNC: 4.2 MMOL/L (ref 3.5–5.1)
PROT SERPL-MCNC: 7.4 G/DL (ref 6.4–8.2)
PROT UR STRIP-MCNC: NEGATIVE MG/DL
PSA SERPL-MCNC: 1.2 NG/ML (ref 0–4)
RBC # BLD AUTO: 4.29 M/UL (ref 4.1–5.7)
RBC #/AREA URNS HPF: ABNORMAL /HPF (ref 0–5)
REFLEX CRITERIA: NORMAL
SODIUM SERPL-SCNC: 137 MMOL/L (ref 136–145)
SP GR UR REFRACTOMETRY: 1.02 (ref 1–1.03)
TRIGL SERPL-MCNC: 97 MG/DL (ref ?–150)
TSH SERPL DL<=0.05 MIU/L-ACNC: 1.7 UIU/ML (ref 0.36–3.74)
UROBILINOGEN UR QL STRIP.AUTO: 0.2 EU/DL (ref 0.2–1)
VLDLC SERPL CALC-MCNC: 19.4 MG/DL
WBC # BLD AUTO: 6.2 K/UL (ref 4.1–11.1)
WBC URNS QL MICRO: ABNORMAL /HPF (ref 0–4)

## 2021-01-28 NOTE — PROGRESS NOTES
Lucian Maza,  I have reviewed your results  Blood count,kidney,liver, thyroid, prostate cancer screen ,urine protein all results are very reassuring  Sugar and cholesterol results are also at goal  Surprisingly urine is positive for significant blood. Your CT scan you did recently showed small 9 mm stone in left side kidney, so most likely from it. Please drink plenty fluids and take Flomax that I started you on for prostate, so that helps to pass your stone quicker. Let me know if you have any questions.   thanks

## 2021-01-29 DIAGNOSIS — N40.1 BENIGN PROSTATIC HYPERPLASIA WITH URINARY FREQUENCY: Primary | ICD-10-CM

## 2021-01-29 DIAGNOSIS — R35.0 BENIGN PROSTATIC HYPERPLASIA WITH URINARY FREQUENCY: Primary | ICD-10-CM

## 2021-01-29 LAB — CGA SERPL-MCNC: 249.3 NG/ML (ref 0–101.8)

## 2021-01-29 RX ORDER — TAMSULOSIN HYDROCHLORIDE 0.4 MG/1
0.4 CAPSULE ORAL DAILY
Qty: 90 CAP | Refills: 0 | Status: SHIPPED | OUTPATIENT
Start: 2021-01-29 | End: 2021-04-26

## 2021-01-29 NOTE — PROGRESS NOTES
Contacted pt. Confirmed . Informed pt that results regarding Blood count,kidney,liver, thyroid, prostate cancer screen ,urine protein all  are reassuring as well as Sugar and cholesterol . However, urine is positive for bloodand CT scan showed small 9 mm stone in left side kidney. Advised to drink plenty fluids and to take Flomax  for prostate. Patient was understanding and requested medications to be prescribed to [facility]

## 2021-02-01 ENCOUNTER — TELEPHONE (OUTPATIENT)
Dept: SURGERY | Age: 76
End: 2021-02-01

## 2021-02-16 ENCOUNTER — TELEPHONE (OUTPATIENT)
Dept: SURGERY | Age: 76
End: 2021-02-16

## 2021-02-16 NOTE — TELEPHONE ENCOUNTER
I called patient to schedule appointment with Dr. Basia Pollock in July. No answer, left voicemail. If patient calls back let patient know his July appointment will be after he's had his ct scan done and labs.

## 2021-02-22 ENCOUNTER — OFFICE VISIT (OUTPATIENT)
Dept: FAMILY MEDICINE CLINIC | Age: 76
End: 2021-02-22

## 2021-02-22 VITALS
BODY MASS INDEX: 26.75 KG/M2 | HEIGHT: 63 IN | HEART RATE: 80 BPM | WEIGHT: 151 LBS | SYSTOLIC BLOOD PRESSURE: 96 MMHG | DIASTOLIC BLOOD PRESSURE: 61 MMHG | OXYGEN SATURATION: 98 % | TEMPERATURE: 97.6 F | RESPIRATION RATE: 18 BRPM

## 2021-02-22 DIAGNOSIS — N20.0 RENAL CALCULUS, LEFT: ICD-10-CM

## 2021-02-22 DIAGNOSIS — I25.10 CORONARY ARTERY DISEASE INVOLVING NATIVE CORONARY ARTERY OF NATIVE HEART WITHOUT ANGINA PECTORIS: Primary | ICD-10-CM

## 2021-02-22 DIAGNOSIS — I10 ESSENTIAL HYPERTENSION WITH GOAL BLOOD PRESSURE LESS THAN 130/85: ICD-10-CM

## 2021-02-22 DIAGNOSIS — R07.9 LEFT-SIDED CHEST PAIN: ICD-10-CM

## 2021-02-22 DIAGNOSIS — R31.0 GROSS HEMATURIA: ICD-10-CM

## 2021-02-22 DIAGNOSIS — M94.0 COSTOCHONDRITIS: ICD-10-CM

## 2021-02-22 PROCEDURE — G8427 DOCREV CUR MEDS BY ELIG CLIN: HCPCS | Performed by: FAMILY MEDICINE

## 2021-02-22 PROCEDURE — G8754 DIAS BP LESS 90: HCPCS | Performed by: FAMILY MEDICINE

## 2021-02-22 PROCEDURE — G8510 SCR DEP NEG, NO PLAN REQD: HCPCS | Performed by: FAMILY MEDICINE

## 2021-02-22 PROCEDURE — 3017F COLORECTAL CA SCREEN DOC REV: CPT | Performed by: FAMILY MEDICINE

## 2021-02-22 PROCEDURE — G8752 SYS BP LESS 140: HCPCS | Performed by: FAMILY MEDICINE

## 2021-02-22 PROCEDURE — G8536 NO DOC ELDER MAL SCRN: HCPCS | Performed by: FAMILY MEDICINE

## 2021-02-22 PROCEDURE — G8419 CALC BMI OUT NRM PARAM NOF/U: HCPCS | Performed by: FAMILY MEDICINE

## 2021-02-22 PROCEDURE — 1101F PT FALLS ASSESS-DOCD LE1/YR: CPT | Performed by: FAMILY MEDICINE

## 2021-02-22 PROCEDURE — 99214 OFFICE O/P EST MOD 30 MIN: CPT | Performed by: FAMILY MEDICINE

## 2021-02-22 RX ORDER — LOSARTAN POTASSIUM 50 MG/1
50 TABLET ORAL DAILY
Qty: 90 TAB | Refills: 1 | Status: SHIPPED | OUTPATIENT
Start: 2021-02-22 | End: 2021-08-21

## 2021-02-22 NOTE — PROGRESS NOTES
Chief Complaint   Patient presents with    Chest Pain     x 1 week pain is aggrvated with cough and tender on touch left side 2 or 3rd rib pain       1. Have you been to the ER, urgent care clinic since your last visit? Hospitalized since your last visit? No    2. Have you seen or consulted any other health care providers outside of the [address] [place] [address] since your last visit? Include any pap smears or colon screening. No    Have you had the covid vaccine. [place] No. when    3 most recent PHQ Screens 2/22/2021   Little interest or pleasure in doing things Not at all   Feeling down, depressed, irritable, or hopeless Not at all   Total Score PHQ 2 0       Health Maintenance Due   Topic Date Due    COVID-19 Vaccine (1 of 2) 09/04/1962    DTaP/Tdap/Td series (1 - Tdap) 09/04/1967    Shingrix Vaccine Age 50> (1 of 2) 09/04/1996    GLAUCOMA SCREENING Q2Y  10/11/2019    Eye Exam Retinal or Dilated  10/11/2019     Patient will schedule for an eye exam.

## 2021-02-22 NOTE — PATIENT INSTRUCTIONS

## 2021-02-23 LAB
APPEARANCE UR: CLEAR
BILIRUB UR QL: NEGATIVE
COLOR UR: ABNORMAL
GLUCOSE UR STRIP.AUTO-MCNC: NEGATIVE MG/DL
HGB UR QL STRIP: ABNORMAL
KETONES UR QL STRIP.AUTO: NEGATIVE MG/DL
LEUKOCYTE ESTERASE UR QL STRIP.AUTO: ABNORMAL
NITRITE UR QL STRIP.AUTO: NEGATIVE
PH UR STRIP: 5 [PH] (ref 5–8)
PROT UR STRIP-MCNC: NEGATIVE MG/DL
SP GR UR REFRACTOMETRY: 1.01 (ref 1–1.03)
UROBILINOGEN UR QL STRIP.AUTO: 0.2 EU/DL (ref 0.2–1)

## 2021-02-23 NOTE — PROGRESS NOTES
Ewelina Boyd,  I have reviewed your urine result. It is still positive for trace blood but has improved significantly from previous report. Please continue drinking plenty of water and keep taking Flomax regularly. Please let me know if you have any question.   Thank you

## 2021-02-24 NOTE — PROGRESS NOTES
Called patient. Name and  joe. Informed that urine shows trace blood but improved. Advised to drink plenty of water and take flomax regularly.

## 2021-02-25 ENCOUNTER — TELEPHONE (OUTPATIENT)
Dept: SURGERY | Age: 76
End: 2021-02-25

## 2021-02-25 DIAGNOSIS — D3A.8 PRIMARY PANCREATIC NEUROENDOCRINE TUMOR: Primary | ICD-10-CM

## 2021-02-25 DIAGNOSIS — C7A.8 OTHER MALIGNANT NEUROENDOCRINE TUMORS (HCC): ICD-10-CM

## 2021-02-25 NOTE — TELEPHONE ENCOUNTER
Pt is calling to get Dr Corbin Harris to put in his CT Order so he can schedule he scan before coming in for his appt 07/08/2021 as a follow up.

## 2021-03-03 ENCOUNTER — DOCUMENTATION ONLY (OUTPATIENT)
Dept: SURGERY | Age: 76
End: 2021-03-03

## 2021-03-04 DIAGNOSIS — I25.10 CORONARY ARTERY DISEASE INVOLVING NATIVE CORONARY ARTERY OF NATIVE HEART WITHOUT ANGINA PECTORIS: Chronic | ICD-10-CM

## 2021-03-04 DIAGNOSIS — K14.4 ATROPHIC GLOSSITIS: ICD-10-CM

## 2021-03-04 RX ORDER — FOLIC ACID 1 MG/1
1 TABLET ORAL DAILY
Qty: 90 TAB | Refills: 1 | Status: SHIPPED | OUTPATIENT
Start: 2021-03-04 | End: 2021-12-27 | Stop reason: SDUPTHER

## 2021-03-04 RX ORDER — CARVEDILOL 6.25 MG/1
6.25 TABLET ORAL 2 TIMES DAILY
Qty: 180 TAB | Refills: 1 | Status: SHIPPED | OUTPATIENT
Start: 2021-03-04 | End: 2021-05-25 | Stop reason: SDUPTHER

## 2021-03-04 NOTE — TELEPHONE ENCOUNTER
MD Snehal Harry,    Patient call for refills below. carrol Bailey     Last Visit: 2/22/21 MD Snehal Harry  Next Appointment: 5/25/21 MD Snehal Harry  Previous Refill Encounter(s):   Carvedilol 60/9/63 90   Folic acid 7/65/52 90    He requested rosuvastatin also but he should have a refill remaining on it. ThanksCarrol    Requested Prescriptions     Pending Prescriptions Disp Refills    carvediloL (COREG) 6.25 mg tablet 180 Tab 1     Sig: Take 1 Tab by mouth two (2) times a day. With food    folic acid (FOLVITE) 1 mg tablet 90 Tab 1     Sig: Take 1 Tab by mouth daily.

## 2021-04-22 DIAGNOSIS — J30.1 SEASONAL ALLERGIC RHINITIS DUE TO POLLEN: ICD-10-CM

## 2021-04-22 RX ORDER — AZELASTINE HCL 205.5 UG/1
1 SPRAY NASAL 2 TIMES DAILY
Qty: 1 BOTTLE | Refills: 2 | Status: SHIPPED | OUTPATIENT
Start: 2021-04-22

## 2021-04-22 NOTE — TELEPHONE ENCOUNTER
Last Visit: 21 MD Daria Damon  Next Appointment: 21 MD Daria Damon  Previous Refill Encounter(s): 3/11/20 1    Requested Prescriptions     Pending Prescriptions Disp Refills    azelastine (ASTEPRO) 205.5 mcg (0.15 %) 1 Bottle 2     [place] by Both Nostrils route two (2) times a day.

## 2021-04-23 DIAGNOSIS — E11.9 CONTROLLED TYPE 2 DIABETES MELLITUS WITHOUT COMPLICATION, WITHOUT LONG-TERM CURRENT USE OF INSULIN (HCC): ICD-10-CM

## 2021-04-26 DIAGNOSIS — N40.1 BENIGN PROSTATIC HYPERPLASIA WITH URINARY FREQUENCY: ICD-10-CM

## 2021-04-26 DIAGNOSIS — E11.9 CONTROLLED TYPE 2 DIABETES MELLITUS WITHOUT COMPLICATION, WITHOUT LONG-TERM CURRENT USE OF INSULIN (HCC): ICD-10-CM

## 2021-04-26 DIAGNOSIS — K44.9 HIATAL HERNIA: ICD-10-CM

## 2021-04-26 DIAGNOSIS — R35.0 BENIGN PROSTATIC HYPERPLASIA WITH URINARY FREQUENCY: ICD-10-CM

## 2021-04-26 RX ORDER — TAMSULOSIN HYDROCHLORIDE 0.4 MG/1
CAPSULE ORAL
Qty: 90 CAP | Refills: 0 | Status: SHIPPED | OUTPATIENT
Start: 2021-04-26 | End: 2021-10-05 | Stop reason: SDUPTHER

## 2021-04-26 RX ORDER — METFORMIN HYDROCHLORIDE 500 MG/1
TABLET ORAL
Qty: 180 TAB | Refills: 1 | Status: SHIPPED | OUTPATIENT
Start: 2021-04-26 | End: 2021-04-26 | Stop reason: SDUPTHER

## 2021-04-26 RX ORDER — OMEPRAZOLE 40 MG/1
40 CAPSULE, DELAYED RELEASE ORAL DAILY
Qty: 90 CAP | Refills: 0 | Status: SHIPPED | OUTPATIENT
Start: 2021-04-26 | End: 2022-06-16 | Stop reason: SDUPTHER

## 2021-04-26 RX ORDER — METFORMIN HYDROCHLORIDE 500 MG/1
TABLET ORAL
Qty: 180 TAB | Refills: 1 | Status: SHIPPED | OUTPATIENT
Start: 2021-04-26 | End: 2021-09-21

## 2021-04-26 NOTE — TELEPHONE ENCOUNTER
Pt is requesting a 3 month supply    Last Visit: 2/22/21 MD Diana Clark  Next Appointment: 5/25/21 MD Diana Clark  Previous refill encounter(s)   03/30/2020 Prilosec #90     Requested Prescriptions     Pending Prescriptions Disp Refills    omeprazole (PRILOSEC) 40 mg capsule 90 Cap 0     Sig: Take 1 Cap by mouth daily.

## 2021-04-26 NOTE — TELEPHONE ENCOUNTER
----- Message from [facility] sent at 4/26/2021  1:40 PM EDT -----  Regarding: Dr. Kanchan Saucedo (if not patient):  N/A      Relationship of caller (if not patient):  N/A      Best contact number(s):  [phone]      Name of medication and dosage if known:  Omeprazole (PRILOSEC) 40 mg capsule, THREE-MONTH SUPPLY      Is patient out of this medication (yes/no): Y      Pharmacy name:  [facility]    Pharmacy listed in chart? (yes/no): Y  Pharmacy phone number:  [phone]       Details to clarify the request:  Patient is requesting a three-month supply.       [facility]

## 2021-04-26 NOTE — TELEPHONE ENCOUNTER
Request for new rx for [facility] pharmacy.   Thanks, Josiah Monet    Last Visit: 2/22/21 MD shah  Next Appointment: 5/25/21 MD Cayla Weir  Previous Refill Encounter(s): 9/2/20 180    Requested Prescriptions     Pending Prescriptions Disp Refills    metFORMIN (GLUCOPHAGE) 500 mg tablet 180 Tab 1     Sig: TAKE 1 TABLET TWICE DAILY WITH MEALS

## 2021-04-26 NOTE — TELEPHONE ENCOUNTER
----- Message from [facility] sent at 4/26/2021  1:36 PM EDT -----  Regarding: Dr. Darren Cabello first and last name:  N/A      Reason for call:  Resend prescription      Callback required yes/no and why:  Y      Best contact number(s):  [phone]      Details to clarify the request:  Patient is requesting to have his Metformin script sent to [facility] at [phone] for a temporary supply because it was sent to his mail-order pharmacy, but that takes time to receive it in the mail, and he is completely out of this medication. He desperately needs this medication and cannot wait for the mail order.       Ava

## 2021-04-26 NOTE — TELEPHONE ENCOUNTER
Patient is requesting to have his Metformin script sent to [facility] at [phone] for a temporary supply because it was sent to his mail-order pharmacy, but that takes time to receive it in the mail, and he is completely out of this medication. He desperately needs this medication and cannot wait for the mail order.

## 2021-05-18 ENCOUNTER — TELEPHONE (OUTPATIENT)
Dept: SURGERY | Age: 76
End: 2021-05-18

## 2021-05-25 ENCOUNTER — OFFICE VISIT (OUTPATIENT)
Dept: FAMILY MEDICINE CLINIC | Age: 76
End: 2021-05-25
Payer: MEDICARE

## 2021-05-25 VITALS
RESPIRATION RATE: 16 BRPM | SYSTOLIC BLOOD PRESSURE: 113 MMHG | TEMPERATURE: 97.4 F | BODY MASS INDEX: 25.55 KG/M2 | WEIGHT: 144.2 LBS | HEIGHT: 63 IN | DIASTOLIC BLOOD PRESSURE: 72 MMHG | HEART RATE: 66 BPM | OXYGEN SATURATION: 97 %

## 2021-05-25 DIAGNOSIS — I25.110 CORONARY ARTERY DISEASE INVOLVING NATIVE CORONARY ARTERY OF NATIVE HEART WITH UNSTABLE ANGINA PECTORIS (HCC): Chronic | ICD-10-CM

## 2021-05-25 DIAGNOSIS — I25.10 CORONARY ARTERY DISEASE INVOLVING NATIVE CORONARY ARTERY OF NATIVE HEART WITHOUT ANGINA PECTORIS: Chronic | ICD-10-CM

## 2021-05-25 DIAGNOSIS — E11.9 CONTROLLED TYPE 2 DIABETES MELLITUS WITHOUT COMPLICATION, WITHOUT LONG-TERM CURRENT USE OF INSULIN (HCC): ICD-10-CM

## 2021-05-25 DIAGNOSIS — K21.9 CHRONIC GERD: ICD-10-CM

## 2021-05-25 DIAGNOSIS — J44.9 COPD, MODERATE (HCC): Primary | ICD-10-CM

## 2021-05-25 DIAGNOSIS — I10 ESSENTIAL HYPERTENSION WITH GOAL BLOOD PRESSURE LESS THAN 130/85: ICD-10-CM

## 2021-05-25 DIAGNOSIS — E78.5 HYPERLIPIDEMIA LDL GOAL <100: ICD-10-CM

## 2021-05-25 PROCEDURE — G8419 CALC BMI OUT NRM PARAM NOF/U: HCPCS | Performed by: FAMILY MEDICINE

## 2021-05-25 PROCEDURE — 99214 OFFICE O/P EST MOD 30 MIN: CPT | Performed by: FAMILY MEDICINE

## 2021-05-25 PROCEDURE — 3044F HG A1C LEVEL LT 7.0%: CPT | Performed by: FAMILY MEDICINE

## 2021-05-25 PROCEDURE — G8510 SCR DEP NEG, NO PLAN REQD: HCPCS | Performed by: FAMILY MEDICINE

## 2021-05-25 PROCEDURE — G8754 DIAS BP LESS 90: HCPCS | Performed by: FAMILY MEDICINE

## 2021-05-25 PROCEDURE — G8752 SYS BP LESS 140: HCPCS | Performed by: FAMILY MEDICINE

## 2021-05-25 PROCEDURE — G8536 NO DOC ELDER MAL SCRN: HCPCS | Performed by: FAMILY MEDICINE

## 2021-05-25 PROCEDURE — G8427 DOCREV CUR MEDS BY ELIG CLIN: HCPCS | Performed by: FAMILY MEDICINE

## 2021-05-25 PROCEDURE — 1101F PT FALLS ASSESS-DOCD LE1/YR: CPT | Performed by: FAMILY MEDICINE

## 2021-05-25 PROCEDURE — 3017F COLORECTAL CA SCREEN DOC REV: CPT | Performed by: FAMILY MEDICINE

## 2021-05-25 PROCEDURE — 2022F DILAT RTA XM EVC RTNOPTHY: CPT | Performed by: FAMILY MEDICINE

## 2021-05-25 RX ORDER — ROSUVASTATIN CALCIUM 10 MG/1
10 TABLET, COATED ORAL DAILY
Qty: 90 TABLET | Refills: 1 | Status: SHIPPED | OUTPATIENT
Start: 2021-05-25 | End: 2021-10-14

## 2021-05-25 RX ORDER — CARVEDILOL 6.25 MG/1
6.25 TABLET ORAL 2 TIMES DAILY
Qty: 180 TABLET | Refills: 1 | Status: SHIPPED | OUTPATIENT
Start: 2021-05-25 | End: 2021-12-27 | Stop reason: SDUPTHER

## 2021-05-25 NOTE — PATIENT INSTRUCTIONS
Gastroesophageal Reflux Disease (GERD): Care Instructions  Overview     Gastroesophageal reflux disease (GERD) is the backward flow of stomach acid into the esophagus. The esophagus is the tube that leads from your throat to your stomach. A one-way valve prevents the stomach acid from backing up into this tube. But when you have GERD, this valve does not close tightly enough. This can also cause pain and swelling in your esophagus. (This is called esophagitis.)  If you have mild GERD symptoms including heartburn, you may be able to control the problem with antacids or over-the-counter medicine. You can also make lifestyle changes to help reduce your symptoms. These include changing your diet and eating habits, such as not eating late at night and losing weight. Follow-up care is a key part of your treatment and safety. Be sure to make and go to all appointments, and call your doctor if you are having problems. It's also a good idea to know your test results and keep a list of the medicines you take. How can you care for yourself at home? · Take your medicines exactly as prescribed. Call your doctor if you think you are having a problem with your medicine. · Your doctor may recommend over-the-counter medicine. For mild or occasional indigestion, antacids, such as Tums, Gaviscon, Mylanta, or Maalox, may help. Your doctor also may recommend over-the-counter acid reducers, such as famotidine (Pepcid AC), cimetidine (Tagamet HB), or omeprazole (Prilosec). Read and follow all instructions on the label. If you use these medicines often, talk with your doctor. · Change your eating habits. ? It's best to eat several small meals instead of two or three large meals. ? After you eat, wait 2 to 3 hours before you lie down. ? Chocolate, mint, and alcohol can make GERD worse. ? Spicy foods, foods that have a lot of acid (like tomatoes and oranges), and coffee can make GERD symptoms worse in some people.  If your symptoms are worse after you eat a certain food, you may want to stop eating that food to see if your symptoms get better. · Do not smoke or chew tobacco. Smoking can make GERD worse. If you need help quitting, talk to your doctor about stop-smoking programs and medicines. These can increase your chances of quitting for good. · If you have GERD symptoms at night, raise the head of your bed 6 to 8 inches by putting the frame on blocks or placing a foam wedge under the head of your mattress. (Adding extra pillows does not work.)  · Do not wear tight clothing around your middle. · Lose weight if you need to. Losing just 5 to 10 pounds can help. When should you call for help? Call your doctor now or seek immediate medical care if:    · You have new or different belly pain.     · Your stools are black and tarlike or have streaks of blood. Watch closely for changes in your health, and be sure to contact your doctor if:    · Your symptoms have not improved after 2 days.     · Food seems to catch in your throat or chest.   Where can you learn more? Go to http://www.gray.com/  Enter P601 in the search box to learn more about \"Gastroesophageal Reflux Disease (GERD): Care Instructions. \"  Current as of: April 15, 2020               Content Version: 12.8  © 2006-2021 Healthwise, [employer]. Care instructions adapted under license by [employer] (which disclaims liability or warranty for this information). If you have questions about a medical condition or this instruction, always ask your healthcare professional. [place] [address] any warranty or liability for your use of this information.

## 2021-05-25 NOTE — PROGRESS NOTES
HISTORY OF PRESENT ILLNESS  Jackson Ashby is a 77 y.o. male. Was seen today for follow-up on multiple medical issues including diabetes, coronary artery disease, dyslipidemia, hypertension, COPD and concern of persistent acid reflux and cough. His recent nuclear stress test with Dr. Holli Garces was reviewed. He had very reassuring findings on stress test.  Urinary symptoms have improved significantly since he is on Flomax. HPI  Cardiovascular Review  The patient has hypertension, hyperlipidemia, coronary artery disease and status post coronary artery stenting.  He reports taking medications as instructed, no medication side effects noted, no TIA's, no chest pain on exertion, no dyspnea on exertion, no swelling of ankles, no orthostatic dizziness or lightheadedness, no orthopnea or paroxysmal nocturnal dyspnea, no palpitations, no intermittent claudication symptoms, no muscle aches or pain.  Diet and Lifestyle: generally follows a low fat low cholesterol diet, generally follows a low sodium diet, does not rigorously follow a diabetic diet, exercises regularly, nonsmoker.  Lab review: labs reviewed and discussed with patient.  He had normal Lexican stress test and Echo in 09/2014 and normal EKG in 01/2016  Medicines: Carvedilol 6.25 mg BID, crestor 10 mg  Losartan/Hctz 50/12.5 mg, clonidine as needed  He is s/p Cardiac cath ( 12/17/2018) by Dr Bethany Bajwa. He had nuclear stress test done last month with Dr. Holli Graces, reports where reviewed  he was told cath was reassuring and stents are patent. He was advised to dc Plavix. Is on aspirin only now.     As per cardiology, if his symptoms continues, to add Imdur for questionable angina.        Endocrine Review  He is seen for diabetes.  Since last visit: no change.  Home glucose monitoring: is performed sporadically, nonfasting values range 100-140.  He reports medication compliance: noncompliant some of the time.  Medication side effects: none.  Diabetic diet compliance: noncompliant some of the time.  Further diabetic ROS: no polyuria or polydipsia, no chest pain, dyspnea or TIA's, no numbness, tingling or pain in extremities, no unusual visual symptoms, no hypoglycemia.  Lab review: labs reviewed and discussed with patient.  Eye exam: due.  ,   Medications: Metformin  500 mg BID  on ACEI/ARBS :yes  On ASA, yes  Podiatry visit: not done     COPD  Patient complains of cough and shortness of breath. Symptoms began several years ago. Symptoms chronic dyspnea: severity = mild: course of sx: well controlled  able walk 5 blocks  cough: severity: moderate: sputum : clear: light  symptoms worse with exertion. does worsen with exertion. Sputum is clear in small amounts. Fever has been suspected fevers but not measured at home. Patient uses 1 pillows at night. Patient can walk 1 mile  before resting. Patient currently is not on home oxygen therapy. [facility] Respiratory history: frequent episodes of bronchitis and COPD  He is on scheduled Fluticasone, Singulair and prn albuterol, but non compliant with treatment.     Urology Review  He is here today because of BPH.  He reports his symptoms are well controlled.  His symptoms include: nocturia x 2, dysuria, pain on tip of penis.  He denies: urinary hesitancy, urinary frequency, double voiding, weak stream, perineal discomfort, hematuria, abdominal pain, flank pain, testicular pain.  He is on the following medications: Flomax.  He reports the following medication side effects: none.  Lab review: labs reviewed and discussed with patient.    Is back on Flomax for few months now. Review of Systems   Constitutional: Negative for chills, fever and malaise/fatigue. HENT: Negative for congestion, ear pain, sore throat and tinnitus. Eyes: Negative for blurred vision, double vision, pain and discharge. Respiratory: Positive for cough. Negative for shortness of breath and wheezing. Cardiovascular: Negative for chest pain, palpitations and leg swelling. Gastrointestinal: Positive for heartburn. Negative for abdominal pain, blood in stool, constipation, diarrhea, nausea and vomiting. Genitourinary: Negative for dysuria, frequency, hematuria and urgency. Musculoskeletal: Negative for back pain, joint pain and myalgias. Skin: Negative for rash. Neurological: Negative for dizziness, tremors, seizures and headaches. Endo/Heme/Allergies: Negative for polydipsia. Does not bruise/bleed easily. Psychiatric/Behavioral: Negative for depression and substance abuse. The patient is not nervous/anxious. Physical Exam  Vitals and nursing note reviewed. Constitutional:       Appearance: He is well-developed. He is not diaphoretic. HENT:      Head: Normocephalic and atraumatic. Right Ear: External ear normal.      Mouth/Throat:      Pharynx: No oropharyngeal exudate. Eyes:      General: No scleral icterus. Conjunctiva/sclera: Conjunctivae normal.      Pupils: Pupils are equal, round, and reactive to light. Neck:      Thyroid: No thyromegaly. Vascular: No JVD. Cardiovascular:      Rate and Rhythm: Normal rate and regular rhythm. Pulses:           Dorsalis pedis pulses are 2+ on the right side and 2+ on the left side. Posterior tibial pulses are 2+ on the right side and 2+ on the left side. Heart sounds: Normal heart sounds. No murmur heard. Pulmonary:      Effort: Pulmonary effort is normal.      Breath sounds: Normal breath sounds. No wheezing. Abdominal:      General: Bowel sounds are normal. There is no distension. Palpations: Abdomen is soft. There is no mass. Musculoskeletal:         General: No tenderness. Normal range of motion. Cervical back: Normal range of motion and neck supple. Feet:      Right foot:      Protective Sensation: 10 sites tested. 10 sites sensed. Skin integrity: Skin integrity normal.      Left foot:      Protective Sensation: 10 sites tested. 10 sites sensed.       Skin integrity: Skin integrity normal.   Lymphadenopathy:      Cervical: No cervical adenopathy. Skin:     General: Skin is warm and dry. Findings: No rash. Neurological:      Mental Status: He is alert and oriented to person, place, and time. Cranial Nerves: No cranial nerve deficit. Deep Tendon Reflexes: Reflexes are normal and symmetric. Reflexes normal.         ASSESSMENT and PLAN  Diagnoses and all orders for this visit:    1. COPD, moderate ([facility] [address]  Assessment & Plan:   well controlled, continue current medications, medication adherence emphasized, lifestyle modifications recommended    Orders:  -     CBC WITH AUTOMATED DIFF; Future    2. Controlled type 2 diabetes mellitus without complication, without long-term current use of insulin ([facility] [address]  Assessment & Plan:   well controlled, continue current medications pending work up below, medication adherence emphasized, lifestyle modifications recommended    Orders:  -     REFERRAL TO OPHTHALMOLOGY  -      DIABETES FOOT EXAM  -     HEMOGLOBIN A1C WITH EAG; Future  -     METABOLIC PANEL, COMPREHENSIVE; Future    3. Coronary artery disease involving native coronary artery of native heart with unstable angina pectoris [facility])  Assessment & Plan:   well controlled, continue current plan pending work up below, medication adherence emphasized, lifestyle modifications recommended    Orders:  -     LIPID PANEL; Future    4. Essential hypertension with goal blood pressure less than 329/08  -     METABOLIC PANEL, COMPREHENSIVE; Future    5. Hyperlipidemia LDL goal <100  -     rosuvastatin (CRESTOR) 10 mg tablet; Take 1 Tablet by mouth daily.  -     LIPID PANEL; Future  -     METABOLIC PANEL, COMPREHENSIVE; Future    6. Coronary artery disease involving native coronary artery of native heart without angina pectoris  -     carvediloL (COREG) 6.25 mg tablet; Take 1 Tablet by mouth two (2) times a day. With food  -     LIPID PANEL;  Future  -     METABOLIC PANEL, COMPREHENSIVE; Future    7. Chronic GERD  -     REFERRAL TO GASTROENTEROLOGY  -     CBC WITH AUTOMATED DIFF; Future    Discussed lifestyle issues and health guidance given  Patient was given an after visit summary which includes diagnoses, vital signs, current medications, instructions and references & authorized prescriptions . Results of labs will be conveyed to patient, once available. Pt verbalized instructions I provided and expressed understanding of discussion that was held today. Follow-up and Dispositions    · Return in about 4 months (around 9/25/2021) for fasting, follow up.

## 2021-05-25 NOTE — ASSESSMENT & PLAN NOTE
well controlled, continue current plan pending work up below, medication adherence emphasized, lifestyle modifications recommended

## 2021-05-25 NOTE — PROGRESS NOTES
Chief Complaint   Patient presents with    Coronary Artery Disease     3 months follow up    Hypertension    Diabetes       1. Have you been to the ER, urgent care clinic since your last visit? Hospitalized since your last visit? No    2. Have you seen or consulted any other health care providers outside of the [address] [place] [address] since your last visit? Include any pap smears or colon screening. No    Have you had the covid vaccine. .yes. when unknown    3 most recent PHQ Screens 5/25/2021   Little interest or pleasure in doing things Not at all   Feeling down, depressed, irritable, or hopeless Not at all   Total Score PHQ 2 0       Health Maintenance Due   Topic Date Due    COVID-19 Vaccine (1) Never done    DTaP/Tdap/Td series (1 - Tdap) Never done    Shingrix Vaccine Age 50> (1 of 2) Never done    Eye Exam Retinal or Dilated  10/11/2019     Pt didn't have an eye exam recently but will be going soon

## 2021-05-25 NOTE — ASSESSMENT & PLAN NOTE
well controlled, continue current medications, medication adherence emphasized, lifestyle modifications recommended

## 2021-05-26 DIAGNOSIS — I10 ESSENTIAL HYPERTENSION WITH GOAL BLOOD PRESSURE LESS THAN 130/85: ICD-10-CM

## 2021-05-26 LAB
ALBUMIN SERPL-MCNC: 4.1 G/DL (ref 3.7–4.7)
ALBUMIN/GLOB SERPL: 1.4 {RATIO} (ref 1.2–2.2)
ALP SERPL-CCNC: 77 IU/L (ref 48–121)
ALT SERPL-CCNC: 8 IU/L (ref 0–44)
AST SERPL-CCNC: 16 IU/L (ref 0–40)
BASOPHILS # BLD AUTO: 0 X10E3/UL (ref 0–0.2)
BASOPHILS NFR BLD AUTO: 1 %
BILIRUB SERPL-MCNC: 0.4 MG/DL (ref 0–1.2)
BUN SERPL-MCNC: 15 MG/DL (ref 8–27)
BUN/CREAT SERPL: 16 (ref 10–24)
CALCIUM SERPL-MCNC: 9.4 MG/DL (ref 8.6–10.2)
CHLORIDE SERPL-SCNC: 104 MMOL/L (ref 96–106)
CHOLEST SERPL-MCNC: 107 MG/DL (ref 100–199)
CO2 SERPL-SCNC: 26 MMOL/L (ref 20–29)
CREAT SERPL-MCNC: 0.91 MG/DL (ref 0.76–1.27)
EOSINOPHIL # BLD AUTO: 0.5 X10E3/UL (ref 0–0.4)
EOSINOPHIL NFR BLD AUTO: 8 %
ERYTHROCYTE [DISTWIDTH] IN BLOOD BY AUTOMATED COUNT: 13.1 % (ref 11.6–15.4)
EST. AVERAGE GLUCOSE BLD GHB EST-MCNC: 134 MG/DL
GLOBULIN SER CALC-MCNC: 2.9 G/DL (ref 1.5–4.5)
GLUCOSE SERPL-MCNC: 111 MG/DL (ref 65–99)
HBA1C MFR BLD: 6.3 % (ref 4.8–5.6)
HCT VFR BLD AUTO: 39.1 % (ref 37.5–51)
HDLC SERPL-MCNC: 48 MG/DL
HGB BLD-MCNC: 12.6 G/DL (ref 13–17.7)
IMM GRANULOCYTES # BLD AUTO: 0 X10E3/UL (ref 0–0.1)
IMM GRANULOCYTES NFR BLD AUTO: 0 %
IMP & REVIEW OF LAB RESULTS: NORMAL
LDLC SERPL CALC-MCNC: 45 MG/DL (ref 0–99)
LYMPHOCYTES # BLD AUTO: 2 X10E3/UL (ref 0.7–3.1)
LYMPHOCYTES NFR BLD AUTO: 31 %
MCH RBC QN AUTO: 27.2 PG (ref 26.6–33)
MCHC RBC AUTO-ENTMCNC: 32.2 G/DL (ref 31.5–35.7)
MCV RBC AUTO: 84 FL (ref 79–97)
MONOCYTES # BLD AUTO: 0.6 X10E3/UL (ref 0.1–0.9)
MONOCYTES NFR BLD AUTO: 9 %
NEUTROPHILS # BLD AUTO: 3.3 X10E3/UL (ref 1.4–7)
NEUTROPHILS NFR BLD AUTO: 51 %
PLATELET # BLD AUTO: 263 X10E3/UL (ref 150–450)
POTASSIUM SERPL-SCNC: 4.5 MMOL/L (ref 3.5–5.2)
PROT SERPL-MCNC: 7 G/DL (ref 6–8.5)
RBC # BLD AUTO: 4.64 X10E6/UL (ref 4.14–5.8)
SODIUM SERPL-SCNC: 139 MMOL/L (ref 134–144)
TRIGL SERPL-MCNC: 65 MG/DL (ref 0–149)
VLDLC SERPL CALC-MCNC: 14 MG/DL (ref 5–40)
WBC # BLD AUTO: 6.3 X10E3/UL (ref 3.4–10.8)

## 2021-05-26 RX ORDER — LOSARTAN POTASSIUM 50 MG/1
50 TABLET ORAL DAILY
Qty: 90 TABLET | Refills: 1 | Status: CANCELLED | OUTPATIENT
Start: 2021-05-26

## 2021-05-26 NOTE — PROGRESS NOTES
Her Cortez,  I have reviewed your results. All results including kidney and liver function, cholesterol, are very normal.A1c, diabetes screening is very reassuring and at goal.Blood count is normal, shows borderline low hemoglobin but has improved from previous results. Overall very reassuring results, please continue on current medications. Please let me know if any question, thanks.

## 2021-05-26 NOTE — TELEPHONE ENCOUNTER
Duplicate request - Cozaar 50 mg #90 with 1 refill was sent to [facility] on 02/22/2021.     -Prescribing Status: Receipt confirmed by pharmacy (2/22/2021  9:08 AM EST)      Requested Prescriptions     Pending Prescriptions Disp Refills    losartan (COZAAR) 50 mg tablet 90 Tablet 1     Sig: Take 1 Tablet by mouth daily.

## 2021-06-28 ENCOUNTER — TELEPHONE (OUTPATIENT)
Dept: FAMILY MEDICINE CLINIC | Age: 76
End: 2021-06-28

## 2021-06-28 DIAGNOSIS — E11.9 CONTROLLED TYPE 2 DIABETES MELLITUS WITHOUT COMPLICATION, WITHOUT LONG-TERM CURRENT USE OF INSULIN (HCC): Primary | ICD-10-CM

## 2021-06-28 DIAGNOSIS — D49.0 PANCREATIC ENDOCRINE TUMOR: Primary | ICD-10-CM

## 2021-06-28 NOTE — TELEPHONE ENCOUNTER
----- Message from Juwan Edge sent at 6/28/2021  9:13 AM EDT -----  Regarding: Dr. Donnalee Severin  Referral    Caller (first and last name if not the patient or from practice): pt      Caller's relationship to patient (if not from a practice): self      Name of caller (if calling from a practice): n/a      Name of practice:  n/a      Specialist's title, first, and last name: Dr. Rodney Reynolds       Office Phone Number: [phone]      Fax number: n/a      Date and time of appointment: 7/16/21 at 0900      Reason for appointment: eye check up      Details to clarify the request:       Juwan Edge

## 2021-06-28 NOTE — TELEPHONE ENCOUNTER
Outbound call to the patient. Nmae and  verified. Informed patient that he has order for CT chest abdomen and pelvis, ordered by Dr. Denilson Avendano and he has scheduled that for . He does not need a separate order for that.  He stated understanding

## 2021-06-28 NOTE — TELEPHONE ENCOUNTER
Referral has been completed. Please give it to Verner Sexton in case he needs insurance referral.  And fax referral to specialist office.   Thanks

## 2021-06-28 NOTE — TELEPHONE ENCOUNTER
Let him know he already has order for CT chest abdomen and pelvis, ordered by Dr. Kirby Mi and he has scheduled that for July 1. He does not need a separate order for that.

## 2021-06-29 ENCOUNTER — TELEPHONE (OUTPATIENT)
Dept: SURGERY | Age: 76
End: 2021-06-29

## 2021-07-01 ENCOUNTER — HOSPITAL ENCOUNTER (OUTPATIENT)
Dept: CT IMAGING | Age: 76
Discharge: HOME | End: 2021-07-01
Attending: SURGERY
Payer: MEDICARE

## 2021-07-01 DIAGNOSIS — D3A.8 PRIMARY PANCREATIC NEUROENDOCRINE TUMOR: ICD-10-CM

## 2021-07-01 PROCEDURE — 74011000636 HC RX REV CODE- 636: Performed by: SURGERY

## 2021-07-01 PROCEDURE — 71260 CT THORAX DX C+: CPT

## 2021-07-01 PROCEDURE — 74178 CT ABD&PLV WO CNTR FLWD CNTR: CPT

## 2021-07-01 RX ADMIN — IOPAMIDOL 100 ML: 755 INJECTION, SOLUTION INTRAVENOUS at 09:03

## 2021-07-06 RX ORDER — NITROGLYCERIN 0.4 MG/1
TABLET SUBLINGUAL
Qty: 25 TABLET | Refills: 0 | Status: SHIPPED | OUTPATIENT
Start: 2021-07-06

## 2021-07-06 NOTE — TELEPHONE ENCOUNTER
MD Roman Muller,    Patient call stating needs [facility]   Thanks, Krista Rae    Last Visit: 5/25/21 MD Roman Muller  Next Appointment: 9/28/21 MD Roman Muller  Previous Refill Encounter(s): 7/6/18 25    Requested Prescriptions     Pending Prescriptions Disp Refills    nitroglycerin (NITROSTAT) 0.4 mg SL tablet 25 Tablet 0     Sig: TAKE 1 TABLET AND PLACE UNDER THE TONGUE EVERY 5 MINUTES FOR CHEST PAIN FOR UP TO 3 DOSES

## 2021-07-07 NOTE — TELEPHONE ENCOUNTER
Patient call again for refill of nitroglycerin. I contacted patient today to let him know rx was sent yesterday and to contact his pharmacy.   Thanks, Aurelio Daniel

## 2021-07-08 ENCOUNTER — OFFICE VISIT (OUTPATIENT)
Dept: SURGERY | Age: 76
End: 2021-07-08
Payer: MEDICARE

## 2021-07-08 VITALS
SYSTOLIC BLOOD PRESSURE: 118 MMHG | BODY MASS INDEX: 25.52 KG/M2 | HEIGHT: 63 IN | HEART RATE: 77 BPM | OXYGEN SATURATION: 96 % | WEIGHT: 144 LBS | RESPIRATION RATE: 18 BRPM | DIASTOLIC BLOOD PRESSURE: 78 MMHG | TEMPERATURE: 98.3 F

## 2021-07-08 DIAGNOSIS — D3A.8 PRIMARY PANCREATIC NEUROENDOCRINE TUMOR: Primary | ICD-10-CM

## 2021-07-08 DIAGNOSIS — C7A.8 OTHER MALIGNANT NEUROENDOCRINE TUMORS (HCC): ICD-10-CM

## 2021-07-08 PROCEDURE — G8427 DOCREV CUR MEDS BY ELIG CLIN: HCPCS | Performed by: SURGERY

## 2021-07-08 PROCEDURE — G8752 SYS BP LESS 140: HCPCS | Performed by: SURGERY

## 2021-07-08 PROCEDURE — 99213 OFFICE O/P EST LOW 20 MIN: CPT | Performed by: SURGERY

## 2021-07-08 PROCEDURE — G8419 CALC BMI OUT NRM PARAM NOF/U: HCPCS | Performed by: SURGERY

## 2021-07-08 PROCEDURE — G8754 DIAS BP LESS 90: HCPCS | Performed by: SURGERY

## 2021-07-08 PROCEDURE — 1101F PT FALLS ASSESS-DOCD LE1/YR: CPT | Performed by: SURGERY

## 2021-07-08 PROCEDURE — G8510 SCR DEP NEG, NO PLAN REQD: HCPCS | Performed by: SURGERY

## 2021-07-08 PROCEDURE — 3017F COLORECTAL CA SCREEN DOC REV: CPT | Performed by: SURGERY

## 2021-07-08 PROCEDURE — G8536 NO DOC ELDER MAL SCRN: HCPCS | Performed by: SURGERY

## 2021-07-08 NOTE — PROGRESS NOTES
[facility]      Clinic Note - Follow up    1949 Sadie Cloud returns for scheduled follow up today. He is known to me for a previously incidentally identified mass in the tail of the pancreas. The patient underwent an EUS with biopsy. 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 He elected to pursue observation rather than resection given the small size less than 2 cm and well differentiated nature of the tumor. The patient states he has been doing well. He has gotten his Covid vaccine and tolerated that well. He feels like he is in his usual state of health. He denies any flushing, diarrhea, abdominal pain, weight loss, nausea/vomiting or changes in bowel habits. The patient denies any changes to the [facility], PSHx, SHx or FHx since their last visit except as mentioned above. ROS is negative except as mentioned in the HPI. Objective     Visit Vitals  /78 (BP 1 Location: Right arm, BP Patient Position: Sitting, BP Cuff Size: Adult)   Pulse 77   Temp 98.3 °F (36.8 °C) (Oral)   Resp 18   Ht 5' 3\" (1.6 m)   Wt 144 lb (65.3 kg)   SpO2 96%   BMI 25.51 kg/m²         PE  GEN - Awake, alert, communicating appropriately. NAD  Pulm - CTAB  CV - RRR  Abd - soft, NT, ND. No palpable masses or organomegaly. He has mild diastases in the upper abdomen. Lymphatics - No cervical, supraclavicular or inguinal adenopathy bilaterally. Ext - warm, well perfused, no edema. All other systems negative unless indicated above. Labs  Chromogranin A: pending      Imaging  CT Results (most recent):  Results from Hospital Encounter encounter on 07/01/21    CT ABDOMEN W WO CONT AND PELVIS W CONT    Narrative  EXAM:  CT CHEST W CONT, CT ABDOMEN W WO CONT AND PELVIS W CONT    INDICATION: Pancreatic neuroendocrine tumor    COMPARISON: January 26, 2021    CONTRAST:  100 mL of Isovue-370.     TECHNIQUE:  Following the uneventful intravenous administration of contrast, thin axial  images were obtained through the chest, abdomen and pelvis. Coronal and sagittal  reconstructions were generated. Oral contrast was administered. CT dose  reduction was achieved through use of a standardized protocol tailored for this  examination and automatic exposure control for dose modulation. FINDINGS:    THYROID: No nodule. MEDIASTINUM: No mass or lymphadenopathy. YOEL: No mass or lymphadenopathy. THORACIC AORTA: No dissection or aneurysm. MAIN PULMONARY ARTERY: Normal in caliber. TRACHEA/BRONCHI: Patent. ESOPHAGUS: Small hiatal hernia. HEART: Diffuse coronary calcifications. Normal heart size. PLEURA: No effusion or pneumothorax. LUNGS: Stable micronodules 30 in the lungs bilaterally, some areas representing  scar as there are along pleural/fissural surfaces. The largest of these at the  left lung base measuring 2 mm (7:91). LIVER: No mass or biliary dilatation. GALLBLADDER: Unremarkable. SPLEEN: No mass. PANCREAS: 14 mm hyperenhancing pancreatic tail mass, stable from prior imaging. No other hyper or hypoenhancing lesions in the pancreas. ADRENALS: Unremarkable. KIDNEYS: 10 mm anterior interpolar left renal stone, nonobstructive. Simple  right upper pole renal cyst, stable. STOMACH: Small hiatal hernia. SMALL BOWEL: No dilatation or wall thickening. COLON: No dilatation or wall thickening. APPENDIX: Normal  PERITONEUM: No ascites or pneumoperitoneum. RETROPERITONEUM: No lymphadenopathy or aortic aneurysm. REPRODUCTIVE ORGANS: Within normal limits  URINARY BLADDER: No mass or calculus. BONES: Lumbar spine degenerative changes. No aggressive osseous lesion. ADDITIONAL COMMENTS: N/A    Impression  1. Stable 14 mm hyperenhancing pancreatic tail mass consistent with a  neuroendocrine tumor. 2.  No evidence of liver metastases. 3.  Nonobstructive left renal stone. Filipe Russell is a 77 y. o.yr old male known to me for a previously incidentally identified mass in the tail of the pancreas. The patient underwent an EUS with biopsy. 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 He elected to pursue observation rather than resection given the small size less than 2 cm and well differentiated nature of the tumor. Plan     The patient is doing well with no evidence of change in his pancreatic neuroendocrine tumor. He will get a chromogranin A level checked today. We again discussed options of surgical resection versus ongoing observation. He wishes to proceed with observation given the small size of the tumor, lack of change on imaging, well differentiated nature of the tumor and concerns regarding morbidity after pancreatectomy. I think this is reasonable given these features. I will plan to see him back in 6 months with a repeat CT scan and chromogranin A level at that time. 20 mins of time was spent with the patient of which > 50% of the time involved face-to-face counseling of the patient regarding the proposed treatment plan.       Etelvina Archer MD  7/8/2021    CC: MD Dr. Lorelei Quintanilla Dr.

## 2021-07-08 NOTE — LETTER
7/8/2021    Patient: Toi Mackey   YOB: 1945   Date of Visit: 7/8/2021     Karyn Moreno MD  [address] [place] [address]  Via In Basket    Dear Karyn Moreno MD,      Thank you for referring Mr. Mel Fuentes to [facility] for evaluation. My notes for this consultation are attached. If you have questions, please do not hesitate to call me. I look forward to following your patient along with you.       Sincerely,    Etelvina Archer MD

## 2021-07-08 NOTE — PROGRESS NOTES
1. Have you been to the ER, urgent care clinic since your last visit? Hospitalized since your last visit? No     2. Have you seen or consulted any other health care providers outside of the [address] [place] [address] since your last visit? Include any pap smears or colon screening.  No

## 2021-07-12 LAB — CGA SERPL-MCNC: 149.2 NG/ML (ref 0–101.8)

## 2021-07-20 ENCOUNTER — TELEPHONE (OUTPATIENT)
Dept: FAMILY MEDICINE CLINIC | Age: 76
End: 2021-07-20

## 2021-07-20 DIAGNOSIS — E11.9 CONTROLLED TYPE 2 DIABETES MELLITUS WITHOUT COMPLICATION, WITHOUT LONG-TERM CURRENT USE OF INSULIN (HCC): ICD-10-CM

## 2021-07-20 RX ORDER — METFORMIN HYDROCHLORIDE 500 MG/1
TABLET ORAL
Qty: 180 TABLET | Refills: 1 | Status: CANCELLED | OUTPATIENT
Start: 2021-07-20

## 2021-07-20 NOTE — TELEPHONE ENCOUNTER
Pt is requesting two referrals. Pt did not know if he needed to set-up an appointment with our office as well as the request for the doctors. One referral is for a cardiologist and the other is for an eye specialist. Please cancel appointment if not needed. Dr Natty Marc at [facility]. Phone # [phone]    [facility] eye associates Dr Steven Moon.  Phone number #[phone]

## 2021-07-20 NOTE — TELEPHONE ENCOUNTER
Duplicate request: Glucophage 500 mg #180 with 1 refill was sent to [facility] on 04/26/2021.      Requested Prescriptions     Pending Prescriptions Disp Refills    metFORMIN (GLUCOPHAGE) 500 mg tablet 180 Tablet 1     Sig: TAKE 1 TABLET TWICE DAILY WITH MEALS

## 2021-07-21 ENCOUNTER — TELEPHONE (OUTPATIENT)
Dept: FAMILY MEDICINE CLINIC | Age: 76
End: 2021-07-21

## 2021-07-21 NOTE — TELEPHONE ENCOUNTER
Patient called requesting a referral to be sent to Dr. Hermann Balderas cardiologist.    Fax # [phone]    Best call back # [phone]

## 2021-07-21 NOTE — TELEPHONE ENCOUNTER
I have already sent message to nurse pool that we will discuss during his upcoming office visit, as he just had cardiac work up.  Can not make multiple referral as per patient requests

## 2021-07-21 NOTE — TELEPHONE ENCOUNTER
Called patient. Name and  verified. Informed that this cam be discussed with Galdino Rehman during upcoming visit. He stated understanding.

## 2021-07-29 ENCOUNTER — OFFICE VISIT (OUTPATIENT)
Dept: FAMILY MEDICINE CLINIC | Age: 76
End: 2021-07-29
Payer: MEDICARE

## 2021-07-29 VITALS
DIASTOLIC BLOOD PRESSURE: 60 MMHG | HEART RATE: 77 BPM | SYSTOLIC BLOOD PRESSURE: 90 MMHG | TEMPERATURE: 98 F | WEIGHT: 144 LBS | RESPIRATION RATE: 16 BRPM | OXYGEN SATURATION: 98 % | BODY MASS INDEX: 25.52 KG/M2 | HEIGHT: 63 IN

## 2021-07-29 DIAGNOSIS — K44.9 HIATAL HERNIA: ICD-10-CM

## 2021-07-29 DIAGNOSIS — K21.9 CHRONIC GERD: ICD-10-CM

## 2021-07-29 DIAGNOSIS — R94.30 ABNORMAL PHARMACOLOGIC MYOCARDIAL PERFUSION STUDY: Primary | ICD-10-CM

## 2021-07-29 DIAGNOSIS — I25.110 CORONARY ARTERY DISEASE INVOLVING NATIVE CORONARY ARTERY OF NATIVE HEART WITH UNSTABLE ANGINA PECTORIS (HCC): ICD-10-CM

## 2021-07-29 DIAGNOSIS — H25.013 CORTICAL AGE-RELATED CATARACT OF BOTH EYES: ICD-10-CM

## 2021-07-29 PROCEDURE — 3017F COLORECTAL CA SCREEN DOC REV: CPT | Performed by: FAMILY MEDICINE

## 2021-07-29 PROCEDURE — G8752 SYS BP LESS 140: HCPCS | Performed by: FAMILY MEDICINE

## 2021-07-29 PROCEDURE — 1101F PT FALLS ASSESS-DOCD LE1/YR: CPT | Performed by: FAMILY MEDICINE

## 2021-07-29 PROCEDURE — 99213 OFFICE O/P EST LOW 20 MIN: CPT | Performed by: FAMILY MEDICINE

## 2021-07-29 PROCEDURE — G8536 NO DOC ELDER MAL SCRN: HCPCS | Performed by: FAMILY MEDICINE

## 2021-07-29 PROCEDURE — G8510 SCR DEP NEG, NO PLAN REQD: HCPCS | Performed by: FAMILY MEDICINE

## 2021-07-29 PROCEDURE — G8419 CALC BMI OUT NRM PARAM NOF/U: HCPCS | Performed by: FAMILY MEDICINE

## 2021-07-29 PROCEDURE — G8754 DIAS BP LESS 90: HCPCS | Performed by: FAMILY MEDICINE

## 2021-07-29 PROCEDURE — G8427 DOCREV CUR MEDS BY ELIG CLIN: HCPCS | Performed by: FAMILY MEDICINE

## 2021-07-29 NOTE — PROGRESS NOTES
Chief Complaint   Patient presents with    Referral Request       1. Have you been to the ER, urgent care clinic since your last visit? Hospitalized since your last visit? No    2. Have you seen or consulted any other health care providers outside of the [address] [place] [address] since your last visit? Include any pap smears or colon screening.  No        3 most recent PHQ Screens 7/29/2021   Little interest or pleasure in doing things Not at all   Feeling down, depressed, irritable, or hopeless Not at all   Total Score PHQ 2 0       Health Maintenance Due   Topic Date Due    COVID-19 Vaccine (1) Never done    DTaP/Tdap/Td series (1 - Tdap) Never done    Shingrix Vaccine Age 50> (1 of 2) Never done    Eye Exam Retinal or Dilated  10/11/2019     Pt had an eye exam with Derrick denise

## 2021-07-29 NOTE — PROGRESS NOTES
HISTORY OF PRESENT ILLNESS  David Chisholm is a 77 y.o. male. Patient was seen today to discuss several concerns in reference. He has also brought preoperative clearance form for cataract surgery and has now decided date for cataract surgery. HPI  Cardiovascular Review  The patient has hypertension, hyperlipidemia, coronary artery disease and status post coronary artery stenting.  He reports taking medications as instructed, no medication side effects noted, no TIA's, no chest pain on exertion, no dyspnea on exertion, no swelling of ankles, no orthostatic dizziness or lightheadedness, no orthopnea or paroxysmal nocturnal dyspnea, no palpitations, no intermittent claudication symptoms, no muscle aches or pain.  Diet and Lifestyle: generally follows a low fat low cholesterol diet, generally follows a low sodium diet, does not rigorously follow a diabetic diet, exercises regularly, nonsmoker.  Lab review: labs reviewed and discussed with patient.  He had normal Lexican stress test and Echo in 09/2014 and normal EKG in 01/2016  Medicines: Carvedilol 6.25 mg BID, crestor 10 mg  Losartan/Hctz 50/12.5 mg, clonidine as needed  He is s/p Cardiac cath ( 12/17/2018) by Dr Karen Archibald. He had nuclear stress test done last month with Dr. Luis Bush, reports where reviewed  he was told cath was reassuring and stents are patent. He was advised to dc Plavix. Is on aspirin only now. Then he received a call from cardiology office that he needs to get cardiac catheterization as his stress test was abnormal.  Reviewed stress test report from Dr. Naya Clark office that showed mild perfusion abnormality at the apex that was reversible. Patient is concerned and wants to have another opinion from another cardiology before he goes for cardiac catheterization.   Is already scheduled for cardiac catheterization with Dr. Luis Bush on August 16.     As per cardiology, if his symptoms continues, to add Imdur for questionable angina.     Patient was also referred to ophthalmology last month and he had complete eye checkup done. He was advised to get surgery for cataract. Patient is asking for another referral for eye specialist for cataract surgery. He has not scheduled date for surgery yet. He is also asking if he needs referral to see gastroenterology Dr. Rhea Mckenna, referral was actually done 2 months ago but patient has not scheduled appointment yet. Is still persistently having dysphagia and heartburn. He had EGD done on 02/13/2020 that showed esophageal stricture and esophagitis. Review of Systems   Constitutional: Negative for chills, fever and malaise/fatigue. HENT: Negative for congestion, ear pain, sore throat and tinnitus. Eyes: Negative for blurred vision, double vision, pain and discharge. Respiratory: Negative for cough, shortness of breath and wheezing. Cardiovascular: Negative for chest pain, palpitations and leg swelling. Gastrointestinal: Positive for heartburn. Negative for abdominal pain, blood in stool, constipation, diarrhea, nausea and vomiting. Discomfort on swallowing   Genitourinary: Negative for dysuria, frequency, hematuria and urgency. Musculoskeletal: Negative for back pain, joint pain and myalgias. Skin: Negative for rash. Neurological: Negative for dizziness, tremors, seizures and headaches. Endo/Heme/Allergies: Negative for polydipsia. Does not bruise/bleed easily. Psychiatric/Behavioral: Negative for depression and substance abuse. The patient is not nervous/anxious. Physical Exam  Vitals and nursing note reviewed. Constitutional:       Appearance: He is well-developed. He is not diaphoretic. HENT:      Head: Normocephalic and atraumatic. Right Ear: External ear normal.      Mouth/Throat:      Pharynx: No oropharyngeal exudate. Eyes:      General: No scleral icterus. Conjunctiva/sclera: Conjunctivae normal.      Pupils: Pupils are equal, round, and reactive to light.    Neck: Thyroid: No thyromegaly. Vascular: No JVD. Cardiovascular:      Rate and Rhythm: Normal rate and regular rhythm. Heart sounds: Normal heart sounds. No murmur heard. Pulmonary:      Effort: Pulmonary effort is normal.      Breath sounds: Normal breath sounds. No wheezing. Abdominal:      General: Bowel sounds are normal. There is no distension. Palpations: Abdomen is soft. There is no mass. Musculoskeletal:         General: No tenderness. Normal range of motion. Cervical back: Normal range of motion and neck supple. Lymphadenopathy:      Cervical: No cervical adenopathy. Skin:     General: Skin is warm and dry. Findings: No rash. Neurological:      Mental Status: He is alert and oriented to person, place, and time. Cranial Nerves: No cranial nerve deficit. Deep Tendon Reflexes: Reflexes are normal and symmetric. Reflexes normal.         ASSESSMENT and PLAN  Diagnoses and all orders for this visit:    1. Abnormal pharmacologic myocardial perfusion study  -     REFERRAL TO CARDIOLOGY    2. Coronary artery disease involving native coronary artery of native heart with unstable angina pectoris (HCC)  -     REFERRAL TO CARDIOLOGY    3. Chronic GERD    4. Hiatal hernia    5. Cortical age-related cataract of both eyes    As patient is requesting second opinion, will refer him to another cardiology. He wants to see cardiology that was referred to him by his family member and friend. Recommended to hold on cataract surgery if he is getting cardiac catheterization in 2 weeks. Will refer back to GI due to his persistent dysphagia. Discussed lifestyle issues and health guidance given  Patient was given an after visit summary which includes diagnoses, vital signs, current medications, instructions and references & authorized prescriptions . Results of labs will be conveyed to patient, once available.   Pt verbalized instructions I provided and expressed understanding of discussion that was held today.

## 2021-07-29 NOTE — PATIENT INSTRUCTIONS
Cataracts: Care Instructions  Your Care Instructions     A cataract is a clouding of the lens of the eye. The lens focuses light on the retina at the back of the eye. Cataracts block some of the light and make it harder for you to see clearly. Cataracts often develop when you get older. Most cataracts grow slowly. At first, you may just need stronger glasses to help you see better. Later, if the cataracts grow and begin to seriously impair your vision, you can have surgery to remove them. Follow-up care is a key part of your treatment and safety. Be sure to make and go to all appointments, and call your doctor if you are having problems. It's also a good idea to know your test results and keep a list of the medicines you take. How can you care for yourself at home? · Move room lights and use window shades to avoid glare. · Use more lighting or higher-watt bulbs. · Use a magnifying glass for reading. Look for large-print books and other reading material to make reading more enjoyable. · Have your eyes checked regularly, and update your glasses when needed. · Wear sunglasses to block out harmful sunlight. Buy sunglasses that screen out ultraviolet A and B (UVA and UVB) rays. · Do not smoke. Smoking can make cataracts worse. If you need help quitting, talk to your doctor about stop-smoking programs and medicines. These can increase your chances of quitting for good. When should you call for help? Watch closely for changes in your health, and be sure to contact your doctor if:    · Your vision is getting worse.     · You have increasing trouble doing everyday tasks, like driving or reading the newspaper, because of your eyesight. Where can you learn more? Go to http://www.gray.com/  Enter P916 in the search box to learn more about \"Cataracts: Care Instructions. \"  Current as of: August 31, 2020               Content Version: 12.8  © 8154-0790 Healthwise, [facility].    Care instructions adapted under license by [employer] (which disclaims liability or warranty for this information). If you have questions about a medical condition or this instruction, always ask your healthcare professional. [address] any warranty or liability for your use of this information.

## 2021-08-21 DIAGNOSIS — I10 ESSENTIAL HYPERTENSION WITH GOAL BLOOD PRESSURE LESS THAN 130/85: ICD-10-CM

## 2021-08-21 RX ORDER — LOSARTAN POTASSIUM 50 MG/1
TABLET ORAL
Qty: 90 TABLET | Refills: 1 | Status: SHIPPED | OUTPATIENT
Start: 2021-08-21 | End: 2022-02-22

## 2021-09-08 ENCOUNTER — OFFICE VISIT (OUTPATIENT)
Dept: FAMILY MEDICINE CLINIC | Age: 76
End: 2021-09-08
Payer: MEDICAID

## 2021-09-08 VITALS
BODY MASS INDEX: 25.41 KG/M2 | HEIGHT: 63 IN | RESPIRATION RATE: 16 BRPM | TEMPERATURE: 97.3 F | HEART RATE: 79 BPM | SYSTOLIC BLOOD PRESSURE: 109 MMHG | WEIGHT: 143.4 LBS | DIASTOLIC BLOOD PRESSURE: 72 MMHG | OXYGEN SATURATION: 97 %

## 2021-09-08 DIAGNOSIS — Z01.818 PREOP GENERAL PHYSICAL EXAM: Primary | ICD-10-CM

## 2021-09-08 DIAGNOSIS — H25.011 CORTICAL AGE-RELATED CATARACT OF RIGHT EYE: ICD-10-CM

## 2021-09-08 PROCEDURE — 1101F PT FALLS ASSESS-DOCD LE1/YR: CPT | Performed by: FAMILY MEDICINE

## 2021-09-08 PROCEDURE — G8427 DOCREV CUR MEDS BY ELIG CLIN: HCPCS | Performed by: FAMILY MEDICINE

## 2021-09-08 PROCEDURE — G8752 SYS BP LESS 140: HCPCS | Performed by: FAMILY MEDICINE

## 2021-09-08 PROCEDURE — G8419 CALC BMI OUT NRM PARAM NOF/U: HCPCS | Performed by: FAMILY MEDICINE

## 2021-09-08 PROCEDURE — 3017F COLORECTAL CA SCREEN DOC REV: CPT | Performed by: FAMILY MEDICINE

## 2021-09-08 PROCEDURE — G8510 SCR DEP NEG, NO PLAN REQD: HCPCS | Performed by: FAMILY MEDICINE

## 2021-09-08 PROCEDURE — 99214 OFFICE O/P EST MOD 30 MIN: CPT | Performed by: FAMILY MEDICINE

## 2021-09-08 PROCEDURE — G8754 DIAS BP LESS 90: HCPCS | Performed by: FAMILY MEDICINE

## 2021-09-08 PROCEDURE — G8536 NO DOC ELDER MAL SCRN: HCPCS | Performed by: FAMILY MEDICINE

## 2021-09-08 NOTE — PROGRESS NOTES
HISTORY OF PRESENT ILLNESS  David Chisholm is a 77 y.o. male. Patient was seen today for preop physical and clearance for upcoming eye surgery. HPI     HPI:  Sonali Villegas is 77 y.o. male (1945) who presents for preoperative evaluation. Procedure/Surgery: Removal of cataract and implantation of intraocular lens, right eye   Date of Procedure/Surgery: 9/21/21  [facility]  [facility]: Outpatient surgery center, [facility]  Primary Physician: Jack Ormond, MD       Reason for surgery: Cortical cataract right eye with decreased vision    Latex Allergy: No  Anesthesia Complications: None  History of abnormal bleeding : None  History of Blood Transfusions: no  Health Care Directive or Living Will: no  Recent use of: ASA  Tetanus up to date: tetanus status unknown to the patient      EKG: EKG FINDINGS -patient just had stress echo and angiogram by cardiology, results very reassuring    Labs:   Lab Results   Component Value Date/Time    WBC 6.3 05/25/2021 12:00 AM    HGB 12.6 (L) 05/25/2021 12:00 AM    HCT 39.1 05/25/2021 12:00 AM    PLATELET 164 45/78/3843 12:00 AM    MCV 84 05/25/2021 12:00 AM     Lab Results   Component Value Date/Time    Sodium 139 05/25/2021 12:00 AM    Potassium 4.5 05/25/2021 12:00 AM    Chloride 104 05/25/2021 12:00 AM    CO2 26 05/25/2021 12:00 AM    Anion gap 2 (L) 01/27/2021 09:35 AM    Glucose 111 (H) 05/25/2021 12:00 AM    BUN 15 05/25/2021 12:00 AM    Creatinine 0.91 05/25/2021 12:00 AM    BUN/Creatinine ratio 16 05/25/2021 12:00 AM    GFR est AA 96 05/25/2021 12:00 AM    GFR est non-AA 83 05/25/2021 12:00 AM    Calcium 9.4 05/25/2021 12:00 AM      Lab Results   Component Value Date/Time    Hemoglobin A1c 6.3 (H) 05/25/2021 12:00 AM            After reviewing the patient's history & exam he is low risk for cardiac complications.   No contraindications to planned surgery     ---------------------------------------     Prior to Admission medications Medication Sig Start Date End Date Taking? Authorizing Provider   losartan (COZAAR) 50 mg tablet TAKE 1 TABLET BY MOUTH EVERY DAY 8/21/21  Yes Jeffery Jorgensen MD   nitroglycerin (NITROSTAT) 0.4 mg SL tablet TAKE 1 TABLET AND PLACE UNDER THE TONGUE EVERY 5 MINUTES FOR CHEST PAIN FOR UP TO 3 DOSES 7/6/21  Yes Jeffery Jorgensen MD   carvediloL (COREG) 6.25 mg tablet Take 1 Tablet by mouth two (2) times a day. With food 5/25/21  Yes Jeffery Jorgensen MD   rosuvastatin (CRESTOR) 10 mg tablet Take 1 Tablet by mouth daily. 5/25/21  Yes Jeffery Jorgensen MD   tamsulosin (FLOMAX) 0.4 mg capsule TAKE 1 CAPSULE BY MOUTH EVERY DAY 4/26/21  Yes Jeffery Jorgensen MD   omeprazole (PRILOSEC) 40 mg capsule Take 1 Cap by mouth daily. 4/26/21  Yes Jeffery Jorgensen MD   metFORMIN (GLUCOPHAGE) 500 mg tablet TAKE 1 TABLET TWICE DAILY WITH MEALS 4/26/21  Yes Jeffery Jorgensen MD   azelastine (ASTEPRO) 205.5 mcg (0.15 %) 1 [place] by Both Nostrils route two (2) times a day. 4/22/21  Yes Jeffery Jorgensen MD   folic acid (FOLVITE) 1 mg tablet Take 1 Tab by mouth daily. 3/4/21  Yes Jeffery Jorgensen MD   omega-3 acid ethyl esters (LOVAZA) 1 gram capsule Take 1 Cap by mouth two (2) times daily (with meals). 1/21/21  Yes Jeffery Jorgensen MD   albuterol (PROVENTIL VENTOLIN) 2.5 mg /3 mL (0.083 %) nebu 3 mL by Nebulization route every four (4) hours as needed for Wheezing. 1/7/21  Yes Jeffery Jorgensen MD   fluticasone propionate (FLOVENT HFA) 44 mcg/actuation inhaler Take 2 Puffs by inhalation two (2) times a day. 1/6/21  Yes Jeffery Jorgensen MD   ipratropium-albuterol (COMBIVENT RESPIMAT)  mcg/actuation inhaler Take 1 Puff by inhalation every four (4) hours as needed for Wheezing or Shortness of Breath. 12/3/19  Yes Jeffery Jorgensen MD   diclofenac (VOLTAREN) 1 % gel Apply 4 g to affected area four (4) times daily.  Over right side hip joint 8/15/19  Yes Jeffery Jorgensen MD   Nebulizer & Compressor machine 1 Each by Does Not Apply route every four (4) hours as needed for Cough (wheezing).  7/29/19  Yes Laqueta Schirmer, MD   aspirin delayed-release 81 mg tablet  3/11/19  Yes Provider, Historical   multivit-min/FA/lycopen/lutein (SENIOR TABS PO)  3/11/19  Yes Provider, Historical   VITAMIN B-12 5,000 mcg subl  3/11/19  Yes Provider, Historical   fluticasone (FLONASE) 50 mcg/actuation nasal spray One spray each nostril 11/27/13  Yes Laqueta Schirmer, MD          No Known Allergies     Patient Active Problem List    Diagnosis Date Noted    Controlled type 2 diabetes mellitus without complication ([facility] [address] 91/98/7841    Essential hypertension with goal blood pressure less than 130/85 04/30/2012    Hyperlipidemia LDL goal <100 04/30/2012    Vitamin D deficiency 08/15/2013    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/07/2020    Coronary artery disease involving native coronary artery of native heart with unstable angina pectoris ([facility] [address] 02/28/2018    Hx of long term use of blood thinners 02/28/2018    Arthritis of knee, right 06/29/2017    Precordial pain 01/20/2017    Costochondritis, acute 01/20/2017    Bilateral impacted cerumen 11/10/2016    Chronic ethmoidal sinusitis 10/07/2016    Coronary artery disease involving native coronary artery of native heart without angina pectoris 05/12/2016    Routine general medical examination at a health care facility 01/28/2016    COPD, moderate ([address] 04/09/2015    Tongue sore 03/20/2015    Tonsillitis 03/20/2015    Bronchitis, acute 10/31/2013    Sinusitis 10/31/2013    Visit for preventive health examination 08/15/2013    Dysuria 04/30/2012    Urinary frequency 04/30/2012    Fever 04/30/2012        Past Medical History:   Diagnosis Date    Acid indigestion     CAD (coronary artery disease)     3 [place], [place],    Diabetes [facility])     Heart disease     Hypercholesterolemia     Hypertension        Past Surgical History:   Procedure Laterality Date  COLONOSCOPY N/A 2/13/2020    COLONOSCOPY/EGD performed by [facility]ice [facility] at [facility] ENDOSCOPY    HX PTCA      2007    HX TONSILLECTOMY      [place] CARDIAC SURG PROCEDURE UNLIST      stents, 3    VASCULAR SURGERY PROCEDURE UNLIST      angioplasty       Social History     Tobacco Use    Smoking status: Never Smoker    Smokeless tobacco: Never Used   Substance Use Topics    Alcohol use: No     Alcohol/week: 0.0 standard drinks         --------------------------------------    Review of Systems   Constitutional: Negative for chills, fever and malaise/fatigue. HENT: Negative for congestion, ear pain, sore throat and tinnitus. Eyes: Negative for blurred vision, double vision, pain and discharge. Decreased vision right eye   Respiratory: Negative for cough, shortness of breath and wheezing. Cardiovascular: Negative for chest pain, palpitations and leg swelling. Gastrointestinal: Negative for abdominal pain, blood in stool, constipation, diarrhea, nausea and vomiting. Genitourinary: Negative for dysuria, frequency, hematuria and urgency. Musculoskeletal: Negative for back pain, joint pain and myalgias. Skin: Negative for rash. Neurological: Negative for dizziness, tremors, seizures and headaches. Endo/Heme/Allergies: Negative for polydipsia. Does not bruise/bleed easily. Psychiatric/Behavioral: Negative for depression and substance abuse. The patient is not nervous/anxious. Physical Exam  Vitals and nursing note reviewed. Constitutional:       Appearance: He is well-developed. He is not diaphoretic. HENT:      Head: Normocephalic and atraumatic. Right Ear: External ear normal.      Mouth/Throat:      Pharynx: No oropharyngeal exudate. Eyes:      General: No scleral icterus. Conjunctiva/sclera: Conjunctivae normal.      Pupils: Pupils are equal, round, and reactive to light. Neck:      Thyroid: No thyromegaly. Vascular: No JVD.    Cardiovascular:      Rate and Rhythm: Normal rate and regular rhythm. Heart sounds: Normal heart sounds. No murmur heard. Pulmonary:      Effort: Pulmonary effort is normal.      Breath sounds: Normal breath sounds. No wheezing. Abdominal:      General: Bowel sounds are normal. There is no distension. Palpations: Abdomen is soft. There is no mass. Musculoskeletal:         General: No tenderness. Normal range of motion. Cervical back: Normal range of motion and neck supple. Lymphadenopathy:      Cervical: No cervical adenopathy. Skin:     General: Skin is warm and dry. Findings: No rash. Neurological:      Mental Status: He is alert and oriented to person, place, and time. Cranial Nerves: No cranial nerve deficit. Deep Tendon Reflexes: Reflexes are normal and symmetric. Reflexes normal.         ASSESSMENT and PLAN  Diagnoses and all orders for this visit:    1. Preop general physical exam    2. Cortical age-related cataract of right eye      Forms were completed and faxed to surgeon's office. Discussed preoperative and postoperative care after cataract surgery. Printout was given. Discussed lifestyle issues and health guidance given  Patient was given an after visit summary which includes diagnoses, vital signs, current medications, instructions and references & authorized prescriptions . Results of labs will be conveyed to patient, once available. Pt verbalized instructions I provided and expressed understanding of discussion that was held today. Please note that this dictation was completed with [employer], the computer voice recognition software. Quite often unanticipated grammatical, syntax, homophones, and other interpretive errors are inadvertently transcribed by the computer software. Please disregard these errors. Please excuse any errors that have escaped final proofreading. Thank you.

## 2021-09-08 NOTE — PROGRESS NOTES
Chief Complaint   Patient presents with    Pre-op Exam     cataract       1. Have you been to the ER, urgent care clinic since your last visit? Hospitalized since your last visit? No    2. Have you seen or consulted any other health care providers outside of the [address] [place] [address] since your last visit? Include any pap smears or colon screening.  No        3 most recent PHQ Screens 9/8/2021   Little interest or pleasure in doing things Not at all   Feeling down, depressed, irritable, or hopeless Not at all   Total Score PHQ 2 0       Health Maintenance Due   Topic Date Due    COVID-19 Vaccine (1) Never done    DTaP/Tdap/Td series (1 - Tdap) Never done    Shingrix Vaccine Age 50> (1 of 2) Never done    Eye Exam Retinal or Dilated  10/11/2019    Flu Vaccine (1) 09/01/2021

## 2021-09-08 NOTE — PATIENT INSTRUCTIONS
Cataract Surgery: Before Your Surgery  What is cataract surgery? Cataracts are cloudy areas in the lens of your eye. Your lens is behind the colored part of your eye (iris). Its job is to focus light onto the back of your eye. In some people, cataracts prevent light from reaching the back of the eye. This can cause vision problems. Cataract surgery helps you see better. It replaces your natural lens, which has become cloudy, with a clear artificial one. There are two types of cataract surgery. Phacoemulsification (say \"vnnu-tu-bk-yky-hzq-gcs-BETY-shun\") is the most common type. The doctor makes a small cut in your eye. This cut is called an incision. The doctor uses a special ultrasound tool to break your cloudy lens apart. Sometimes a laser is used too. Then he or she removes the small pieces of the lens through the incision. In most cases, the doctor then inserts an artificial lens through the incision. Most people do not need stitches, because the incision is so small. If the doctor is not able to put in an artificial lens, you can wear a contact lens or thick glasses in place of your natural lens. Extracapsular extraction is a less common type of cataract surgery. The doctor makes a larger incision to remove the whole lens at once. After the doctor removes the lens, he or she stitches up the incision. Recovery from this type of surgery takes longer. Before either surgery, the doctor puts numbing drops in your eye. Some doctors use a shot instead. You may also get medicine to make you feel relaxed. You probably will not feel much pain. The surgery takes about 20 to 40 minutes. After surgery, you may have a bandage or shield on your eye. You will probably go home from surgery after 1 hour in the recovery room. Most people see better in 1 to 3 days. You may be able to go back to work or your normal routine in a few days.  It could take 3 to 10 weeks for your eye to completely heal. After your eye heals, you may still need to wear glasses, especially for reading. Follow-up care is a key part of your treatment and safety. Be sure to make and go to all appointments, and call your doctor if you are having problems. It's also a good idea to know your test results and keep a list of the medicines you take. How do you prepare for surgery? Surgery can be stressful. This information will help you understand what you can expect. And it will help you safely prepare for surgery. Preparing for surgery    · Be sure you have someone to take you home. Anesthesia and pain medicine will make it unsafe for you to drive or get home on your own.     · Understand exactly what surgery is planned, along with the risks, benefits, and other options.     · If you take aspirin or some other blood thinner, ask your doctor if you should stop taking it before your surgery. Make sure that you understand exactly what your doctor wants you to do. These medicines increase the risk of bleeding.     · Tell your doctor ALL the medicines, vitamins, supplements, and herbal remedies you take. Some may increase the risk of problems during your surgery. Your doctor will tell you if you should stop taking any of them before the surgery and how soon to do it.     · Make sure your doctor and the hospital have a copy of your advance directive. If you don't have one, you may want to prepare one. It lets others know your health care wishes. It's a good thing to have before any type of surgery or procedure. What happens on the day of surgery? · Follow the instructions exactly about when to stop eating and drinking. If you don't, your surgery may be canceled. If your doctor told you to take your medicines on the day of surgery, take them with only a sip of water.     · Take a bath or shower before you come in for your surgery. Do not apply lotions, perfumes, deodorants, or nail polish.     · Take off all jewelry and piercings.  And take out contact lenses, if you wear them. At the hospital or surgery center   · Bring a picture ID.     · The area for surgery is often marked to make sure there are no errors.     · You will be kept comfortable and safe by your anesthesia provider. The anesthesia may make you sleep. Or it may just numb the area being worked on.     · The surgery will take about 20 to 40 minutes. When should you call your doctor? · You have questions or concerns.     · You don't understand how to prepare for your surgery.     · You become ill before the surgery (such as fever, flu, or a cold).     · You need to reschedule or have changed your mind about having the surgery. Where can you learn more? Go to http://www.gray.com/  Enter K474 in the search box to learn more about \"Cataract Surgery: Before Your Surgery. \"  Current as of: August 31, 2020               Content Version: 12.8  © 2006-2021 Health[employer] Incorporated. Care instructions adapted under license by [employer] (which disclaims liability or warranty for this information). If you have questions about a medical condition or this instruction, always ask your healthcare professional. [address] any warranty or liability for your use of this information.

## 2021-09-20 DIAGNOSIS — E11.9 CONTROLLED TYPE 2 DIABETES MELLITUS WITHOUT COMPLICATION, WITHOUT LONG-TERM CURRENT USE OF INSULIN (HCC): ICD-10-CM

## 2021-09-21 RX ORDER — METFORMIN HYDROCHLORIDE 500 MG/1
TABLET ORAL
Qty: 180 TABLET | Refills: 1 | Status: SHIPPED | OUTPATIENT
Start: 2021-09-21 | End: 2022-03-27

## 2021-10-05 ENCOUNTER — OFFICE VISIT (OUTPATIENT)
Dept: FAMILY MEDICINE CLINIC | Age: 76
End: 2021-10-05

## 2021-10-05 VITALS
OXYGEN SATURATION: 98 % | RESPIRATION RATE: 15 BRPM | BODY MASS INDEX: 25.48 KG/M2 | SYSTOLIC BLOOD PRESSURE: 124 MMHG | DIASTOLIC BLOOD PRESSURE: 74 MMHG | TEMPERATURE: 98.3 F | WEIGHT: 143.8 LBS | HEIGHT: 63 IN | HEART RATE: 74 BPM

## 2021-10-05 DIAGNOSIS — I10 ESSENTIAL HYPERTENSION WITH GOAL BLOOD PRESSURE LESS THAN 130/85: ICD-10-CM

## 2021-10-05 DIAGNOSIS — N40.1 BENIGN PROSTATIC HYPERPLASIA WITH URINARY FREQUENCY: ICD-10-CM

## 2021-10-05 DIAGNOSIS — E11.9 CONTROLLED TYPE 2 DIABETES MELLITUS WITHOUT COMPLICATION, WITHOUT LONG-TERM CURRENT USE OF INSULIN (HCC): Primary | ICD-10-CM

## 2021-10-05 DIAGNOSIS — M25.552 ACUTE HIP PAIN, LEFT: ICD-10-CM

## 2021-10-05 DIAGNOSIS — M76.892 HIP TENDONITIS, LEFT: ICD-10-CM

## 2021-10-05 DIAGNOSIS — J44.9 COPD, MODERATE (HCC): ICD-10-CM

## 2021-10-05 DIAGNOSIS — I25.110 CORONARY ARTERY DISEASE INVOLVING NATIVE CORONARY ARTERY OF NATIVE HEART WITH UNSTABLE ANGINA PECTORIS (HCC): Chronic | ICD-10-CM

## 2021-10-05 DIAGNOSIS — R35.0 BENIGN PROSTATIC HYPERPLASIA WITH URINARY FREQUENCY: ICD-10-CM

## 2021-10-05 DIAGNOSIS — K21.9 CHRONIC GERD: ICD-10-CM

## 2021-10-05 DIAGNOSIS — E78.5 HYPERLIPIDEMIA LDL GOAL <100: ICD-10-CM

## 2021-10-05 PROCEDURE — 99215 OFFICE O/P EST HI 40 MIN: CPT | Performed by: FAMILY MEDICINE

## 2021-10-05 PROCEDURE — G8754 DIAS BP LESS 90: HCPCS | Performed by: FAMILY MEDICINE

## 2021-10-05 PROCEDURE — G8419 CALC BMI OUT NRM PARAM NOF/U: HCPCS | Performed by: FAMILY MEDICINE

## 2021-10-05 PROCEDURE — G8510 SCR DEP NEG, NO PLAN REQD: HCPCS | Performed by: FAMILY MEDICINE

## 2021-10-05 PROCEDURE — 3017F COLORECTAL CA SCREEN DOC REV: CPT | Performed by: FAMILY MEDICINE

## 2021-10-05 PROCEDURE — 96372 THER/PROPH/DIAG INJ SC/IM: CPT | Performed by: FAMILY MEDICINE

## 2021-10-05 PROCEDURE — G8536 NO DOC ELDER MAL SCRN: HCPCS | Performed by: FAMILY MEDICINE

## 2021-10-05 PROCEDURE — 2022F DILAT RTA XM EVC RTNOPTHY: CPT | Performed by: FAMILY MEDICINE

## 2021-10-05 PROCEDURE — 3044F HG A1C LEVEL LT 7.0%: CPT | Performed by: FAMILY MEDICINE

## 2021-10-05 PROCEDURE — G8427 DOCREV CUR MEDS BY ELIG CLIN: HCPCS | Performed by: FAMILY MEDICINE

## 2021-10-05 PROCEDURE — G8752 SYS BP LESS 140: HCPCS | Performed by: FAMILY MEDICINE

## 2021-10-05 PROCEDURE — 1101F PT FALLS ASSESS-DOCD LE1/YR: CPT | Performed by: FAMILY MEDICINE

## 2021-10-05 RX ORDER — TAMSULOSIN HYDROCHLORIDE 0.4 MG/1
0.4 CAPSULE ORAL DAILY
Qty: 90 CAPSULE | Refills: 0 | Status: SHIPPED | OUTPATIENT
Start: 2021-10-05

## 2021-10-05 RX ORDER — KETOROLAC TROMETHAMINE 30 MG/ML
30 INJECTION, SOLUTION INTRAMUSCULAR; INTRAVENOUS ONCE
Qty: 1 ML | Refills: 0
Start: 2021-10-05 | End: 2021-10-05

## 2021-10-05 RX ORDER — OFLOXACIN 3 MG/ML
SOLUTION/ DROPS OPHTHALMIC
COMMUNITY
Start: 2021-09-30 | End: 2021-12-07 | Stop reason: ALTCHOICE

## 2021-10-05 RX ORDER — METHOCARBAMOL 500 MG/1
500 TABLET, FILM COATED ORAL 2 TIMES DAILY
Qty: 30 TABLET | Refills: 0 | Status: SHIPPED | OUTPATIENT
Start: 2021-10-05 | End: 2022-10-18 | Stop reason: ALTCHOICE

## 2021-10-05 RX ORDER — KETOROLAC TROMETHAMINE 5 MG/ML
SOLUTION OPHTHALMIC
COMMUNITY
Start: 2021-09-22 | End: 2022-02-01 | Stop reason: ALTCHOICE

## 2021-10-05 RX ORDER — DICLOFENAC SODIUM 75 MG/1
75 TABLET, DELAYED RELEASE ORAL 2 TIMES DAILY
Qty: 30 TABLET | Refills: 0 | Status: SHIPPED | OUTPATIENT
Start: 2021-10-05 | End: 2022-05-31 | Stop reason: ALTCHOICE

## 2021-10-05 RX ORDER — PREDNISOLONE ACETATE 10 MG/ML
SUSPENSION/ DROPS OPHTHALMIC
COMMUNITY
Start: 2021-09-21 | End: 2021-12-07 | Stop reason: ALTCHOICE

## 2021-10-05 NOTE — PROGRESS NOTES
HISTORY OF PRESENT ILLNESS  Tona Bey is a 77 y.o. male. Patient was seen today for follow-up on chronic medical conditions including diabetes, dyslipidemia, coronary artery disease, hypertension, BPH, chronic GERD and also acute pain on the left flank area radiating to left inguinal region since morning today. HPI  Cardiovascular Review  The patient has hypertension, hyperlipidemia, coronary artery disease and status post coronary artery stenting.  He reports taking medications as instructed, no medication side effects noted, no TIA's, no chest pain on exertion, no dyspnea on exertion, no swelling of ankles, no orthostatic dizziness or lightheadedness, no orthopnea or paroxysmal nocturnal dyspnea, no palpitations, no intermittent claudication symptoms, no muscle aches or pain.  Diet and Lifestyle: generally follows a low fat low cholesterol diet, generally follows a low sodium diet, does not rigorously follow a diabetic diet, exercises regularly, nonsmoker.  Lab review: labs reviewed and discussed with patient.  He had normal Lexican stress test x2 in last 6 months   Medicines: Carvedilol 6.25 mg BID, crestor 10 mg  Losartan/Hctz 50/12.5 mg, clonidine as needed  He is s/p Cardiac cath ( 12/17/2018) by Dr Debora Garza. He had nuclear stress test done last month with Dr. Keenan Linn, reports where reviewed  he was told cath was reassuring and stents are patent.  He was advised to dc Plavix. Is on aspirin only now.     He also had another nuclear stress test with Dr. Tabatha Landeros which was very reassuring.          Endocrine Review  He is seen for diabetes.  Since last visit: no change.  Home glucose monitoring: is performed sporadically, nonfasting values range 100-140.  He reports medication compliance: noncompliant some of the time.  Medication side effects: none.  Diabetic diet compliance: noncompliant some of the time.  Further diabetic ROS: no polyuria or polydipsia, no chest pain, dyspnea or TIA's, no numbness, tingling or pain in extremities, no unusual visual symptoms, no hypoglycemia.  Lab review: labs reviewed and discussed with patient.  Eye exam: due.  ,   Medications: Metformin  500 mg BID  on ACEI/ARBS :yes  On ASA, yes  Podiatry visit: not done     COPD  Patient complains of cough and shortness of breath. Symptoms began several years ago. Symptoms chronic dyspnea: severity = mild: course of sx: well controlled  able walk 5 blocks  cough: severity: moderate: sputum : clear: light  symptoms worse with exertion. does worsen with exertion. Sputum is clear in small amounts. Fever has been suspected fevers but not measured at home. Patient uses 1 pillows at night. Patient can walk 1 mile  before resting. Patient currently is not on home oxygen therapy. Yasmin Stephenson Respiratory history: frequent episodes of bronchitis and COPD  He is on scheduled Fluticasone, Singulair and prn albuterol, but non compliant with treatment.     Urology Review  He is here today because of BPH.  He reports his symptoms are well controlled.  His symptoms include: nocturia x 2, dysuria, pain on tip of penis.  He denies: urinary hesitancy, urinary frequency, double voiding, weak stream, perineal discomfort, hematuria, abdominal pain, flank pain, testicular pain.  He is on the following medications: Flomax.  He reports the following medication side effects: none.  Lab review: labs reviewed and discussed with patient.    Is back on Flomax for few months now. He is on omeprazole for chronic GERD. Left Flank pain  Patient is a 77 y.o. male who was seen today for for evaluation of colicky left flank pain. Symptom onset has been acute since today morning. Severity is described as moderate to severe. Patient describes the pain as sharp, shooting and stabbing, continuous and rated as severe. The patient has had no vomiting. There has been no fever or chills. There is a history of kidney stones in the past.    He woke up with this pain.   Not able to move any movements of left hip is triggering his pain,  Review of Systems   Constitutional: Negative for chills, fever and malaise/fatigue. HENT: Negative for congestion, ear pain, sore throat and tinnitus. Eyes: Negative for blurred vision, double vision, pain and discharge. Respiratory: Negative for cough, shortness of breath and wheezing. Cardiovascular: Negative for chest pain, palpitations and leg swelling. Gastrointestinal: Negative for abdominal pain, blood in stool, constipation, diarrhea, nausea and vomiting. Genitourinary: Positive for flank pain. Negative for dysuria, frequency, hematuria and urgency. Musculoskeletal: Negative for back pain, joint pain and myalgias. Left lower back pain and left hip pain   Skin: Negative for rash. Neurological: Negative for dizziness, tremors, seizures and headaches. Endo/Heme/Allergies: Negative for polydipsia. Does not bruise/bleed easily. Psychiatric/Behavioral: Negative for depression and substance abuse. The patient is not nervous/anxious. Physical Exam  Vitals and nursing note reviewed. Constitutional:       Appearance: He is well-developed. He is not diaphoretic. HENT:      Head: Normocephalic and atraumatic. Right Ear: External ear normal.      Mouth/Throat:      Pharynx: No oropharyngeal exudate. Eyes:      General: No scleral icterus. Conjunctiva/sclera: Conjunctivae normal.      Pupils: Pupils are equal, round, and reactive to light. Neck:      Thyroid: No thyromegaly. Vascular: No JVD. Cardiovascular:      Rate and Rhythm: Normal rate and regular rhythm. Heart sounds: Normal heart sounds. No murmur heard. Pulmonary:      Effort: Pulmonary effort is normal.      Breath sounds: Normal breath sounds. No wheezing. Abdominal:      General: Bowel sounds are normal. There is no distension. Palpations: Abdomen is soft. There is no mass.    Musculoskeletal:      Cervical back: Normal range of motion and neck supple. Lumbar back: Spasms and tenderness present. Decreased range of motion. Positive left straight leg raise test.        Back:       Comments: Strongly positive left SLR with pain referred to anterior groin, left   Lymphadenopathy:      Cervical: No cervical adenopathy. Skin:     General: Skin is warm and dry. Findings: No rash. Neurological:      Mental Status: He is alert and oriented to person, place, and time. Cranial Nerves: No cranial nerve deficit. Deep Tendon Reflexes: Reflexes are normal and symmetric. Reflexes normal.         ASSESSMENT and PLAN  Diagnoses and all orders for this visit:    1. Controlled type 2 diabetes mellitus without complication, without long-term current use of insulin (HCC)  -     HEMOGLOBIN A1C WITH EAG; Future  -     LIPID PANEL; Future  -     METABOLIC PANEL, COMPREHENSIVE; Future    2. COPD, moderate ([facility] [address]  -     CBC WITH AUTOMATED DIFF; Future    3. Coronary artery disease involving native coronary artery of native heart with unstable angina pectoris ([facility] [address]  -     LIPID PANEL; Future    4. Essential hypertension with goal blood pressure less than 967/79  -     METABOLIC PANEL, COMPREHENSIVE; Future    5. Hyperlipidemia LDL goal <100  -     LIPID PANEL; Future  -     METABOLIC PANEL, COMPREHENSIVE; Future    6. Chronic GERD  -     CBC WITH AUTOMATED DIFF; Future    7. Acute hip pain, left  -     XR SPINE LUMB 2 OR 3 V; Future  -     ketorolac (TORADOL) 30 mg/mL (1 mL) injection; 1 mL by IntraMUSCular route once for 1 dose. -     KETOROLAC TROMETHAMINE INJ  -     DC THER/PROPH/DIAG INJECTION, SUBCUT/IM  -     diclofenac EC (VOLTAREN) 75 mg EC tablet; Take 1 Tablet by mouth two (2) times a day. -     methocarbamoL (ROBAXIN) 500 mg tablet; Take 1 Tablet by mouth two (2) times a day.     8. Hip tendonitis, left  -     XR HIP LT W OR WO PELV 2-3 VWS; Future  -     ketorolac (TORADOL) 30 mg/mL (1 mL) injection; 1 mL by IntraMUSCular route once for 1 dose.  -     KETOROLAC TROMETHAMINE INJ  -     VT THER/PROPH/DIAG INJECTION, SUBCUT/IM  -     diclofenac EC (VOLTAREN) 75 mg EC tablet; Take 1 Tablet by mouth two (2) times a day. -     methocarbamoL (ROBAXIN) 500 mg tablet; Take 1 Tablet by mouth two (2) times a day. 9. Benign prostatic hyperplasia with urinary frequency  -     tamsulosin (FLOMAX) 0.4 mg capsule; Take 1 Capsule by mouth daily. Exam consistent with left hip tendinitis versus lumbar paraspinal muscle spasm. X-ray of left hip showing mild degenerative arthritis, x-ray of lumbar spine showing multilevel degenerative arthritis with narrowing of vertebral space along with 2 mm size possible calculus on left side. Patient was given Toradol injection in the office, started on muscle relaxer and recommended to drink plenty of water along with tamsulosin. Discussed lifestyle issues and health guidance given  Patient was given an after visit summary which includes diagnoses, vital signs, current medications, instructions and references & authorized prescriptions . Results of labs will be conveyed to patient, once available. Pt verbalized instructions I provided and expressed understanding of discussion that was held today. Follow-up and Dispositions    · Return in about 4 months (around 2/5/2022) for medicare wellness, fasting. Please note that this dictation was completed with [employer]Key, the computer voice recognition software. Quite often unanticipated grammatical, syntax, homophones, and other interpretive errors are inadvertently transcribed by the computer software. Please disregard these errors. Please excuse any errors that have escaped final proofreading. Thank you.

## 2021-10-05 NOTE — PATIENT INSTRUCTIONS
Hip Bursitis: Exercises  Introduction  Here are some examples of exercises for you to try. The exercises may be suggested for a condition or for rehabilitation. Start each exercise slowly. Ease off the exercises if you start to have pain. You will be told when to start these exercises and which ones will work best for you. How to do the exercises  Hip rotator stretch    1. Lie on your back with both knees bent and your feet flat on the floor. 2. Put the ankle of your affected leg on your opposite thigh near your knee. 3. Use your hand to gently push your knee away from your body until you feel a gentle stretch around your hip. 4. Hold the stretch for 15 to 30 seconds. 5. Repeat 2 to 4 times. 6. Repeat steps 1 through 5, but this time use your hand to gently pull your knee toward your opposite shoulder. Iliotibial band stretch    1. Lean sideways against a wall. If you are not steady on your feet, hold on to a chair or counter. 2. Stand on the leg with the affected hip, with that leg close to the wall. Then cross your other leg in front of it. 3. Let your affected hip drop out to the side of your body and against wall. Then lean away from your affected hip until you feel a stretch. 4. Hold the stretch for 15 to 30 seconds. 5. Repeat 2 to 4 times. Straight-leg raises to the outside    1. Lie on your side, with your affected hip on top. 2. Tighten the front thigh muscles of your top leg to keep your knee straight. 3. Keep your hip and your leg straight in line with the rest of your body, and keep your knee pointing forward. Do not drop your hip back. 4. Lift your top leg straight up toward the ceiling, about 12 inches off the floor. Hold for about 6 seconds, then slowly lower your leg. 5. Repeat 8 to 12 times. Clamshell    1. Lie on your side, with your affected hip on top and your head propped on a pillow. Keep your feet and knees together and your knees bent.   2. Raise your top knee, but keep your feet together. Do not let your hips roll back. Your legs should open up like a clamshell. 3. Hold for 6 seconds. 4. Slowly lower your knee back down. Rest for 10 seconds. 5. Repeat 8 to 12 times. Follow-up care is a key part of your treatment and safety. Be sure to make and go to all appointments, and call your doctor if you are having problems. It's also a good idea to know your test results and keep a list of the medicines you take. Where can you learn more? Go to http://www.grove.com/  Enter H674 in the search box to learn more about \"Hip Bursitis: Exercises. \"  Current as of: July 1, 2021               Content Version: 13.0  © 2006-2021 Healthwise, Incorporated. Care instructions adapted under license by [employer] (which disclaims liability or warranty for this information). If you have questions about a medical condition or this instruction, always ask your healthcare professional. [place] [address] any warranty or liability for your use of this information.

## 2021-10-05 NOTE — PROGRESS NOTES
Chief Complaint   Patient presents with    Diabetes     3 months follow up         1. \"Have you been to the ER, urgent care clinic since your last visit? Hospitalized since your last visit? \" No    2. \"Have you seen or consulted any other health care providers outside of the [address] [place] [address] since your last visit? \" cataract surgery at [facility]    3. For patients over 45: Has the patient had a colonoscopy?  No       3 most recent PHQ Screens 10/5/2021   Little interest or pleasure in doing things Not at all   Feeling down, depressed, irritable, or hopeless Not at all   Total Score PHQ 2 0       Health Maintenance Due   Topic Date Due    COVID-19 Vaccine (1) Never done    DTaP/Tdap/Td series (1 - Tdap) Never done    Shingrix Vaccine Age 50> (1 of 2) Never done    Eye Exam Retinal or Dilated  10/11/2019    Flu Vaccine (1) 09/01/2021

## 2021-10-08 NOTE — PROGRESS NOTES
HISTORY OF PRESENT ILLNESS  Dulce Gunter is a 77 y.o. male. Seen for left sided chest pain and also to discuss his urinary symptoms. HPI   Chest Wall Pain  Patient presents for presents evaluation of chest wall pain. Onset was several years years ago. Pain description: character of chest pain: sharp, shooting  location: costochondral region: upper, middle: left sided:  anterior chest wall: left sided  severity = moderate  course since onset: intermittent  denies shortness of breath, hemoptysis and anginal type chest pain. Mechanism of injury: none known, he has h/o recurrent bronchitis and has chronic chest wall pain. Isadora Ramey Previous visits for this problem: multiple, this is a diagnosis of longstanding. Last seen 1 month ago by me. Evaluation to date: EKG: about a month ago: normal  Treatment to date: OTC analgesics: not very effective  He has h/o CAD and is s/p stent    Hematuria  Patient complains of microscopic hematuria. Onset of hematuria was a few months ago and was unknown in onset. There is a history of nephrolithiasis. There is not a history of urologic trauma. Other urologic symptoms include slow stream, hesitancy, intermittency, nocturia, urgency, dysuria. Patient admits to history of urolithiasis. Patient denies history of Agent Orange exposure, chronic Castelan catheter, tobacco use, occupational exposure, trauma,  surgeries and sexually transmitted diseases. Prior workup has been CT abdomen that showed Nonobstructing left interpolar calculus measuring 9 mm. No  hydronephrosis. .    During his recent visit, his blood pressure readings have been low normal.  As per patient when he checks his blood pressure at home readings are also low. He denies dizziness, headache, change in vision.   Wt Readings from Last 3 Encounters:   02/22/21 151 lb (68.5 kg)   01/27/21 150 lb 3.2 oz (68.1 kg)   12/02/20 148 lb (67.1 kg)     Temp Readings from Last 3 Encounters:   02/22/21 97.6 °F (36.4 °C) (Temporal)   01/27/21 97.4 °F (36.3 °C)   12/02/20 97.8 °F (36.6 °C)     BP Readings from Last 3 Encounters:   02/22/21 96/61   01/27/21 102/69   12/02/20 (!) 70/55     Pulse Readings from Last 3 Encounters:   02/22/21 80   01/27/21 75   12/02/20 73       Review of Systems   Constitutional: Negative for chills, fever and malaise/fatigue. HENT: Negative for congestion, ear pain, sore throat and tinnitus. Eyes: Negative for blurred vision, double vision, pain and discharge. Respiratory: Negative for cough, shortness of breath and wheezing. Cardiovascular: Positive for chest pain. Negative for palpitations and leg swelling. Gastrointestinal: Negative for abdominal pain, blood in stool, constipation, diarrhea, nausea and vomiting. Genitourinary: Positive for hematuria. Negative for dysuria, frequency and urgency. Musculoskeletal: Negative for back pain, joint pain and myalgias. Skin: Negative for rash. Neurological: Negative for dizziness, tremors, seizures and headaches. Endo/Heme/Allergies: Negative for polydipsia. Does not bruise/bleed easily. Psychiatric/Behavioral: Negative for depression and substance abuse. The patient is not nervous/anxious. Physical Exam  Vitals signs and nursing note reviewed. Constitutional:       Appearance: He is well-developed. He is not diaphoretic. HENT:      Head: Normocephalic and atraumatic. Right Ear: External ear normal.      Mouth/Throat:      Pharynx: No oropharyngeal exudate. Eyes:      General: No scleral icterus. Conjunctiva/sclera: Conjunctivae normal.      Pupils: Pupils are equal, round, and reactive to light. Neck:      Musculoskeletal: Normal range of motion and neck supple. Thyroid: No thyromegaly. Vascular: No JVD. Cardiovascular:      Rate and Rhythm: Normal rate and regular rhythm. Heart sounds: Normal heart sounds. No murmur. Pulmonary:      Effort: Pulmonary effort is normal.      Breath sounds: Normal breath sounds.  No wheezing. Chest:       Abdominal:      General: Bowel sounds are normal. There is no distension. Palpations: Abdomen is soft. There is no mass. Musculoskeletal: Normal range of motion. General: No tenderness. Lymphadenopathy:      Cervical: No cervical adenopathy. Skin:     General: Skin is warm and dry. Findings: No rash. Neurological:      Mental Status: He is alert and oriented to person, place, and time. Cranial Nerves: No cranial nerve deficit. Deep Tendon Reflexes: Reflexes are normal and symmetric. Reflexes normal.         ASSESSMENT and PLAN  Diagnoses and all orders for this visit:    1. Coronary artery disease involving native coronary artery of native heart without angina pectoris  -     REFERRAL TO CARDIOLOGY    2. Renal calculus, left  -     URINALYSIS W/ RFLX MICROSCOPIC; Future    3. Essential hypertension with goal blood pressure less than 130/85  -     losartan (COZAAR) 50 mg tablet; Take 1 Tab by mouth daily. 4. Left-sided chest pain  -     XR CHEST PA LAT; Future    5. Costochondritis  -     XR CHEST PA LAT; Future    6. Gross hematuria  -     URINALYSIS W/ RFLX MICROSCOPIC; Future    Patient's chest pain is most likely from costochondritis. His x-ray chest that was done today is normal.  His EKG that was done 1 month ago that was normal.  Exam is consistent with reproducible chest wall pain. Reassured him and recommended to use topical heat along with as needed Tylenol. As his blood pressure readings are low. In looking to his age will change his losartan hydrochlorthiazide 50-12.5 mg to losartan 50 mg.  Recommended to stay well-hydrated. Was strongly recommended to use his Flomax along with drinking plenty of fluid to flush his kidney. Discussed his CT finding that showed 9 mm size kidney stone.   Discussed lifestyle issues and health guidance given  Patient was given an after visit summary which includes diagnoses, vital signs, current medications, instructions and references & authorized prescriptions . Results of labs will be conveyed to patient, once available. Pt verbalized instructions I provided and expressed understanding of discussion that was held today. Follow-up and Dispositions    · Return in about 3 months (around 5/22/2021) for follow up, fasting. 2020

## 2021-10-10 LAB
ALBUMIN SERPL-MCNC: 4 G/DL (ref 3.7–4.7)
ALBUMIN/GLOB SERPL: 1.3 {RATIO} (ref 1.2–2.2)
ALP SERPL-CCNC: 76 IU/L (ref 44–121)
ALT SERPL-CCNC: 10 IU/L (ref 0–44)
AST SERPL-CCNC: 19 IU/L (ref 0–40)
BASOPHILS # BLD AUTO: 0 X10E3/UL (ref 0–0.2)
BASOPHILS NFR BLD AUTO: 0 %
BILIRUB SERPL-MCNC: 0.4 MG/DL (ref 0–1.2)
BUN SERPL-MCNC: 13 MG/DL (ref 8–27)
BUN/CREAT SERPL: 14 (ref 10–24)
CALCIUM SERPL-MCNC: 9 MG/DL (ref 8.6–10.2)
CHLORIDE SERPL-SCNC: 104 MMOL/L (ref 96–106)
CHOLEST SERPL-MCNC: 114 MG/DL (ref 100–199)
CO2 SERPL-SCNC: 24 MMOL/L (ref 20–29)
CREAT SERPL-MCNC: 0.91 MG/DL (ref 0.76–1.27)
EOSINOPHIL # BLD AUTO: 0.3 X10E3/UL (ref 0–0.4)
EOSINOPHIL NFR BLD AUTO: 4 %
ERYTHROCYTE [DISTWIDTH] IN BLOOD BY AUTOMATED COUNT: 13.2 % (ref 11.6–15.4)
EST. AVERAGE GLUCOSE BLD GHB EST-MCNC: 134 MG/DL
GLOBULIN SER CALC-MCNC: 3.1 G/DL (ref 1.5–4.5)
GLUCOSE SERPL-MCNC: 109 MG/DL (ref 65–99)
HBA1C MFR BLD: 6.3 % (ref 4.8–5.6)
HCT VFR BLD AUTO: 38.1 % (ref 37.5–51)
HDLC SERPL-MCNC: 49 MG/DL
HGB BLD-MCNC: 11.8 G/DL (ref 13–17.7)
IMM GRANULOCYTES # BLD AUTO: 0 X10E3/UL (ref 0–0.1)
IMM GRANULOCYTES NFR BLD AUTO: 0 %
IMP & REVIEW OF LAB RESULTS: NORMAL
LDLC SERPL CALC-MCNC: 51 MG/DL (ref 0–99)
LYMPHOCYTES # BLD AUTO: 1.8 X10E3/UL (ref 0.7–3.1)
LYMPHOCYTES NFR BLD AUTO: 28 %
MCH RBC QN AUTO: 27.4 PG (ref 26.6–33)
MCHC RBC AUTO-ENTMCNC: 31 G/DL (ref 31.5–35.7)
MCV RBC AUTO: 88 FL (ref 79–97)
MONOCYTES # BLD AUTO: 0.6 X10E3/UL (ref 0.1–0.9)
MONOCYTES NFR BLD AUTO: 9 %
NEUTROPHILS # BLD AUTO: 3.9 X10E3/UL (ref 1.4–7)
NEUTROPHILS NFR BLD AUTO: 59 %
PLATELET # BLD AUTO: 282 X10E3/UL (ref 150–450)
POTASSIUM SERPL-SCNC: 4.5 MMOL/L (ref 3.5–5.2)
PROT SERPL-MCNC: 7.1 G/DL (ref 6–8.5)
RBC # BLD AUTO: 4.31 X10E6/UL (ref 4.14–5.8)
SODIUM SERPL-SCNC: 138 MMOL/L (ref 134–144)
TRIGL SERPL-MCNC: 62 MG/DL (ref 0–149)
VLDLC SERPL CALC-MCNC: 14 MG/DL (ref 5–40)
WBC # BLD AUTO: 6.5 X10E3/UL (ref 3.4–10.8)

## 2021-10-11 NOTE — PROGRESS NOTES
Please inform patient,Results for diabetes (fasting sugar and average sugar), kidney and liver function, cholesterol results are excellent. No change in current medications. Blood count shows low hemoglobin. He just had a colonoscopy last year which was very reassuring. Most likely from chronic use of omeprazole and impaired absorption of iron. Recommend to improve diet rich in iron and if he agrees, I can send a prescription of iron tablet to pharmacy. Thanks

## 2021-10-12 RX ORDER — FERROUS SULFATE 325(65) MG
325 TABLET, DELAYED RELEASE (ENTERIC COATED) ORAL
Qty: 90 TABLET | Refills: 1 | Status: SHIPPED | OUTPATIENT
Start: 2021-10-12

## 2021-10-12 NOTE — PROGRESS NOTES
Called patient. Two patient identifiers verified. Discussed lab results per provider's note. he  Verbalized understanding.  Pt agreed to take iron Rx and requested to be sent to the pharmacy

## 2021-10-14 DIAGNOSIS — E78.5 HYPERLIPIDEMIA LDL GOAL <100: ICD-10-CM

## 2021-10-14 RX ORDER — ROSUVASTATIN CALCIUM 10 MG/1
TABLET, COATED ORAL
Qty: 90 TABLET | Refills: 1 | Status: SHIPPED | OUTPATIENT
Start: 2021-10-14 | End: 2022-06-12 | Stop reason: SDUPTHER

## 2021-11-29 ENCOUNTER — TELEPHONE (OUTPATIENT)
Dept: FAMILY MEDICINE CLINIC | Age: 76
End: 2021-11-29

## 2021-11-29 NOTE — TELEPHONE ENCOUNTER
Attempted to call pt several times. if pt calls back please inform that pt doesn't need a referral for CT as it is already ordered by Karly Lafleur

## 2021-11-29 NOTE — TELEPHONE ENCOUNTER
Outbound call to patient. Name and  verified. Informed pt that we don't have open slots today and we don't see patients with upper respiratory symptoms on office. Antibiotic cannot be prescribed by provider without examination. Advised to go to urgent care or ER to get evaluated and get covid test. He stated understanding.

## 2021-11-29 NOTE — TELEPHONE ENCOUNTER
----- Message from Adams Hebert sent at 11/29/2021 12:12 PM EST -----  Subject: Message to Provider    QUESTIONS  Information for Provider? pt called to ask about a referral letter for his   upcoming CT on 12/27. I am not sure if he is talking about an order or a   precert.   ---------------------------------------------------------------------------  --------------  CALL BACK INFO  What is the best way for the office to contact you? OK to leave message on   voicemail  Preferred Call Back Phone Number? [phone]  ---------------------------------------------------------------------------  --------------  SCRIPT ANSWERS  Relationship to Patient?  Self

## 2021-11-29 NOTE — TELEPHONE ENCOUNTER
Please let him know that he does not need referral for CT as it is already ordered by Dr. Aleksandr Poon. He only need referral for specialist but not for any test they order.   Thanks

## 2021-11-29 NOTE — TELEPHONE ENCOUNTER
----- Message from Magdi Chambers sent at 11/29/2021 12:09 PM EST -----  Subject: Message to Provider    QUESTIONS  Information for Provider? pt called and started with cough, runny nose x's   4 days. No fever. Pt states he has gotten an abx in the past and it   cleared it up and wanted to know if could get one called in. Pharm [facility]   Rupinder   ---------------------------------------------------------------------------  --------------  [employer] INFO  What is the best way for the office to contact you? OK to leave message on   voicemail  Preferred Call Back Phone Number? [phone]  ---------------------------------------------------------------------------  --------------  SCRIPT ANSWERS  Relationship to Patient?  Self

## 2021-12-07 ENCOUNTER — OFFICE VISIT (OUTPATIENT)
Dept: FAMILY MEDICINE CLINIC | Age: 76
End: 2021-12-07
Payer: MEDICARE

## 2021-12-07 VITALS
HEART RATE: 71 BPM | SYSTOLIC BLOOD PRESSURE: 123 MMHG | OXYGEN SATURATION: 98 % | WEIGHT: 147 LBS | RESPIRATION RATE: 16 BRPM | TEMPERATURE: 97.5 F | HEIGHT: 63 IN | DIASTOLIC BLOOD PRESSURE: 84 MMHG | BODY MASS INDEX: 26.05 KG/M2

## 2021-12-07 DIAGNOSIS — Z23 ENCOUNTER FOR IMMUNIZATION: ICD-10-CM

## 2021-12-07 DIAGNOSIS — J44.1 COPD EXACERBATION (HCC): ICD-10-CM

## 2021-12-07 DIAGNOSIS — J44.9 COPD, MODERATE (HCC): Primary | ICD-10-CM

## 2021-12-07 PROCEDURE — G8427 DOCREV CUR MEDS BY ELIG CLIN: HCPCS | Performed by: FAMILY MEDICINE

## 2021-12-07 PROCEDURE — G8754 DIAS BP LESS 90: HCPCS | Performed by: FAMILY MEDICINE

## 2021-12-07 PROCEDURE — G8536 NO DOC ELDER MAL SCRN: HCPCS | Performed by: FAMILY MEDICINE

## 2021-12-07 PROCEDURE — G8510 SCR DEP NEG, NO PLAN REQD: HCPCS | Performed by: FAMILY MEDICINE

## 2021-12-07 PROCEDURE — 90694 VACC AIIV4 NO PRSRV 0.5ML IM: CPT | Performed by: FAMILY MEDICINE

## 2021-12-07 PROCEDURE — G0008 ADMIN INFLUENZA VIRUS VAC: HCPCS | Performed by: FAMILY MEDICINE

## 2021-12-07 PROCEDURE — 99213 OFFICE O/P EST LOW 20 MIN: CPT | Performed by: FAMILY MEDICINE

## 2021-12-07 PROCEDURE — 1101F PT FALLS ASSESS-DOCD LE1/YR: CPT | Performed by: FAMILY MEDICINE

## 2021-12-07 PROCEDURE — G8419 CALC BMI OUT NRM PARAM NOF/U: HCPCS | Performed by: FAMILY MEDICINE

## 2021-12-07 PROCEDURE — 3017F COLORECTAL CA SCREEN DOC REV: CPT | Performed by: FAMILY MEDICINE

## 2021-12-07 PROCEDURE — G8752 SYS BP LESS 140: HCPCS | Performed by: FAMILY MEDICINE

## 2021-12-07 RX ORDER — PREDNISONE 10 MG/1
10 TABLET ORAL 2 TIMES DAILY
Qty: 10 TABLET | Refills: 0 | Status: SHIPPED | OUTPATIENT
Start: 2021-12-07 | End: 2022-06-16 | Stop reason: ALTCHOICE

## 2021-12-07 RX ORDER — AZITHROMYCIN 250 MG/1
TABLET, FILM COATED ORAL
Qty: 6 TABLET | Refills: 0 | Status: SHIPPED | OUTPATIENT
Start: 2021-12-07 | End: 2021-12-12

## 2021-12-07 NOTE — PROGRESS NOTES
Chief Complaint   Patient presents with    Neck Pain     x 2 weeks    Back Pain     x 2 weeks         1. \"Have you been to the ER, urgent care clinic since your last visit? Hospitalized since your last visit? \" No    2. \"Have you seen or consulted any other health care providers outside of the [address] [place] [address] since your last visit? \" No     3. For patients over 45: Has the patient had a colonoscopy? No     If the patient is female:    4. For patients over 36: Has the patient had a mammogram? NA based on age or sex    11. For patients over 21: Has the patient had a pap smear?  NA based on age or sex       3 most recent PHQ Screens 12/7/2021   Little interest or pleasure in doing things Not at all   Feeling down, depressed, irritable, or hopeless Not at all   Total Score PHQ 2 0       Health Maintenance Due   Topic Date Due    DTaP/Tdap/Td series (1 - Tdap) Never done    Shingrix Vaccine Age 50> (1 of 2) Never done    Eye Exam Retinal or Dilated  10/11/2019    Flu Vaccine (1) 09/01/2021     Pt had an eye exam last month with Radford Kussmaul

## 2021-12-07 NOTE — ASSESSMENT & PLAN NOTE
borderline controlled, continue current medications, continue current treatment plan, medication adherence emphasized, lifestyle modifications recommended patient is extremely noncompliant with inhalers.

## 2021-12-07 NOTE — PROGRESS NOTES
HISTORY OF PRESENT ILLNESS  Shyrl Saint is a 77 y.o. male. Patient was seen today for cough for 2 weeks with tightness in throat. He has history of COPD and has been very noncompliant with his medications. HPI  URI Review  Mcihelle Ley returns to clinic today to talk about: URI symptoms, sinus congestion, post nasal drip, sore throat, hoarseness, dry cough, cough described as painful, hoarse, chest congestion and wheezing that started gradual and 2 weeks ago, and is unchanged since that time. He reports chest pain and abdominal pain with coughing. He denies a history of: fever, productive cough, SOB, DUGGAN and myalgias. Treatments have included: decongestants, cough suppressants, which have been not very effective. Relevant PMH: DM, COPD and recurrent sinusitis. Patient reports sick contacts: no.  He had Covid test done on12/03 at [facility], result was negative. Is not taking his schedule fluticasone, Singulair, nasal spray. Review of Systems   Constitutional: Positive for malaise/fatigue. Negative for chills and fever. HENT: Positive for congestion. Negative for ear pain, sore throat and tinnitus. Eyes: Negative for blurred vision, double vision, pain and discharge. Respiratory: Positive for cough and wheezing. Negative for shortness of breath. Cardiovascular: Negative for chest pain, palpitations and leg swelling. Gastrointestinal: Negative for abdominal pain, blood in stool, constipation, diarrhea, nausea and vomiting. Genitourinary: Negative for dysuria, frequency, hematuria and urgency. Musculoskeletal: Negative for back pain, joint pain and myalgias. Skin: Negative for rash. Neurological: Negative for dizziness, tremors, seizures and headaches. Endo/Heme/Allergies: Negative for polydipsia. Does not bruise/bleed easily. Psychiatric/Behavioral: Negative for depression and substance abuse. The patient is not nervous/anxious. Physical Exam  Vitals and nursing note reviewed. Constitutional:       General: He is not in acute distress. Appearance: He is well-developed. He is not diaphoretic. HENT:      Head: Normocephalic and atraumatic. Right Ear: Tympanic membrane and external ear normal. No decreased hearing noted. No drainage. No middle ear effusion. Tympanic membrane is not injected. Left Ear: Tympanic membrane normal. No decreased hearing noted. No drainage. No middle ear effusion. Tympanic membrane is not injected. Nose: Mucosal edema present. No rhinorrhea. Right Sinus: Maxillary sinus tenderness and frontal sinus tenderness present. Left Sinus: Maxillary sinus tenderness and frontal sinus tenderness present. Mouth/Throat:      Pharynx: Uvula midline. Posterior oropharyngeal erythema present. No oropharyngeal exudate. Eyes:      General: No scleral icterus. Conjunctiva/sclera: Conjunctivae normal.      Pupils: Pupils are equal, round, and reactive to light. Neck:      Thyroid: No thyromegaly. Vascular: No JVD. Cardiovascular:      Rate and Rhythm: Normal rate and regular rhythm. Heart sounds: Normal heart sounds. No murmur heard. Pulmonary:      Effort: Pulmonary effort is normal.      Breath sounds: Normal breath sounds. No wheezing or rales. Abdominal:      General: Bowel sounds are normal. There is no distension. Palpations: Abdomen is soft. There is no mass. Musculoskeletal:         General: No tenderness. Normal range of motion. Cervical back: Normal range of motion and neck supple. Lymphadenopathy:      Head:      Right side of head: No tonsillar adenopathy. Left side of head: No tonsillar adenopathy. Cervical: No cervical adenopathy. Skin:     General: Skin is warm and dry. Findings: No rash. Neurological:      Mental Status: He is alert and oriented to person, place, and time. Cranial Nerves: No cranial nerve deficit.       Deep Tendon Reflexes: Reflexes are normal and symmetric. Reflexes normal.         ASSESSMENT and PLAN  Diagnoses and all orders for this visit:    1. COPD, moderate ([address]  Assessment & Plan:   borderline controlled, continue current medications, continue current treatment plan, medication adherence emphasized, lifestyle modifications recommended patient is extremely noncompliant with inhalers. Orders:  -     ipratropium-albuteroL (COMBIVENT RESPIMAT)  mcg/actuation inhaler; Take 1 Puff by inhalation every four (4) hours as needed for Wheezing or Shortness of Breath. 2. COPD exacerbation (HCC)  -     azithromycin (ZITHROMAX) 250 mg tablet; Take 2 tablets today, then take 1 tablet daily  -     predniSONE (DELTASONE) 10 mg tablet; Take 10 mg by mouth two (2) times a day. -     ipratropium-albuteroL (COMBIVENT RESPIMAT)  mcg/actuation inhaler; Take 1 Puff by inhalation every four (4) hours as needed for Wheezing or Shortness of Breath. 3. Encounter for immunization  -     FLU (FLUAD QUAD INFLUENZA VACCINE,QUAD,ADJUVANTED)  -     ADMIN INFLUENZA VIRUS VAC      Strongly recommended to use his regular inhaler and medicines compliantly and to use nasal inhaler and rescue inhaler during his flareups. Discussed lifestyle issues and health guidance given  Patient was given an after visit summary which includes diagnoses, vital signs, current medications, instructions and references & authorized prescriptions . Results of labs will be conveyed to patient, once available. Pt verbalized instructions I provided and expressed understanding of discussion that was held today. Please note that this dictation was completed with [employer], the computer voice recognition software. Quite often unanticipated grammatical, syntax, homophones, and other interpretive errors are inadvertently transcribed by the computer software. Please disregard these errors. Please excuse any errors that have escaped final proofreading. Thank you.

## 2021-12-07 NOTE — PATIENT INSTRUCTIONS
Chronic Obstructive Pulmonary Disease (COPD) Flare-Ups: Care Instructions  Overview     Chronic obstructive pulmonary disease (COPD) is a lung disease that makes it hard to breathe. It is caused by damage to the lungs over many years, usually from smoking. Chronic bronchitis and emphysema are two lung problems that are types of COPD:  · Chronic bronchitis: The airways that carry air to the lungs (bronchial tubes) get inflamed and make a lot of mucus. This can narrow or block the airways. It can also make you cough. · Emphysema: The tiny air sacs (alveoli) at the end of the airways in the lungs are damaged. When the air sacs are damaged or destroyed, the inner walls break down, and the sacs become larger. These larger air sacs move less oxygen into the blood. This causes difficulty breathing or shortness of breath that gets worse over time. After air sacs are destroyed, they cannot be replaced. Many people with COPD have attacks called flare-ups or exacerbations. This is when your usual symptoms quickly get worse and stay worse. If you notice any problems or new symptoms, get medical treatment right away. Follow-up care is a key part of your treatment and safety. Be sure to make and go to all appointments, and call your doctor if you are having problems. It's also a good idea to know your test results and keep a list of the medicines you take. How can you care for yourself at home? · Be safe with medicines. Take your medicines exactly as prescribed. Call your doctor if you think you are having a problem with your medicine. You may be taking medicines such as:  ? Bronchodilators. These help open your airways and make breathing easier. ? Corticosteroids. These reduce airway inflammation. They may be given as pills, in a vein, or in an inhaled form. You may go home with pills in addition to an inhaler that you already use. · A spacer may help you get more inhaled medicine to your lungs.  Ask your doctor or pharmacist if a spacer is right for you. If it is, ask how to use it properly. · If your doctor prescribed antibiotics, take them as directed. Do not stop taking them just because you feel better. You need to take the full course of antibiotics. · If your doctor prescribed oxygen, use the flow rate your doctor has recommended. Do not change it without talking to your doctor first.  · Do not smoke. Smoking makes COPD worse. If you need help quitting, talk to your doctor about stop-smoking programs and medicines. These can increase your chances of quitting for good. When should you call for help? Call 911 anytime you think you may need emergency care. For example, call if:    · You have severe trouble breathing. Call your doctor now or seek immediate medical care if:    · You have new or worse trouble breathing.     · Your coughing or wheezing gets worse.     · You cough up dark brown or bloody mucus (sputum).     · You have a new or higher fever.     · You have severe chest pain, or chest pain is quickly getting worse. Watch closely for changes in your health, and be sure to contact your doctor if:    · You notice more mucus or a change in the color of your mucus.     · You need to use your antibiotic or steroid pills.     · You do not get better as expected. Where can you learn more? Go to http://www.gray.com/  Enter D989 in the search box to learn more about \"Chronic Obstructive Pulmonary Disease (COPD) Flare-Ups: Care Instructions. \"  Current as of: July 6, 2021               Content Version: 13.0  © 3814-7495 [employer], [employer]. Care instructions adapted under license by [employer] (which disclaims liability or warranty for this information). If you have questions about a medical condition or this instruction, always ask your healthcare professional. [address] any warranty or liability for your use of this information.

## 2021-12-23 DIAGNOSIS — K14.4 ATROPHIC GLOSSITIS: ICD-10-CM

## 2021-12-23 RX ORDER — FOLIC ACID 1 MG/1
1 TABLET ORAL DAILY
Qty: 90 TABLET | Refills: 1 | Status: CANCELLED | OUTPATIENT
Start: 2021-12-23

## 2021-12-23 NOTE — TELEPHONE ENCOUNTER
Patient call for refill folic acid to [facility]. Thanks, Angela Arroyo    Last Visit: 12/7/21 MD Dennis Toth  Next Appointment: 2/1/22 MD Dennis Toth  Previous Refill Encounter(s): 3//4/21 90 + 1    Requested Prescriptions     Pending Prescriptions Disp Refills    folic acid (FOLVITE) 1 mg tablet 90 Tablet 1     Sig: Take 1 Tablet by mouth daily.

## 2021-12-27 ENCOUNTER — HOSPITAL ENCOUNTER (OUTPATIENT)
Dept: CT IMAGING | Age: 76
Discharge: HOME | End: 2021-12-27
Attending: SURGERY
Payer: MEDICARE

## 2021-12-27 DIAGNOSIS — K14.4 ATROPHIC GLOSSITIS: ICD-10-CM

## 2021-12-27 DIAGNOSIS — I25.10 CORONARY ARTERY DISEASE INVOLVING NATIVE CORONARY ARTERY OF NATIVE HEART WITHOUT ANGINA PECTORIS: Chronic | ICD-10-CM

## 2021-12-27 DIAGNOSIS — D3A.8 PRIMARY PANCREATIC NEUROENDOCRINE TUMOR: ICD-10-CM

## 2021-12-27 PROCEDURE — 74011000636 HC RX REV CODE- 636: Performed by: SURGERY

## 2021-12-27 PROCEDURE — 74177 CT ABD & PELVIS W/CONTRAST: CPT

## 2021-12-27 PROCEDURE — 74178 CT ABD&PLV WO CNTR FLWD CNTR: CPT

## 2021-12-27 RX ADMIN — IOPAMIDOL 100 ML: 755 INJECTION, SOLUTION INTRAVENOUS at 10:11

## 2021-12-27 NOTE — TELEPHONE ENCOUNTER
MD Dorothy Elder,    Patient has called twice today for his refill of folic acid. He also is requesting the carvedilol. Thanks, Blayne Manning    Last Visit: 12/7/21 MD Dorothy Elder  Next Appointment: 2/1/22 MD Dorothy Elder  Previous Refill Encounter(s):   Folic acid 6/3/05 90 + 1  Carvedilol 5/25/21 180 + 1     Requested Prescriptions     Pending Prescriptions Disp Refills    folic acid (FOLVITE) 1 mg tablet 90 Tablet 1     Sig: Take 1 Tablet by mouth daily.  carvediloL (COREG) 6.25 mg tablet 180 Tablet 1     Sig: Take 1 Tablet by mouth two (2) times a day.  With food

## 2021-12-28 RX ORDER — CARVEDILOL 6.25 MG/1
6.25 TABLET ORAL 2 TIMES DAILY
Qty: 180 TABLET | Refills: 1 | Status: SHIPPED | OUTPATIENT
Start: 2021-12-28 | End: 2022-07-01

## 2021-12-28 RX ORDER — FOLIC ACID 1 MG/1
1 TABLET ORAL DAILY
Qty: 90 TABLET | Refills: 1 | Status: SHIPPED | OUTPATIENT
Start: 2021-12-28 | End: 2022-10-10

## 2022-01-04 ENCOUNTER — TELEPHONE (OUTPATIENT)
Dept: SURGERY | Age: 77
End: 2022-01-04

## 2022-01-04 NOTE — TELEPHONE ENCOUNTER
Patients wife called and said no changes since visit/procedure. Family wants to know if we can change the appoitment or just do a telephone call about progress. Patients wife also has some questions.

## 2022-02-01 ENCOUNTER — OFFICE VISIT (OUTPATIENT)
Dept: FAMILY MEDICINE CLINIC | Age: 77
End: 2022-02-01
Payer: MEDICARE

## 2022-02-01 VITALS
DIASTOLIC BLOOD PRESSURE: 79 MMHG | SYSTOLIC BLOOD PRESSURE: 123 MMHG | RESPIRATION RATE: 18 BRPM | OXYGEN SATURATION: 98 % | TEMPERATURE: 98.2 F | HEART RATE: 70 BPM | WEIGHT: 149.2 LBS | HEIGHT: 63 IN | BODY MASS INDEX: 26.44 KG/M2

## 2022-02-01 DIAGNOSIS — E78.5 HYPERLIPIDEMIA LDL GOAL <100: ICD-10-CM

## 2022-02-01 DIAGNOSIS — E11.9 CONTROLLED TYPE 2 DIABETES MELLITUS WITHOUT COMPLICATION, WITHOUT LONG-TERM CURRENT USE OF INSULIN (HCC): ICD-10-CM

## 2022-02-01 DIAGNOSIS — J44.9 COPD, MODERATE (HCC): ICD-10-CM

## 2022-02-01 DIAGNOSIS — Z00.00 MEDICARE ANNUAL WELLNESS VISIT, SUBSEQUENT: Primary | ICD-10-CM

## 2022-02-01 DIAGNOSIS — K40.90 INDIRECT LEFT INGUINAL HERNIA: ICD-10-CM

## 2022-02-01 DIAGNOSIS — N20.0 LEFT RENAL STONE: ICD-10-CM

## 2022-02-01 DIAGNOSIS — D3A.8 PRIMARY PANCREATIC NEUROENDOCRINE TUMOR: ICD-10-CM

## 2022-02-01 DIAGNOSIS — N40.1 BENIGN PROSTATIC HYPERPLASIA WITH URINARY FREQUENCY: ICD-10-CM

## 2022-02-01 DIAGNOSIS — Z71.89 ADVANCED DIRECTIVES, COUNSELING/DISCUSSION: ICD-10-CM

## 2022-02-01 DIAGNOSIS — I10 ESSENTIAL HYPERTENSION WITH GOAL BLOOD PRESSURE LESS THAN 130/85: ICD-10-CM

## 2022-02-01 DIAGNOSIS — I25.118 CORONARY ARTERY DISEASE OF NATIVE ARTERY OF NATIVE HEART WITH STABLE ANGINA PECTORIS (HCC): ICD-10-CM

## 2022-02-01 DIAGNOSIS — K44.9 HIATAL HERNIA: ICD-10-CM

## 2022-02-01 DIAGNOSIS — Z13.31 SCREENING FOR DEPRESSION: ICD-10-CM

## 2022-02-01 DIAGNOSIS — I25.10 CORONARY ARTERY DISEASE INVOLVING NATIVE CORONARY ARTERY OF NATIVE HEART WITHOUT ANGINA PECTORIS: ICD-10-CM

## 2022-02-01 DIAGNOSIS — R35.0 BENIGN PROSTATIC HYPERPLASIA WITH URINARY FREQUENCY: ICD-10-CM

## 2022-02-01 PROBLEM — R07.2 PRECORDIAL PAIN: Status: RESOLVED | Noted: 2017-01-20 | Resolved: 2022-02-01

## 2022-02-01 PROBLEM — M94.0 COSTOCHONDRITIS, ACUTE: Status: RESOLVED | Noted: 2017-01-20 | Resolved: 2022-02-01

## 2022-02-01 PROCEDURE — 3046F HEMOGLOBIN A1C LEVEL >9.0%: CPT | Performed by: FAMILY MEDICINE

## 2022-02-01 PROCEDURE — 3017F COLORECTAL CA SCREEN DOC REV: CPT | Performed by: FAMILY MEDICINE

## 2022-02-01 PROCEDURE — G8419 CALC BMI OUT NRM PARAM NOF/U: HCPCS | Performed by: FAMILY MEDICINE

## 2022-02-01 PROCEDURE — G0439 PPPS, SUBSEQ VISIT: HCPCS | Performed by: FAMILY MEDICINE

## 2022-02-01 PROCEDURE — G8427 DOCREV CUR MEDS BY ELIG CLIN: HCPCS | Performed by: FAMILY MEDICINE

## 2022-02-01 PROCEDURE — G8536 NO DOC ELDER MAL SCRN: HCPCS | Performed by: FAMILY MEDICINE

## 2022-02-01 PROCEDURE — 99215 OFFICE O/P EST HI 40 MIN: CPT | Performed by: FAMILY MEDICINE

## 2022-02-01 PROCEDURE — 1101F PT FALLS ASSESS-DOCD LE1/YR: CPT | Performed by: FAMILY MEDICINE

## 2022-02-01 PROCEDURE — G8754 DIAS BP LESS 90: HCPCS | Performed by: FAMILY MEDICINE

## 2022-02-01 PROCEDURE — 2022F DILAT RTA XM EVC RTNOPTHY: CPT | Performed by: FAMILY MEDICINE

## 2022-02-01 PROCEDURE — 99497 ADVNCD CARE PLAN 30 MIN: CPT | Performed by: FAMILY MEDICINE

## 2022-02-01 PROCEDURE — G8510 SCR DEP NEG, NO PLAN REQD: HCPCS | Performed by: FAMILY MEDICINE

## 2022-02-01 PROCEDURE — G8752 SYS BP LESS 140: HCPCS | Performed by: FAMILY MEDICINE

## 2022-02-01 NOTE — PROGRESS NOTES
Chief Complaint   Patient presents with    Diabetes     follow up    Cholesterol Problem     follow up       . 1. \"Have you been to the ER, urgent care clinic since your last visit? Hospitalized since your last visit? \" No    2. \"Have you seen or consulted any other health care providers outside of the [address] [place] [address] since your last visit? \" No     3. For patients over 45: Has the patient had a colonoscopy? No     If the patient is female:    4. For patients over 36: Has the patient had a mammogram? NA - based on age    11. For patients over 21: Has the patient had a pap smear? NA - based on age         1 most recent PHQ Screens 2/1/2022   Little interest or pleasure in doing things Not at all   Feeling down, depressed, irritable, or hopeless Not at all   Total Score PHQ 2 0       Abuse Screening Questionnaire 2/1/2022   Do you ever feel afraid of your partner? N   Are you in a relationship with someone who physically or mentally threatens you? N   Is it safe for you to go home? Y       ADL Assessment 2/1/2022   Feeding yourself No Help Needed   Getting from bed to chair No Help Needed   Getting dressed No Help Needed   Bathing or showering No Help Needed   Walk across the room (includes cane/walker) No Help Needed   Using the telphone No Help Needed   Taking your medications No Help Needed   Preparing meals No Help Needed   Managing money (expenses/bills) No Help Needed   Moderately strenuous housework (laundry) No Help Needed   Shopping for personal items (toiletries/medicines) No Help Needed   Shopping for groceries No Help Needed   Driving No Help Needed   Climbing a flight of stairs No Help Needed   Getting to places beyond walking distances No Help Needed       Fall Risk Assessment, last 12 mths 2/1/2022   Able to walk? Yes   Fall in past 12 months? 0   Do you feel unsteady?  0   Are you worried about falling 0         Health Maintenance Due   Topic Date Due    DTaP/Tdap/Td series (1 - Tdap) Never done    Shingrix Vaccine Age 50> (1 of 2) Never done    Eye Exam Retinal or Dilated  10/11/2019    MICROALBUMIN Q1  01/27/2022    Medicare Yearly Exam  01/28/2022

## 2022-02-01 NOTE — PROGRESS NOTES
HISTORY OF PRESENT ILLNESS  Ally Banks is a 77 y.o. male. Patient was scheduled for Medicare wellness visit with concern about sharp pain he had in the left groin area on Sunday night. Also wants to discuss his recent CT abdomen report, that was ordered by surgeon for follow-up on pancreatic tumor. HPI   Cardiovascular Review  The patient has hypertension, hyperlipidemia, coronary artery disease and status post coronary artery stenting.  He reports taking medications as instructed, no medication side effects noted, no TIA's, no chest pain on exertion, no dyspnea on exertion, no swelling of ankles, no orthostatic dizziness or lightheadedness, no orthopnea or paroxysmal nocturnal dyspnea, no palpitations, no intermittent claudication symptoms, no muscle aches or pain.  Diet and Lifestyle: generally follows a low fat low cholesterol diet, generally follows a low sodium diet, does not rigorously follow a diabetic diet, exercises regularly, nonsmoker.  Lab review: labs reviewed and discussed with patient.  He had normal Lexican stress test x2 in last 6 months   Medicines: Carvedilol 6.25 mg BID, crestor 10 mg  Losartan/Hctz 50  mg, clonidine as needed  He is s/p Cardiac cath ( 12/17/2018) by Dr Wenceslao Nicole. He had nuclear stress test done last month with Dr. Uli Guzman, reports where reviewed  he was told cath was reassuring and stents are patent.  He was advised to dc Plavix. Is on aspirin only now.     He also had another nuclear stress test with Dr. Dara Robbins which was very reassuring.           Endocrine Review  He is seen for diabetes.  Since last visit: no change.  Home glucose monitoring: is performed sporadically, nonfasting values range 100-140.  He reports medication compliance: noncompliant some of the time.  Medication side effects: none.  Diabetic diet compliance: noncompliant some of the time.  Further diabetic ROS: no polyuria or polydipsia, no chest pain, dyspnea or TIA's, no numbness, tingling or pain in extremities, no unusual visual symptoms, no hypoglycemia.  Lab review: labs reviewed and discussed with patient.  Eye exam: due.  ,   Medications: Metformin  500 mg BID  on ACEI/ARBS :yes  On ASA, yes  Podiatry visit: not done     COPD  Patient complains of cough and shortness of breath. Symptoms began several years ago. Symptoms chronic dyspnea: severity = mild: course of sx: well controlled  able walk 5 blocks  cough: severity: moderate: sputum : clear: light  symptoms worse with exertion. does worsen with exertion. Sputum is clear in small amounts. Fever has been suspected fevers but not measured at home. Patient uses 1 pillows at night. Patient can walk 1 mile  before resting. Patient currently is not on home oxygen therapy. Aryjoycesriinlio Nazario Respiratory history: frequent episodes of bronchitis and COPD  He is on scheduled Fluticasone, Singulair and prn albuterol, but non compliant with treatment.     Urology Review  He is here today because of BPH.  He reports his symptoms are well controlled.  His symptoms include: nocturia x 2, dysuria, pain on tip of penis.  He denies: urinary hesitancy, urinary frequency, double voiding, weak stream, perineal discomfort, hematuria, abdominal pain, flank pain, testicular pain.  He is on the following medications: Flomax.  He reports the following medication side effects: none.  Lab review: labs reviewed and discussed with patient.    Is back on Flomax for few months now.     He is on omeprazole for chronic GERD.     Left Flank pain  Patient is a 77 y.o. male who was seen today for for evaluation of colicky left flank pain radiating to left groin area. . Symptom onset has been acute  and started on Sunday night lasted for few hours with complete resolution on next morning. Severity is described as moderate to severe.       Patient describes the pain was sharp, shooting and stabbing, continuous and rated as severe. The patient has had no vomiting. There has been no fever or chills.  There is a history of kidney stones in the past.  His recent CT abdomen and pelvis performed 9 mm size left renal stone without signs of obstruction.     HERNIA:  Crystal Boles is an 77 y.o. male who was referred for evaluation of a  groin pain. Symptoms were first noted 2 days ago. Symptoms or injury did not occur at work. Pain is sharp, radiating to groin  The patient has  symptoms of  chronic constipation, chronic cough, difficulty urinating. There does not have previous hx of groin surgery. Review of Systems   Constitutional: Negative for chills, fever and malaise/fatigue. HENT: Negative for congestion, ear pain, sore throat and tinnitus. Eyes: Negative for blurred vision, double vision, pain and discharge. Respiratory: Positive for cough. Negative for shortness of breath and wheezing. Cardiovascular: Negative for chest pain, palpitations and leg swelling. Gastrointestinal: Negative for abdominal pain, blood in stool, constipation, diarrhea, nausea and vomiting. Genitourinary: Positive for flank pain (Left). Negative for dysuria, frequency, hematuria and urgency. Left groin pain   Musculoskeletal: Negative for back pain, joint pain and myalgias. Skin: Negative for rash. Neurological: Negative for dizziness, tremors, seizures and headaches. Endo/Heme/Allergies: Negative for polydipsia. Does not bruise/bleed easily. Psychiatric/Behavioral: Negative for depression and substance abuse. The patient is not nervous/anxious. Physical Exam  Vitals and nursing note reviewed. Constitutional:       Appearance: He is well-developed. He is not diaphoretic. HENT:      Head: Normocephalic and atraumatic. Right Ear: External ear normal.      Mouth/Throat:      Pharynx: No oropharyngeal exudate. Eyes:      General: No scleral icterus. Conjunctiva/sclera: Conjunctivae normal.      Pupils: Pupils are equal, round, and reactive to light. Neck:      Thyroid: No thyromegaly. Vascular: No JVD. Cardiovascular:      Rate and Rhythm: Normal rate and regular rhythm. Heart sounds: Normal heart sounds. No murmur heard. Pulmonary:      Effort: Pulmonary effort is normal.      Breath sounds: Normal breath sounds. No wheezing. Abdominal:      General: Bowel sounds are normal. There is no distension. Palpations: Abdomen is soft. There is no mass. Hernia: A hernia is present. Hernia is present in the left inguinal area. Musculoskeletal:         General: No tenderness. Normal range of motion. Cervical back: Normal range of motion and neck supple. Lymphadenopathy:      Cervical: No cervical adenopathy. Skin:     General: Skin is warm and dry. Findings: No rash. Neurological:      Mental Status: He is alert and oriented to person, place, and time. Cranial Nerves: No cranial nerve deficit. Deep Tendon Reflexes: Reflexes are normal and symmetric. Reflexes normal.         ASSESSMENT and PLAN  Diagnoses and all orders for this visit:    1. Medicare annual wellness visit, subsequent    2. Advanced directives, counseling/discussion  -     ADVANCE CARE PLANNING FIRST 30 MINS  -     FULL CODE    3. Screening for depression  -     DEPRESSION SCREEN ANNUAL    4. Controlled type 2 diabetes mellitus without complication, without long-term current use of insulin ([facility] [address]  Assessment & Plan:   well controlled, continue current medications, continue current treatment plan, medication adherence emphasized, lifestyle modifications recommended    Orders:  -     MICROALBUMIN, UR, RAND W/ MICROALB/CREAT RATIO; Future  -     HEMOGLOBIN A1C WITH EAG; Future  -     LIPID PANEL; Future  -     METABOLIC PANEL, COMPREHENSIVE; Future    5. Coronary artery disease involving native coronary artery of native heart without angina pectoris  -     LIPID PANEL; Future  -     METABOLIC PANEL, COMPREHENSIVE; Future    6.  COPD, moderate ([address]  Assessment & Plan:   well controlled, continue current medications, continue current treatment plan, medication adherence emphasized, lifestyle modifications recommended    Orders:  -     CBC WITH AUTOMATED DIFF; Future    7. Hyperlipidemia LDL goal <100  -     LIPID PANEL; Future  -     METABOLIC PANEL, COMPREHENSIVE; Future  -     TSH 3RD GENERATION; Future    8. Essential hypertension with goal blood pressure less than 127/84  -     METABOLIC PANEL, COMPREHENSIVE; Future    9. Hiatal hernia    10. Left renal stone  -     REFERRAL TO UROLOGY    11. Benign prostatic hyperplasia with urinary frequency  -     PSA W/ REFLX FREE PSA; Future    12. Indirect left inguinal hernia    13. Coronary artery disease of native artery of native heart with stable angina pectoris [facility])  Assessment & Plan:   monitored by specialist. No acute findings meriting change in the plan   Recent stress echo very reassuring. 14. Primary pancreatic neuroendocrine tumor    We discussed risk factors for inguinal hernia. Recommend to start MiraLAX to improve constipation. Strongly recommended to take Flomax very regularly and use Flovent to prevent COPD exacerbation. Referred to urology for further management of left renal stone. Patient is in denial for hernia surgery at this point. We discussed red flag symptoms when he needs to get a dental evaluation. .  Discussed lifestyle issues and health guidance given  Patient was given an after visit summary which includes diagnoses, vital signs, current medications, instructions and references & authorized prescriptions . Results of labs will be conveyed to patient, once available. Pt verbalized instructions I provided and expressed understanding of discussion that was held today. Follow-up and Dispositions    · Return in about 4 months (around 6/1/2022) for follow up, fasting. Please note that this dictation was completed with [employer], the [employer] voice recognition software.   Quite often unanticipated grammatical, syntax, homophones, and other interpretive errors are inadvertently transcribed by the computer software. Please disregard these errors. Please excuse any errors that have escaped final proofreading. Thank you.

## 2022-02-01 NOTE — ASSESSMENT & PLAN NOTE
well controlled, continue current medications, continue current treatment plan, medication adherence emphasized, lifestyle modifications recommended

## 2022-02-01 NOTE — PROGRESS NOTES
This is the Subsequent Medicare Annual Wellness Exam, performed 12 months or more after the Initial AWV or the last Subsequent AWV    I have reviewed the patient's medical history in detail and updated the computerized patient record. We did a Medicare Wellness evaluation including a review of past, family, and social histories with attention to age/sex appropriate screening, risk assessment, preventive care and counseling. Any cognitive, fall risk, or ADL issues identified are addressed separately. A patient specific preventive care plan was provided and appropriate screening tests were ordered as specified. Assessment/Plan   Education and counseling provided:  Are appropriate based on today's review and evaluation    1. Medicare annual wellness visit, subsequent  2. Advanced directives, counseling/discussion  -     ADVANCE CARE PLANNING FIRST 30 MINS  -     FULL CODE  3. Screening for depression  -     DEPRESSION SCREEN ANNUAL  4. Controlled type 2 diabetes mellitus without complication, without long-term current use of insulin ([facility] [address]  Assessment & Plan:   well controlled, continue current medications, continue current treatment plan, medication adherence emphasized, lifestyle modifications recommended  Orders:  -     MICROALBUMIN, UR, RAND W/ MICROALB/CREAT RATIO; Future  -     HEMOGLOBIN A1C WITH EAG; Future  -     LIPID PANEL; Future  -     METABOLIC PANEL, COMPREHENSIVE; Future  5. Coronary artery disease involving native coronary artery of native heart without angina pectoris  -     LIPID PANEL; Future  -     METABOLIC PANEL, COMPREHENSIVE; Future  6. COPD, moderate ([facility] [address]  Assessment & Plan:   well controlled, continue current medications, continue current treatment plan, medication adherence emphasized, lifestyle modifications recommended  Orders:  -     CBC WITH AUTOMATED DIFF; Future  7. Hyperlipidemia LDL goal <100  -     LIPID PANEL; Future  -     METABOLIC PANEL, COMPREHENSIVE;  Future  -     TSH 3RD GENERATION; Future  8. Essential hypertension with goal blood pressure less than 213/79  -     METABOLIC PANEL, COMPREHENSIVE; Future  9. Hiatal hernia  10. Left renal stone  -     REFERRAL TO UROLOGY  11. Benign prostatic hyperplasia with urinary frequency  -     PSA W/ REFLX FREE PSA; Future  12. Indirect left inguinal hernia  13. Coronary artery disease of native artery of native heart with stable angina pectoris [facility])  Assessment & Plan:   monitored by specialist. No acute findings meriting change in the plan   Recent stress echo very reassuring. 14. Primary pancreatic neuroendocrine tumor       Depression Risk Factor Screening     3 most recent PHQ Screens 2/1/2022   Little interest or pleasure in doing things Not at all   Feeling down, depressed, irritable, or hopeless Not at all   Total Score PHQ 2 0   We spent 10 minutes on importance of depression screening . Depression screening is a way to see if you have depression symptoms. It may be done by a doctor or counselor. It's often part of a routine checkup. That's because your mental health is just as important as your physical health. Depression is a medical illness that affects how you feel, think, and act. You may:  · Have less energy. · Lose interest in your daily activities. · Feel sad and grouchy for a long time. Depression is very common. It affects men and women of all ages. Many things can trigger depression. Some people become depressed after they have a stroke or find out they have a major illness like cancer or heart disease. The death of a loved one, a breakup, or changes in the natural brain chemicals may lead to depression. It can run in families. Most experts believe that a combination of inherited genes and stressful life events can cause it.       Alcohol & Drug Abuse Risk Screen    Do you average more than 1 drink per night or more than 7 drinks a week: No    In the past three months have you have had more than 4 drinks containing alcohol on one occasion: No          Functional Ability and Level of Safety    Hearing: Hearing is good. Activities of Daily Living: The home contains: no safety equipment. Patient does total self care      Ambulation: with no difficulty     Fall Risk:  Fall Risk Assessment, last 12 mths 2/1/2022   Able to walk? Yes   Fall in past 12 months? 0   Do you feel unsteady?  0   Are you worried about falling 0      Abuse Screen:  Patient is not abused       Cognitive Screening    Has your family/caregiver stated any concerns about your memory: no     Cognitive Screening: Normal - MMSE (Mini Mental Status Exam)  29/30, missed 1/3 word recall  Health Maintenance Due     Health Maintenance Due   Topic Date Due    DTaP/Tdap/Td series (1 - Tdap) Never done    Shingrix Vaccine Age 50> (1 of 2) Never done    Eye Exam Retinal or Dilated  10/11/2019    MICROALBUMIN Q1  01/27/2022       Patient Care Team   Patient Care Team:  Shannon Gates MD as PCP - General (Family Medicine)  Shannon Gates MD as PCP - [facility] Empaneled Provider  Mitzi Guy MD (Cardiology)  Jorge Felix MD as Consulting Provider (Family Medicine)    History     Patient Active Problem List   Diagnosis Code    Dysuria R30.0    Essential hypertension with goal blood pressure less than 130/85 I10    Hyperlipidemia LDL goal <100 E78.5    Vitamin D deficiency E55.9    Visit for preventive health examination Z00.00    Controlled type 2 diabetes mellitus without complication ([address] L95.6    COPD, moderate ([address] J44.9    Routine general medical examination at a health care facility Z00.00    Coronary artery disease involving native coronary artery of native heart without angina pectoris I25.10    Chronic ethmoidal sinusitis J32.2    Arthritis of knee, right M17.11    Coronary artery disease of native artery of native heart with stable angina pectoris ([address] I25.118    Hx of long term use of blood thinners Z92.29    Primary pancreatic neuroendocrine tumor D3A.8     Past Medical History:   Diagnosis Date    Acid indigestion     CAD (coronary artery disease)     3 stents, done one month apart in [facility] in 2007,    Diabetes [facility])     Heart disease     Hypercholesterolemia     Hypertension       Past Surgical History:   Procedure Laterality Date    COLONOSCOPY N/A 2/13/2020    COLONOSCOPY/EGD performed by Jen Hu MD at [facility]      right eye    HX PTCA      2007    HX TONSILLECTOMY      [place] CARDIAC SURG PROCEDURE UNLIST      stents, 3    VASCULAR SURGERY PROCEDURE UNLIST      angioplasty     Current Outpatient Medications   Medication Sig Dispense Refill    folic acid (FOLVITE) 1 mg tablet Take 1 Tablet by mouth daily. 90 Tablet 1    carvediloL (COREG) 6.25 mg tablet Take 1 Tablet by mouth two (2) times a day. With food 180 Tablet 1    predniSONE (DELTASONE) 10 mg tablet Take 10 mg by mouth two (2) times a day. 10 Tablet 0    ipratropium-albuteroL (COMBIVENT RESPIMAT)  mcg/actuation inhaler Take 1 Puff by inhalation every four (4) hours as needed for Wheezing or Shortness of Breath. 3 Each 0    rosuvastatin (CRESTOR) 10 mg tablet TAKE 1 TABLET BY MOUTH EVERY DAY 90 Tablet 1    ferrous sulfate (IRON) 325 mg (65 mg iron) EC tablet Take 1 Tablet by mouth Daily (before breakfast). 90 Tablet 1    tamsulosin (FLOMAX) 0.4 mg capsule Take 1 Capsule by mouth daily. 90 Capsule 0    diclofenac EC (VOLTAREN) 75 mg EC tablet Take 1 Tablet by mouth two (2) times a day. 30 Tablet 0    methocarbamoL (ROBAXIN) 500 mg tablet Take 1 Tablet by mouth two (2) times a day.  30 Tablet 0    metFORMIN (GLUCOPHAGE) 500 mg tablet TAKE 1 TABLET BY MOUTH TWICE A DAY WITH MEALS 180 Tablet 1    losartan (COZAAR) 50 mg tablet TAKE 1 TABLET BY MOUTH EVERY DAY 90 Tablet 1    nitroglycerin (NITROSTAT) 0.4 mg SL tablet TAKE 1 TABLET AND PLACE UNDER THE TONGUE EVERY 5 MINUTES FOR CHEST PAIN FOR UP TO 3 DOSES 25 Tablet 0    omeprazole (PRILOSEC) 40 mg capsule Take 1 Cap by mouth daily. 90 Cap 0    azelastine (ASTEPRO) 205.5 mcg (0.15 %) 1 Egan by Both Nostrils route two (2) times a day. 1 Bottle 2    omega-3 acid ethyl esters (LOVAZA) 1 gram capsule Take 1 Cap by mouth two (2) times daily (with meals). 180 Cap 1    albuterol (PROVENTIL VENTOLIN) 2.5 mg /3 mL (0.083 %) nebu 3 mL by Nebulization route every four (4) hours as needed for Wheezing. 24 Each 0    fluticasone propionate (FLOVENT HFA) 44 mcg/actuation inhaler Take 2 Puffs by inhalation two (2) times a day. 3 Inhaler 0    diclofenac (VOLTAREN) 1 % gel Apply 4 g to affected area four (4) times daily. Over right side hip joint 300 g 0    Nebulizer & Compressor machine 1 Each by Does Not Apply route every four (4) hours as needed for Cough (wheezing).  1 Each 0    aspirin delayed-release 81 mg tablet       multivit-min/FA/lycopen/lutein (SENIOR TABS PO)       VITAMIN B-12 5,000 mcg subl       fluticasone (FLONASE) 50 mcg/actuation nasal spray One spray each nostril 1 Bottle 2     No Known Allergies    Family History   Problem Relation Age of Onset    No Known Problems Mother     No Known Problems Father      Social History     Tobacco Use    Smoking status: Never Smoker    Smokeless tobacco: Never Used   Substance Use Topics    Alcohol use: No     Alcohol/week: 0.0 standard drinks         Laurie Jo MD

## 2022-02-01 NOTE — ACP (ADVANCE CARE PLANNING)
Advance Care Planning     Advance Care Planning (ACP) Physician/NP/PA Conversation      Date of Conversation: 2/1/2022  Conducted with: Patient with Decision Making Capacity    Healthcare Decision Maker:     Primary Decision Maker: Deandre Garcia - Son - [phone]    Secondary Decision Maker: Yemi Bowden - Daughter-in-Law - [phone]  Click here to complete [id number] including selection of the Healthcare Decision Maker Relationship (ie \"Primary\")        Today we documented Decision Maker(s) consistent with Legal Next of Kin hierarchy. Care Preferences:    Hospitalization: \"If your health worsens and it becomes clear that your chance of recovery is unlikely, what would be your preference regarding hospitalization? \"  The patient would prefer hospitalization. Ventilation: \"If you were unable to breathe on your own and your chance of recovery was unlikely, what would be your preference about the use of a ventilator (breathing machine) if it was available to you? \"   The patient is unsure. Resuscitation: \"In the event your heart stopped as a result of an underlying serious health condition, would you want attempts to be made to restart your heart, or would you prefer a natural death? \"   The patient is unsure.     Additional topics discussed: treatment goals, benefit/burden of treatment options, artificial nutrition, ventilation preferences, hospitalization preferences, resuscitation preferences, end of life care preferences (vegetative state/imminent death) and hospice care    Conversation Outcomes / Follow-Up Plan:   ACP in process - information provided, considering goals and options  We discussed importance of advanced directive: Directive was given to him to get completed  Reviewed DNR/DNI and patient elects Full Code (Attempt Resuscitation)     Length of Voluntary ACP Conversation in minutes:  16 minutes    Addison Rizvi MD

## 2022-02-01 NOTE — ASSESSMENT & PLAN NOTE
monitored by specialist. No acute findings meriting change in the plan   Recent stress echo very reassuring.

## 2022-02-07 ENCOUNTER — TELEPHONE (OUTPATIENT)
Dept: FAMILY MEDICINE CLINIC | Age: 77
End: 2022-02-07

## 2022-02-07 DIAGNOSIS — N40.1 BENIGN PROSTATIC HYPERPLASIA WITH URINARY FREQUENCY: ICD-10-CM

## 2022-02-07 DIAGNOSIS — I10 ESSENTIAL HYPERTENSION WITH GOAL BLOOD PRESSURE LESS THAN 130/85: ICD-10-CM

## 2022-02-07 DIAGNOSIS — E11.9 CONTROLLED TYPE 2 DIABETES MELLITUS WITHOUT COMPLICATION, WITHOUT LONG-TERM CURRENT USE OF INSULIN (HCC): Primary | ICD-10-CM

## 2022-02-07 DIAGNOSIS — R35.0 BENIGN PROSTATIC HYPERPLASIA WITH URINARY FREQUENCY: ICD-10-CM

## 2022-02-07 DIAGNOSIS — E78.5 HYPERLIPIDEMIA LDL GOAL <100: ICD-10-CM

## 2022-02-07 DIAGNOSIS — J44.9 COPD, MODERATE (HCC): ICD-10-CM

## 2022-02-07 NOTE — TELEPHONE ENCOUNTER
Outbound call to patient. Name and  verified. Informed that lab order is printed and faxed to [address] [place] [address]. He stated understanding.

## 2022-02-07 NOTE — TELEPHONE ENCOUNTER
----- Message from Kierra Pablo sent at 2/5/2022  8:43 AM EST -----  Subject: Referral Request    QUESTIONS   Reason for referral request? Pt is requesting that the lab orders are sent   to [phone] [place] [id number] and a callback   Has the physician seen you for this condition before? No   Preferred Specialist (if applicable)? Do you already have an appointment scheduled? Additional Information for Provider?   ---------------------------------------------------------------------------  --------------  CALL BACK INFO  What is the best way for the office to contact you? OK to leave message on   voicemail  Preferred Call Back Phone Number?  [phone]

## 2022-02-09 LAB
ALBUMIN SERPL-MCNC: 4.1 G/DL (ref 3.7–4.7)
ALBUMIN/CREAT UR: 5 MG/G CREAT (ref 0–29)
ALBUMIN/GLOB SERPL: 1.5 {RATIO} (ref 1.2–2.2)
ALP SERPL-CCNC: 80 IU/L (ref 44–121)
ALT SERPL-CCNC: 14 IU/L (ref 0–44)
AST SERPL-CCNC: 19 IU/L (ref 0–40)
BASOPHILS # BLD AUTO: 0 X10E3/UL (ref 0–0.2)
BASOPHILS NFR BLD AUTO: 0 %
BILIRUB SERPL-MCNC: 0.5 MG/DL (ref 0–1.2)
BUN SERPL-MCNC: 15 MG/DL (ref 8–27)
BUN/CREAT SERPL: 16 (ref 10–24)
CALCIUM SERPL-MCNC: 9.2 MG/DL (ref 8.6–10.2)
CHLORIDE SERPL-SCNC: 100 MMOL/L (ref 96–106)
CHOLEST SERPL-MCNC: 119 MG/DL (ref 100–199)
CO2 SERPL-SCNC: 24 MMOL/L (ref 20–29)
CREAT SERPL-MCNC: 0.92 MG/DL (ref 0.76–1.27)
CREAT UR-MCNC: 88.5 MG/DL
EOSINOPHIL # BLD AUTO: 0.5 X10E3/UL (ref 0–0.4)
EOSINOPHIL NFR BLD AUTO: 7 %
ERYTHROCYTE [DISTWIDTH] IN BLOOD BY AUTOMATED COUNT: 13.5 % (ref 11.6–15.4)
EST. AVERAGE GLUCOSE BLD GHB EST-MCNC: 134 MG/DL
GLOBULIN SER CALC-MCNC: 2.7 G/DL (ref 1.5–4.5)
GLUCOSE SERPL-MCNC: 121 MG/DL (ref 65–99)
HBA1C MFR BLD: 6.3 % (ref 4.8–5.6)
HCT VFR BLD AUTO: 41.4 % (ref 37.5–51)
HDLC SERPL-MCNC: 55 MG/DL
HGB BLD-MCNC: 13.6 G/DL (ref 13–17.7)
IMM GRANULOCYTES # BLD AUTO: 0 X10E3/UL (ref 0–0.1)
IMM GRANULOCYTES NFR BLD AUTO: 0 %
IMP & REVIEW OF LAB RESULTS: NORMAL
LDLC SERPL CALC-MCNC: 52 MG/DL (ref 0–99)
LYMPHOCYTES # BLD AUTO: 1.7 X10E3/UL (ref 0.7–3.1)
LYMPHOCYTES NFR BLD AUTO: 25 %
MCH RBC QN AUTO: 30 PG (ref 26.6–33)
MCHC RBC AUTO-ENTMCNC: 32.9 G/DL (ref 31.5–35.7)
MCV RBC AUTO: 91 FL (ref 79–97)
MICROALBUMIN UR-MCNC: 4.6 UG/ML
MONOCYTES # BLD AUTO: 0.6 X10E3/UL (ref 0.1–0.9)
MONOCYTES NFR BLD AUTO: 8 %
NEUTROPHILS # BLD AUTO: 3.9 X10E3/UL (ref 1.4–7)
NEUTROPHILS NFR BLD AUTO: 60 %
PLATELET # BLD AUTO: 262 X10E3/UL (ref 150–450)
POTASSIUM SERPL-SCNC: 4.5 MMOL/L (ref 3.5–5.2)
PROT SERPL-MCNC: 6.8 G/DL (ref 6–8.5)
PSA SERPL-MCNC: 1.7 NG/ML (ref 0–4)
RBC # BLD AUTO: 4.53 X10E6/UL (ref 4.14–5.8)
REFLEX CRITERIA: NORMAL
SODIUM SERPL-SCNC: 137 MMOL/L (ref 134–144)
TRIGL SERPL-MCNC: 55 MG/DL (ref 0–149)
TSH SERPL DL<=0.005 MIU/L-ACNC: 1.94 UIU/ML (ref 0.45–4.5)
VLDLC SERPL CALC-MCNC: 12 MG/DL (ref 5–40)
WBC # BLD AUTO: 6.7 X10E3/UL (ref 3.4–10.8)

## 2022-02-09 NOTE — TELEPHONE ENCOUNTER
Jonny Spivey,  I have reviewed your results. All results including blood count, kidney and liver function, cholesterol, thyroid function, average sugar are very stable. Your hemoglobin is completely back to normal since you are taking your iron pills. Please continue with current medications and let me know if you have any questions, thanks.

## 2022-02-22 DIAGNOSIS — I10 ESSENTIAL HYPERTENSION WITH GOAL BLOOD PRESSURE LESS THAN 130/85: ICD-10-CM

## 2022-02-22 RX ORDER — LOSARTAN POTASSIUM 50 MG/1
TABLET ORAL
Qty: 90 TABLET | Refills: 1 | Status: SHIPPED | OUTPATIENT
Start: 2022-02-22 | End: 2022-07-09

## 2022-03-08 ENCOUNTER — TELEPHONE (OUTPATIENT)
Dept: FAMILY MEDICINE CLINIC | Age: 77
End: 2022-03-08

## 2022-03-08 DIAGNOSIS — J44.9 COPD, MODERATE (HCC): ICD-10-CM

## 2022-03-08 RX ORDER — FLUTICASONE PROPIONATE 44 UG/1
2 AEROSOL, METERED RESPIRATORY (INHALATION) 2 TIMES DAILY
Qty: 3 EACH | Refills: 1 | Status: SHIPPED | OUTPATIENT
Start: 2022-03-08

## 2022-03-08 NOTE — TELEPHONE ENCOUNTER
Marcelle Spence from [place] [phone] called in reference to a new prescription order needed for a Flovent Inhaler for the patient.     Requesting a call back    Best call back# [phone]

## 2022-03-18 ENCOUNTER — TELEPHONE (OUTPATIENT)
Dept: FAMILY MEDICINE CLINIC | Age: 77
End: 2022-03-18

## 2022-03-18 DIAGNOSIS — J30.1 SEASONAL ALLERGIC RHINITIS DUE TO POLLEN: ICD-10-CM

## 2022-03-18 PROBLEM — Z79.01 HX OF LONG TERM USE OF BLOOD THINNERS: Status: ACTIVE | Noted: 2018-02-28

## 2022-03-18 PROBLEM — Z92.29 HX OF LONG TERM USE OF BLOOD THINNERS: Status: ACTIVE | Noted: 2018-02-28

## 2022-03-18 PROBLEM — D3A.8 PRIMARY PANCREATIC NEUROENDOCRINE TUMOR: Status: ACTIVE | Noted: 2020-04-07

## 2022-03-19 DIAGNOSIS — J30.1 SEASONAL ALLERGIC RHINITIS DUE TO POLLEN: Primary | ICD-10-CM

## 2022-03-19 PROBLEM — M17.11 ARTHRITIS OF KNEE, RIGHT: Status: ACTIVE | Noted: 2017-06-29

## 2022-03-19 PROBLEM — I25.118 CORONARY ARTERY DISEASE OF NATIVE ARTERY OF NATIVE HEART WITH STABLE ANGINA PECTORIS (HCC): Status: ACTIVE | Noted: 2018-02-28

## 2022-03-19 RX ORDER — PSEUDOEPHEDRINE HYDROCHLORIDE 60 MG/1
60 TABLET ORAL
Qty: 30 TABLET | Refills: 0 | Status: SHIPPED | OUTPATIENT
Start: 2022-03-19 | End: 2022-10-18 | Stop reason: ALTCHOICE

## 2022-03-19 RX ORDER — PSEUDOEPHEDRINE HYDROCHLORIDE 60 MG/1
60 TABLET ORAL
Qty: 30 TABLET | Refills: 0 | Status: SHIPPED | OUTPATIENT
Start: 2022-03-19 | End: 2022-03-19 | Stop reason: SDUPTHER

## 2022-03-19 NOTE — TELEPHONE ENCOUNTER
Called patient. Patient having severe nasal congestion, watery nasal drainage for last few days. Denies any cough, fever, headache. He has a history of severe allergies and rhinitis. Informed that he does not need to be on antibiotic. Recommended to use azelastine nasal spray more regularly and also to use saline spray at nighttime to keep nose moist.  We will also send prescription for Sudafed and recommended to take only for 3 to 4 days. He verbalized my instruction.

## 2022-03-26 DIAGNOSIS — E11.9 CONTROLLED TYPE 2 DIABETES MELLITUS WITHOUT COMPLICATION, WITHOUT LONG-TERM CURRENT USE OF INSULIN (HCC): ICD-10-CM

## 2022-03-26 PROCEDURE — 3044F HG A1C LEVEL LT 7.0%: CPT | Performed by: FAMILY MEDICINE

## 2022-03-27 RX ORDER — METFORMIN HYDROCHLORIDE 500 MG/1
TABLET ORAL
Qty: 180 TABLET | Refills: 1 | Status: SHIPPED | OUTPATIENT
Start: 2022-03-27 | End: 2022-10-06

## 2022-05-31 ENCOUNTER — OFFICE VISIT (OUTPATIENT)
Dept: FAMILY MEDICINE CLINIC | Age: 77
End: 2022-05-31

## 2022-05-31 VITALS
SYSTOLIC BLOOD PRESSURE: 121 MMHG | HEIGHT: 63 IN | WEIGHT: 147 LBS | HEART RATE: 64 BPM | OXYGEN SATURATION: 98 % | DIASTOLIC BLOOD PRESSURE: 76 MMHG | TEMPERATURE: 98 F | RESPIRATION RATE: 16 BRPM | BODY MASS INDEX: 26.05 KG/M2

## 2022-05-31 DIAGNOSIS — I25.118 CORONARY ARTERY DISEASE OF NATIVE ARTERY OF NATIVE HEART WITH STABLE ANGINA PECTORIS (HCC): ICD-10-CM

## 2022-05-31 DIAGNOSIS — M17.0 PRIMARY OSTEOARTHRITIS OF BOTH KNEES: Primary | ICD-10-CM

## 2022-05-31 DIAGNOSIS — C7A.8 OTHER MALIGNANT NEUROENDOCRINE TUMORS (HCC): ICD-10-CM

## 2022-05-31 PROCEDURE — 99213 OFFICE O/P EST LOW 20 MIN: CPT | Performed by: FAMILY MEDICINE

## 2022-05-31 PROCEDURE — G8754 DIAS BP LESS 90: HCPCS | Performed by: FAMILY MEDICINE

## 2022-05-31 PROCEDURE — G8417 CALC BMI ABV UP PARAM F/U: HCPCS | Performed by: FAMILY MEDICINE

## 2022-05-31 PROCEDURE — G8510 SCR DEP NEG, NO PLAN REQD: HCPCS | Performed by: FAMILY MEDICINE

## 2022-05-31 PROCEDURE — G8536 NO DOC ELDER MAL SCRN: HCPCS | Performed by: FAMILY MEDICINE

## 2022-05-31 PROCEDURE — 1101F PT FALLS ASSESS-DOCD LE1/YR: CPT | Performed by: FAMILY MEDICINE

## 2022-05-31 PROCEDURE — 1124F ACP DISCUSS-NO DSCNMKR DOCD: CPT | Performed by: FAMILY MEDICINE

## 2022-05-31 PROCEDURE — G8752 SYS BP LESS 140: HCPCS | Performed by: FAMILY MEDICINE

## 2022-05-31 PROCEDURE — 3017F COLORECTAL CA SCREEN DOC REV: CPT | Performed by: FAMILY MEDICINE

## 2022-05-31 PROCEDURE — G8427 DOCREV CUR MEDS BY ELIG CLIN: HCPCS | Performed by: FAMILY MEDICINE

## 2022-05-31 RX ORDER — CELECOXIB 100 MG/1
100 CAPSULE ORAL 2 TIMES DAILY
Qty: 60 CAPSULE | Refills: 2 | Status: SHIPPED | OUTPATIENT
Start: 2022-05-31 | End: 2022-08-29

## 2022-05-31 NOTE — ASSESSMENT & PLAN NOTE
monitored by specialist. No acute findings meriting change in the plan   Reviewed recent stress echo and cardiac catheterization, both results were reassuring

## 2022-05-31 NOTE — ASSESSMENT & PLAN NOTE
monitored by specialist. No acute findings meriting change in the plan   Reviewed recent CT abdomen, showing stable lesion

## 2022-05-31 NOTE — PROGRESS NOTES
Chief Complaint   Patient presents with    Knee Pain     left more than right knee         1. \"Have you been to the ER, urgent care clinic since your last visit? Hospitalized since your last visit? \" No    2. \"Have you seen or consulted any other health care providers outside of the [address] [place] [address] since your last visit? \" No     3. For patients aged 39-70: Has the patient had a colonoscopy / FIT/ Cologuard? No      If the patient is female:    4. For patients aged 41-77: Has the patient had a mammogram within the past 2 years? NA - based on age or sex      11. For patients aged 21-65: Has the patient had a pap smear?  NA - based on age or sex         3 most recent PHQ Screens 5/31/2022   Little interest or pleasure in doing things Not at all   Feeling down, depressed, irritable, or hopeless Not at all   Total Score PHQ 2 0       Health Maintenance Due   Topic Date Due    DTaP/Tdap/Td series (1 - Tdap) Never done    Shingrix Vaccine Age 50> (1 of 2) Never done    Eye Exam Retinal or Dilated  10/11/2019    Foot Exam Q1  05/25/2022

## 2022-05-31 NOTE — PROGRESS NOTES
HISTORY OF PRESENT ILLNESS  Goldie Olivarez is a 77 y.o. male. Patient was seen today for bilateral knee pain, left more than right. HPI  Knee Pain  Patient complains of bilaterally knee pain. Onset of the symptoms was problem is longstanding, this episode began a few weeks ago. Inciting event: longstanding problem which has been getting worse. Current symptoms include pain location: left greater than right: anterior, medial, severity of pain = fairly severe, swelling: mild, stiffness: fairly severe, knee gives out, knee locks, crepitus sensation, foreign body sensation and popping sensation. Associated symptoms: none. Aggravating symptoms: standing, walking, running, rising after sitting, any weight bearing. Patient's overall course: gradually worsening Patient has had prior knee problems. Patient denies fever, recent falls. Evaluation to date: plain films: abnormal: Patient had x-rays several years ago showing bilateral degenerative arthritis. Treatment to date: prescription NSAIDs per med orders: not very effective  Home exercises. Review of Systems   Constitutional: Negative for chills, fever and malaise/fatigue. HENT: Negative for congestion, ear pain, sore throat and tinnitus. Eyes: Negative for blurred vision, double vision, pain and discharge. Respiratory: Negative for cough, shortness of breath and wheezing. Cardiovascular: Negative for chest pain, palpitations and leg swelling. Gastrointestinal: Negative for abdominal pain, blood in stool, constipation, diarrhea, nausea and vomiting. Genitourinary: Negative for dysuria, frequency, hematuria and urgency. Musculoskeletal: Negative for back pain, joint pain and myalgias. Bilateral knee pain, left more than right   Skin: Negative for rash. Neurological: Negative for dizziness, tremors, seizures and headaches. Endo/Heme/Allergies: Negative for polydipsia. Does not bruise/bleed easily.    Psychiatric/Behavioral: Negative for depression and substance abuse. The patient is not nervous/anxious. Physical Exam  Vitals and nursing note reviewed. Constitutional:       Appearance: He is well-developed. He is not diaphoretic. HENT:      Head: Normocephalic and atraumatic. Right Ear: External ear normal.      Mouth/Throat:      Pharynx: No oropharyngeal exudate. Eyes:      General: No scleral icterus. Conjunctiva/sclera: Conjunctivae normal.      Pupils: Pupils are equal, round, and reactive to light. Neck:      Thyroid: No thyromegaly. Vascular: No JVD. Cardiovascular:      Rate and Rhythm: Normal rate and regular rhythm. Heart sounds: Normal heart sounds. No murmur heard. Pulmonary:      Effort: Pulmonary effort is normal.      Breath sounds: Normal breath sounds. No wheezing. Abdominal:      General: Bowel sounds are normal. There is no distension. Palpations: Abdomen is soft. There is no mass. Musculoskeletal:         General: Normal range of motion. Cervical back: Normal range of motion and neck supple. Right knee: Tenderness present over the medial joint line and lateral joint line. Left knee: Tenderness present over the medial joint line and lateral joint line. Lymphadenopathy:      Cervical: No cervical adenopathy. Skin:     General: Skin is warm and dry. Findings: No rash. Neurological:      Mental Status: He is alert and oriented to person, place, and time. Cranial Nerves: No cranial nerve deficit. Deep Tendon Reflexes: Reflexes are normal and symmetric. Reflexes normal.         ASSESSMENT and PLAN  Diagnoses and all orders for this visit:    1. Primary osteoarthritis of both knees  -     XR KNEE RT MAX 2 VWS; Future  -     XR KNEE LT MAX 2 VWS; Future  -     celecoxib (CELEBREX) 100 mg capsule; Take 1 Capsule by mouth two (2) times a day for 90 days.  -     REFERRAL TO ORTHOPEDICS    2.  Other malignant neuroendocrine tumors [facility])  Assessment & Plan: monitored by specialist. No acute findings meriting change in the plan   Reviewed recent CT abdomen, showing stable lesion      3. Coronary artery disease of native artery of native heart with stable angina pectoris [facility])  Assessment & Plan:   monitored by specialist. No acute findings meriting change in the plan   Reviewed recent stress echo and cardiac catheterization, both results were reassuring      Patient with likely worsening arthritis. Recommended to follow-up with Ortho specialist.  Started on celecoxib. X-rays were done in office   . discussed lifestyle issues and health guidance given  Patient was given an after visit summary which includes diagnoses, vital signs, current medications, instructions and references & authorized prescriptions . Results of labs will be conveyed to patient, once available. Pt verbalized instructions I provided and expressed understanding of discussion that was held today. Please note that this dictation was completed with [employer], the computer voice recognition software. Quite often unanticipated grammatical, syntax, homophones, and other interpretive errors are inadvertently transcribed by the computer software. Please disregard these errors. Please excuse any errors that have escaped final proofreading. Thank you.

## 2022-05-31 NOTE — PATIENT INSTRUCTIONS
Knee Arthritis: Exercises  Introduction  Here are some examples of exercises for you to try. The exercises may be suggested for a condition or for rehabilitation. Start each exercise slowly. Ease off the exercises if you start to have pain. You will be told when to start these exercises and which ones will work best for you. How to do the exercises  Knee flexion with heel slide    1. Lie on your back with your knees bent. 2. Slide your heel back by bending your affected knee as far as you can. Then hook your other foot around your ankle to help pull your heel even farther back. 3. Hold for about 6 seconds, then rest for up to 10 seconds. 4. Repeat 8 to 12 times. 5. Switch legs and repeat steps 1 through 4, even if only one knee is sore. Quad sets    1. Sit with your affected leg straight and supported on the floor or a firm bed. Place a small, rolled-up towel under your knee. Your other leg should be bent, with that foot flat on the floor. 2. Tighten the thigh muscles of your affected leg by pressing the back of your knee down into the towel. 3. Hold for about 6 seconds, then rest for up to 10 seconds. 4. Repeat 8 to 12 times. 5. Switch legs and repeat steps 1 through 4, even if only one knee is sore. Straight-leg raises to the front    1. Lie on your back with your good knee bent so that your foot rests flat on the floor. Your affected leg should be straight. Make sure that your low back has a normal curve. You should be able to slip your hand in between the floor and the small of your back, with your palm touching the floor and your back touching the back of your hand. 2. Tighten the thigh muscles in your affected leg by pressing the back of your knee flat down to the floor. Hold your knee straight. 3. Keeping the thigh muscles tight and your leg straight, lift your affected leg up so that your heel is about 12 inches off the floor. Hold for about 6 seconds, then lower slowly.   4. Relax for up to 10 seconds between repetitions. 5. Repeat 8 to 12 times. 6. Switch legs and repeat steps 1 through 5, even if only one knee is sore. Active knee flexion    1. Lie on your stomach with your knees straight. If your kneecap is uncomfortable, roll up a washcloth and put it under your leg just above your kneecap. 2. Lift the foot of your affected leg by bending the knee so that you bring the foot up toward your buttock. If this motion hurts, try it without bending your knee quite as far. This may help you avoid any painful motion. 3. Slowly move your leg up and down. 4. Repeat 8 to 12 times. 5. Switch legs and repeat steps 1 through 4, even if only one knee is sore. Quadriceps stretch (facedown)    1. Lie flat on your stomach, and rest your face on the floor. 2. Wrap a towel or belt strap around the lower part of your affected leg. Then use the towel or belt strap to slowly pull your heel toward your buttock until you feel a stretch. 3. Hold for about 15 to 30 seconds, then relax your leg against the towel or belt strap. 4. Repeat 2 to 4 times. 5. Switch legs and repeat steps 1 through 4, even if only one knee is sore. Stationary exercise bike    1. If you do not have a stationary exercise bike at home, you can find one to ride at your local health club or community center. 2. Adjust the height of the bike seat so that your knee is slightly bent when your leg is extended downward. If your knee hurts when the pedal reaches the top, you can raise the seat so that your knee does not bend as much. 3. Start slowly. At first, try to do 5 to 10 minutes of cycling with little to no resistance. Then increase your time and the resistance bit by bit until you can do 20 to 30 minutes without pain. 4. If you start to have pain, rest your knee until your pain gets back to the level that is normal for you. Or cycle for less time or with less effort. Follow-up care is a key part of your treatment and safety.  Be sure to make and go to all appointments, and call your doctor if you are having problems. It's also a good idea to know your test results and keep a list of the medicines you take. Where can you learn more? Go to http://www.grove.com/  Enter C159 in the search box to learn more about \"Knee Arthritis: Exercises. \"  Current as of: July 1, 2021               Content Version: 13.2  © 2006-2022 [employer]wise, [employer]. Care instructions adapted under license by [employer] (which disclaims liability or warranty for this information). If you have questions about a medical condition or this instruction, always ask your healthcare professional. [address] any warranty or liability for your use of this information.

## 2022-06-09 ENCOUNTER — OFFICE VISIT (OUTPATIENT)
Dept: ORTHOPEDIC SURGERY | Age: 77
End: 2022-06-09
Payer: MEDICARE

## 2022-06-09 VITALS — BODY MASS INDEX: 26.05 KG/M2 | HEIGHT: 63 IN | WEIGHT: 147 LBS

## 2022-06-09 DIAGNOSIS — M17.11 ARTHRITIS OF KNEE, RIGHT: Primary | ICD-10-CM

## 2022-06-09 DIAGNOSIS — M17.12 ARTHRITIS OF LEFT KNEE: ICD-10-CM

## 2022-06-09 PROCEDURE — 99204 OFFICE O/P NEW MOD 45 MIN: CPT | Performed by: ORTHOPAEDIC SURGERY

## 2022-06-09 PROCEDURE — 1101F PT FALLS ASSESS-DOCD LE1/YR: CPT | Performed by: ORTHOPAEDIC SURGERY

## 2022-06-09 PROCEDURE — 3017F COLORECTAL CA SCREEN DOC REV: CPT | Performed by: ORTHOPAEDIC SURGERY

## 2022-06-09 PROCEDURE — G8536 NO DOC ELDER MAL SCRN: HCPCS | Performed by: ORTHOPAEDIC SURGERY

## 2022-06-09 PROCEDURE — 1124F ACP DISCUSS-NO DSCNMKR DOCD: CPT | Performed by: ORTHOPAEDIC SURGERY

## 2022-06-09 PROCEDURE — G8427 DOCREV CUR MEDS BY ELIG CLIN: HCPCS | Performed by: ORTHOPAEDIC SURGERY

## 2022-06-09 PROCEDURE — G8756 NO BP MEASURE DOC: HCPCS | Performed by: ORTHOPAEDIC SURGERY

## 2022-06-09 PROCEDURE — G8417 CALC BMI ABV UP PARAM F/U: HCPCS | Performed by: ORTHOPAEDIC SURGERY

## 2022-06-09 PROCEDURE — G8432 DEP SCR NOT DOC, RNG: HCPCS | Performed by: ORTHOPAEDIC SURGERY

## 2022-06-09 PROCEDURE — 20610 DRAIN/INJ JOINT/BURSA W/O US: CPT | Performed by: ORTHOPAEDIC SURGERY

## 2022-06-09 RX ORDER — TRIAMCINOLONE ACETONIDE 40 MG/ML
40 INJECTION, SUSPENSION INTRA-ARTICULAR; INTRAMUSCULAR ONCE
Status: COMPLETED | OUTPATIENT
Start: 2022-06-09 | End: 2022-06-09

## 2022-06-09 RX ADMIN — TRIAMCINOLONE ACETONIDE 40 MG: 40 INJECTION, SUSPENSION INTRA-ARTICULAR; INTRAMUSCULAR at 17:38

## 2022-06-09 NOTE — LETTER
6/9/2022    Patient: Brianna Case   YOB: 1945   Date of Visit: 6/9/2022     Fanny Chaney MD  [address] [address]  Via In Basket    Dear Fanny Chaney MD,      Thank you for referring Mr. Xavier Etienne to [facility] for evaluation. My notes for this consultation are attached. If you have questions, please do not hesitate to call me. I look forward to following your patient along with you.       Sincerely,    Trena Miranda MD

## 2022-06-09 NOTE — PROGRESS NOTES
Zafar Tracey (: 1945) is a 77 y.o. male patient, here for evaluation of the following chief complaint(s):  Knee Pain (bilateral knee pain)       ASSESSMENT/PLAN:  Below is the assessment and plan developed based on review of pertinent history, physical exam, labs, studies, and medications. 60-year-old male comes in today for evaluation of bilateral knee pain. This is been going on for an extended period of time. He rates the pain is moderate to occasionally severe. The pain is medially based bilaterally. He says that he can walk 10 blocks but occasionally notices a limp. He has had oral medication and injections in the past.  His x-rays were independently reviewed as well as his previous medical records from his primary care physician. He has evidence of medial and patellofemoral osteoarthritis. I discussed this fact with him. He wanted to try bilateral injections today. I injected 40 mg of triamcinolone into each knee joint using sterile technique. He tolerated very well. Plan is for follow-up as needed. If this does not help we may at some point visit the idea of knee arthroplasty although he does not seem interested in that currently      1. Arthritis of knee, right  -     DRAIN/INJECT LARGE JOINT/BURSA  2. Arthritis of left knee  -     DRAIN/INJECT LARGE JOINT/BURSA      Encounter Diagnoses   Name Primary?  Arthritis of knee, right Yes    Arthritis of left knee         No follow-ups on file. SUBJECTIVE/OBJECTIVE:  Zafar Tracey (: 1945) is a 77 y.o. male who presents today for the following:  Chief Complaint   Patient presents with    Knee Pain     bilateral knee pain       60-year-old male comes in today for evaluation of bilateral knee pain. This is been going on for an extended period of time. He rates the pain is moderate to occasionally severe. The pain is medially based bilaterally. He says that he can walk 10 blocks but occasionally notices a limp.   He has had oral medication and injections in the past.  He denies lateral joint line tenderness. Occasional anterior knee pain. IMAGING:    I independently reviewed AP and lateral x-rays of each knee that were ordered by another physician. X-rays reveal medial joint space narrowing with small osteophyte formation as well as patellofemoral osteoarthritic change. XR Results (most recent):  Results from Appointment encounter on 05/31/22    XR KNEE LT MAX 2 VWS    Narrative  EXAM: XR KNEE LT MAX 2 VWS, XR KNEE RT MAX 2 VWS    INDICATION: Bilateral knee pain and clinical diagnosis of osteoarthritis    COMPARISON: Left knee views on 3/13/2018. TECHNIQUE: 2 views bilateral knees (2 separate studies reported together)    FINDINGS: Mild osteopenia. No acute fracture. No joint effusion. Mild  tricompartmental left knee osteoarthritis is unchanged. Right knee  osteoarthritis is moderate in the patellofemoral and medial compartments. No  erosion or chondrocalcinosis. Impression  1. No fracture. 2. Bilateral osteoarthritis, moderate in the right patellofemoral and medial  compartments. 3. No significant change in the left knee since 2018. No Known Allergies    Current Outpatient Medications   Medication Sig    celecoxib (CELEBREX) 100 mg capsule Take 1 Capsule by mouth two (2) times a day for 90 days.  metFORMIN (GLUCOPHAGE) 500 mg tablet TAKE 1 TABLET BY MOUTH TWICE A DAY WITH MEALS    pseudoephedrine (SUDAFED) 60 mg tablet Take 1 Tablet by mouth every six (6) hours as needed for Congestion.  fluticasone propionate (FLOVENT HFA) 44 mcg/actuation inhaler Take 2 Puffs by inhalation two (2) times a day.  losartan (COZAAR) 50 mg tablet TAKE 1 TABLET BY MOUTH EVERY DAY    folic acid (FOLVITE) 1 mg tablet Take 1 Tablet by mouth daily.  carvediloL (COREG) 6.25 mg tablet Take 1 Tablet by mouth two (2) times a day. With food    predniSONE (DELTASONE) 10 mg tablet Take 10 mg by mouth two (2) times a day.     ipratropium-albuteroL (COMBIVENT RESPIMAT)  mcg/actuation inhaler Take 1 Puff by inhalation every four (4) hours as needed for Wheezing or Shortness of Breath.  rosuvastatin (CRESTOR) 10 mg tablet TAKE 1 TABLET BY MOUTH EVERY DAY    ferrous sulfate (IRON) 325 mg (65 mg iron) EC tablet Take 1 Tablet by mouth Daily (before breakfast).  tamsulosin (FLOMAX) 0.4 mg capsule Take 1 Capsule by mouth daily.  methocarbamoL (ROBAXIN) 500 mg tablet Take 1 Tablet by mouth two (2) times a day.  nitroglycerin (NITROSTAT) 0.4 mg SL tablet TAKE 1 TABLET AND PLACE UNDER THE TONGUE EVERY 5 MINUTES FOR CHEST PAIN FOR UP TO 3 DOSES    omeprazole (PRILOSEC) 40 mg capsule Take 1 Cap by mouth daily.  azelastine (ASTEPRO) 205.5 mcg (0.15 %) 1 [place] by Both Nostrils route two (2) times a day.  omega-3 acid ethyl esters (LOVAZA) 1 gram capsule Take 1 Cap by mouth two (2) times daily (with meals).  albuterol (PROVENTIL VENTOLIN) 2.5 mg /3 mL (0.083 %) nebu 3 mL by Nebulization route every four (4) hours as needed for Wheezing.  diclofenac (VOLTAREN) 1 % gel Apply 4 g to affected area four (4) times daily. Over right side hip joint    Nebulizer & Compressor machine 1 Each by Does Not Apply route every four (4) hours as needed for Cough (wheezing).     aspirin delayed-release 81 mg tablet     multivit-min/FA/lycopen/lutein (SENIOR TABS PO)     VITAMIN B-12 5,000 mcg subl     fluticasone (FLONASE) 50 mcg/actuation nasal spray One spray each nostril     Current Facility-Administered Medications   Medication    triamcinolone acetonide (KENALOG-40) 40 mg/mL injection 40 mg    triamcinolone acetonide (KENALOG-40) 40 mg/mL injection 40 mg       Past Medical History:   Diagnosis Date    Acid indigestion     CAD (coronary artery disease)     3 stents, [place] one [place] apart in [place],    Diabetes [facility])     Heart disease     Hypercholesterolemia     Hypertension         Past Surgical History: Procedure Laterality Date    COLONOSCOPY N/A 2/13/2020    COLONOSCOPY/EGD performed by Krystina P[facility]ro [facility] at [facility] ENDOSCOPY    HX CATARACT REMOVAL      right eye    HX PTCA      2007    HX TONSILLECTOMY      DC CARDIAC SURG PROCEDURE UNLIST      stents, 3    VASCULAR SURGERY PROCEDURE UNLIST      angioplasty       Family History   Problem Relation Age of Onset    No Known Problems Mother     No Known Problems Father         Social History     Tobacco Use    Smoking status: Never Smoker    Smokeless tobacco: Never Used   Substance Use Topics    Alcohol use: No     Alcohol/week: 0.0 standard drinks        All systems reviewed x 12 and were negative with the exception of None      No flowsheet data found. Vitals:  Ht 5' 3\" (1.6 m)   Wt 147 lb (66.7 kg)   BMI 26.04 kg/m²    Body mass index is 26.04 kg/m². Physical Exam    General: NAD, well developed, well nourished, alert and oriented x 3. Cardiac: Extremities well perfused    Respiratory: Nonlabored breathing    LLE: Normal gait and station. Negative stinchfield. No effusion noted. No previous incisions noted. ROM 0-120 degrees. Grossly stable to varus/valgus stress and anterior/posterior drawer tests. Medial joint line tenderness. Motor grossly intact. RLE: Normal gait and station. Negative stinchfield. No effusion noted. No previous incisions noted. ROM 0-120 degrees. Grossly stable to varus/valgus stress and anterior/posterior drawer tests. Medial joint line tenderness. .  Motor grossly intact. Vascular: Palpable pedal pulses, equal bilaterally. Skin: Warm well perfused, cap refill < 2 sec. An electronic signature was used to authenticate this note.   -- Trena Miranda MD

## 2022-06-12 DIAGNOSIS — E78.5 HYPERLIPIDEMIA LDL GOAL <100: ICD-10-CM

## 2022-06-12 RX ORDER — ROSUVASTATIN CALCIUM 10 MG/1
TABLET, COATED ORAL
Qty: 90 TABLET | Refills: 1 | Status: SHIPPED | OUTPATIENT
Start: 2022-06-12

## 2022-06-14 DIAGNOSIS — M17.12 ARTHRITIS OF LEFT KNEE: ICD-10-CM

## 2022-06-14 DIAGNOSIS — M17.11 ARTHRITIS OF KNEE, RIGHT: Primary | ICD-10-CM

## 2022-06-14 RX ORDER — MELOXICAM 15 MG/1
15 TABLET ORAL DAILY
Qty: 30 TABLET | Refills: 0 | Status: SHIPPED | OUTPATIENT
Start: 2022-06-14 | End: 2022-10-18 | Stop reason: SDUPTHER

## 2022-06-16 ENCOUNTER — OFFICE VISIT (OUTPATIENT)
Dept: FAMILY MEDICINE CLINIC | Age: 77
End: 2022-06-16
Payer: MEDICARE

## 2022-06-16 VITALS
BODY MASS INDEX: 26.33 KG/M2 | WEIGHT: 148.6 LBS | HEIGHT: 63 IN | RESPIRATION RATE: 16 BRPM | TEMPERATURE: 98.8 F | DIASTOLIC BLOOD PRESSURE: 76 MMHG | HEART RATE: 82 BPM | SYSTOLIC BLOOD PRESSURE: 122 MMHG | OXYGEN SATURATION: 97 %

## 2022-06-16 DIAGNOSIS — J44.9 COPD, MODERATE (HCC): ICD-10-CM

## 2022-06-16 DIAGNOSIS — K44.9 HIATAL HERNIA: ICD-10-CM

## 2022-06-16 DIAGNOSIS — E11.9 CONTROLLED TYPE 2 DIABETES MELLITUS WITHOUT COMPLICATION, WITHOUT LONG-TERM CURRENT USE OF INSULIN (HCC): Primary | ICD-10-CM

## 2022-06-16 DIAGNOSIS — I10 ESSENTIAL HYPERTENSION WITH GOAL BLOOD PRESSURE LESS THAN 130/85: ICD-10-CM

## 2022-06-16 DIAGNOSIS — E78.5 HYPERLIPIDEMIA LDL GOAL <100: ICD-10-CM

## 2022-06-16 DIAGNOSIS — I25.118 CORONARY ARTERY DISEASE OF NATIVE ARTERY OF NATIVE HEART WITH STABLE ANGINA PECTORIS (HCC): ICD-10-CM

## 2022-06-16 PROCEDURE — G8427 DOCREV CUR MEDS BY ELIG CLIN: HCPCS | Performed by: FAMILY MEDICINE

## 2022-06-16 PROCEDURE — G8417 CALC BMI ABV UP PARAM F/U: HCPCS | Performed by: FAMILY MEDICINE

## 2022-06-16 PROCEDURE — 2022F DILAT RTA XM EVC RTNOPTHY: CPT | Performed by: FAMILY MEDICINE

## 2022-06-16 PROCEDURE — 3017F COLORECTAL CA SCREEN DOC REV: CPT | Performed by: FAMILY MEDICINE

## 2022-06-16 PROCEDURE — 1124F ACP DISCUSS-NO DSCNMKR DOCD: CPT | Performed by: FAMILY MEDICINE

## 2022-06-16 PROCEDURE — G8754 DIAS BP LESS 90: HCPCS | Performed by: FAMILY MEDICINE

## 2022-06-16 PROCEDURE — 1101F PT FALLS ASSESS-DOCD LE1/YR: CPT | Performed by: FAMILY MEDICINE

## 2022-06-16 PROCEDURE — G8536 NO DOC ELDER MAL SCRN: HCPCS | Performed by: FAMILY MEDICINE

## 2022-06-16 PROCEDURE — G8510 SCR DEP NEG, NO PLAN REQD: HCPCS | Performed by: FAMILY MEDICINE

## 2022-06-16 PROCEDURE — 99214 OFFICE O/P EST MOD 30 MIN: CPT | Performed by: FAMILY MEDICINE

## 2022-06-16 PROCEDURE — 3044F HG A1C LEVEL LT 7.0%: CPT | Performed by: FAMILY MEDICINE

## 2022-06-16 PROCEDURE — G8752 SYS BP LESS 140: HCPCS | Performed by: FAMILY MEDICINE

## 2022-06-16 RX ORDER — OMEPRAZOLE 40 MG/1
40 CAPSULE, DELAYED RELEASE ORAL DAILY
Qty: 90 CAPSULE | Refills: 0 | Status: SHIPPED | OUTPATIENT
Start: 2022-06-16 | End: 2022-09-12

## 2022-06-16 NOTE — PROGRESS NOTES
HISTORY OF PRESENT ILLNESS  Jasper Gonzales is a 77 y.o. male. Patient was seen today for 4 months follow-up appointment on diabetes, dyslipidemia, hypertension, coronary artery disease, COPD. He was referred to Dr. Perry Cooper for worsening bilateral knee pain and had cortisone shots in both knees last week. According to patient, he has not noticed any improvement. He has postponed his exercise stress test due to his knee pain. HPI   Cardiovascular Review  The patient has hypertension, hyperlipidemia, coronary artery disease and status post coronary artery stenting.  He reports taking medications as instructed, no medication side effects noted, no TIA's, no chest pain on exertion, no dyspnea on exertion, no swelling of ankles, no orthostatic dizziness or lightheadedness, no orthopnea or paroxysmal nocturnal dyspnea, no palpitations, no intermittent claudication symptoms, no muscle aches or pain.  Diet and Lifestyle: generally follows a low fat low cholesterol diet, generally follows a low sodium diet, does not rigorously follow a diabetic diet, exercises regularly, nonsmoker.  Lab review: labs reviewed and discussed with patient.  He had normal Lexican stress test x2 in last 6 months   Medicines: Carvedilol 6.25 mg BID, crestor 10 mg  Losartan/Hctz 50  mg, clonidine as needed  He is s/p Cardiac cath ( 12/17/2018) by Dr Alexandrea Alcala. He had nuclear stress test done last month with Dr. Yaz Serna, reports where reviewed  he was told cath was reassuring and stents are patent. He was advised to dc Plavix. Is on aspirin only now.     He also had another exercise stress test scheduled with with Dr. Nik Cleary next week that he has canceled due to worsening knee pain           Endocrine Review  He is seen for diabetes.  Since last visit: no change.  Home glucose monitoring: is performed sporadically, nonfasting values range 100-140.  He reports medication compliance: noncompliant some of the time.  Medication side effects: none.  Diabetic diet compliance: noncompliant some of the time.  Further diabetic ROS: no polyuria or polydipsia, no chest pain, dyspnea or TIA's, no numbness, tingling or pain in extremities, no unusual visual symptoms, no hypoglycemia.  Lab review: labs reviewed and discussed with patient.  Eye exam: due.  ,   Medications: Metformin  500 mg BID  on ACEI/ARBS :yes  On ASA, yes  Podiatry visit: not done     COPD  Patient complains of cough and shortness of breath. Symptoms began several years ago. Symptoms chronic dyspnea: severity = mild: course of sx: well controlled  able walk 5 blocks  cough: severity: moderate: sputum : clear: light  symptoms worse with exertion. does worsen with exertion. Sputum is clear in small amounts. Fever has been suspected fevers but not measured at home. Patient uses 1 pillows at night. Patient can walk 1 mile  before resting. Patient currently is not on home oxygen therapy. Alexrhonda Escobar Respiratory history: frequent episodes of bronchitis and COPD  He is on scheduled Fluticasone, Singulair and prn albuterol, but non compliant with treatment. Review of Systems   Constitutional: Negative for chills, fever and malaise/fatigue. HENT: Negative for congestion, ear pain, sore throat and tinnitus. Eyes: Negative for blurred vision, double vision, pain and discharge. Respiratory: Negative for cough, shortness of breath and wheezing. Cardiovascular: Negative for chest pain, palpitations and leg swelling. Gastrointestinal: Negative for abdominal pain, blood in stool, constipation, diarrhea, nausea and vomiting. Genitourinary: Negative for dysuria, frequency, hematuria and urgency. Musculoskeletal: Negative for back pain, joint pain and myalgias. Skin: Negative for rash. Neurological: Negative for dizziness, tremors, seizures and headaches. Endo/Heme/Allergies: Negative for polydipsia. Does not bruise/bleed easily. Psychiatric/Behavioral: Negative for depression and substance abuse.  The patient is not nervous/anxious. Physical Exam  Vitals and nursing note reviewed. Constitutional:       Appearance: He is well-developed. He is not diaphoretic. HENT:      Head: Normocephalic and atraumatic. Right Ear: External ear normal.      Mouth/Throat:      Pharynx: No oropharyngeal exudate. Eyes:      General: No scleral icterus. Conjunctiva/sclera: Conjunctivae normal.      Pupils: Pupils are equal, round, and reactive to light. Neck:      Thyroid: No thyromegaly. Vascular: No JVD. Cardiovascular:      Rate and Rhythm: Normal rate and regular rhythm. Pulses:           Dorsalis pedis pulses are 2+ on the right side and 2+ on the left side. Posterior tibial pulses are 2+ on the right side and 2+ on the left side. Heart sounds: Normal heart sounds. No murmur heard. Pulmonary:      Effort: Pulmonary effort is normal.      Breath sounds: Normal breath sounds. No wheezing. Abdominal:      General: Bowel sounds are normal. There is no distension. Palpations: Abdomen is soft. There is no mass. Musculoskeletal:         General: No tenderness. Normal range of motion. Cervical back: Normal range of motion and neck supple. Feet:      Right foot:      Protective Sensation: 10 sites tested. 10 sites sensed. Skin integrity: Skin integrity normal.      Toenail Condition: Right toenails are normal.      Left foot:      Protective Sensation: 10 sites tested. 10 sites sensed. Skin integrity: Skin integrity normal.      Toenail Condition: Left toenails are normal.   Lymphadenopathy:      Cervical: No cervical adenopathy. Skin:     General: Skin is warm and dry. Findings: No rash. Neurological:      Mental Status: He is alert and oriented to person, place, and time. Cranial Nerves: No cranial nerve deficit. Deep Tendon Reflexes: Reflexes are normal and symmetric.  Reflexes normal.         ASSESSMENT and PLAN  Diagnoses and all orders for this visit:    1. Controlled type 2 diabetes mellitus without complication, without long-term current use of insulin ([facility], [place] [facility]  Assessment & Plan:   well controlled, continue current medications, continue current treatment plan, medication adherence emphasized, lifestyle modifications recommended    Orders:  -      DIABETES FOOT EXAM  -     MICROALBUMIN, UR, RAND W/ MICROALB/CREAT RATIO; Future  -     METABOLIC PANEL, COMPREHENSIVE; Future  -     LIPID PANEL; Future  -     HEMOGLOBIN A1C WITH EAG; Future    2. Coronary artery disease of native artery of native heart with stable angina pectoris ([facility], [place] [facility]  Assessment & Plan:   monitored by specialist. No acute findings meriting change in the plan     Reviewed recent stress echo and cardiac catheterization, both results were reassuring    Orders:  -     LIPID PANEL; Future    3. COPD, moderate ([facility] [place] [facility]  Assessment & Plan:   well controlled, continue current medications, continue current treatment plan, medication adherence emphasized, lifestyle modifications recommended    Orders:  -     CBC WITH AUTOMATED DIFF; Future    4. Hyperlipidemia LDL goal <132  -     METABOLIC PANEL, COMPREHENSIVE; Future  -     LIPID PANEL; Future    5. Essential hypertension with goal blood pressure less than 948/62  -     METABOLIC PANEL, COMPREHENSIVE; Future    6. Hiatal hernia  -     omeprazole (PRILOSEC) 40 mg capsule; Take 1 Capsule by mouth daily. Discussed lifestyle issues and health guidance given  Patient was given an after visit summary which includes diagnoses, vital signs, current medications, instructions and references & authorized prescriptions . Results of labs will be conveyed to patient, once available. Pt verbalized instructions I provided and expressed understanding of discussion that was held today. Follow-up and Dispositions    · Return in about 4 months (around 10/16/2022) for follow up, fasting.          Please note that this dictation was completed with [employer] computer voice recognition software. Quite often unanticipated grammatical, syntax, homophones, and other interpretive errors are inadvertently transcribed by the computer software. Please disregard these errors. Please excuse any errors that have escaped final proofreading. Thank you.

## 2022-06-16 NOTE — PATIENT INSTRUCTIONS
Learning About Meal Planning for Diabetes  Why plan your meals? Meal planning can be a key part of managing diabetes. Planning meals and snacks with the right balance of carbohydrate, protein, and fat can help you keep your blood sugar at the target level you set with your doctor. You don't have to eat special foods. You can eat what your family eats, including sweets once in a while. But you do have to pay attention to how often you eat and how much you eat of certain foods. You may want to work with a dietitian or a diabetes educator. They can give you tips and meal ideas and can answer your questions about meal planning. This health professional can also help you reach a healthy weight if that is one of your goals. What plan is right for you? Your dietitian or diabetes educator may suggest that you start with the plate format or carbohydrate counting. The plate format  The plate format is a simple way to help you manage how you eat. You plan meals by learning how much space each food should take on a plate. Using the plate format helps you manage the amount of carbohydrate you eat. It can make it easier to keep your blood sugar level within your target range. It also helps you see if you're eating healthy portion sizes. To use the plate format, you put non-starchy vegetables on half your plate. Add lean protein foods, such as fish, lean meats and poultry, or soy products, on one-quarter of the plate. Put a grain or starchy vegetable (such as brown rice or a potato) on the final quarter of the plate. You can add a small piece of fruit and some low-fat or fat-free milk or yogurt, depending on your carbohydrate goal for each meal.  Here are some tips for using the plate format:  · Make sure that you are not using an oversized plate. A 9-inch plate is best. Many restaurants use larger plates. · Get used to using the plate format at home. Then you can use it when you eat out.   · Write down your questions about using the plate format. Talk to your doctor, a dietitian, or a diabetes educator about your concerns. Carbohydrate counting  With carbohydrate counting, you plan meals based on the amount of carbohydrate in each food. Carbohydrate raises blood sugar higher and more quickly than any other nutrient. It is found in desserts, breads and cereals, and fruit. It's also found in starchy vegetables such as potatoes and corn, grains such as rice and pasta, and milk and yogurt. You can help keep your blood sugar levels within your target range by planning how much carbohydrate to have at meals and snacks. The amount you need depends on several things. These include your weight, how active you are, which diabetes medicines you take, and what your goals are for your blood sugar levels. A registered dietitian or diabetes educator can help you plan how much carbohydrate to include in each meal and snack. An example of a carbohydrate counting plan is:  · 45 to 60 grams at each meal. That's about the same as 3 to 4 carbohydrate servings. · 15 to 20 grams at each snack. That's about the same as 1 carbohydrate serving. The Nutrition Facts label on packaged foods tells you how much carbohydrate is in a serving of the food. First, look at the serving size on the food label. Is that the amount you eat in a serving? All of the nutrition information on a food label is based on that serving size. So if you eat more or less than that, you'll need to adjust the other numbers. Total carbohydrate is the next thing you need to look for on the label. If you count carbohydrate servings, one serving of carbohydrate is 15 grams. For foods that don't come with labels, such as fresh fruits and vegetables, you'll need a guide that lists carbohydrate in these foods. Ask your doctor, dietitian, or diabetes educator about books or other nutrition guides you can use.   If you take insulin, you need to know how many grams of carbohydrate are in a meal. This lets you know how much rapid-acting insulin to take before you eat. If you use an insulin pump, you get a constant rate of insulin during the day. So the pump must be programmed at meals to give you extra insulin to cover the rise in blood sugar after meals. When you know how much carbohydrate you will eat, you can take the right amount of insulin. Or, if you always use the same amount of insulin, you need to make sure that you eat the same amount of carbohydrate at meals. If you need more help to understand carbohydrate counting and food labels, ask your doctor, dietitian, or diabetes educator. How can you plan healthy meals? Here are some tips to get started:  · Plan your meals a week at a time. Don't forget to include snacks too. · Use cookbooks or online recipes to plan several main meals. Plan some quick meals for busy nights. You also can double some recipes that freeze well. Then you can save half for other busy nights when you don't have time to cook. · Make sure you have the ingredients you need for your recipes. If you're running low on basic items, put these items on your shopping list too. · List foods that you use to make breakfasts, lunches, and snacks. List plenty of fruits and vegetables. · Post this list on the refrigerator. Add to it as you think of more things you need. · Take the list to the store to do your weekly shopping. Follow-up care is a key part of your treatment and safety. Be sure to make and go to all appointments, and call your doctor if you are having problems. It's also a good idea to know your test results and keep a list of the medicines you take. Where can you learn more? Go to http://www.gray.com/  Enter U287 in the search box to learn more about \"Learning About Meal Planning for Diabetes. \"  Current as of: September 8, 2021               Content Version: 13.2  © 2252-5192 [employer]wise, [facility].    Care instructions adapted under license by [employer] (which disclaims liability or warranty for this information). If you have questions about a medical condition or this instruction, always ask your healthcare professional. [address] any warranty or liability for your use of this information.

## 2022-06-16 NOTE — PROGRESS NOTES
Chief Complaint   Patient presents with    Hypertension     follow up    Diabetes     follow up         1. \"Have you been to the ER, urgent care clinic since your last visit? Hospitalized since your last visit? \" No    2. \"Have you seen or consulted any other health care providers outside of the [address] [place] [address] since your last visit? \" No     3. For patients aged 39-70: Has the patient had a colonoscopy / FIT/ Cologuard? No      If the patient is female:    4. For patients aged 41-77: Has the patient had a mammogram within the past 2 years? NA - based on age or sex      11. For patients aged 21-65: Has the patient had a pap smear?  NA - based on age or sex         3 most recent PHQ Screens 6/16/2022   Little interest or pleasure in doing things Not at all   Feeling down, depressed, irritable, or hopeless Not at all   Total Score PHQ 2 0       Health Maintenance Due   Topic Date Due    DTaP/Tdap/Td series (1 - Tdap) Never done    Shingrix Vaccine Age 50> (1 of 2) Never done    Eye Exam Retinal or Dilated  10/11/2019    Foot Exam Q1  05/25/2022

## 2022-06-29 DIAGNOSIS — I25.10 CORONARY ARTERY DISEASE INVOLVING NATIVE CORONARY ARTERY OF NATIVE HEART WITHOUT ANGINA PECTORIS: Chronic | ICD-10-CM

## 2022-07-01 RX ORDER — CARVEDILOL 6.25 MG/1
TABLET ORAL
Qty: 180 TABLET | Refills: 0 | Status: SHIPPED | OUTPATIENT
Start: 2022-07-01 | End: 2022-10-09

## 2022-07-09 DIAGNOSIS — I10 ESSENTIAL HYPERTENSION WITH GOAL BLOOD PRESSURE LESS THAN 130/85: ICD-10-CM

## 2022-07-09 RX ORDER — LOSARTAN POTASSIUM 50 MG/1
TABLET ORAL
Qty: 90 TABLET | Refills: 1 | Status: SHIPPED | OUTPATIENT
Start: 2022-07-09

## 2022-08-23 ENCOUNTER — NURSE TRIAGE (OUTPATIENT)
Dept: OTHER | Facility: CLINIC | Age: 77
End: 2022-08-23

## 2022-08-23 NOTE — TELEPHONE ENCOUNTER
Received call from Kenney at [facility] with Red Flag Complaint. Subjective: Caller states \"having chest pain and wants an EKG. It feels like a pinching pain. At night I felt it in my hand. Yesterday my blood pressure 144/79, this morning blood pressure is better 120/77\"     Current Symptoms: Chest pain, Headache    Onset: 2 days ago;     Associated Symptoms: NA    Pain Severity: 0/10; N/A; None    Temperature:  Not asked    What has been tried: Nitroglycerin     LMP: NA Pregnant: NA    Recommended disposition: Go to ED/UCC Now (Or to Office with PCP Approval)    Care advice provided, patient verbalizes understanding; denies any other questions or concerns; instructed to call back for any new or worsening symptoms. Writer provided warm transfer to Lane Cheng at [facility] for 2nd level triage    Attention Provider: Thank you for allowing me to participate in the care of your patient. The patient was connected to triage in response to information provided to the ECC. Please do not respond through this encounter as the response is not directed to a shared pool.         Reason for Disposition   Chest pain or 'angina' comes and goes and is happening more often (increasing in frequency) or getting worse (increasing in severity) (Exception: chest pains that last only a few seconds)    Protocols used: Chest Pain-ADULT-[place]

## 2022-09-11 DIAGNOSIS — K44.9 HIATAL HERNIA: ICD-10-CM

## 2022-09-12 RX ORDER — OMEPRAZOLE 40 MG/1
CAPSULE, DELAYED RELEASE ORAL
Qty: 90 CAPSULE | Refills: 0 | Status: SHIPPED | OUTPATIENT
Start: 2022-09-12

## 2022-09-12 NOTE — TELEPHONE ENCOUNTER
Requested Prescriptions     Pending Prescriptions Disp Refills    omeprazole (PRILOSEC) 40 mg capsule [Pharmacy Med Name: OMEPRAZOLE DR 40 MG CAPSULE] 90 Capsule 0     Sig: TAKE 1 CAPSULE BY MOUTH EVERY DAY

## 2022-10-06 DIAGNOSIS — E11.9 CONTROLLED TYPE 2 DIABETES MELLITUS WITHOUT COMPLICATION, WITHOUT LONG-TERM CURRENT USE OF INSULIN (HCC): ICD-10-CM

## 2022-10-06 RX ORDER — METFORMIN HYDROCHLORIDE 500 MG/1
TABLET ORAL
Qty: 180 TABLET | Refills: 1 | Status: SHIPPED | OUTPATIENT
Start: 2022-10-06

## 2022-10-08 DIAGNOSIS — I25.10 CORONARY ARTERY DISEASE INVOLVING NATIVE CORONARY ARTERY OF NATIVE HEART WITHOUT ANGINA PECTORIS: Chronic | ICD-10-CM

## 2022-10-09 DIAGNOSIS — K14.4 ATROPHIC GLOSSITIS: ICD-10-CM

## 2022-10-09 RX ORDER — CARVEDILOL 6.25 MG/1
TABLET ORAL
Qty: 180 TABLET | Refills: 0 | Status: SHIPPED | OUTPATIENT
Start: 2022-10-09

## 2022-10-10 RX ORDER — FOLIC ACID 1 MG/1
TABLET ORAL
Qty: 90 TABLET | Refills: 1 | Status: SHIPPED | OUTPATIENT
Start: 2022-10-10

## 2022-10-18 ENCOUNTER — OFFICE VISIT (OUTPATIENT)
Dept: FAMILY MEDICINE CLINIC | Age: 77
End: 2022-10-18
Payer: MEDICARE

## 2022-10-18 VITALS
TEMPERATURE: 98.6 F | BODY MASS INDEX: 25.52 KG/M2 | HEART RATE: 73 BPM | RESPIRATION RATE: 16 BRPM | HEIGHT: 63 IN | SYSTOLIC BLOOD PRESSURE: 123 MMHG | DIASTOLIC BLOOD PRESSURE: 77 MMHG | OXYGEN SATURATION: 98 % | WEIGHT: 144 LBS

## 2022-10-18 DIAGNOSIS — E78.5 HYPERLIPIDEMIA LDL GOAL <100: ICD-10-CM

## 2022-10-18 DIAGNOSIS — J44.9 COPD, MODERATE (HCC): ICD-10-CM

## 2022-10-18 DIAGNOSIS — Z92.29 HX OF LONG TERM USE OF BLOOD THINNERS: ICD-10-CM

## 2022-10-18 DIAGNOSIS — I25.10 CORONARY ARTERY DISEASE INVOLVING NATIVE CORONARY ARTERY OF NATIVE HEART WITHOUT ANGINA PECTORIS: Chronic | ICD-10-CM

## 2022-10-18 DIAGNOSIS — M72.2 PLANTAR FASCIITIS, RIGHT: ICD-10-CM

## 2022-10-18 DIAGNOSIS — M17.12 ARTHRITIS OF LEFT KNEE: ICD-10-CM

## 2022-10-18 DIAGNOSIS — E11.9 CONTROLLED TYPE 2 DIABETES MELLITUS WITHOUT COMPLICATION, WITHOUT LONG-TERM CURRENT USE OF INSULIN (HCC): Primary | ICD-10-CM

## 2022-10-18 DIAGNOSIS — I25.118 CORONARY ARTERY DISEASE OF NATIVE ARTERY OF NATIVE HEART WITH STABLE ANGINA PECTORIS (HCC): ICD-10-CM

## 2022-10-18 DIAGNOSIS — I10 ESSENTIAL HYPERTENSION WITH GOAL BLOOD PRESSURE LESS THAN 130/85: ICD-10-CM

## 2022-10-18 DIAGNOSIS — M17.11 ARTHRITIS OF KNEE, RIGHT: ICD-10-CM

## 2022-10-18 PROCEDURE — G8510 SCR DEP NEG, NO PLAN REQD: HCPCS | Performed by: FAMILY MEDICINE

## 2022-10-18 PROCEDURE — 1101F PT FALLS ASSESS-DOCD LE1/YR: CPT | Performed by: FAMILY MEDICINE

## 2022-10-18 PROCEDURE — G8427 DOCREV CUR MEDS BY ELIG CLIN: HCPCS | Performed by: FAMILY MEDICINE

## 2022-10-18 PROCEDURE — 3044F HG A1C LEVEL LT 7.0%: CPT | Performed by: FAMILY MEDICINE

## 2022-10-18 PROCEDURE — G8754 DIAS BP LESS 90: HCPCS | Performed by: FAMILY MEDICINE

## 2022-10-18 PROCEDURE — 99214 OFFICE O/P EST MOD 30 MIN: CPT | Performed by: FAMILY MEDICINE

## 2022-10-18 PROCEDURE — G8417 CALC BMI ABV UP PARAM F/U: HCPCS | Performed by: FAMILY MEDICINE

## 2022-10-18 PROCEDURE — G8752 SYS BP LESS 140: HCPCS | Performed by: FAMILY MEDICINE

## 2022-10-18 PROCEDURE — G8536 NO DOC ELDER MAL SCRN: HCPCS | Performed by: FAMILY MEDICINE

## 2022-10-18 PROCEDURE — 1124F ACP DISCUSS-NO DSCNMKR DOCD: CPT | Performed by: FAMILY MEDICINE

## 2022-10-18 RX ORDER — MELOXICAM 15 MG/1
15 TABLET ORAL DAILY
Qty: 30 TABLET | Refills: 0 | Status: SHIPPED | OUTPATIENT
Start: 2022-10-18

## 2022-10-18 NOTE — PROGRESS NOTES
HISTORY OF PRESENT ILLNESS  Cornell Ivy is a 69 y.o. male. Patient was seen today for 4 months follow-up appointment on diabetes, CAD, COPD, hypertension, dyslipidemia. He has worsening bilateral knee pain as well as concern of pain in right foot. Patient was evaluated by Dr. Roxana Boo for his knee arthritis about 4 months ago and had cortisone shot in both knees  HPI  Cardiovascular Review  The patient has hypertension, hyperlipidemia, coronary artery disease and status post coronary artery stenting. He reports taking medications as instructed, no medication side effects noted, no TIA's, no chest pain on exertion, no dyspnea on exertion, no swelling of ankles, no orthostatic dizziness or lightheadedness, no orthopnea or paroxysmal nocturnal dyspnea, no palpitations, no intermittent claudication symptoms, no muscle aches or pain. Diet and Lifestyle: generally follows a low fat low cholesterol diet, generally follows a low sodium diet, does not rigorously follow a diabetic diet, exercises regularly, nonsmoker. Lab review: labs reviewed and discussed with patient. He had normal Lexican stress test x2 in last 6 months   Medicines: Carvedilol 6.25 mg BID, crestor 10 mg  Losartan/Hctz 50  mg, clonidine as needed  He is s/p Cardiac cath ( 12/17/2018) by Dr Mitchell Vazquez. He had nuclear stress test done last month with Dr. Oziel Aguila, reports where reviewed  he was told cath was reassuring and stents are patent. He was advised to dc Plavix. Is on aspirin only now. He also had another nuclear stress test with Dr. Dennis Chapman 2 weeks ago which was very reassuring. Records were reviewed           Endocrine Review  He is seen for diabetes. Since last visit: no change. Home glucose monitoring: is performed sporadically, nonfasting values range 100-140. He reports medication compliance: noncompliant some of the time. Medication side effects: none. Diabetic diet compliance: noncompliant some of the time.   Further diabetic ROS: no polyuria or polydipsia, no chest pain, dyspnea or TIA's, no numbness, tingling or pain in extremities, no unusual visual symptoms, no hypoglycemia. Lab review: labs reviewed and discussed with patient. Eye exam: due.  ,   Medications: Metformin  500 mg BID  on ACEI/ARBS :yes  On ASA, yes  Podiatry visit: not done     COPD  Patient complains of cough and shortness of breath. Symptoms began several years ago. Symptoms chronic dyspnea: severity = mild: course of sx: well controlled  able walk 5 blocks  cough: severity: moderate: sputum : clear: light  symptoms worse with exertion. does worsen with exertion. Sputum is clear in small amounts. Fever has been suspected fevers but not measured at home. Patient uses 1 pillows at night. Patient can walk 1 mile  before resting. Patient currently is not on home oxygen therapy. Padmini Lau Respiratory history: frequent episodes of bronchitis and COPD  He is on scheduled Fluticasone, Singulair and prn albuterol, but non compliant with treatment. Urology Review  He is here today because of BPH. He reports his symptoms are well controlled. His symptoms include: nocturia x 2, dysuria, pain on tip of penis. He denies: urinary hesitancy, urinary frequency, double voiding, weak stream, perineal discomfort, hematuria, abdominal pain, flank pain, testicular pain. He is on the following medications: Flomax. He reports the following medication side effects: none. Lab review: labs reviewed and discussed with patient. Is back on Flomax for few months now. He is on omeprazole for chronic GERD. Knee Pain  Patient complains of bilaterally knee pain. Onset of the symptoms was problem is longstanding, this episode began a few weeks ago. Inciting event: longstanding problem which has been getting worse.  Current symptoms include pain location: left greater than right: anterior, medial, severity of pain = fairly severe, swelling: mild, stiffness: fairly severe, knee gives out, knee locks, crepitus sensation, foreign body sensation and popping sensation. Associated symptoms: none. Aggravating symptoms: standing, walking, running, rising after sitting, any weight bearing. Patient's overall course: gradually worsening Patient has had prior knee problems. Patient denies fever, recent falls. Evaluation to date: plain films: abnormal: Patient had x-rays in Dr. Meghana Gonzales office about 4 months ago showing bilateral degenerative arthritis. Treatment to date: Cortisone shots in both knees, prescription NSAIDs per med orders: not very effective  His symptoms improved for few weeks but now having same symptoms again. Today he is also complaining of pain in right heel that is more noticeable in the morning when he steps out of bed as well as when he walks after period of inactivity. Pain improves with walking. He has tried massage as well as insoles without much help. Review of Systems   Constitutional:  Negative for chills, fever and malaise/fatigue. HENT:  Negative for congestion, ear pain, sore throat and tinnitus. Eyes:  Negative for blurred vision, double vision, pain and discharge. Respiratory:  Negative for cough, shortness of breath and wheezing. Cardiovascular:  Negative for chest pain, palpitations and leg swelling. Gastrointestinal:  Negative for abdominal pain, blood in stool, constipation, diarrhea, nausea and vomiting. Genitourinary:  Negative for dysuria, frequency, hematuria and urgency. Musculoskeletal:  Negative for back pain, joint pain and myalgias. Bilateral knee pain, right heel pain   Skin:  Negative for rash. Neurological:  Negative for dizziness, tremors, seizures and headaches. Endo/Heme/Allergies:  Negative for polydipsia. Does not bruise/bleed easily. Psychiatric/Behavioral:  Negative for depression and substance abuse. The patient is not nervous/anxious. Physical Exam  Vitals and nursing note reviewed.    Constitutional:       Appearance: He is well-developed. He is not diaphoretic. HENT:      Head: Normocephalic and atraumatic. Right Ear: External ear normal.      Mouth/Throat:      Mouth: Mucous membranes are moist.      Pharynx: No oropharyngeal exudate. Eyes:      General: No scleral icterus. Conjunctiva/sclera: Conjunctivae normal.      Pupils: Pupils are equal, round, and reactive to light. Neck:      Thyroid: No thyromegaly. Vascular: No JVD. Cardiovascular:      Rate and Rhythm: Normal rate and regular rhythm. Heart sounds: Normal heart sounds. No murmur heard. Pulmonary:      Effort: Pulmonary effort is normal.      Breath sounds: Normal breath sounds. No wheezing. Abdominal:      General: Bowel sounds are normal. There is no distension. Palpations: Abdomen is soft. There is no mass. Musculoskeletal:         General: Normal range of motion. Cervical back: Normal range of motion and neck supple. Right knee: Tenderness present over the medial joint line. Left knee: Tenderness present over the medial joint line. Feet:    Lymphadenopathy:      Cervical: No cervical adenopathy. Skin:     General: Skin is warm and dry. Findings: No rash. Neurological:      Mental Status: He is alert and oriented to person, place, and time. Cranial Nerves: No cranial nerve deficit. Deep Tendon Reflexes: Reflexes are normal and symmetric. Reflexes normal.   Psychiatric:         Mood and Affect: Mood normal.         Behavior: Behavior normal.       ASSESSMENT and PLAN  Diagnoses and all orders for this visit:    1. Controlled type 2 diabetes mellitus without complication, without long-term current use of insulin ([address]  -     LIPID PANEL; Future  -     METABOLIC PANEL, COMPREHENSIVE; Future  -     HEMOGLOBIN A1C WITH EAG; Future  -     MICROALBUMIN, UR, RAND W/ MICROALB/CREAT RATIO; Future    2. COPD, moderate ([address]  -     CBC WITH AUTOMATED DIFF; Future    3.  Coronary artery disease of native artery of native heart with stable angina pectoris ([facility] [address]    4. Coronary artery disease involving native coronary artery of native heart without angina pectoris  -     LIPID PANEL; Future    5. Essential hypertension with goal blood pressure less than 348/81  -     METABOLIC PANEL, COMPREHENSIVE; Future    6. Hyperlipidemia LDL goal <100  -     LIPID PANEL; Future  -     METABOLIC PANEL, COMPREHENSIVE; Future    7. Hx of long term use of blood thinners    8. Plantar fasciitis, right  -     meloxicam (MOBIC) 15 mg tablet; Take 1 Tablet by mouth daily. 9. Arthritis of knee, right  -     meloxicam (MOBIC) 15 mg tablet; Take 1 Tablet by mouth daily. 10. Arthritis of left knee  -     meloxicam (MOBIC) 15 mg tablet; Take 1 Tablet by mouth daily. Discussed exercises for plantar fasciitis as well as knee arthritis. Printout was given  Discussed lifestyle issues and health guidance given  Patient was given an after visit summary which includes diagnoses, vital signs, current medications, instructions and references & authorized prescriptions . Results of labs will be conveyed to patient, once available. Pt verbalized instructions I provided and expressed understanding of discussion that was held today. Follow-up and Dispositions    Return in about 4 months (around 2/18/2023) for fasting, medicare wellness, physical.         Please note that this dictation was completed with [employer], the computer voice recognition software. Quite often unanticipated grammatical, syntax, homophones, and other interpretive errors are inadvertently transcribed by the computer software. Please disregard these errors. Please excuse any errors that have escaped final proofreading. Thank you.

## 2022-10-18 NOTE — PROGRESS NOTES
Chief Complaint   Patient presents with    Follow Up Chronic Condition     diabetes, dyslipidemia, hypertension, coronary artery disease, COPD         1. \"Have you been to the ER, urgent care clinic since your last visit? Hospitalized since your last visit? \" No    2. \"Have you seen or consulted any other health care providers outside of the [address] [place] [address] since your last visit? \" No     3. For patients over 45: Has the patient had a colonoscopy?  Yes - no Care Gap present   \    3 most recent PHQ Screens 10/18/2022   Little interest or pleasure in doing things Not at all   Feeling down, depressed, irritable, or hopeless Not at all   Total Score PHQ 2 0       Health Maintenance Due   Topic Date Due    DTaP/Tdap/Td series (1 - Tdap) Never done    Shingrix Vaccine Age 50> (1 of 2) Never done    Eye Exam Retinal or Dilated  10/11/2019    COVID-19 Vaccine (4 - Booster for Pfizer series) 02/11/2022    Flu Vaccine (1) 08/01/2022     Pt declined flu shot

## 2022-10-24 DIAGNOSIS — I25.10 CORONARY ARTERY DISEASE INVOLVING NATIVE CORONARY ARTERY OF NATIVE HEART WITHOUT ANGINA PECTORIS: Chronic | ICD-10-CM

## 2022-10-24 DIAGNOSIS — E11.9 CONTROLLED TYPE 2 DIABETES MELLITUS WITHOUT COMPLICATION, WITHOUT LONG-TERM CURRENT USE OF INSULIN (HCC): ICD-10-CM

## 2022-10-24 DIAGNOSIS — E78.5 HYPERLIPIDEMIA LDL GOAL <100: ICD-10-CM

## 2022-10-24 DIAGNOSIS — I10 ESSENTIAL HYPERTENSION WITH GOAL BLOOD PRESSURE LESS THAN 130/85: ICD-10-CM

## 2022-10-24 DIAGNOSIS — J44.9 COPD, MODERATE (HCC): ICD-10-CM

## 2022-10-24 LAB
ALBUMIN SERPL-MCNC: 3.4 G/DL (ref 3.5–5)
ALBUMIN/GLOB SERPL: 1 {RATIO} (ref 1.1–2.2)
ALP SERPL-CCNC: 79 U/L (ref 45–117)
ALT SERPL-CCNC: 15 U/L (ref 12–78)
ANION GAP SERPL CALC-SCNC: 2 MMOL/L (ref 5–15)
AST SERPL-CCNC: 13 U/L (ref 15–37)
BASOPHILS # BLD: 0 K/UL (ref 0–0.1)
BASOPHILS NFR BLD: 0 % (ref 0–1)
BILIRUB SERPL-MCNC: 0.5 MG/DL (ref 0.2–1)
BUN SERPL-MCNC: 23 MG/DL (ref 6–20)
BUN/CREAT SERPL: 25 (ref 12–20)
CALCIUM SERPL-MCNC: 8.9 MG/DL (ref 8.5–10.1)
CHLORIDE SERPL-SCNC: 108 MMOL/L (ref 97–108)
CHOLEST SERPL-MCNC: 121 MG/DL
CO2 SERPL-SCNC: 28 MMOL/L (ref 21–32)
CREAT SERPL-MCNC: 0.92 MG/DL (ref 0.7–1.3)
DIFFERENTIAL METHOD BLD: NORMAL
EOSINOPHIL # BLD: 0.2 K/UL (ref 0–0.4)
EOSINOPHIL NFR BLD: 4 % (ref 0–7)
ERYTHROCYTE [DISTWIDTH] IN BLOOD BY AUTOMATED COUNT: 12.1 % (ref 11.5–14.5)
EST. AVERAGE GLUCOSE BLD GHB EST-MCNC: 128 MG/DL
GLOBULIN SER CALC-MCNC: 3.4 G/DL (ref 2–4)
GLUCOSE SERPL-MCNC: 108 MG/DL (ref 65–100)
HBA1C MFR BLD: 6.1 % (ref 4–5.6)
HCT VFR BLD AUTO: 40.2 % (ref 36.6–50.3)
HDLC SERPL-MCNC: 50 MG/DL
HDLC SERPL: 2.4 {RATIO} (ref 0–5)
HGB BLD-MCNC: 12.7 G/DL (ref 12.1–17)
IMM GRANULOCYTES # BLD AUTO: 0 K/UL (ref 0–0.04)
IMM GRANULOCYTES NFR BLD AUTO: 0 % (ref 0–0.5)
LDLC SERPL CALC-MCNC: 54.8 MG/DL (ref 0–100)
LYMPHOCYTES # BLD: 1.7 K/UL (ref 0.8–3.5)
LYMPHOCYTES NFR BLD: 30 % (ref 12–49)
MCH RBC QN AUTO: 30.1 PG (ref 26–34)
MCHC RBC AUTO-ENTMCNC: 31.6 G/DL (ref 30–36.5)
MCV RBC AUTO: 95.3 FL (ref 80–99)
MONOCYTES # BLD: 0.5 K/UL (ref 0–1)
MONOCYTES NFR BLD: 9 % (ref 5–13)
NEUTS SEG # BLD: 3.2 K/UL (ref 1.8–8)
NEUTS SEG NFR BLD: 57 % (ref 32–75)
NRBC # BLD: 0 K/UL (ref 0–0.01)
NRBC BLD-RTO: 0 PER 100 WBC
PLATELET # BLD AUTO: 236 K/UL (ref 150–400)
PMV BLD AUTO: 9.6 FL (ref 8.9–12.9)
POTASSIUM SERPL-SCNC: 4.6 MMOL/L (ref 3.5–5.1)
PROT SERPL-MCNC: 6.8 G/DL (ref 6.4–8.2)
RBC # BLD AUTO: 4.22 M/UL (ref 4.1–5.7)
SODIUM SERPL-SCNC: 138 MMOL/L (ref 136–145)
TRIGL SERPL-MCNC: 81 MG/DL (ref ?–150)
VLDLC SERPL CALC-MCNC: 16.2 MG/DL
WBC # BLD AUTO: 5.6 K/UL (ref 4.1–11.1)

## 2022-10-25 LAB
CREAT UR-MCNC: 87.6 MG/DL
MICROALBUMIN UR-MCNC: 0.72 MG/DL
MICROALBUMIN/CREAT UR-RTO: 8 MG/G (ref 0–30)

## 2022-10-25 NOTE — PROGRESS NOTES
Lei Cortez,  I am so happy to inform you that all results including fasting and average sugar, cholesterol profile, kidney and liver function, blood count, urine for protein are very stable and at goal for you. Please continue with current medications. Very reassuring results.   Thanks

## 2022-10-27 NOTE — PROGRESS NOTES
Results discussed with patient by Dr. Mustapha Munoz via Amie Steele. Letter sent with results and instructions as follow up.   Alycia Yoon LPN

## 2022-11-16 DIAGNOSIS — M72.2 PLANTAR FASCIITIS, RIGHT: ICD-10-CM

## 2022-11-16 DIAGNOSIS — M17.11 ARTHRITIS OF KNEE, RIGHT: ICD-10-CM

## 2022-11-16 DIAGNOSIS — M17.12 ARTHRITIS OF LEFT KNEE: ICD-10-CM

## 2022-11-16 RX ORDER — MELOXICAM 15 MG/1
TABLET ORAL
Qty: 30 TABLET | Refills: 0 | Status: SHIPPED | OUTPATIENT
Start: 2022-11-16

## 2022-11-30 DIAGNOSIS — E78.5 HYPERLIPIDEMIA LDL GOAL <100: ICD-10-CM

## 2022-11-30 RX ORDER — ROSUVASTATIN CALCIUM 10 MG/1
TABLET, COATED ORAL
Qty: 90 TABLET | Refills: 1 | Status: SHIPPED | OUTPATIENT
Start: 2022-11-30

## 2022-12-08 DIAGNOSIS — N40.1 BENIGN PROSTATIC HYPERPLASIA WITH URINARY FREQUENCY: ICD-10-CM

## 2022-12-08 DIAGNOSIS — R35.0 BENIGN PROSTATIC HYPERPLASIA WITH URINARY FREQUENCY: ICD-10-CM

## 2022-12-08 RX ORDER — TAMSULOSIN HYDROCHLORIDE 0.4 MG/1
0.4 CAPSULE ORAL DAILY
Qty: 90 CAPSULE | Refills: 0 | Status: SHIPPED | OUTPATIENT
Start: 2022-12-08

## 2022-12-08 RX ORDER — NITROGLYCERIN 0.4 MG/1
TABLET SUBLINGUAL
Qty: 25 TABLET | Refills: 0 | Status: SHIPPED | OUTPATIENT
Start: 2022-12-08

## 2022-12-08 NOTE — TELEPHONE ENCOUNTER
Patient call for refills tamsulosin and NTG. Leo, Carrol    Last Visit: 10/18/22 [facility]  Next Appointment: 2/20/23 [facility]  Previous Refill Encounter(s):   NTG 7/6/21 25  Tamsulosin 10/5/21 90    Requested Prescriptions     Pending Prescriptions Disp Refills    nitroglycerin (NITROSTAT) 0.4 mg SL tablet 25 Tablet 0     Sig: TAKE 1 TABLET AND PLACE UNDER THE TONGUE EVERY 5 MINUTES FOR CHEST PAIN FOR UP TO 3 DOSES    tamsulosin (FLOMAX) 0.4 mg capsule 90 Capsule 0     Sig: Take 1 Capsule by mouth daily. For [address] in place:   Recommendation Provided To:    Intervention Detail: New Rx: 2, reason: Patient Preference  Gap Closed?:   Intervention Accepted By:   Time Spent (min): 10

## 2022-12-11 DIAGNOSIS — K44.9 HIATAL HERNIA: ICD-10-CM

## 2022-12-12 RX ORDER — OMEPRAZOLE 40 MG/1
CAPSULE, DELAYED RELEASE ORAL
Qty: 90 CAPSULE | Refills: 0 | Status: SHIPPED | OUTPATIENT
Start: 2022-12-12

## 2022-12-15 NOTE — ACP (ADVANCE CARE PLANNING)
Advance Care Planning     General Advance Care Planning (ACP) Conversation      Date of Conversation: 1/27/2021  Conducted with: Patient with Decision Making Capacity    Healthcare Decision Maker:     Primary Decision Maker (Active): Kathleen Salgado - Son - [phone]    Secondary Decision Maker: Clare Acosta - Daughter-in-Law - [phone]  Click here to complete [address] including selection of the Healthcare Decision Maker Relationship (ie \"Primary\")  Today we documented Decision Maker(s) consistent with Legal Next of Kin hierarchy. Content/Action Overview:    Has ACP document(s) NOT on file - requested patient to provide  Reviewed DNR/DNI and patient elects Full Code (Attempt Resuscitation)  Topics discussed: treatment goals, benefit/burden of treatment options, artificial nutrition, ventilation preferences, hospitalization preferences, resuscitation preferences, end of life care preferences (vegetative state/imminent death) and hospice care  Additional Comments: he wants all treatment to bring him back     Length of Voluntary ACP Conversation in minutes:  16 minutes    Antonette Bueno MD Ear Star Wedge Flap Text: The defect edges were debeveled with a #15 blade scalpel.  Given the location of the defect and the proximity to free margins (helical rim) an ear star wedge flap was deemed most appropriate.  Using a sterile surgical marker, the appropriate flap was drawn incorporating the defect and placing the expected incisions between the helical rim and antihelix where possible.  The area thus outlined was incised through and through with a #15 scalpel blade.

## 2022-12-19 DIAGNOSIS — M17.11 ARTHRITIS OF KNEE, RIGHT: ICD-10-CM

## 2022-12-19 DIAGNOSIS — M72.2 PLANTAR FASCIITIS, RIGHT: ICD-10-CM

## 2022-12-19 DIAGNOSIS — M17.12 ARTHRITIS OF LEFT KNEE: ICD-10-CM

## 2022-12-19 RX ORDER — MELOXICAM 15 MG/1
TABLET ORAL
Qty: 30 TABLET | Refills: 0 | Status: SHIPPED | OUTPATIENT
Start: 2022-12-19

## 2023-01-03 DIAGNOSIS — I25.10 CORONARY ARTERY DISEASE INVOLVING NATIVE CORONARY ARTERY OF NATIVE HEART WITHOUT ANGINA PECTORIS: Chronic | ICD-10-CM

## 2023-01-03 RX ORDER — CARVEDILOL 6.25 MG/1
TABLET ORAL
Qty: 180 TABLET | Refills: 0 | Status: SHIPPED | OUTPATIENT
Start: 2023-01-03

## 2023-01-17 DIAGNOSIS — M72.2 PLANTAR FASCIITIS, RIGHT: ICD-10-CM

## 2023-01-17 DIAGNOSIS — M17.12 ARTHRITIS OF LEFT KNEE: ICD-10-CM

## 2023-01-17 DIAGNOSIS — M17.11 ARTHRITIS OF KNEE, RIGHT: ICD-10-CM

## 2023-01-17 RX ORDER — MELOXICAM 15 MG/1
TABLET ORAL
Qty: 30 TABLET | Refills: 0 | Status: SHIPPED | OUTPATIENT
Start: 2023-01-17

## 2023-01-30 ENCOUNTER — NURSE TRIAGE (OUTPATIENT)
Dept: OTHER | Facility: CLINIC | Age: 78
End: 2023-01-30

## 2023-01-30 NOTE — TELEPHONE ENCOUNTER
[facility]    Received call from Wiliam Rob at [facility] with [facility]. Subjective: Caller states \"I have symptoms of a cold. I am tired. I want an EKG\"     Current Symptoms: fatigue with exertion. No cough. Onset: 1 week ago; unchanged    Associated Symptoms: no CP, no SOB. Pain Severity: 0/10; ;     Temperature: denies     What has been tried: nothing    LMP: NA Pregnant: NA    Recommended disposition: See in Office Today    Care advice provided, patient verbalizes understanding; denies any other questions or concerns; instructed to call back for any new or worsening symptoms. Patient/Caller agrees with recommended disposition; writer provided warm transfer to [employer] at [facility] for appointment scheduling    Attention Provider: Thank you for allowing me to participate in the care of your patient. The patient was connected to triage in response to information provided to the [facility]. Please do not respond through this encounter as the response is not directed to a shared pool.       Reason for Disposition   Taking a medicine that could cause weakness (e.g., blood pressure medications, diuretics)    Protocols used: Weakness (Generalized) and Fatigue-ADULT-[place]

## 2023-01-31 ENCOUNTER — OFFICE VISIT (OUTPATIENT)
Dept: FAMILY MEDICINE CLINIC | Age: 78
End: 2023-01-31

## 2023-01-31 VITALS
HEIGHT: 63 IN | HEART RATE: 92 BPM | WEIGHT: 140.4 LBS | SYSTOLIC BLOOD PRESSURE: 118 MMHG | DIASTOLIC BLOOD PRESSURE: 62 MMHG | RESPIRATION RATE: 16 BRPM | TEMPERATURE: 98.2 F | BODY MASS INDEX: 24.88 KG/M2 | OXYGEN SATURATION: 97 %

## 2023-01-31 DIAGNOSIS — E11.9 CONTROLLED TYPE 2 DIABETES MELLITUS WITHOUT COMPLICATION, WITHOUT LONG-TERM CURRENT USE OF INSULIN (HCC): ICD-10-CM

## 2023-01-31 DIAGNOSIS — J44.1 COPD WITH ACUTE EXACERBATION (HCC): ICD-10-CM

## 2023-01-31 DIAGNOSIS — I25.118 CORONARY ARTERY DISEASE OF NATIVE ARTERY OF NATIVE HEART WITH STABLE ANGINA PECTORIS (HCC): ICD-10-CM

## 2023-01-31 DIAGNOSIS — R06.09 DOE (DYSPNEA ON EXERTION): Primary | ICD-10-CM

## 2023-01-31 DIAGNOSIS — J44.9 COPD, MODERATE (HCC): ICD-10-CM

## 2023-01-31 DIAGNOSIS — R00.2 PALPITATION: ICD-10-CM

## 2023-01-31 DIAGNOSIS — C7A.8 OTHER MALIGNANT NEUROENDOCRINE TUMORS (HCC): ICD-10-CM

## 2023-01-31 DIAGNOSIS — J44.1 COPD EXACERBATION (HCC): ICD-10-CM

## 2023-01-31 DIAGNOSIS — R53.83 FATIGUE, UNSPECIFIED TYPE: ICD-10-CM

## 2023-01-31 PROCEDURE — 1101F PT FALLS ASSESS-DOCD LE1/YR: CPT | Performed by: FAMILY MEDICINE

## 2023-01-31 PROCEDURE — G8420 CALC BMI NORM PARAMETERS: HCPCS | Performed by: FAMILY MEDICINE

## 2023-01-31 PROCEDURE — G8536 NO DOC ELDER MAL SCRN: HCPCS | Performed by: FAMILY MEDICINE

## 2023-01-31 PROCEDURE — 3074F SYST BP LT 130 MM HG: CPT | Performed by: FAMILY MEDICINE

## 2023-01-31 PROCEDURE — G8427 DOCREV CUR MEDS BY ELIG CLIN: HCPCS | Performed by: FAMILY MEDICINE

## 2023-01-31 PROCEDURE — G8510 SCR DEP NEG, NO PLAN REQD: HCPCS | Performed by: FAMILY MEDICINE

## 2023-01-31 PROCEDURE — 99214 OFFICE O/P EST MOD 30 MIN: CPT | Performed by: FAMILY MEDICINE

## 2023-01-31 PROCEDURE — 3078F DIAST BP <80 MM HG: CPT | Performed by: FAMILY MEDICINE

## 2023-01-31 PROCEDURE — 93000 ELECTROCARDIOGRAM COMPLETE: CPT | Performed by: FAMILY MEDICINE

## 2023-01-31 PROCEDURE — 1124F ACP DISCUSS-NO DSCNMKR DOCD: CPT | Performed by: FAMILY MEDICINE

## 2023-01-31 RX ORDER — METHYLPREDNISOLONE 4 MG/1
TABLET ORAL
Qty: 1 DOSE PACK | Refills: 0 | Status: SHIPPED | OUTPATIENT
Start: 2023-01-31

## 2023-01-31 RX ORDER — DOXYCYCLINE 100 MG/1
100 CAPSULE ORAL 2 TIMES DAILY
Qty: 20 CAPSULE | Refills: 0 | Status: SHIPPED | OUTPATIENT
Start: 2023-01-31 | End: 2023-02-10

## 2023-01-31 RX ORDER — FUROSEMIDE 20 MG/1
20 TABLET ORAL DAILY
Qty: 30 TABLET | Refills: 0 | Status: SHIPPED | OUTPATIENT
Start: 2023-01-31

## 2023-01-31 NOTE — ASSESSMENT & PLAN NOTE
monitored by specialist. No acute findings meriting change in the plan   Asymptomatic.   Stable lesion as per last CT abdomen

## 2023-01-31 NOTE — ASSESSMENT & PLAN NOTE
monitored by specialist. No acute findings meriting change in the plan   Patient's nuclear stress test 4 months ago was very normal.  He follows Dr. Autumn Cabrera, cardiology

## 2023-01-31 NOTE — PROGRESS NOTES
Chief Complaint   Patient presents with    Fatigue     With exertion x 1 month         1. \"Have you been to the ER, urgent care clinic since your last visit? Hospitalized since your last visit? \" No    2. \"Have you seen or consulted any other health care providers outside of the [address] [place] [address] since your last visit? \" No     3. For patients aged 39-70: Has the patient had a colonoscopy / FIT/ Cologuard? NA - based on age      If the patient is female:    4. For patients aged 41-77: Has the patient had a mammogram within the past 2 years? NA - based on age or sex      11. For patients aged 21-65: Has the patient had a pap smear?  NA - based on age or sex       Financial Resource Strain: Low Risk     Difficulty of Paying Living Expenses: Not hard at all      Food Insecurity: No Food Insecurity    Worried About [address] in the Last Year: Never true    [facility] in the Last Year: Never true        3 most recent PHQ Screens 1/31/2023   Little interest or pleasure in doing things Not at all   Feeling down, depressed, irritable, or hopeless Not at all   Total Score PHQ 2 0       Health Maintenance Due   Topic Date Due    DTaP/Tdap/Td series (1 - Tdap) Never done    Shingles Vaccine (1 of 2) Never done    Eye Exam Retinal or Dilated  10/11/2019    COVID-19 Vaccine (4 - Booster for [employer] series) 12/06/2021    Medicare Yearly Exam  02/02/2023

## 2023-01-31 NOTE — PROGRESS NOTES
HISTORY OF PRESENT ILLNESS  Dayron Townsend is a 69 y.o. male. Patient was seen today for worsening shortness of breath, fatigue and palpitations that is going on for last 1 month. He was in [place] and just returned back about 3 days ago. Is also having significant nasal congestion, cough and wheezing. HPI  The patient complains of dyspnea, headache, nasal congestion, productive cough, rhinorrhea (clear in color), weight loss, and extreme fatigue. Onset of symptoms was gradual and 1 month ago. Symptom course has been worsening, while severity now is severe. Symptoms tend to occur with minimal activity. , cough, stress, and weather change. Symptoms are aggravated by exertion, exercise, dust, pollen, mold, alleviated by rest and medication(s) (combivent) and have been associated with palpitations and fatigue. Past respiratory history includes approximately 3 COPD exacerbation(s) every year. Patient denies travel or residence in the [place]. Patient has travel or residence in [address] [place] [address] for TB. Patient denies occupational or hobby exposure to irritants such as sulfiting agents, tartrazine, epoxies, chemical, flour, wood dust.) Risks for MI include Age > 27, male, previous MI, HTN, and diabetes. Risks for PE include increased age and recent travel. Review of Systems   Constitutional:  Positive for malaise/fatigue. Negative for chills and fever. HENT:  Positive for congestion. Negative for ear pain, sore throat and tinnitus. Eyes:  Negative for blurred vision, double vision, pain and discharge. Respiratory:  Positive for cough, sputum production, shortness of breath and wheezing. Cardiovascular:  Positive for palpitations. Negative for chest pain and leg swelling. Gastrointestinal:  Negative for abdominal pain, blood in stool, constipation, diarrhea, nausea and vomiting. Genitourinary:  Negative for dysuria, frequency, hematuria and urgency.    Musculoskeletal:  Negative for back pain, joint pain and myalgias. Skin:  Negative for rash. Neurological:  Negative for dizziness, tremors, seizures and headaches. Endo/Heme/Allergies:  Negative for polydipsia. Does not bruise/bleed easily. Psychiatric/Behavioral:  Negative for depression and substance abuse. The patient is not nervous/anxious. Physical Exam  Vitals and nursing note reviewed. Constitutional:       General: He is not in acute distress. Appearance: He is well-developed. He is not diaphoretic. HENT:      Head: Normocephalic and atraumatic. Right Ear: Tympanic membrane and external ear normal. No decreased hearing noted. No drainage. No middle ear effusion. Tympanic membrane is not injected. Left Ear: Tympanic membrane normal. No decreased hearing noted. No drainage. No middle ear effusion. Tympanic membrane is not injected. Nose: Mucosal edema and congestion present. No rhinorrhea. Right Sinus: Maxillary sinus tenderness and frontal sinus tenderness present. Left Sinus: Maxillary sinus tenderness and frontal sinus tenderness present. Mouth/Throat:      Mouth: Mucous membranes are moist.      Pharynx: Uvula midline. Posterior oropharyngeal erythema present. No oropharyngeal exudate. Eyes:      General: No scleral icterus. Conjunctiva/sclera: Conjunctivae normal.      Pupils: Pupils are equal, round, and reactive to light. Neck:      Thyroid: No thyromegaly. Vascular: No JVD. Cardiovascular:      Rate and Rhythm: Normal rate and regular rhythm. Heart sounds: Normal heart sounds. No murmur heard. Pulmonary:      Effort: Pulmonary effort is normal.      Breath sounds: Normal breath sounds. No wheezing or rales. Comments: Significant decrease in bilateral basal air entry, extensive wheezing bilaterally  Abdominal:      General: Bowel sounds are normal. There is no distension. Palpations: Abdomen is soft. There is no mass. Musculoskeletal:         General: No tenderness. Normal range of motion. Cervical back: Normal range of motion and neck supple. Lymphadenopathy:      Head:      Right side of head: No tonsillar adenopathy. Left side of head: No tonsillar adenopathy. Cervical: No cervical adenopathy. Skin:     General: Skin is warm and dry. Findings: No rash. Neurological:      Mental Status: He is alert and oriented to person, place, and time. Cranial Nerves: No cranial nerve deficit. Deep Tendon Reflexes: Reflexes are normal and symmetric. Reflexes normal.   Psychiatric:         Mood and Affect: Mood normal.         Behavior: Behavior normal.       ASSESSMENT and PLAN  Diagnoses and all orders for this visit:    1. DUGGAN (dyspnea on exertion)  -     AMB POC EKG ROUTINE W/ 12 LEADS, INTER & REP  -     XR CHEST PA LAT; Future  -     ECHO ADULT COMPLETE; Future    2. Fatigue, unspecified type  -     ECHO ADULT COMPLETE; Future    3. Palpitation  -     AMB POC EKG ROUTINE W/ 12 LEADS, INTER & REP  -     ECHO ADULT COMPLETE; Future    4. COPD, moderate ([address]  Assessment & Plan:   borderline controlled, continue current medications, continue current treatment plan, medication adherence emphasized, lifestyle modifications recommended    Orders:  -     XR CHEST PA LAT; Future  -     ipratropium-albuteroL (COMBIVENT RESPIMAT)  mcg/actuation inhaler; Take 1 Puff by inhalation every four (4) hours as needed for Wheezing or Shortness of Breath. 5. Other malignant neuroendocrine tumors ([address]  Assessment & Plan:   monitored by specialist. No acute findings meriting change in the plan   Asymptomatic. Stable lesion as per last CT abdomen      6.  Coronary artery disease of native artery of native heart with stable angina pectoris [facility])  Assessment & Plan:   monitored by specialist. No acute findings meriting change in the plan   Patient's nuclear stress test 4 months ago was very normal.  He follows Dr. Reena Wan, cardiology    Orders:  -     ECHO ADULT COMPLETE; Future    7. Controlled type 2 diabetes mellitus without complication, without long-term current use of insulin ([facility]  Assessment & Plan:   well controlled, continue current medications, continue current treatment plan, medication adherence emphasized, lifestyle modifications recommended      8. COPD with acute exacerbation (HCC)  -     methylPREDNISolone (MEDROL DOSEPACK) 4 mg tablet; As per instruction on package  -     doxycycline (VIBRAMYCIN) 100 mg capsule; Take 1 Capsule by mouth two (2) times a day for 10 days. 9. COPD exacerbation (HCC)  -     ipratropium-albuteroL (COMBIVENT RESPIMAT)  mcg/actuation inhaler; Take 1 Puff by inhalation every four (4) hours as needed for Wheezing or Shortness of Breath. Patient with COPD exacerbation versus CHF. EKG showed frequent PACs with decreased voltage consistent with pulmonary disease. Will get x-ray chest and echocardiogram.  His nuclear stress test about 4 months ago was reassuring and normal as per records from cardiology. Strongly recommended to be more compliant with his controller medication and use Flovent very regularly and to use Combivent as needed. We will start on steroid as well as doxycycline. Plan to add diuretic after reviewing his x-ray chest and echocardiogram.Discussed lifestyle issues and health guidance given  Patient was given an after visit summary which includes diagnoses, vital signs, current medications, instructions and references & authorized prescriptions . Results of labs will be conveyed to patient, once available. Pt verbalized instructions I provided and expressed understanding of discussion that was held today. Please note that this dictation was completed with [employer], the computer voice recognition software. Quite often unanticipated grammatical, syntax, homophones, and other interpretive errors are inadvertently transcribed by the computer software. Please disregard these errors.   Please excuse any errors that have escaped final proofreading. Thank you.

## 2023-02-16 DIAGNOSIS — I10 ESSENTIAL HYPERTENSION WITH GOAL BLOOD PRESSURE LESS THAN 130/85: ICD-10-CM

## 2023-02-16 RX ORDER — LOSARTAN POTASSIUM 50 MG/1
TABLET ORAL
Qty: 90 TABLET | Refills: 1 | Status: SHIPPED | OUTPATIENT
Start: 2023-02-16

## 2023-02-20 ENCOUNTER — OFFICE VISIT (OUTPATIENT)
Dept: FAMILY MEDICINE CLINIC | Age: 78
End: 2023-02-20
Payer: MEDICARE

## 2023-02-20 VITALS
HEART RATE: 80 BPM | SYSTOLIC BLOOD PRESSURE: 98 MMHG | TEMPERATURE: 97.7 F | BODY MASS INDEX: 25.16 KG/M2 | DIASTOLIC BLOOD PRESSURE: 62 MMHG | WEIGHT: 142 LBS | HEIGHT: 63 IN | OXYGEN SATURATION: 99 % | RESPIRATION RATE: 16 BRPM

## 2023-02-20 DIAGNOSIS — R35.0 BENIGN PROSTATIC HYPERPLASIA WITH URINARY FREQUENCY: ICD-10-CM

## 2023-02-20 DIAGNOSIS — Z71.89 ADVANCED DIRECTIVES, COUNSELING/DISCUSSION: ICD-10-CM

## 2023-02-20 DIAGNOSIS — Z00.00 MEDICARE ANNUAL WELLNESS VISIT, SUBSEQUENT: Primary | ICD-10-CM

## 2023-02-20 DIAGNOSIS — I25.118 CORONARY ARTERY DISEASE OF NATIVE ARTERY OF NATIVE HEART WITH STABLE ANGINA PECTORIS (HCC): ICD-10-CM

## 2023-02-20 DIAGNOSIS — I10 ESSENTIAL HYPERTENSION WITH GOAL BLOOD PRESSURE LESS THAN 130/85: ICD-10-CM

## 2023-02-20 DIAGNOSIS — C7A.8 OTHER MALIGNANT NEUROENDOCRINE TUMORS (HCC): ICD-10-CM

## 2023-02-20 DIAGNOSIS — E11.9 CONTROLLED TYPE 2 DIABETES MELLITUS WITHOUT COMPLICATION, WITHOUT LONG-TERM CURRENT USE OF INSULIN (HCC): ICD-10-CM

## 2023-02-20 DIAGNOSIS — Z13.31 SCREENING FOR DEPRESSION: ICD-10-CM

## 2023-02-20 DIAGNOSIS — J44.9 COPD, MODERATE (HCC): ICD-10-CM

## 2023-02-20 DIAGNOSIS — N40.1 BENIGN PROSTATIC HYPERPLASIA WITH URINARY FREQUENCY: ICD-10-CM

## 2023-02-20 DIAGNOSIS — E78.5 HYPERLIPIDEMIA LDL GOAL <100: ICD-10-CM

## 2023-02-20 PROCEDURE — 3078F DIAST BP <80 MM HG: CPT | Performed by: FAMILY MEDICINE

## 2023-02-20 PROCEDURE — 1124F ACP DISCUSS-NO DSCNMKR DOCD: CPT | Performed by: FAMILY MEDICINE

## 2023-02-20 PROCEDURE — 1101F PT FALLS ASSESS-DOCD LE1/YR: CPT | Performed by: FAMILY MEDICINE

## 2023-02-20 PROCEDURE — G8417 CALC BMI ABV UP PARAM F/U: HCPCS | Performed by: FAMILY MEDICINE

## 2023-02-20 PROCEDURE — G0439 PPPS, SUBSEQ VISIT: HCPCS | Performed by: FAMILY MEDICINE

## 2023-02-20 PROCEDURE — 3074F SYST BP LT 130 MM HG: CPT | Performed by: FAMILY MEDICINE

## 2023-02-20 PROCEDURE — 99497 ADVNCD CARE PLAN 30 MIN: CPT | Performed by: FAMILY MEDICINE

## 2023-02-20 PROCEDURE — G8536 NO DOC ELDER MAL SCRN: HCPCS | Performed by: FAMILY MEDICINE

## 2023-02-20 PROCEDURE — 99214 OFFICE O/P EST MOD 30 MIN: CPT | Performed by: FAMILY MEDICINE

## 2023-02-20 PROCEDURE — G8510 SCR DEP NEG, NO PLAN REQD: HCPCS | Performed by: FAMILY MEDICINE

## 2023-02-20 PROCEDURE — G8427 DOCREV CUR MEDS BY ELIG CLIN: HCPCS | Performed by: FAMILY MEDICINE

## 2023-02-20 NOTE — PATIENT INSTRUCTIONS
Medicare Wellness Visit, Male    The best way to live healthy is to have a lifestyle where you eat a well-balanced diet, exercise regularly, limit alcohol use, and quit all forms of tobacco/nicotine, if applicable. Regular preventive services are another way to keep healthy. Preventive services (vaccines, screening tests, monitoring & exams) can help personalize your care plan, which helps you manage your own care. Screening tests can find health problems at the earliest stages, when they are easiest to treat. [facility] follows the current, evidence-based guidelines published by the [facility] ([facility]) when recommending preventive services for our patients. Because we follow these guidelines, sometimes recommendations change over time as research supports it. (For example, a prostate screening blood test is no longer routinely recommended for men with no symptoms). Of course, you and your doctor may decide to screen more often for some diseases, based on your risk and co-morbidities (chronic disease you are already diagnosed with). Preventive services for you include:  - Medicare offers their members a free annual wellness visit, which is time for you and your primary care provider to discuss and plan for your preventive service needs.  Take advantage of this benefit every year!    -Over the age of 72 should receive the recommended pneumonia vaccines.   -All adults should have a flu vaccine yearly.  -All adults should receive a tetanus vaccine every 10 years.  -Over the age of 48 should receive the shingles vaccines.    -All adults should be screened once for Hepatitis C.  -All adults age 38-68 who are overweight should have a diabetes screening test once every three years.   -Other screening tests & preventive services for persons with diabetes include: an eye exam to screen for diabetic retinopathy, a kidney function test, a foot exam, and stricter control over your cholesterol.   -Cardiovascular screening for adults with routine risk involves an electrocardiogram (ECG) at intervals determined by the provider.     -Colorectal cancer screening should be done for adults age 43-69 with no increased risk factors for colorectal cancer. There are a number of acceptable methods of screening for this type of cancer. Each test has its own benefits and drawbacks. Discuss with your provider what is most appropriate for you during your annual wellness visit. The different tests include: colonoscopy (considered the best screening method), a fecal occult blood test, a fecal DNA test, and sigmoidoscopy.    -Lung cancer screening is recommended annually with a low dose CT scan for adults between age 54 and 68, who have smoked at least 30 pack years (equivalent of 1 pack per day for 30 days), and who is a current smoker or quit less than 15 years ago. -An Abdominal Aortic Aneurysm (AAA) Screening is recommended for men age 73-68 who has ever smoked in their lifetime. Here is a list of your current Health Maintenance items (your personalized list of preventive services) with a due date:  Health Maintenance Due   Topic Date Due    DTaP/Tdap/Td  (1 - Tdap) Never done    Shingles Vaccine (1 of 2) Never done    Eye Exam  10/11/2019    COVID-19 Vaccine (4 - Booster for Negrete Peter series) 12/06/2021            Learning About Living Daniele Williamson  What is a living will? A living will is a legal form you use to write down the kind of care you want at the end of your life. It is used by the health professionals who will treat you if you aren't able to decide for yourself. If you put your wishes in writing, your loved ones and others will know what kind of care you want. They won't need to guess. This can ease your mind and be helpful to others. A living will is not the same as an estate or property will.  An estate will explains what you want to happen with your money and property after you die. Is a living will a legal document? A living will is a legal document. Each state has its own laws about living pedroza. If you move to another state, make sure that your living will is legal in the state where you now live. Or you might use a universal form that has been approved by many states. This kind of form can sometimes be completed and stored online. Your electronic copy will then be available wherever you have a connection to the Internet. In most cases, doctors will respect your wishes even if you have a form from a different state. You don't need an  to complete a living will. But legal advice can be helpful if your state's laws are unclear, your health history is complicated, or your family can't agree on what should be in your living will. You can change your living will at any time. Some people find that their wishes about end-of-life care change as their health changes. In addition to making a living will, think about completing a medical power of  form. This form lets you name the person you want to make end-of-life treatment decisions for you (your \"health care agent\") if you're not able to. Many hospitals and nursing homes will give you the forms you need to complete a living will and a medical power of . Your living will is used only if you can't make or communicate decisions for yourself anymore. If you become able to make decisions again, you can accept or refuse any treatment, no matter what you wrote in your living will. Your state may offer an online registry. This is a place where you can store your living will online so the doctors and nurses who need to treat you can find it right away. What should you think about when creating a living will? Talk about your end-of-life wishes with your family members and your doctor. Let them know what you want. That way the people making decisions for you won't be surprised by your choices.   Think about these questions as you make your living will:  Do you know enough about life support methods that might be used? If not, talk to your doctor so you know what might be done if you can't breathe on your own, your heart stops, or you're unable to swallow. What things would you still want to be able to do after you receive life-support methods? Would you want to be able to walk? To speak? To eat on your own? To live without the help of machines? If you have a choice, where do you want to be cared for? In your home? At a hospital or nursing home? Do you want certain Yarsani practices performed if you become very ill? If you have a choice at the end of your life, where would you prefer to die? At home? In a hospital or nursing home? Somewhere else? Would you prefer to be buried or cremated? Do you want your organs to be donated after you die? What should you do with your living will? Make sure that your family members and your health care agent have copies of your living will. Give your doctor a copy of your living will to keep in your medical record. If you have more than one doctor, make sure that each one has a copy. You may want to put a copy of your living will where it can be easily found. Where can you learn more? Go to http://www.gray.com/. Enter A545 in the search box to learn more about \"Learning About Living Perrolilly. \"  Current as of: August 8, 2016  Content Version: 11.3  © 7159-6166 [employer]. Care instructions adapted under license by [employer] (which disclaims liability or warranty for this information). If you have questions about a medical condition or this instruction, always ask your healthcare professional. [place] [address] any warranty or liability for your use of this information.          Preventing Falls: Care Instructions  Your Care Instructions    Getting around your home safely can be a challenge if you have injuries or health problems that make it easy for you to fall. Loose rugs and furniture in walkways are among the dangers for many older people who have problems walking or who have poor eyesight. People who have conditions such as arthritis, osteoporosis, or dementia also have to be careful not to fall. You can make your home safer with a few simple measures. Follow-up care is a key part of your treatment and safety. Be sure to make and go to all appointments, and call your doctor if you are having problems. It's also a good idea to know your test results and keep a list of the medicines you take. How can you care for yourself at home? Taking care of yourself  You may get dizzy if you do not drink enough water. To prevent dehydration, drink plenty of fluids, enough so that your urine is light yellow or clear like water. Choose water and other caffeine-free clear liquids. If you have kidney, heart, or liver disease and have to limit fluids, talk with your doctor before you increase the amount of fluids you drink. Exercise regularly to improve your strength, muscle tone, and balance. Walk if you can. Swimming may be a good choice if you cannot walk easily. Have your vision and hearing checked each year or any time you notice a change. If you have trouble seeing and hearing, you might not be able to avoid objects and could lose your balance. Know the side effects of the medicines you take. Ask your doctor or pharmacist whether the medicines you take can affect your balance. Sleeping pills or sedatives can affect your balance. Limit the amount of alcohol you drink. Alcohol can impair your balance and other senses. Ask your doctor whether calluses or corns on your feet need to be removed. If you wear loose-fitting shoes because of calluses or corns, you can lose your balance and fall. Talk to your doctor if you have numbness in your feet.   Preventing falls at home  Remove raised doorway thresholds, throw rugs, and clutter. Repair loose carpet or raised areas in the floor. Move furniture and electrical cords to keep them out of walking paths. Use nonskid floor wax, and wipe up spills right away, especially on ceramic tile floors. If you use a walker or cane, put rubber tips on it. If you use crutches, clean the bottoms of them regularly with an abrasive pad, such as steel wool. Keep your house well lit, especially stairways, porches, and outside walkways. Use night-lights in areas such as hallways and bathrooms. Add extra light switches or use remote switches (such as switches that go on or off when you clap your hands) to make it easier to turn lights on if you have to get up during the night. Install sturdy handrails on stairways. Move items in your cabinets so that the things you use a lot are on the lower shelves (about waist level). Keep a cordless phone and a flashlight with new batteries by your bed. If possible, put a phone in each of the main rooms of your house, or carry a cell phone in case you fall and cannot reach a phone. Or, you can wear a device around your neck or wrist. You push a button that sends a signal for help. Wear low-heeled shoes that fit well and give your feet good support. Use footwear with nonskid soles. Check the heels and soles of your shoes for wear. Repair or replace worn heels or soles. Do not wear socks without shoes on wood floors. Walk on the grass when the sidewalks are slippery. If you live in an area that gets snow and ice in the winter, sprinkle salt on slippery steps and sidewalks. Preventing falls in the bath  Install grab bars and nonskid mats inside and outside your shower or tub and near the toilet and sinks. Use shower chairs and bath benches. Use a hand-held shower head that will allow you to sit while showering.   Get into a tub or shower by putting the weaker leg in first. Get out of a tub or shower with your strong side first.  Repair loose toilet seats and consider installing a raised toilet seat to make getting on and off the toilet easier. Keep your bathroom door unlocked while you are in the shower. Where can you learn more? Go to http://www.grove.com/. Enter 0476 [id number] in the search box to learn more about \"Preventing Falls: Care Instructions. \"  Current as of: March 16, 2018  Content Version: 11.8  © 7267-3720 [employer], [employer]. Care instructions adapted under license by [employer] (which disclaims liability or warranty for this information). If you have questions about a medical condition or this instruction, always ask your healthcare professional. [place] [address] any warranty or liability for your use of this information.

## 2023-02-20 NOTE — PROGRESS NOTES
This is the Subsequent Medicare Annual Wellness Exam, performed 12 months or more after the Initial AWV or the last Subsequent AWV    I have reviewed the patient's medical history in detail and updated the computerized patient record. We did a Medicare Wellness evaluation including a review of past, family, and social histories with attention to age/sex appropriate screening, risk assessment, preventive care and counseling. Any cognitive, fall risk, or ADL issues identified are addressed separately. A patient specific preventive care plan was provided and appropriate screening tests were ordered as specified. Assessment/Plan   Education and counseling provided:  Are appropriate based on today's review and evaluation    1. Medicare annual wellness visit, subsequent  2. Controlled type 2 diabetes mellitus without complication, without long-term current use of insulin ([facility]  Assessment & Plan:   well controlled, continue current medications, continue current treatment plan, medication adherence emphasized, lifestyle modifications recommended  Orders:  -     REFERRAL TO OPHTHALMOLOGY  -     LIPID PANEL; Future  -     HEMOGLOBIN A1C WITH EAG; Future  -     METABOLIC PANEL, COMPREHENSIVE; Future  3. COPD, moderate ([facility]  Assessment & Plan:   borderline controlled, continue current medications, continue current treatment plan, medication adherence emphasized, lifestyle modifications recommended  Orders:  -     CBC WITH AUTOMATED DIFF; Future  4. Essential hypertension with goal blood pressure less than 411/54  -     METABOLIC PANEL, COMPREHENSIVE; Future  5. Coronary artery disease of native artery of native heart with stable angina pectoris [facility])  Assessment & Plan:   monitored by specialist. No acute findings meriting change in the plan   Patient's nuclear stress test 4 months ago was very normal.  Follows Dr. Jose Cruz  Orders:  -     LIPID PANEL; Future  6.  Other malignant neuroendocrine tumors [facility])  Assessment & Plan:   asymptomatic, continue current treatment plan   Follows with GI specialist and getting surveillance CT scan periodically  7. Benign prostatic hyperplasia with urinary frequency  -     PSA W/ REFLX FREE PSA; Future  8. Hyperlipidemia LDL goal <100  -     LIPID PANEL; Future  -     METABOLIC PANEL, COMPREHENSIVE; Future  -     TSH 3RD GENERATION; Future  9. Advanced directives, counseling/discussion  -     ADVANCE CARE PLANNING FIRST 30 MINS  -     FULL CODE  10. Screening for depression  -     DEPRESSION SCREEN ANNUAL       Depression Risk Factor Screening     3 most recent PHQ Screens 2/20/2023   Little interest or pleasure in doing things Not at all   Feeling down, depressed, irritable, or hopeless Not at all   Total Score PHQ 2 0   Trouble falling or staying asleep, or sleeping too much Not at all   Feeling tired or having little energy Not at all   Poor appetite, weight loss, or overeating Not at all   Feeling bad about yourself - or that you are a failure or have let yourself or your family down Not at all   Trouble concentrating on things such as school, work, reading, or watching TV Not at all   Moving or speaking so slowly that other people could have noticed; or the opposite being so fidgety that others notice Not at all   Thoughts of being better off dead, or hurting yourself in some way Not at all   PHQ 9 Score 0   How difficult have these problems made it for you to do your work, take care of your home and get along with others Not difficult at all     We spent 10 minutes on importance of depression screening . Depression screening is a way to see if you have depression symptoms. It may be done by a doctor or counselor. It's often part of a routine checkup. That's because your mental health is just as important as your physical health. Depression is a medical illness that affects how you feel, think, and act. You may:  Have less energy. Lose interest in your daily activities.   Feel sad and grouchy for a long time. Depression is very common. It affects men and women of all ages. Many things can trigger depression. Some people become depressed after they have a stroke or find out they have a major illness like cancer or heart disease. The death of a loved one, a breakup, or changes in the natural brain chemicals may lead to depression. It can run in families. Most experts believe that a combination of inherited genes and stressful life events can cause it. Alcohol & Drug Abuse Risk Screen    Do you average more than 1 drink per night or more than 7 drinks a week: No    In the past three months have you have had more than 4 drinks containing alcohol on one occasion: No          Functional Ability and Level of Safety    Hearing: Hearing is good. Activities of Daily Living: The home contains: handrails and grab bars  Patient does total self care      Ambulation: with no difficulty     Fall Risk:  Fall Risk Assessment, last 12 mths 2/20/2023   Able to walk? Yes   Fall in past 12 months? 0   Do you feel unsteady?  0   Are you worried about falling 0      Abuse Screen:  Patient is not abused       Cognitive Screening    Has your family/caregiver stated any concerns about your memory: no     Cognitive Screening: Normal - MMSE (Mini Mental Status Exam)  29/30 missed 1 out of 3 word recall's  Health Maintenance Due     Health Maintenance Due   Topic Date Due    DTaP/Tdap/Td series (1 - Tdap) Never done    Shingles Vaccine (1 of 2) Never done    Eye Exam Retinal or Dilated  10/11/2019    COVID-19 Vaccine (4 - Booster for [place] series) 12/06/2021       Patient Care Team   Patient Care Team:  Janel Stein MD as PCP - General (Family Medicine)  Janel Stein MD as PCP - [facility] Empaneled Provider  Kamlesh Ding MD (Cardiovascular Disease Physician)  Troy Parisi MD as Consulting Provider (Family Medicine)  Izzy Graves MD as Physician (Orthopedic Surgery)    History     Patient Active Problem List   Diagnosis Code    Essential hypertension with goal blood pressure less than 130/85 I10    Hyperlipidemia LDL goal <100 E78.5    Vitamin D deficiency E55.9    Visit for preventive health examination Z00.00    Controlled type 2 diabetes mellitus without complication ([address] A70.8    COPD, moderate ([address] J44.9    Coronary artery disease involving native coronary artery of native heart without angina pectoris I25.10    Chronic ethmoidal sinusitis J32.2    Arthritis of knee, right M17.11    Coronary artery disease of native artery of native heart with stable angina pectoris (HCC) I25.118    Hx of long term use of blood thinners Z92.29    Primary pancreatic neuroendocrine tumor D3A.8    Other malignant neuroendocrine tumors ([address] C7A.8     Past Medical History:   Diagnosis Date    Acid indigestion     CAD (coronary artery disease)     3 stents, done one month apart in [facility] in 2007,    Diabetes ([address]     Heart disease     Hypercholesterolemia     Hypertension       Past Surgical History:   Procedure Laterality Date    COLONOSCOPY N/A 2/13/2020    COLONOSCOPY/EGD performed by Carmelita Asher MD at [facility] ENDOSCOPY    HX CATARACT REMOVAL      right eye    HX PTCA      2007    HX TONSILLECTOMY      NV UNLISTED PROCEDURE CARDIAC SURGERY      stents, 3    NV UNLISTED PROCEDURE VASCULAR SURGERY      angioplasty     Current Outpatient Medications   Medication Sig Dispense Refill    losartan (COZAAR) 50 mg tablet TAKE 1 TABLET BY MOUTH EVERY DAY 90 Tablet 1    ipratropium-albuteroL (COMBIVENT RESPIMAT)  mcg/actuation inhaler Take 1 Puff by inhalation every four (4) hours as needed for Wheezing or Shortness of Breath. 3 Each 0    furosemide (LASIX) 20 mg tablet Take 1 Tablet by mouth daily.  30 Tablet 0    meloxicam (MOBIC) 15 mg tablet TAKE 1 TABLET BY MOUTH EVERY DAY 30 Tablet 0    carvediloL (COREG) 6.25 mg tablet TAKE 1 TABLET BY MOUTH TWICE A DAY WITH FOOD 180 Tablet 0    omeprazole (PRILOSEC) 40 mg capsule TAKE 1 CAPSULE BY MOUTH EVERY DAY 90 Capsule 0    nitroglycerin (NITROSTAT) 0.4 mg SL tablet TAKE 1 TABLET AND PLACE UNDER THE TONGUE EVERY 5 MINUTES FOR CHEST PAIN FOR UP TO 3 DOSES 25 Tablet 0    tamsulosin (FLOMAX) 0.4 mg capsule Take 1 Capsule by mouth daily. 90 Capsule 0    rosuvastatin (CRESTOR) 10 mg tablet TAKE 1 TABLET BY MOUTH EVERY DAY 90 Tablet 1    folic acid (FOLVITE) 1 mg tablet TAKE 1 TABLET BY MOUTH EVERY DAY 90 Tablet 1    metFORMIN (GLUCOPHAGE) 500 mg tablet TAKE 1 TABLET BY MOUTH TWICE A DAY WITH MEALS 180 Tablet 1    fluticasone propionate (FLOVENT HFA) 44 mcg/actuation inhaler Take 2 Puffs by inhalation two (2) times a day. 3 Each 1    ferrous sulfate (IRON) 325 mg (65 mg iron) EC tablet Take 1 Tablet by mouth Daily (before breakfast). 90 Tablet 1    azelastine (ASTEPRO) 205.5 mcg (0.15 %) 1 [place] by Both Nostrils route two (2) times a day. 1 Bottle 2    albuterol (PROVENTIL VENTOLIN) 2.5 mg /3 mL (0.083 %) nebu 3 mL by Nebulization route every four (4) hours as needed for Wheezing. 24 Each 0    diclofenac (VOLTAREN) 1 % gel Apply 4 g to affected area four (4) times daily. Over right side hip joint 300 g 0    Nebulizer & Compressor machine 1 Each by Does Not Apply route every four (4) hours as needed for Cough (wheezing).  1 Each 0    aspirin delayed-release 81 mg tablet       multivit-min/FA/lycopen/lutein (SENIOR TABS PO)       VITAMIN B-12 5,000 mcg subl       fluticasone (FLONASE) 50 mcg/actuation nasal spray One spray each nostril 1 Bottle 2     No Known Allergies    Family History   Problem Relation Age of Onset    No Known Problems Mother     No Known Problems Father      Social History     Tobacco Use    Smoking status: Never    Smokeless tobacco: Never   Substance Use Topics    Alcohol use: No     Alcohol/week: 0.0 standard drinks         Emil Diggs MD

## 2023-02-20 NOTE — PROGRESS NOTES
Chief Complaint   Patient presents with    Annual Wellness Visit     Follow up       1. \"Have you been to the ER, urgent care clinic since your last visit? Hospitalized since your last visit? \" No    2. \"Have you seen or consulted any other health care providers outside of the [address] [place] [address] since your last visit? \" No     3. For patients aged 39-70: Has the patient had a colonoscopy / FIT/ Cologuard? NA - based on age      If the patient is female:    4. For patients aged 41-77: Has the patient had a mammogram within the past 2 years? NA - based on age or sex      11. For patients aged 21-65: Has the patient had a pap smear? NA - based on age or sex       Financial Resource Strain: Low Risk     Difficulty of Paying Living Expenses: Not hard at all      Food Insecurity: No Food Insecurity    Worried About [address] in the Last Year: Never true    [facility] in the Last Year: Never true        3 most recent PHQ Screens 2/20/2023   Little interest or pleasure in doing things Not at all   Feeling down, depressed, irritable, or hopeless Not at all   Total Score PHQ 2 0   Trouble falling or staying asleep, or sleeping too much Not at all   Feeling tired or having little energy Not at all   Poor appetite, weight loss, or overeating Not at all   Feeling bad about yourself - or that you are a failure or have let yourself or your family down Not at all   Trouble concentrating on things such as school, work, reading, or watching TV Not at all   Moving or speaking so slowly that other people could have noticed; or the opposite being so fidgety that others notice Not at all   Thoughts of being better off dead, or hurting yourself in some way Not at all   PHQ 9 Score 0   How difficult have these problems made it for you to do your work, take care of your home and get along with others Not difficult at all       Abuse Screening Questionnaire 2/20/2023   Do you ever feel afraid of your partner?  N   Are you in a relationship with someone who physically or mentally threatens you? N   Is it safe for you to go home? Y       ADL Assessment 2/20/2023   Feeding yourself No Help Needed   Getting from bed to chair No Help Needed   Getting dressed No Help Needed   Bathing or showering No Help Needed   Walk across the room (includes cane/walker) No Help Needed   Using the telphone No Help Needed   Taking your medications No Help Needed   Preparing meals No Help Needed   Managing money (expenses/bills) No Help Needed   Moderately strenuous housework (laundry) No Help Needed   Shopping for personal items (toiletries/medicines) No Help Needed   Shopping for groceries No Help Needed   Driving No Help Needed   Climbing a flight of stairs No Help Needed   Getting to places beyond walking distances No Help Needed       Fall Risk Assessment, last 12 mths 2/20/2023   Able to walk? Yes   Fall in past 12 months? 0   Do you feel unsteady?  0   Are you worried about falling 0         Health Maintenance Due   Topic Date Due    DTaP/Tdap/Td series (1 - Tdap) Never done    Shingles Vaccine (1 of 2) Never done    Eye Exam Retinal or Dilated  10/11/2019    COVID-19 Vaccine (4 - Booster for [employer] series) 12/06/2021    Medicare Yearly Exam  02/02/2023

## 2023-02-20 NOTE — PROGRESS NOTES
HISTORY OF PRESENT ILLNESS  Rich Olvera is a 69 y.o. male. Patient was scheduled for Medicare wellness visit. He has several other concerns including his shortness of breath, tearing of eyes, low blood pressure readings at home. Past medical history is significant for diabetes, CAD, dyslipidemia, COPD. He has been very noncompliant with recommendations. HPI  Cardiovascular Review  The patient has hypertension, hyperlipidemia, coronary artery disease and status post coronary artery stenting. He reports taking medications as instructed, no medication side effects noted, no TIA's, no chest pain on exertion, no dyspnea on exertion, no swelling of ankles, no orthostatic dizziness or lightheadedness, no orthopnea or paroxysmal nocturnal dyspnea, no palpitations, no intermittent claudication symptoms, no muscle aches or pain. Diet and Lifestyle: generally follows a low fat low cholesterol diet, generally follows a low sodium diet, does not rigorously follow a diabetic diet, exercises regularly, nonsmoker. Lab review: labs reviewed and discussed with patient. He had normal Lexican stress test x2 in last 6 months   Medicines: Carvedilol 6.25 mg BID, crestor 10 mg  Losartan 50  mg, clonidine as needed  He is s/p Cardiac cath ( 12/17/2018) by Dr Cheryl Ding. He had nuclear stress test done last month with Dr. Romaine Wallace, reports where reviewed  he was told cath was reassuring and stents are patent. He was advised to dc Plavix. Is on aspirin only now. He also had another nuclear stress test with Dr. Annelise Junior few months ago that was very normal.  He was ordered echocardiogram and was started on furosemide due to worsening shortness of breath on exertion and a bilateral fine crackles. He has not started his furosemide. And his echocardiogram is scheduled for next week           Endocrine Review  He is seen for diabetes. Since last visit: no change.   Home glucose monitoring: is performed sporadically, nonfasting values range 100-140. He reports medication compliance: noncompliant some of the time. Medication side effects: none. Diabetic diet compliance: noncompliant some of the time. Further diabetic ROS: no polyuria or polydipsia, no chest pain, dyspnea or TIA's, no numbness, tingling or pain in extremities, no unusual visual symptoms, no hypoglycemia. Lab review: labs reviewed and discussed with patient. Eye exam: due.  ,   Medications: Metformin  500 mg BID  on ACEI/ARBS :yes  On ASA, yes  Podiatry visit: not done     COPD  Patient complains of cough and shortness of breath. Symptoms began several years ago. Symptoms chronic dyspnea: severity = mild: course of sx: well controlled  able walk 5 blocks  cough: severity: moderate: sputum : clear: light  symptoms worse with exertion. does worsen with exertion. Sputum is clear in small amounts. Fever has been suspected fevers but not measured at home. Patient uses 1 pillows at night. Patient can walk 1 mile  before resting. Patient currently is not on home oxygen therapy. Jonah Bazan Respiratory history: frequent episodes of bronchitis and COPD  He is on scheduled Fluticasone, Singulair and prn albuterol, but non compliant with treatment. Urology Review  He is here today because of BPH. He reports his symptoms are well controlled. His symptoms include: nocturia x 2, dysuria, pain on tip of penis. He denies: urinary hesitancy, urinary frequency, double voiding, weak stream, perineal discomfort, hematuria, abdominal pain, flank pain, testicular pain. He is on the following medications: Flomax. He reports the following medication side effects: none. Lab review: labs reviewed and discussed with patient. Is back on Flomax for few months now. He is on omeprazole for chronic GERD. Review of Systems   Constitutional:  Negative for chills, fever and malaise/fatigue. HENT:  Negative for congestion, ear pain, sore throat and tinnitus.     Eyes:  Negative for blurred vision, double vision, pain and discharge. Tearing both eyes   Respiratory:  Positive for shortness of breath. Negative for cough and wheezing. Cardiovascular:  Negative for chest pain, palpitations and leg swelling. Gastrointestinal:  Negative for abdominal pain, blood in stool, constipation, diarrhea, nausea and vomiting. Genitourinary:  Negative for dysuria, frequency, hematuria and urgency. Musculoskeletal:  Negative for back pain, joint pain and myalgias. Skin:  Positive for itching. Negative for rash. Neurological:  Negative for dizziness, tremors, seizures and headaches. Endo/Heme/Allergies:  Negative for polydipsia. Does not bruise/bleed easily. Psychiatric/Behavioral:  Negative for depression and substance abuse. The patient is not nervous/anxious. Physical Exam  Vitals and nursing note reviewed. Constitutional:       General: He is not in acute distress. Appearance: He is well-developed. He is not diaphoretic. HENT:      Head: Normocephalic and atraumatic. Right Ear: Tympanic membrane and external ear normal. No decreased hearing noted. No drainage. No middle ear effusion. Tympanic membrane is not injected. Left Ear: Tympanic membrane normal. No decreased hearing noted. No drainage. No middle ear effusion. Tympanic membrane is not injected. Nose: Mucosal edema and congestion present. No rhinorrhea. Right Sinus: Maxillary sinus tenderness and frontal sinus tenderness present. Left Sinus: Maxillary sinus tenderness and frontal sinus tenderness present. Mouth/Throat:      Mouth: Mucous membranes are moist.      Pharynx: Uvula midline. Posterior oropharyngeal erythema present. No oropharyngeal exudate. Eyes:      General: No scleral icterus. Conjunctiva/sclera: Conjunctivae normal.      Pupils: Pupils are equal, round, and reactive to light. Neck:      Thyroid: No thyromegaly. Vascular: No JVD.    Cardiovascular:      Rate and Rhythm: Normal rate and regular rhythm. Heart sounds: Normal heart sounds. No murmur heard. Pulmonary:      Effort: Pulmonary effort is normal.      Breath sounds: Normal breath sounds. No wheezing or rales. Comments: Significant decrease in bilateral basal air entry, extensive wheezing bilaterally  Abdominal:      General: Bowel sounds are normal. There is no distension. Palpations: Abdomen is soft. There is no mass. Musculoskeletal:         General: No tenderness. Normal range of motion. Cervical back: Normal range of motion and neck supple. Lymphadenopathy:      Head:      Right side of head: No tonsillar adenopathy. Left side of head: No tonsillar adenopathy. Cervical: No cervical adenopathy. Skin:     General: Skin is warm and dry. Findings: No rash. Neurological:      Mental Status: He is alert and oriented to person, place, and time. Cranial Nerves: No cranial nerve deficit. Deep Tendon Reflexes: Reflexes are normal and symmetric. Reflexes normal.   Psychiatric:         Mood and Affect: Mood normal.         Behavior: Behavior normal.       ASSESSMENT and PLAN  Diagnoses and all orders for this visit:    1. Medicare annual wellness visit, subsequent    2. Controlled type 2 diabetes mellitus without complication, without long-term current use of insulin ([facility]  Assessment & Plan:   well controlled, continue current medications, continue current treatment plan, medication adherence emphasized, lifestyle modifications recommended    Orders:  -     REFERRAL TO OPHTHALMOLOGY  -     LIPID PANEL; Future  -     HEMOGLOBIN A1C WITH EAG; Future  -     METABOLIC PANEL, COMPREHENSIVE; Future    3. COPD, moderate ([address]  Assessment & Plan:   borderline controlled, continue current medications, continue current treatment plan, medication adherence emphasized, lifestyle modifications recommended    Orders:  -     CBC WITH AUTOMATED DIFF; Future    4.  Essential hypertension with goal blood pressure less than 036/93  -     METABOLIC PANEL, COMPREHENSIVE; Future    5. Coronary artery disease of native artery of native heart with stable angina pectoris [facility])  Assessment & Plan:   monitored by specialist. No acute findings meriting change in the plan   Patient's nuclear stress test 4 months ago was very normal.  Follows Dr. Rosaura Ngo    Orders:  -     LIPID PANEL; Future    6. Other malignant neuroendocrine tumors [facility])  Assessment & Plan:   asymptomatic, continue current treatment plan   Follows with GI specialist and getting surveillance CT scan periodically      7. Benign prostatic hyperplasia with urinary frequency  -     PSA W/ REFLX FREE PSA; Future    8. Hyperlipidemia LDL goal <100  -     LIPID PANEL; Future  -     METABOLIC PANEL, COMPREHENSIVE; Future  -     TSH 3RD GENERATION; Future    9. Advanced directives, counseling/discussion  -     ADVANCE CARE PLANNING FIRST 30 MINS  -     FULL CODE    10. Screening for depression  -     DEPRESSION SCREEN ANNUAL  Discussed lifestyle issues and health guidance given  Patient was given an after visit summary which includes diagnoses, vital signs, current medications, instructions and references & authorized prescriptions . Results of labs will be conveyed to patient, once available. Pt verbalized instructions I provided and expressed understanding of discussion that was held today. Follow-up and Dispositions    Return in about 4 months (around 6/20/2023) for follow up. Please note that this dictation was completed with [employer], the computer voice recognition software. Quite often unanticipated grammatical, syntax, homophones, and other interpretive errors are inadvertently transcribed by the computer software. Please disregard these errors. Please excuse any errors that have escaped final proofreading. Thank you.

## 2023-02-20 NOTE — ACP (ADVANCE CARE PLANNING)
Advance Care Planning     Advance Care Planning (ACP) Physician/NP/PA Conversation      Date of Conversation: 2/20/2023  Conducted with: Patient with Decision Making Capacity    Healthcare Decision Maker:     Primary Decision Maker: Violetta Escamilla - Son - [phone]    Secondary Decision Maker: Marisol Byrne - Daughter-in-Law - [phone]  Click here to complete [address] including selection of the Healthcare Decision Maker Relationship (ie \"Primary\")      Today we documented Decision Maker(s). The patient will provide ACP documents. Care Preferences:    Hospitalization: \"If your health worsens and it becomes clear that your chance of recovery is unlikely, what would be your preference regarding hospitalization? \"  The patient would prefer hospitalization. Ventilation: \"If you were unable to breathe on your own and your chance of recovery was unlikely, what would be your preference about the use of a ventilator (breathing machine) if it was available to you? \"   The patient would desire the use of a ventilator. Resuscitation: \"In the event your heart stopped as a result of an underlying serious health condition, would you want attempts to be made to restart your heart, or would you prefer a natural death? \"   The patient is unsure.     Additional topics discussed: treatment goals, benefit/burden of treatment options, artificial nutrition, ventilation preferences, hospitalization preferences, resuscitation preferences, end of life care preferences (vegetative state/imminent death), and hospice care    Conversation Outcomes / Follow-Up Plan:   ACP in process - information provided, considering goals and options  Discussed importance of advanced directive and strongly recommended to complete advanced directive that was given today  Reviewed DNR/DNI and patient elects Full Code (Attempt Resuscitation)     Length of Voluntary ACP Conversation in minutes:  16 minutes    Khadar Velarde MD

## 2023-02-20 NOTE — ASSESSMENT & PLAN NOTE
asymptomatic, continue current treatment plan   Follows with GI specialist and getting surveillance CT scan periodically

## 2023-02-20 NOTE — ASSESSMENT & PLAN NOTE
monitored by specialist. No acute findings meriting change in the plan   Patient's nuclear stress test 4 months ago was very normal.  Follows Dr. Shruthi Paez

## 2023-02-21 NOTE — ADDENDUM NOTE
Addended by: Amandeep Albarado on: 2/21/2023 08:02 AM     Modules accepted: Orders [Alert] : alert [Acute Distress] : no acute distress [Normocephalic] : normocephalic [Flat Open Anterior Napoleon] : flat open anterior fontanelle [Icteric sclera] : nonicteric sclera [PERRL] : PERRL [Red Reflex Bilateral] : red reflex bilateral [Normally Placed Ears] : normally placed ears [Auricles Well Formed] : auricles well formed [Clear Tympanic membranes] : clear tympanic membranes [Light reflex present] : light reflex present [Bony landmarks visible] : bony landmarks visible [Discharge] : no discharge [Nares Patent] : nares patent [Palate Intact] : palate intact [Uvula Midline] : uvula midline [Supple, full passive range of motion] : supple, full passive range of motion [Palpable Masses] : no palpable masses [Symmetric Chest Rise] : symmetric chest rise [Clear to Auscultation Bilaterally] : clear to auscultation bilaterally [Regular Rate and Rhythm] : regular rate and rhythm [S1, S2 present] : S1, S2 present [Murmurs] : no murmurs [+2 Femoral Pulses] : +2 femoral pulses [Soft] : soft [Tender] : nontender [Distended] : not distended [Bowel Sounds] : bowel sounds present [Hepatomegaly] : no hepatomegaly [Splenomegaly] : no splenomegaly [Normal external genitailia] : normal external genitalia [Clitoromegaly] : no clitoromegaly [Patent Vagina] : normal vagina introitus [Patent] : patent [Normally Placed] : normally placed [No Abnormal Lymph Nodes Palpated] : no abnormal lymph nodes palpated [Santiago-Ortolani] : negative Santiago-Ortolani [Symmetric Flexed Extremities] : symmetric flexed extremities [Spinal Dimple] : no spinal dimple [Tuft of Hair] : no tuft of hair [Straight] : straight [Startle Reflex] : startle reflex present [Suck Reflex] : suck reflex present [Rooting] : rooting reflex present [Palmar Grasp] : palmar grasp reflex present [Plantar Grasp] : plantar grasp reflex present [Symmetric Sherrie] : symmetric Odin [Jaundice] : not jaundice

## 2023-03-02 DIAGNOSIS — R06.09 DOE (DYSPNEA ON EXERTION): ICD-10-CM

## 2023-03-02 RX ORDER — FUROSEMIDE 20 MG/1
TABLET ORAL
Qty: 30 TABLET | Refills: 0 | Status: SHIPPED | OUTPATIENT
Start: 2023-03-02

## 2023-03-09 DIAGNOSIS — R35.0 BENIGN PROSTATIC HYPERPLASIA WITH URINARY FREQUENCY: ICD-10-CM

## 2023-03-09 DIAGNOSIS — N40.1 BENIGN PROSTATIC HYPERPLASIA WITH URINARY FREQUENCY: ICD-10-CM

## 2023-03-09 RX ORDER — TAMSULOSIN HYDROCHLORIDE 0.4 MG/1
CAPSULE ORAL
Qty: 90 CAPSULE | Refills: 0 | Status: SHIPPED | OUTPATIENT
Start: 2023-03-09

## 2023-03-28 ENCOUNTER — HOSPITAL ENCOUNTER (OUTPATIENT)
Dept: NON INVASIVE DIAGNOSTICS | Age: 78
Discharge: HOME | End: 2023-03-28
Attending: FAMILY MEDICINE
Payer: MEDICARE

## 2023-03-28 PROCEDURE — 93306 TTE W/DOPPLER COMPLETE: CPT

## 2023-03-30 DIAGNOSIS — E11.9 CONTROLLED TYPE 2 DIABETES MELLITUS WITHOUT COMPLICATION, WITHOUT LONG-TERM CURRENT USE OF INSULIN (HCC): ICD-10-CM

## 2023-03-30 RX ORDER — METFORMIN HYDROCHLORIDE 500 MG/1
TABLET ORAL
Qty: 180 TABLET | Refills: 1 | Status: SHIPPED | OUTPATIENT
Start: 2023-03-30

## 2023-04-02 DIAGNOSIS — I25.10 CORONARY ARTERY DISEASE INVOLVING NATIVE CORONARY ARTERY OF NATIVE HEART WITHOUT ANGINA PECTORIS: Chronic | ICD-10-CM

## 2023-04-02 RX ORDER — CARVEDILOL 6.25 MG/1
TABLET ORAL
Qty: 180 TABLET | Refills: 0 | Status: SHIPPED | OUTPATIENT
Start: 2023-04-02

## 2023-04-27 RX ORDER — UREA 10 %
LOTION (ML) TOPICAL
Qty: 90 TABLET | Refills: 1 | Status: SHIPPED | OUTPATIENT
Start: 2023-04-27

## 2023-05-18 RX ORDER — ROSUVASTATIN CALCIUM 10 MG/1
1 TABLET, COATED ORAL DAILY
COMMUNITY
Start: 2022-11-30 | End: 2023-06-06

## 2023-05-18 RX ORDER — OMEPRAZOLE 40 MG/1
1 CAPSULE, DELAYED RELEASE ORAL DAILY
COMMUNITY
Start: 2022-12-12

## 2023-05-18 RX ORDER — NITROGLYCERIN 0.4 MG/1
TABLET SUBLINGUAL
COMMUNITY
Start: 2022-12-08

## 2023-05-18 RX ORDER — AZELASTINE HCL 205.5 UG/1
1 SPRAY NASAL 2 TIMES DAILY
COMMUNITY
Start: 2021-04-22

## 2023-05-18 RX ORDER — FERROUS SULFATE 325(65) MG
1 TABLET ORAL
COMMUNITY
Start: 2023-04-27

## 2023-05-18 RX ORDER — CARVEDILOL 6.25 MG/1
1 TABLET ORAL 2 TIMES DAILY WITH MEALS
COMMUNITY
Start: 2023-04-02 | End: 2023-06-29

## 2023-05-18 RX ORDER — FLUTICASONE PROPIONATE 50 MCG
SPRAY, SUSPENSION (ML) NASAL
COMMUNITY
Start: 2013-11-27

## 2023-05-18 RX ORDER — LOSARTAN POTASSIUM 50 MG/1
1 TABLET ORAL DAILY
COMMUNITY
Start: 2023-02-16

## 2023-05-18 RX ORDER — FOLIC ACID 1 MG/1
1 TABLET ORAL DAILY
COMMUNITY
Start: 2022-10-10

## 2023-05-18 RX ORDER — FLUTICASONE PROPIONATE 44 UG/1
2 AEROSOL, METERED RESPIRATORY (INHALATION) 2 TIMES DAILY
COMMUNITY
Start: 2022-03-08

## 2023-05-18 RX ORDER — FUROSEMIDE 20 MG/1
1 TABLET ORAL DAILY
COMMUNITY
Start: 2023-03-02 | End: 2023-06-12

## 2023-05-18 RX ORDER — MELOXICAM 15 MG/1
1 TABLET ORAL DAILY
COMMUNITY
Start: 2023-01-17

## 2023-05-18 RX ORDER — ASPIRIN 81 MG/1
TABLET ORAL
COMMUNITY
Start: 2019-03-11

## 2023-05-18 RX ORDER — ALBUTEROL SULFATE 2.5 MG/3ML
2.5 SOLUTION RESPIRATORY (INHALATION) EVERY 4 HOURS PRN
COMMUNITY
Start: 2021-01-07

## 2023-05-18 RX ORDER — AMOXICILLIN 250 MG
CAPSULE ORAL
COMMUNITY
Start: 2019-03-11

## 2023-05-18 RX ORDER — TAMSULOSIN HYDROCHLORIDE 0.4 MG/1
1 CAPSULE ORAL DAILY
COMMUNITY
Start: 2023-03-09

## 2023-06-06 DIAGNOSIS — E78.5 HYPERLIPIDEMIA, UNSPECIFIED: ICD-10-CM

## 2023-06-06 RX ORDER — ROSUVASTATIN CALCIUM 10 MG/1
TABLET, COATED ORAL
Qty: 90 TABLET | Refills: 1 | Status: SHIPPED | OUTPATIENT
Start: 2023-06-06

## 2023-06-29 DIAGNOSIS — I25.10 ATHEROSCLEROTIC HEART DISEASE OF NATIVE CORONARY ARTERY WITHOUT ANGINA PECTORIS: ICD-10-CM

## 2023-06-29 RX ORDER — CARVEDILOL 6.25 MG/1
TABLET ORAL
Qty: 180 TABLET | Refills: 1 | Status: SHIPPED | OUTPATIENT
Start: 2023-06-29

## 2023-08-11 DIAGNOSIS — I10 ESSENTIAL (PRIMARY) HYPERTENSION: ICD-10-CM

## 2023-08-11 RX ORDER — LOSARTAN POTASSIUM 50 MG/1
TABLET ORAL
Qty: 90 TABLET | Refills: 1 | Status: SHIPPED | OUTPATIENT
Start: 2023-08-11

## 2023-08-12 DIAGNOSIS — E11.9 TYPE 2 DIABETES MELLITUS WITHOUT COMPLICATIONS (HCC): ICD-10-CM

## 2023-08-12 DIAGNOSIS — K14.4 ATROPHY OF TONGUE PAPILLAE: ICD-10-CM

## 2023-08-13 RX ORDER — FOLIC ACID 1 MG/1
TABLET ORAL
Qty: 90 TABLET | Refills: 1 | Status: SHIPPED | OUTPATIENT
Start: 2023-08-13

## 2023-10-12 ENCOUNTER — OFFICE VISIT (OUTPATIENT)
Age: 78
End: 2023-10-12
Payer: MEDICARE

## 2023-10-12 VITALS
DIASTOLIC BLOOD PRESSURE: 62 MMHG | RESPIRATION RATE: 14 BRPM | WEIGHT: 143.8 LBS | HEIGHT: 63 IN | TEMPERATURE: 98.2 F | SYSTOLIC BLOOD PRESSURE: 100 MMHG | BODY MASS INDEX: 25.48 KG/M2 | OXYGEN SATURATION: 96 % | HEART RATE: 70 BPM

## 2023-10-12 DIAGNOSIS — J44.9 COPD, MODERATE (HCC): ICD-10-CM

## 2023-10-12 DIAGNOSIS — K21.00 GASTROESOPHAGEAL REFLUX DISEASE WITH ESOPHAGITIS WITHOUT HEMORRHAGE: ICD-10-CM

## 2023-10-12 DIAGNOSIS — E78.5 HYPERLIPIDEMIA LDL GOAL <100: ICD-10-CM

## 2023-10-12 DIAGNOSIS — E11.9 CONTROLLED TYPE 2 DIABETES MELLITUS WITHOUT COMPLICATION, WITHOUT LONG-TERM CURRENT USE OF INSULIN (HCC): Primary | ICD-10-CM

## 2023-10-12 DIAGNOSIS — I10 ESSENTIAL (PRIMARY) HYPERTENSION: ICD-10-CM

## 2023-10-12 DIAGNOSIS — N40.1 BENIGN PROSTATIC HYPERPLASIA WITH URINARY FREQUENCY: ICD-10-CM

## 2023-10-12 DIAGNOSIS — Z23 NEEDS FLU SHOT: ICD-10-CM

## 2023-10-12 DIAGNOSIS — R05.3 CHRONIC COUGH: ICD-10-CM

## 2023-10-12 DIAGNOSIS — R35.0 BENIGN PROSTATIC HYPERPLASIA WITH URINARY FREQUENCY: ICD-10-CM

## 2023-10-12 DIAGNOSIS — I25.118 CORONARY ARTERY DISEASE OF NATIVE ARTERY OF NATIVE HEART WITH STABLE ANGINA PECTORIS (HCC): ICD-10-CM

## 2023-10-12 PROBLEM — C7A.8 OTHER MALIGNANT NEUROENDOCRINE TUMORS (HCC): Status: RESOLVED | Noted: 2022-05-31 | Resolved: 2023-10-12

## 2023-10-12 PROBLEM — H25.12 AGE-RELATED NUCLEAR CATARACT OF LEFT EYE: Status: ACTIVE | Noted: 2023-10-12

## 2023-10-12 PROCEDURE — 3078F DIAST BP <80 MM HG: CPT | Performed by: FAMILY MEDICINE

## 2023-10-12 PROCEDURE — G8484 FLU IMMUNIZE NO ADMIN: HCPCS | Performed by: FAMILY MEDICINE

## 2023-10-12 PROCEDURE — G8419 CALC BMI OUT NRM PARAM NOF/U: HCPCS | Performed by: FAMILY MEDICINE

## 2023-10-12 PROCEDURE — 1036F TOBACCO NON-USER: CPT | Performed by: FAMILY MEDICINE

## 2023-10-12 PROCEDURE — 3023F SPIROM DOC REV: CPT | Performed by: FAMILY MEDICINE

## 2023-10-12 PROCEDURE — G8427 DOCREV CUR MEDS BY ELIG CLIN: HCPCS | Performed by: FAMILY MEDICINE

## 2023-10-12 PROCEDURE — 3044F HG A1C LEVEL LT 7.0%: CPT | Performed by: FAMILY MEDICINE

## 2023-10-12 PROCEDURE — 99214 OFFICE O/P EST MOD 30 MIN: CPT | Performed by: FAMILY MEDICINE

## 2023-10-12 PROCEDURE — PBSHW PBB SHADOW CHARGE: Performed by: FAMILY MEDICINE

## 2023-10-12 PROCEDURE — 1123F ACP DISCUSS/DSCN MKR DOCD: CPT | Performed by: FAMILY MEDICINE

## 2023-10-12 PROCEDURE — 90694 VACC AIIV4 NO PRSRV 0.5ML IM: CPT | Performed by: FAMILY MEDICINE

## 2023-10-12 PROCEDURE — 3074F SYST BP LT 130 MM HG: CPT | Performed by: FAMILY MEDICINE

## 2023-10-12 PROCEDURE — PBSHW INFLUENZA, FLUAD, (AGE 65 Y+), IM, PF, 0.5 ML: Performed by: FAMILY MEDICINE

## 2023-10-12 SDOH — ECONOMIC STABILITY: FOOD INSECURITY: WITHIN THE PAST 12 MONTHS, YOU WORRIED THAT YOUR FOOD WOULD RUN OUT BEFORE YOU GOT MONEY TO BUY MORE.: NEVER TRUE

## 2023-10-12 SDOH — ECONOMIC STABILITY: INCOME INSECURITY: HOW HARD IS IT FOR YOU TO PAY FOR THE VERY BASICS LIKE FOOD, HOUSING, MEDICAL CARE, AND HEATING?: NOT HARD AT ALL

## 2023-10-12 SDOH — ECONOMIC STABILITY: FOOD INSECURITY: WITHIN THE PAST 12 MONTHS, THE FOOD YOU BOUGHT JUST DIDN'T LAST AND YOU DIDN'T HAVE MONEY TO GET MORE.: NEVER TRUE

## 2023-10-12 SDOH — ECONOMIC STABILITY: HOUSING INSECURITY
IN THE LAST 12 MONTHS, WAS THERE A TIME WHEN YOU DID NOT HAVE A STEADY PLACE TO SLEEP OR SLEPT IN A SHELTER (INCLUDING NOW)?: NO

## 2023-10-12 ASSESSMENT — PATIENT HEALTH QUESTIONNAIRE - PHQ9
SUM OF ALL RESPONSES TO PHQ9 QUESTIONS 1 & 2: 2
1. LITTLE INTEREST OR PLEASURE IN DOING THINGS: 1
SUM OF ALL RESPONSES TO PHQ QUESTIONS 1-9: 2
SUM OF ALL RESPONSES TO PHQ QUESTIONS 1-9: 2
2. FEELING DOWN, DEPRESSED OR HOPELESS: 1
SUM OF ALL RESPONSES TO PHQ QUESTIONS 1-9: 2
SUM OF ALL RESPONSES TO PHQ QUESTIONS 1-9: 2

## 2023-10-12 ASSESSMENT — ENCOUNTER SYMPTOMS
DIARRHEA: 0
SORE THROAT: 0
ABDOMINAL PAIN: 0
CONSTIPATION: 0
BACK PAIN: 0
COUGH: 1
NAUSEA: 0
WHEEZING: 0
SHORTNESS OF BREATH: 0

## 2023-10-15 LAB
ALBUMIN SERPL-MCNC: 4 G/DL (ref 3.8–4.8)
ALBUMIN/GLOB SERPL: 1.5 {RATIO} (ref 1.2–2.2)
ALP SERPL-CCNC: 76 IU/L (ref 44–121)
ALT SERPL-CCNC: 11 IU/L (ref 0–44)
AST SERPL-CCNC: 15 IU/L (ref 0–40)
BASOPHILS # BLD AUTO: 0 X10E3/UL (ref 0–0.2)
BASOPHILS NFR BLD AUTO: 0 %
BILIRUB SERPL-MCNC: 0.4 MG/DL (ref 0–1.2)
BUN SERPL-MCNC: 13 MG/DL (ref 8–27)
BUN/CREAT SERPL: 13 (ref 10–24)
CALCIUM SERPL-MCNC: 9.2 MG/DL (ref 8.6–10.2)
CHLORIDE SERPL-SCNC: 105 MMOL/L (ref 96–106)
CHOLEST SERPL-MCNC: 120 MG/DL (ref 100–199)
CO2 SERPL-SCNC: 25 MMOL/L (ref 20–29)
CREAT SERPL-MCNC: 0.98 MG/DL (ref 0.76–1.27)
EGFRCR SERPLBLD CKD-EPI 2021: 79 ML/MIN/1.73
EOSINOPHIL # BLD AUTO: 0.2 X10E3/UL (ref 0–0.4)
EOSINOPHIL NFR BLD AUTO: 4 %
ERYTHROCYTE [DISTWIDTH] IN BLOOD BY AUTOMATED COUNT: 12.5 % (ref 11.6–15.4)
GLOBULIN SER CALC-MCNC: 2.7 G/DL (ref 1.5–4.5)
GLUCOSE SERPL-MCNC: 108 MG/DL (ref 70–99)
HCT VFR BLD AUTO: 41 % (ref 37.5–51)
HDLC SERPL-MCNC: 51 MG/DL
HGB BLD-MCNC: 13.5 G/DL (ref 13–17.7)
IMM GRANULOCYTES # BLD AUTO: 0 X10E3/UL (ref 0–0.1)
IMM GRANULOCYTES NFR BLD AUTO: 0 %
IMP & REVIEW OF LAB RESULTS: NORMAL
LDLC SERPL CALC-MCNC: 54 MG/DL (ref 0–99)
LYMPHOCYTES # BLD AUTO: 1.5 X10E3/UL (ref 0.7–3.1)
LYMPHOCYTES NFR BLD AUTO: 24 %
MCH RBC QN AUTO: 31 PG (ref 26.6–33)
MCHC RBC AUTO-ENTMCNC: 32.9 G/DL (ref 31.5–35.7)
MCV RBC AUTO: 94 FL (ref 79–97)
MONOCYTES # BLD AUTO: 0.8 X10E3/UL (ref 0.1–0.9)
MONOCYTES NFR BLD AUTO: 13 %
NEUTROPHILS # BLD AUTO: 3.6 X10E3/UL (ref 1.4–7)
NEUTROPHILS NFR BLD AUTO: 59 %
PLATELET # BLD AUTO: 272 X10E3/UL (ref 150–450)
POTASSIUM SERPL-SCNC: 4.4 MMOL/L (ref 3.5–5.2)
PROT SERPL-MCNC: 6.7 G/DL (ref 6–8.5)
RBC # BLD AUTO: 4.35 X10E6/UL (ref 4.14–5.8)
SODIUM SERPL-SCNC: 141 MMOL/L (ref 134–144)
TRIGL SERPL-MCNC: 75 MG/DL (ref 0–149)
VLDLC SERPL CALC-MCNC: 15 MG/DL (ref 5–40)
WBC # BLD AUTO: 6.1 X10E3/UL (ref 3.4–10.8)

## 2023-10-18 LAB
EST. AVERAGE GLUCOSE BLD GHB EST-MCNC: 140 MG/DL
HBA1C MFR BLD: 6.5 %HB

## 2023-11-09 ENCOUNTER — TELEPHONE (OUTPATIENT)
Age: 78
End: 2023-11-09

## 2023-11-09 ENCOUNTER — OFFICE VISIT (OUTPATIENT)
Age: 78
End: 2023-11-09
Payer: MEDICARE

## 2023-11-09 VITALS
DIASTOLIC BLOOD PRESSURE: 73 MMHG | HEIGHT: 63 IN | HEART RATE: 81 BPM | WEIGHT: 141 LBS | SYSTOLIC BLOOD PRESSURE: 112 MMHG | OXYGEN SATURATION: 98 % | TEMPERATURE: 98 F | RESPIRATION RATE: 16 BRPM | BODY MASS INDEX: 24.98 KG/M2

## 2023-11-09 DIAGNOSIS — K57.92 DIVERTICULITIS: ICD-10-CM

## 2023-11-09 DIAGNOSIS — D3A.8 PRIMARY PANCREATIC NEUROENDOCRINE TUMOR: Primary | ICD-10-CM

## 2023-11-09 DIAGNOSIS — R14.0 ABDOMINAL BLOATING: ICD-10-CM

## 2023-11-09 DIAGNOSIS — R14.0 ABDOMINAL DISTENSION: ICD-10-CM

## 2023-11-09 PROCEDURE — 1123F ACP DISCUSS/DSCN MKR DOCD: CPT | Performed by: FAMILY MEDICINE

## 2023-11-09 PROCEDURE — G8420 CALC BMI NORM PARAMETERS: HCPCS | Performed by: FAMILY MEDICINE

## 2023-11-09 PROCEDURE — 3078F DIAST BP <80 MM HG: CPT | Performed by: FAMILY MEDICINE

## 2023-11-09 PROCEDURE — 3074F SYST BP LT 130 MM HG: CPT | Performed by: FAMILY MEDICINE

## 2023-11-09 PROCEDURE — G8484 FLU IMMUNIZE NO ADMIN: HCPCS | Performed by: FAMILY MEDICINE

## 2023-11-09 PROCEDURE — 99214 OFFICE O/P EST MOD 30 MIN: CPT | Performed by: FAMILY MEDICINE

## 2023-11-09 PROCEDURE — G8427 DOCREV CUR MEDS BY ELIG CLIN: HCPCS | Performed by: FAMILY MEDICINE

## 2023-11-09 PROCEDURE — 1036F TOBACCO NON-USER: CPT | Performed by: FAMILY MEDICINE

## 2023-11-09 RX ORDER — CIPROFLOXACIN 500 MG/1
500 TABLET, FILM COATED ORAL 2 TIMES DAILY
Qty: 14 TABLET | Refills: 0 | Status: SHIPPED | OUTPATIENT
Start: 2023-11-09 | End: 2023-11-16

## 2023-11-09 RX ORDER — SACCHAROMYCES BOULARDII 250 MG
250 CAPSULE ORAL 2 TIMES DAILY
Qty: 14 CAPSULE | Refills: 0 | Status: SHIPPED | OUTPATIENT
Start: 2023-11-09 | End: 2023-11-16

## 2023-11-09 RX ORDER — FERROUS SULFATE 325(65) MG
1 TABLET ORAL
Qty: 90 TABLET | Refills: 1 | Status: SHIPPED | OUTPATIENT
Start: 2023-11-09

## 2023-11-09 ASSESSMENT — ENCOUNTER SYMPTOMS
WHEEZING: 0
SORE THROAT: 0
BACK PAIN: 0
NAUSEA: 0
ABDOMINAL DISTENTION: 1
ABDOMINAL PAIN: 1
CONSTIPATION: 1
DIARRHEA: 1
COUGH: 0
SHORTNESS OF BREATH: 0

## 2023-11-09 NOTE — PROGRESS NOTES
Date:11/9/2023        Patient Name:Sherman Marcial     YOB: 1945     Age:78 y.o. Seen today for   Chief Complaint   Patient presents with    Abdominal Pain     X 5 days    Bloated     Pt took gas x     Abdominal Pain  Patient complains of abdominal pain. The pain is described as colicky and cramping, and is 6/10 in intensity. The patient is experiencing generalized pain without radiation. Onset was 1 week ago. Symptoms have been unchanged. Aggravating factors: none. Alleviating factors: none. Associated symptoms: Bloating, alternating constipation and diarrhea. The patient denies anorexia, belching, chills, dysuria, fever, frequency, hematochezia, hematuria, melena, myalgias, nausea, and vomiting. He has history of pancreatic neuroendocrine tumor and he is far due for his follow-up with GI specialist as well as follow-up CT scan. He has lost his wife and he still grieving for her, not eating properly. He has noticed that his abdomen has been bloated. He is taking Gas-X with minimal improvement. Also has noticed constipation followed by watery diarrhea for last few days. He already has upcoming appointment with GI specialist in few weeks. He has lost 2 pounds in 1 month. Patient had a colonoscopy in February 2020. Very reassuring. Single sigmoid polyp was removed and pathology report was hyperplastic polyp. Review of Systems   Review of Systems   Constitutional:  Negative for chills, fatigue and fever. HENT:  Negative for congestion, ear pain and sore throat. Eyes:  Negative for visual disturbance. Respiratory:  Negative for cough, shortness of breath and wheezing. Cardiovascular:  Negative for chest pain and leg swelling. Gastrointestinal:  Positive for abdominal distention, abdominal pain, constipation and diarrhea. Negative for nausea. Genitourinary:  Negative for dysuria. Musculoskeletal:  Negative for back pain and neck pain. Skin:  Negative for rash.

## 2023-11-09 NOTE — TELEPHONE ENCOUNTER
Pt called said  his stomach has been swollen and painful for past 2 to 3 days. He has an appt with the gastroenterologist in Feb but he wants to see Dr. Ronak Lou. The next available appt is 12/1/23.     [facility]# [phone]

## 2023-11-10 ENCOUNTER — TELEPHONE (OUTPATIENT)
Age: 78
End: 2023-11-10

## 2023-11-10 ENCOUNTER — HOSPITAL ENCOUNTER (OUTPATIENT)
Facility: HOSPITAL | Age: 78
End: 2023-11-10
Attending: FAMILY MEDICINE
Payer: MEDICARE

## 2023-11-10 DIAGNOSIS — D3A.8 PRIMARY PANCREATIC NEUROENDOCRINE TUMOR: ICD-10-CM

## 2023-11-10 DIAGNOSIS — K57.92 DIVERTICULITIS: ICD-10-CM

## 2023-11-10 DIAGNOSIS — R14.0 ABDOMINAL BLOATING: ICD-10-CM

## 2023-11-10 PROCEDURE — 6360000004 HC RX CONTRAST MEDICATION: Performed by: FAMILY MEDICINE

## 2023-11-10 PROCEDURE — 74178 CT ABD&PLV WO CNTR FLWD CNTR: CPT

## 2023-11-10 RX ORDER — POLYETHYLENE GLYCOL 3350 17 G/17G
17 POWDER, FOR SOLUTION ORAL 2 TIMES DAILY
Qty: 510 G | Refills: 1 | Status: SHIPPED | OUTPATIENT
Start: 2023-11-10 | End: 2023-12-10

## 2023-11-10 RX ADMIN — IOPAMIDOL 100 ML: 755 INJECTION, SOLUTION INTRAVENOUS at 16:30

## 2023-11-10 NOTE — TELEPHONE ENCOUNTER
Called the patient. Name and  verified. Informed pt that central scheduling should be able to do the PA. PA is not done in the office. Provided pt number for  [phone]

## 2023-11-10 NOTE — TELEPHONE ENCOUNTER
Pt called said he tried to schedule the CT abdomen but they said he needs a PA. Pt requested callback after PA has been submitted.     [facility]#[phone]

## 2023-11-12 NOTE — RESULT ENCOUNTER NOTE
Please let patient know that CT abdomen and pelvis is negative for any acute findings including diverticulitis or any kind of intestinal obstruction. It does show previous pancreatic lesion that is consistent with pancreatic neuroendocrine tumor with no changes from previous CT scan. I have sent prescription for MiraLAX that he needs to take twice daily for 1 week and then daily as x-ray showed significant amount of stool in colon.   Thanks

## 2024-01-02 ENCOUNTER — HOSPITAL ENCOUNTER (OUTPATIENT)
Facility: HOSPITAL | Age: 79
Discharge: HOME | End: 2024-01-05
Attending: OTOLARYNGOLOGY
Payer: MEDICARE

## 2024-01-02 DIAGNOSIS — K21.9 CHALASIA OF LOWER ESOPHAGEAL SPHINCTER: ICD-10-CM

## 2024-01-02 PROCEDURE — 74220 X-RAY XM ESOPHAGUS 1CNTRST: CPT

## 2024-02-05 ENCOUNTER — TELEPHONE (OUTPATIENT)
Dept: PHARMACY | Facility: CLINIC | Age: 79
End: 2024-02-05

## 2024-02-05 NOTE — TELEPHONE ENCOUNTER
POPULATION [facility] CLINICAL PHARMACY: ADHERENCE REVIEW  Identified care gap per [employer]: fills at [facility]: Diabetes adherence    ASSESSMENT    DIABETES ADHERENCE    Insurance Records claims through  [employer]  (Prior Year PDC = 73% - FAILED; YTD PDC = ):   METFORMIN 500 MG last filled on 23 for 90 day supply. Next refill due: 23    Prescribed si tablet/capsule twice daily    Per Reconcile Dispense History: last filled on 23 for 90 day supply.       Lab Results   Component Value Date    LABA1C 6.5 (H) 10/14/2023    LABA1C 6.2 (H) 2023    LABA1C 6.2 (H) 2023       PLAN    Per insurer report, LIS-0 - co-pays are based on tiers and patient is subject to coverage gap.    The following are interventions that have been identified:   Patient overdue refilling METFORMIN 500 MG and active on home medication list.     Attempting to reach patient to review.  Left message asking for return call. Seevibeshart message sent to patient.    Last Visit: 23  Next Visit: 24    Karen Riley CPhT  Population Health    [facility]   [phone] option 2     For Pharmacy Admin Tracking Only    Program: [facility] [employer]  CPA in place:  No  Gap Closed?: No   Time Spent (min): 15

## 2024-02-06 DIAGNOSIS — I10 ESSENTIAL (PRIMARY) HYPERTENSION: ICD-10-CM

## 2024-02-06 RX ORDER — LOSARTAN POTASSIUM 50 MG/1
50 TABLET ORAL DAILY
Qty: 90 TABLET | Refills: 1 | Status: SHIPPED | OUTPATIENT
Start: 2024-02-06

## 2024-02-06 NOTE — TELEPHONE ENCOUNTER
PCP: Kenya Fuller MD      Future Appointments   Date Time Provider Department Center   3/4/2024  2:20 PM Kenya Fuller MD [facility] AMB       Requested Prescriptions     Pending Prescriptions Disp Refills    losartan (COZAAR) 50 MG tablet [Pharmacy Med Name: LOSARTAN POTASSIUM 50 MG TAB] 90 tablet 1     Sig: TAKE 1 TABLET BY MOUTH EVERY DAY

## 2024-03-04 ENCOUNTER — OFFICE VISIT (OUTPATIENT)
Age: 79
End: 2024-03-04
Payer: MEDICARE

## 2024-03-04 VITALS
SYSTOLIC BLOOD PRESSURE: 126 MMHG | BODY MASS INDEX: 24.8 KG/M2 | HEART RATE: 68 BPM | RESPIRATION RATE: 14 BRPM | HEIGHT: 63 IN | TEMPERATURE: 97.7 F | WEIGHT: 140 LBS | DIASTOLIC BLOOD PRESSURE: 80 MMHG | OXYGEN SATURATION: 98 %

## 2024-03-04 DIAGNOSIS — J44.9 COPD, MODERATE (HCC): ICD-10-CM

## 2024-03-04 DIAGNOSIS — N40.1 BENIGN PROSTATIC HYPERPLASIA WITH URINARY FREQUENCY: ICD-10-CM

## 2024-03-04 DIAGNOSIS — E78.5 HYPERLIPIDEMIA LDL GOAL <100: ICD-10-CM

## 2024-03-04 DIAGNOSIS — R35.0 BENIGN PROSTATIC HYPERPLASIA WITH URINARY FREQUENCY: ICD-10-CM

## 2024-03-04 DIAGNOSIS — Z23 NEED FOR PROPHYLACTIC VACCINATION AND INOCULATION AGAINST VARICELLA: ICD-10-CM

## 2024-03-04 DIAGNOSIS — I10 ESSENTIAL HYPERTENSION WITH GOAL BLOOD PRESSURE LESS THAN 130/85: ICD-10-CM

## 2024-03-04 DIAGNOSIS — I25.118 CORONARY ARTERY DISEASE OF NATIVE ARTERY OF NATIVE HEART WITH STABLE ANGINA PECTORIS (HCC): ICD-10-CM

## 2024-03-04 DIAGNOSIS — K59.03 DRUG-INDUCED CONSTIPATION: ICD-10-CM

## 2024-03-04 DIAGNOSIS — D3A.8 PRIMARY PANCREATIC NEUROENDOCRINE TUMOR: ICD-10-CM

## 2024-03-04 DIAGNOSIS — K22.2 ESOPHAGEAL STRICTURE: ICD-10-CM

## 2024-03-04 DIAGNOSIS — Z23 NEED FOR PROPHYLACTIC VACCINATION AGAINST DIPHTHERIA-TETANUS-PERTUSSIS (DTP): ICD-10-CM

## 2024-03-04 DIAGNOSIS — Z71.89 ACP (ADVANCE CARE PLANNING): ICD-10-CM

## 2024-03-04 DIAGNOSIS — Z00.00 MEDICARE ANNUAL WELLNESS VISIT, SUBSEQUENT: Primary | ICD-10-CM

## 2024-03-04 DIAGNOSIS — E11.9 CONTROLLED TYPE 2 DIABETES MELLITUS WITHOUT COMPLICATION, WITHOUT LONG-TERM CURRENT USE OF INSULIN (HCC): ICD-10-CM

## 2024-03-04 PROCEDURE — 99497 ADVNCD CARE PLAN 30 MIN: CPT | Performed by: FAMILY MEDICINE

## 2024-03-04 PROCEDURE — 3074F SYST BP LT 130 MM HG: CPT | Performed by: FAMILY MEDICINE

## 2024-03-04 PROCEDURE — 1123F ACP DISCUSS/DSCN MKR DOCD: CPT | Performed by: FAMILY MEDICINE

## 2024-03-04 PROCEDURE — 99214 OFFICE O/P EST MOD 30 MIN: CPT | Performed by: FAMILY MEDICINE

## 2024-03-04 PROCEDURE — 3023F SPIROM DOC REV: CPT | Performed by: FAMILY MEDICINE

## 2024-03-04 PROCEDURE — G8420 CALC BMI NORM PARAMETERS: HCPCS | Performed by: FAMILY MEDICINE

## 2024-03-04 PROCEDURE — G0446 INTENS BEHAVE THER CARDIO DX: HCPCS | Performed by: FAMILY MEDICINE

## 2024-03-04 PROCEDURE — G8484 FLU IMMUNIZE NO ADMIN: HCPCS | Performed by: FAMILY MEDICINE

## 2024-03-04 PROCEDURE — G0439 PPPS, SUBSEQ VISIT: HCPCS | Performed by: FAMILY MEDICINE

## 2024-03-04 PROCEDURE — 1036F TOBACCO NON-USER: CPT | Performed by: FAMILY MEDICINE

## 2024-03-04 PROCEDURE — 3079F DIAST BP 80-89 MM HG: CPT | Performed by: FAMILY MEDICINE

## 2024-03-04 PROCEDURE — G8427 DOCREV CUR MEDS BY ELIG CLIN: HCPCS | Performed by: FAMILY MEDICINE

## 2024-03-04 RX ORDER — FOLIC ACID 1 MG/1
1000 TABLET ORAL DAILY
Qty: 90 TABLET | Refills: 1 | Status: SHIPPED | OUTPATIENT
Start: 2024-03-04

## 2024-03-04 RX ORDER — FLUTICASONE PROPIONATE 44 UG/1
2 AEROSOL, METERED RESPIRATORY (INHALATION) 2 TIMES DAILY
Qty: 3 EACH | Refills: 0 | Status: SHIPPED | OUTPATIENT
Start: 2024-03-04

## 2024-03-04 RX ORDER — POLYETHYLENE GLYCOL 3350 17 G/17G
17 POWDER, FOR SOLUTION ORAL DAILY
Qty: 510 G | Refills: 0 | Status: SHIPPED | OUTPATIENT
Start: 2024-03-04 | End: 2024-04-03

## 2024-03-04 ASSESSMENT — ENCOUNTER SYMPTOMS
NAUSEA: 0
COUGH: 1
SHORTNESS OF BREATH: 1
WHEEZING: 0
SORE THROAT: 0
CONSTIPATION: 0
DIARRHEA: 0
BACK PAIN: 0
ABDOMINAL PAIN: 0

## 2024-03-04 ASSESSMENT — PATIENT HEALTH QUESTIONNAIRE - PHQ9
SUM OF ALL RESPONSES TO PHQ QUESTIONS 1-9: 1
SUM OF ALL RESPONSES TO PHQ QUESTIONS 1-9: 1
2. FEELING DOWN, DEPRESSED OR HOPELESS: 1
SUM OF ALL RESPONSES TO PHQ QUESTIONS 1-9: 1
SUM OF ALL RESPONSES TO PHQ QUESTIONS 1-9: 1
SUM OF ALL RESPONSES TO PHQ9 QUESTIONS 1 & 2: 1
1. LITTLE INTEREST OR PLEASURE IN DOING THINGS: 0

## 2024-03-04 ASSESSMENT — LIFESTYLE VARIABLES
HOW OFTEN DO YOU HAVE A DRINK CONTAINING ALCOHOL: NEVER
HOW MANY STANDARD DRINKS CONTAINING ALCOHOL DO YOU HAVE ON A TYPICAL DAY: PATIENT DOES NOT DRINK

## 2024-03-04 NOTE — PATIENT INSTRUCTIONS
disorders.  A preventive dental visit is recommended every 6 months.  Try to get at least 150 minutes of exercise per week or 10,000 steps per day on a pedometer .  Order or download the FREE \"Exercise & Physical Activity: Your Everyday Guide\" from [employer]. Call 1-747.852.5345 or search [employer] [place] [employer] online.  You need 0540-0048 mg of calcium and 2346-9482 IU of vitamin D per day. It is possible to meet your calcium requirement with diet alone, but a vitamin D supplement is usually necessary to meet this goal.  When exposed to the sun, use a sunscreen that protects against both UVA and UVB radiation with an SPF of 30 or greater. Reapply every 2 to 3 hours or after sweating, drying off with a towel, or swimming.  Always wear a seat belt when traveling in a car. Always wear a helmet when riding a bicycle or motorcycle.

## 2024-03-04 NOTE — PROGRESS NOTES
Advance Care Planning     Advance Care Planning (ACP) Physician/NP/PA Conversation    Date of Conversation: 3/4/2024  Conducted with: Patient with Decision Making Capacity    Healthcare Decision Maker:      Primary Decision Maker: Gio Diana - Child - [phone]    Primary Decision Maker: Luis Diana - Child - [phone]    Secondary Decision Maker: Palmira Diana - Daughter-in-Law - [phone]    Click here to complete Healthcare Decision Makers including selection of the Healthcare Decision Maker Relationship (ie \"Primary\")  Today we documented Decision Maker(s) consistent with Legal Next of Kin hierarchy.    Care Preferences:    Hospitalization:  \"If your health worsens and it becomes clear that your chance of recovery is unlikely, what would be your preference regarding hospitalization?\"  The patient would prefer hospitalization.    Ventilation:  \"If you were unable to breath on your own and your chance of recovery was unlikely, what would be your preference about the use of a ventilator (breathing machine) if it was available to you?\"  The patient would desire the use of a ventilator.    Resuscitation:  \"In the event your heart stopped as a result of an underlying serious health condition, would you want attempts made to restart your heart, or would you prefer a natural death?\"  Yes, attempt to resuscitate.    treatment goals, benefit/burden of treatment options, artificial nutrition, ventilation preferences, hospitalization preferences, resuscitation preferences, end of life care preferences (vegetative state/imminent death), and hospice care    Conversation Outcomes / Follow-Up Plan:  ACP in process - information provided, considering goals and options  Patient has completed his living will.  Requested copy for office.  Documented his preference  Reviewed DNR/DNI and patient elects Full Code (Attempt Resuscitation)    Length of Voluntary ACP Conversation in minutes:  16 minutes    Kenya Fuller 
Chief Complaint   Patient presents with    Medicare AWV    Follow-up Chronic Condition     Follow up     \"Have you been to the ER, urgent care clinic since your last visit?  Hospitalized since your last visit?\"    NO    “Have you seen or consulted any other health care providers outside of [facility] since your last visit?”    NO                   3/4/2024     2:39 PM   PHQ-9    Little interest or pleasure in doing things 0   Feeling down, depressed, or hopeless 1   PHQ-2 Score 1   PHQ-9 Total Score 1           Financial Resource Strain: Low Risk  (10/12/2023)    Overall Financial Resource Strain (CARDIA)     Difficulty of Paying Living Expenses: Not hard at all      Food Insecurity: Not on file (10/12/2023)          Health Maintenance Due   Topic Date Due    DTaP/Tdap/Td vaccine (1 - Tdap) Never done    Shingles vaccine (1 of 2) Never done    Respiratory Syncytial Virus (RSV) Pregnant or age 60 yrs+ (1 - 1-dose 60+ series) Never done    Diabetic retinal exam  10/11/2018    COVID-19 Vaccine (4 - 2023-24 season) 09/01/2023    Annual Wellness Visit (Medicare Advantage)  Never done        
 MCG/ACT AERS inhaler Inhale 1 puff into the lungs every 4 hours as needed for Wheezing Yes Kenya Fuller MD   Tdap (ADACEL) 5-2-15.5 LF-MCG/0.5 injection Inject 0.5 mLs into the muscle once for 1 dose Yes Kenya Fuller MD   zoster recombinant adjuvanted vaccine (SHINGRIX) 50 MCG/0.5ML SUSR injection Inject 0.5 mLs into the muscle once for 1 dose Yes Kenya Fuller MD   polyethylene glycol (GLYCOLAX) 17 GM/SCOOP powder Take 17 g by mouth daily Yes Kenya Fuller MD   losartan (COZAAR) 50 MG tablet TAKE 1 TABLET BY MOUTH EVERY DAY Yes Kenya Fuller MD   rosuvastatin (CRESTOR) 10 MG tablet TAKE 1 TABLET BY MOUTH EVERY DAY Yes Kenya Fuller MD   carvedilol (COREG) 6.25 MG tablet Take 1 tablet by mouth 2 times daily (with meals) Yes Kenya Fuller MD   omeprazole (PRILOSEC) 40 MG delayed release capsule Take 1 capsule by mouth daily Yes Kenya Fuller MD   ferrous sulfate (IRON 325) 325 (65 Fe) MG tablet TAKE 1 TABLET BY MOUTH EVERY DAY BEFORE BREAKFAST Yes Kenya Fuller MD   metFORMIN (GLUCOPHAGE) 500 MG tablet TAKE 1 TABLET BY MOUTH TWICE A DAY WITH MEALS  Patient taking differently: Take 1 tablet by mouth daily (with breakfast) Yes Kenya Fuller MD   albuterol (PROVENTIL) (2.5 MG/3ML) 0.083% nebulizer solution Inhale 3 mLs into the lungs every 4 hours as needed Yes Automatic Reconciliation, Ar   aspirin 81 MG EC tablet ceived the following from [facility]: Outside name: aspirin delayed-release 81 mg tablet Yes Automatic Reconciliation, Ar   azelastine HCl 0.15 % SOLN 1 spray by Nasal route 2 times daily Yes Automatic Reconciliation, Ar   Cobalamin Combinations (B-12) 100-5000 MCG SUBL ceived the following from [facility]: Outside name: VITAMIN B-12 5,000 mcg subl Yes Automatic Reconciliation, Ar   diclofenac sodium (VOLTAREN) 1 % GEL Apply 4 g topically 4 times daily Yes Automatic Reconciliation, Ar   fluticasone (FLONASE) 50 MCG/ACT nasal 
unanticipated grammatical, syntax, homophones, and other interpretive errors are inadvertently transcribed by the computer software.  Please disregard these errors.  Please excuse any errors that have escaped final proofreading.  Thank you.     Return in about 4 months (around 7/4/2024).     Electronically signed by Kenya Fuller MD on 3/4/24 at 5:58 PM EST

## 2024-03-06 DIAGNOSIS — I10 ESSENTIAL HYPERTENSION WITH GOAL BLOOD PRESSURE LESS THAN 130/85: ICD-10-CM

## 2024-03-06 DIAGNOSIS — N40.1 BENIGN PROSTATIC HYPERPLASIA WITH URINARY FREQUENCY: ICD-10-CM

## 2024-03-06 DIAGNOSIS — I25.118 CORONARY ARTERY DISEASE OF NATIVE ARTERY OF NATIVE HEART WITH STABLE ANGINA PECTORIS (HCC): ICD-10-CM

## 2024-03-06 DIAGNOSIS — E78.5 HYPERLIPIDEMIA LDL GOAL <100: ICD-10-CM

## 2024-03-06 DIAGNOSIS — E11.9 CONTROLLED TYPE 2 DIABETES MELLITUS WITHOUT COMPLICATION, WITHOUT LONG-TERM CURRENT USE OF INSULIN (HCC): ICD-10-CM

## 2024-03-06 DIAGNOSIS — R35.0 BENIGN PROSTATIC HYPERPLASIA WITH URINARY FREQUENCY: ICD-10-CM

## 2024-03-06 LAB
ALBUMIN SERPL-MCNC: 3.5 G/DL (ref 3.5–5)
ALBUMIN/GLOB SERPL: 1 (ref 1.1–2.2)
ALP SERPL-CCNC: 84 U/L (ref 45–117)
ALT SERPL-CCNC: 14 U/L (ref 12–78)
ANION GAP SERPL CALC-SCNC: 2 MMOL/L (ref 5–15)
AST SERPL-CCNC: 16 U/L (ref 15–37)
BILIRUB SERPL-MCNC: 0.5 MG/DL (ref 0.2–1)
BUN SERPL-MCNC: 21 MG/DL (ref 6–20)
BUN/CREAT SERPL: 21 (ref 12–20)
CALCIUM SERPL-MCNC: 9.2 MG/DL (ref 8.5–10.1)
CHLORIDE SERPL-SCNC: 105 MMOL/L (ref 97–108)
CHOLEST SERPL-MCNC: 109 MG/DL
CO2 SERPL-SCNC: 29 MMOL/L (ref 21–32)
CREAT SERPL-MCNC: 1 MG/DL (ref 0.7–1.3)
CREAT UR-MCNC: 148 MG/DL
EST. AVERAGE GLUCOSE BLD GHB EST-MCNC: 128 MG/DL
GLOBULIN SER CALC-MCNC: 3.6 G/DL (ref 2–4)
GLUCOSE SERPL-MCNC: 110 MG/DL (ref 65–100)
HBA1C MFR BLD: 6.1 % (ref 4–5.6)
HDLC SERPL-MCNC: 47 MG/DL
HDLC SERPL: 2.3 (ref 0–5)
LDLC SERPL CALC-MCNC: 51.2 MG/DL (ref 0–100)
MICROALBUMIN UR-MCNC: 1.41 MG/DL
MICROALBUMIN/CREAT UR-RTO: 10 MG/G (ref 0–30)
POTASSIUM SERPL-SCNC: 4.4 MMOL/L (ref 3.5–5.1)
PROT SERPL-MCNC: 7.1 G/DL (ref 6.4–8.2)
SODIUM SERPL-SCNC: 136 MMOL/L (ref 136–145)
SPECIMEN HOLD: NORMAL
TRIGL SERPL-MCNC: 54 MG/DL
TSH SERPL DL<=0.05 MIU/L-ACNC: 1.01 UIU/ML (ref 0.36–3.74)
VLDLC SERPL CALC-MCNC: 10.8 MG/DL

## 2024-03-08 LAB
PSA SERPL-MCNC: 2.2 NG/ML (ref 0–4)
REFLEX CRITERIA: NORMAL

## 2024-05-08 ENCOUNTER — ANESTHESIA EVENT (OUTPATIENT)
Facility: HOSPITAL | Age: 79
End: 2024-05-08
Payer: MEDICARE

## 2024-05-08 ENCOUNTER — HOSPITAL ENCOUNTER (OUTPATIENT)
Facility: HOSPITAL | Age: 79
Setting detail: OUTPATIENT SURGERY
Discharge: HOME | End: 2024-05-08
Attending: INTERNAL MEDICINE | Admitting: INTERNAL MEDICINE
Payer: MEDICARE

## 2024-05-08 ENCOUNTER — ANESTHESIA (OUTPATIENT)
Facility: HOSPITAL | Age: 79
End: 2024-05-08
Payer: MEDICARE

## 2024-05-08 VITALS
TEMPERATURE: 98.6 F | WEIGHT: 144 LBS | HEART RATE: 80 BPM | BODY MASS INDEX: 25.52 KG/M2 | DIASTOLIC BLOOD PRESSURE: 71 MMHG | OXYGEN SATURATION: 97 % | HEIGHT: 63 IN | SYSTOLIC BLOOD PRESSURE: 119 MMHG | RESPIRATION RATE: 19 BRPM

## 2024-05-08 PROCEDURE — 2720000010 HC SURG SUPPLY STERILE: Performed by: INTERNAL MEDICINE

## 2024-05-08 PROCEDURE — 3600007512: Performed by: INTERNAL MEDICINE

## 2024-05-08 PROCEDURE — 7100000010 HC PHASE II RECOVERY - FIRST 15 MIN: Performed by: INTERNAL MEDICINE

## 2024-05-08 PROCEDURE — 3700000001 HC ADD 15 MINUTES (ANESTHESIA): Performed by: INTERNAL MEDICINE

## 2024-05-08 PROCEDURE — C1726 CATH, BAL DIL, NON-VASCULAR: HCPCS | Performed by: INTERNAL MEDICINE

## 2024-05-08 PROCEDURE — 7100000011 HC PHASE II RECOVERY - ADDTL 15 MIN: Performed by: INTERNAL MEDICINE

## 2024-05-08 PROCEDURE — 2500000003 HC RX 250 WO HCPCS: Performed by: ANESTHESIOLOGY

## 2024-05-08 PROCEDURE — 2580000003 HC RX 258: Performed by: ANESTHESIOLOGY

## 2024-05-08 PROCEDURE — 3600007502: Performed by: INTERNAL MEDICINE

## 2024-05-08 PROCEDURE — 3700000000 HC ANESTHESIA ATTENDED CARE: Performed by: INTERNAL MEDICINE

## 2024-05-08 PROCEDURE — 2709999900 HC NON-CHARGEABLE SUPPLY: Performed by: INTERNAL MEDICINE

## 2024-05-08 PROCEDURE — 6360000002 HC RX W HCPCS: Performed by: ANESTHESIOLOGY

## 2024-05-08 RX ORDER — SODIUM CHLORIDE 9 MG/ML
INJECTION, SOLUTION INTRAVENOUS CONTINUOUS
Status: DISCONTINUED | OUTPATIENT
Start: 2024-05-08 | End: 2024-05-08 | Stop reason: HOSPADM

## 2024-05-08 RX ORDER — LIDOCAINE HYDROCHLORIDE 20 MG/ML
INJECTION, SOLUTION EPIDURAL; INFILTRATION; INTRACAUDAL; PERINEURAL PRN
Status: DISCONTINUED | OUTPATIENT
Start: 2024-05-08 | End: 2024-05-08 | Stop reason: SDUPTHER

## 2024-05-08 RX ORDER — SODIUM CHLORIDE 0.9 % (FLUSH) 0.9 %
5-40 SYRINGE (ML) INJECTION EVERY 12 HOURS SCHEDULED
Status: DISCONTINUED | OUTPATIENT
Start: 2024-05-08 | End: 2024-05-08 | Stop reason: HOSPADM

## 2024-05-08 RX ORDER — SODIUM CHLORIDE 0.9 % (FLUSH) 0.9 %
5-40 SYRINGE (ML) INJECTION PRN
Status: DISCONTINUED | OUTPATIENT
Start: 2024-05-08 | End: 2024-05-08 | Stop reason: HOSPADM

## 2024-05-08 RX ORDER — SODIUM CHLORIDE 9 MG/ML
INJECTION, SOLUTION INTRAVENOUS CONTINUOUS PRN
Status: DISCONTINUED | OUTPATIENT
Start: 2024-05-08 | End: 2024-05-08 | Stop reason: SDUPTHER

## 2024-05-08 RX ORDER — CHOLECALCIFEROL (VITAMIN D3) 1250 MCG
CAPSULE ORAL
COMMUNITY

## 2024-05-08 RX ORDER — SODIUM CHLORIDE 9 MG/ML
25 INJECTION, SOLUTION INTRAVENOUS PRN
Status: DISCONTINUED | OUTPATIENT
Start: 2024-05-08 | End: 2024-05-08 | Stop reason: HOSPADM

## 2024-05-08 RX ADMIN — PROPOFOL 25 MG: 10 INJECTION, EMULSION INTRAVENOUS at 14:16

## 2024-05-08 RX ADMIN — SODIUM CHLORIDE: 9 INJECTION, SOLUTION INTRAVENOUS at 13:48

## 2024-05-08 RX ADMIN — PROPOFOL 25 MG: 10 INJECTION, EMULSION INTRAVENOUS at 14:19

## 2024-05-08 RX ADMIN — PROPOFOL 50 MG: 10 INJECTION, EMULSION INTRAVENOUS at 14:13

## 2024-05-08 RX ADMIN — PROPOFOL 50 MG: 10 INJECTION, EMULSION INTRAVENOUS at 14:10

## 2024-05-08 RX ADMIN — LIDOCAINE HYDROCHLORIDE 60 MG: 20 INJECTION, SOLUTION EPIDURAL; INFILTRATION; INTRACAUDAL; PERINEURAL at 14:10

## 2024-05-08 ASSESSMENT — PAIN - FUNCTIONAL ASSESSMENT: PAIN_FUNCTIONAL_ASSESSMENT: 0-10

## 2024-05-08 NOTE — H&P
[facility]  [address]  [place] [address] [phone]                                History and Physical     NAME: Sherman Diana   :  1945   MRN:  [id number]     HPI:  The patient was seen and examined.    Past Surgical History:   Procedure Laterality Date    CATARACT REMOVAL      right eye    COLONOSCOPY N/A 2020    COLONOSCOPY/EGD performed by Armando Page MD at [facility] ENDOSCOPY    RI UNLISTED PROCEDURE CARDIAC SURGERY      stents, 3    PTCA          TONSILLECTOMY      VASCULAR SURGERY      angioplasty     Past Medical History:   Diagnosis Date    Acid indigestion     CAD (coronary artery disease)     3 stents, done one month apart in [facility] in ,    Diabetes (HCC)     Heart disease     Hypercholesterolemia     Hypertension      Social History     Tobacco Use    Smoking status: Never    Smokeless tobacco: Never   Vaping Use    Vaping Use: Never used   Substance Use Topics    Alcohol use: No     Alcohol/week: 0.0 standard drinks of alcohol    Drug use: No     No Known Allergies  Family History   Problem Relation Age of Onset    No Known Problems Mother     No Known Problems Father      No current facility-administered medications for this encounter.         PHYSICAL EXAM:  General: WD, WN. Alert, cooperative, no acute distress    HEENT: NC, Atraumatic.  PERRLA, EOMI. Anicteric sclerae.  Lungs:  CTA Bilaterally. No Wheezing/Rhonchi/Rales.  Heart:  Regular  rhythm,  No murmur, No Rubs, No Gallops  Abdomen: Soft, Non distended, Non tender.  +Bowel sounds, no HSM  Extremities: No c/c/e  Neurologic:  CN 2-12 gi, Alert and oriented X 3.  No acute neurological distress   Psych:   Good insight. Not anxious nor agitated.    The heart, lungs and mental status were satisfactory for the administration of MAC sedation and for the procedure.      Mallampati score: 2     The patient was counseled at length about the risks of nabil Covid-19 in the

## 2024-05-08 NOTE — PERIOP NOTE
CRE balloon dilatation of the esophagus   10 mm Balloon inflated to 3 ATMs and held for 10 seconds.  11 mm Balloon inflated to 5 ATMs and held for 10 seconds.  12 mm Balloon inflated to 8 ATMs and held for 60 seconds.    No subcutaneous crepitus of the chest or cervical region was noted post dilatation.     Initial RN admission and assessment performed and documented in Endoscopy navigator.     Patient evaluated by anesthesia in pre-procedure holding.     All procedural vital signs, airway assessment, and level of consciousness information monitored and recorded by anesthesia staff on the anesthesia record.     Report received from CRNA post procedure.  Patient transported to recovery area by RN.    Endoscope was pre-cleaned at bedside immediately following procedure by Antonio.

## 2024-05-08 NOTE — DISCHARGE INSTRUCTIONS
[facility]  [address]  [place] [address]  [phone]                     DISCHARGE INSTRUCTIONS    Sherman SALGUERO Diana  [id number]  1945    DISCOMFORT:  Sore throat- throat lozenges or warm salt water gargle  redness at IV site- apply warm compress to area; if redness or soreness persist- contact your physician  Gaseous discomfort- walking, belching will help relieve any discomfort    DIET  You may eat and drink after you leave.  You may resume your regular diet - however -  remember your colon is empty and a heavy meal will produce gas.   Avoid these foods:  vegetables, fried / greasy foods, carbonated drinks   You may not drink alcoholic beverages for at least 12 hours    ACTIVITY  You may resume your normal daily activities   Spend the remainder of the day resting -  avoid any strenuous activity.  You may not operate a vehicle for 12 hours  You may not engage in an occupation involving machinery or appliances for rest of today  Avoid making any critical decisions for at least 24 hour    CALL M.D.  ANY SIGN OF   Increasing pain, nausea, vomiting  Abdominal distension (swelling)  New increased bleeding (oral or rectal)  Fever (chills)  Pain in chest area  Bloody discharge from nose or mouth  Shortness of breath    Follow-up Instructions:   Call Dr. Ross for any questions or problems.     If we took a biopsy please call the office within 2 weeks to discuss your pathology results. Telephone # [phone]       ENDOSCOPY FINDINGS:   Obstructing Schatzki's ring at GE junction  Hiatal hernia     Post-procedure recommendations:   -Follow symptoms., -GERD diet: avoid fried and fatty foods. peppermint, chocolate, alcohol, coffee, citrus fruits and juices, tomoato products; avoid lying down for 2 to 3 hours after eating., -EGD with dilation in 2-4 weeks      Learning About Coronavirus (COVID-19)  Coronavirus (COVID-19): Overview  What is coronavirus (COVID-19)?  The coronavirus

## 2024-05-08 NOTE — ANESTHESIA PRE PROCEDURE
tablet 3/11/19   Automatic Reconciliation, Ar   azelastine HCl 0.15 % SOLN 1 spray by Nasal route 2 times daily 4/22/21   Automatic Reconciliation, Ar   Cobalamin Combinations (B-12) 100-5000 MCG SUBL ceived the following from [facility]: Outside name: VITAMIN B-12 5,000 mcg subl 3/11/19   Automatic Reconciliation, Ar   diclofenac sodium (VOLTAREN) 1 % GEL Apply 4 g topically 4 times daily 8/15/19   Automatic Reconciliation, Ar   fluticasone (FLONASE) 50 MCG/ACT nasal spray One spray each nostril 11/27/13   Automatic Reconciliation, Ar   meloxicam (MOBIC) 15 MG tablet Take 1 tablet by mouth daily Taking as needed 1/17/23   Automatic Reconciliation, Ar   nitroGLYCERIN (NITROSTAT) 0.4 MG SL tablet TAKE 1 TABLET AND PLACE UNDER THE TONGUE EVERY 5 MINUTES FOR CHEST PAIN FOR UP TO 3 DOSES 12/8/22   Automatic Reconciliation, Ar   tamsulosin (FLOMAX) 0.4 MG capsule Take 1 tablet by mouth daily 3/9/23   Automatic Reconciliation, Ar       Current medications:    Current Facility-Administered Medications   Medication Dose Route Frequency Provider Last Rate Last Admin   • 0.9 % sodium chloride infusion   IntraVENous Continuous Prakash Ross MD       • sodium chloride flush 0.9 % injection 5-40 mL  5-40 mL IntraVENous 2 times per day Prakash Ross MD       • sodium chloride flush 0.9 % injection 5-40 mL  5-40 mL IntraVENous PRN Prakash Ross MD       • 0.9 % sodium chloride infusion  25 mL IntraVENous PRN Prakash Ross MD         Facility-Administered Medications Ordered in Other Encounters   Medication Dose Route Frequency Provider Last Rate Last Admin   • 0.9 % sodium chloride infusion   IntraVENous Continuous PRN Praful Burciaga III, APRN - CRNA   New Bag at 05/08/24 1348       Allergies:  No Known Allergies    Problem List:    Patient Active Problem List   Diagnosis Code   • Primary pancreatic neuroendocrine tumor D3A.8   • Hx of long term use of blood thinners Z92.29   • Coronary artery

## 2024-05-08 NOTE — ANESTHESIA POSTPROCEDURE EVALUATION
Department of Anesthesiology  Postprocedure Note    Patient: Sherman Diana  MRN: [id number]  YOB: 1945  Date of evaluation: 5/8/2024    Procedure Summary       Date: 05/08/24 Room / Location: [facility] ENDO 06 / [facility] ENDOSCOPY    Anesthesia Start: 1349 Anesthesia Stop: 1424    Procedure: ESOPHAGOGASTRODUODENOSCOPY (Upper GI Region) Diagnosis:       Dysphagia, unspecified type      (Dysphagia, unspecified type [R13.10])    Surgeons: Prakash Ross MD Responsible Provider: Vance Wilson MD    Anesthesia Type: MAC ASA Status: 3            Anesthesia Type: MAC    Alejandra Phase I: Alejandra Score: 10    Alejandra Phase II: Alejandra Score: 9    Anesthesia Post Evaluation    Patient location during evaluation: bedside  Nausea & Vomiting: no nausea  Cardiovascular status: blood pressure returned to baseline  Respiratory status: acceptable  Hydration status: euvolemic    No notable events documented.

## 2024-05-13 DIAGNOSIS — E78.5 HYPERLIPIDEMIA, UNSPECIFIED: ICD-10-CM

## 2024-05-13 RX ORDER — ROSUVASTATIN CALCIUM 10 MG/1
10 TABLET, COATED ORAL DAILY
Qty: 90 TABLET | Refills: 3 | Status: SHIPPED | OUTPATIENT
Start: 2024-05-13

## 2024-05-20 ENCOUNTER — OFFICE VISIT (OUTPATIENT)
Age: 79
End: 2024-05-20
Payer: MEDICARE

## 2024-05-20 VITALS
SYSTOLIC BLOOD PRESSURE: 109 MMHG | RESPIRATION RATE: 24 BRPM | HEART RATE: 65 BPM | DIASTOLIC BLOOD PRESSURE: 65 MMHG | TEMPERATURE: 98.4 F | WEIGHT: 146 LBS | HEIGHT: 63 IN | OXYGEN SATURATION: 99 % | BODY MASS INDEX: 25.87 KG/M2

## 2024-05-20 DIAGNOSIS — J44.1 COPD WITH ACUTE EXACERBATION (HCC): Primary | ICD-10-CM

## 2024-05-20 DIAGNOSIS — R35.1 BENIGN PROSTATIC HYPERPLASIA WITH NOCTURIA: ICD-10-CM

## 2024-05-20 DIAGNOSIS — N40.1 BENIGN PROSTATIC HYPERPLASIA WITH NOCTURIA: ICD-10-CM

## 2024-05-20 PROCEDURE — 3078F DIAST BP <80 MM HG: CPT | Performed by: FAMILY MEDICINE

## 2024-05-20 PROCEDURE — 3023F SPIROM DOC REV: CPT | Performed by: FAMILY MEDICINE

## 2024-05-20 PROCEDURE — 99213 OFFICE O/P EST LOW 20 MIN: CPT | Performed by: FAMILY MEDICINE

## 2024-05-20 PROCEDURE — G8419 CALC BMI OUT NRM PARAM NOF/U: HCPCS | Performed by: FAMILY MEDICINE

## 2024-05-20 PROCEDURE — 1123F ACP DISCUSS/DSCN MKR DOCD: CPT | Performed by: FAMILY MEDICINE

## 2024-05-20 PROCEDURE — 3074F SYST BP LT 130 MM HG: CPT | Performed by: FAMILY MEDICINE

## 2024-05-20 PROCEDURE — G8427 DOCREV CUR MEDS BY ELIG CLIN: HCPCS | Performed by: FAMILY MEDICINE

## 2024-05-20 PROCEDURE — 1036F TOBACCO NON-USER: CPT | Performed by: FAMILY MEDICINE

## 2024-05-20 RX ORDER — BENZONATATE 200 MG/1
200 CAPSULE ORAL 3 TIMES DAILY PRN
Qty: 30 CAPSULE | Refills: 0 | Status: SHIPPED | OUTPATIENT
Start: 2024-05-20 | End: 2024-05-30

## 2024-05-20 RX ORDER — PREDNISONE 20 MG/1
40 TABLET ORAL DAILY
Qty: 10 TABLET | Refills: 0 | Status: SHIPPED | OUTPATIENT
Start: 2024-05-20 | End: 2024-05-25

## 2024-05-20 RX ORDER — AMOXICILLIN AND CLAVULANATE POTASSIUM 875; 125 MG/1; MG/1
1 TABLET, FILM COATED ORAL 2 TIMES DAILY
Qty: 14 TABLET | Refills: 0 | Status: SHIPPED | OUTPATIENT
Start: 2024-05-20 | End: 2024-05-27

## 2024-05-20 RX ORDER — TAMSULOSIN HYDROCHLORIDE 0.4 MG/1
0.4 CAPSULE ORAL DAILY
Qty: 90 CAPSULE | Refills: 1 | Status: SHIPPED | OUTPATIENT
Start: 2024-05-20

## 2024-05-20 ASSESSMENT — ENCOUNTER SYMPTOMS
SHORTNESS OF BREATH: 1
ABDOMINAL PAIN: 0
SORE THROAT: 0
NAUSEA: 0
SINUS PAIN: 1
DIARRHEA: 0
SINUS PRESSURE: 1
RHINORRHEA: 1
CONSTIPATION: 0
BACK PAIN: 0
WHEEZING: 0
COUGH: 1

## 2024-05-20 NOTE — PROGRESS NOTES
Date:5/20/2024        Patient Name:Sherman Diana     YOB: 1945     Age:78 y.o.    Seen today for   Chief Complaint   Patient presents with    Shortness of Breath       HPI   COPD  He presents for evaluation and treatment of COPD. Symptoms include constant cough, cough after eating, difficulty breathing, drainage from nose, dyspnea on exertion, frequent throat clearing, and morning cough. Symptoms began a few days ago, gradually worsening since that time.  He denies chest pain, located left chest and unresolving pneumonia.  Associated symptoms include nasal congestion. Weight has been stable.  Appetite has been unchanged. Symptoms are exacerbated by any exercise.  Symptoms are alleviated by nothing.He has treated this current exacerbation with Combivent. The patient has been deviating from this regimen as follows: He is not taking his daily inhaler Flovent as scheduled and recommended . He does not have had any adverse reactions or side effects to medications.    Previous COPD History:  The last exacerbation occurred 4 months ago. Typical exacerbations consist of dyspnea, nasal congestion, and productive cough and usually last 2 weeks.  Effective treatment for exacerbations in the past has included anticholinergics and short-acting inhaled beta-adrenergic agonists.      Review of Systems   Review of Systems   Constitutional:  Negative for chills, fatigue and fever.   HENT:  Positive for postnasal drip, rhinorrhea, sinus pressure and sinus pain. Negative for congestion, ear pain and sore throat.    Eyes:  Negative for visual disturbance.   Respiratory:  Positive for cough and shortness of breath. Negative for wheezing.    Cardiovascular:  Negative for chest pain and leg swelling.   Gastrointestinal:  Negative for abdominal pain, constipation, diarrhea and nausea.   Genitourinary:  Negative for dysuria.   Musculoskeletal:  Negative for back pain and neck pain.   Skin:  Negative for rash.   Neurological:

## 2024-05-20 NOTE — PROGRESS NOTES
Chief Complaint   Patient presents with    Shortness of Breath         \"Have you been to the ER, urgent care clinic since your last visit?  Hospitalized since your last visit?\"    NO    “Have you seen or consulted any other health care providers outside of [facility] since your last visit?”    NO            Click Here for Release of Records Request           3/4/2024     2:39 PM   PHQ-9    Little interest or pleasure in doing things 0   Feeling down, depressed, or hopeless 1   PHQ-2 Score 1   PHQ-9 Total Score 1           Financial Resource Strain: Low Risk  (10/12/2023)    Overall Financial Resource Strain (CARDIA)     Difficulty of Paying Living Expenses: Not hard at all      Food Insecurity: Not on file (10/12/2023)          Health Maintenance Due   Topic Date Due    DTaP/Tdap/Td vaccine (1 - Tdap) Never done    Shingles vaccine (1 of 2) Never done    Respiratory Syncytial Virus (RSV) Pregnant or age 60 yrs+ (1 - 1-dose 60+ series) Never done    Diabetic retinal exam  10/11/2018    COVID-19 Vaccine (4 - 2023-24 season) 09/01/2023    Diabetic foot exam  06/12/2024

## 2024-05-23 DIAGNOSIS — I25.10 ATHEROSCLEROTIC HEART DISEASE OF NATIVE CORONARY ARTERY WITHOUT ANGINA PECTORIS: ICD-10-CM

## 2024-05-23 RX ORDER — CARVEDILOL 6.25 MG/1
6.25 TABLET ORAL 2 TIMES DAILY WITH MEALS
Qty: 180 TABLET | Refills: 1 | OUTPATIENT
Start: 2024-05-23

## 2024-06-03 ENCOUNTER — HOSPITAL ENCOUNTER (OUTPATIENT)
Facility: HOSPITAL | Age: 79
Setting detail: OUTPATIENT SURGERY
Discharge: HOME | End: 2024-06-03
Attending: INTERNAL MEDICINE | Admitting: INTERNAL MEDICINE
Payer: MEDICARE

## 2024-06-03 ENCOUNTER — ANESTHESIA (OUTPATIENT)
Facility: HOSPITAL | Age: 79
End: 2024-06-03
Payer: MEDICARE

## 2024-06-03 ENCOUNTER — ANESTHESIA EVENT (OUTPATIENT)
Facility: HOSPITAL | Age: 79
End: 2024-06-03
Payer: MEDICARE

## 2024-06-03 VITALS
HEART RATE: 77 BPM | OXYGEN SATURATION: 99 % | DIASTOLIC BLOOD PRESSURE: 73 MMHG | WEIGHT: 147 LBS | HEIGHT: 63 IN | SYSTOLIC BLOOD PRESSURE: 126 MMHG | BODY MASS INDEX: 26.05 KG/M2 | RESPIRATION RATE: 20 BRPM | TEMPERATURE: 97.2 F

## 2024-06-03 PROCEDURE — 7100000010 HC PHASE II RECOVERY - FIRST 15 MIN: Performed by: INTERNAL MEDICINE

## 2024-06-03 PROCEDURE — 7100000011 HC PHASE II RECOVERY - ADDTL 15 MIN: Performed by: INTERNAL MEDICINE

## 2024-06-03 PROCEDURE — 2709999900 HC NON-CHARGEABLE SUPPLY: Performed by: INTERNAL MEDICINE

## 2024-06-03 PROCEDURE — 2720000010 HC SURG SUPPLY STERILE: Performed by: INTERNAL MEDICINE

## 2024-06-03 PROCEDURE — 3700000000 HC ANESTHESIA ATTENDED CARE: Performed by: INTERNAL MEDICINE

## 2024-06-03 PROCEDURE — 6360000002 HC RX W HCPCS: Performed by: NURSE ANESTHETIST, CERTIFIED REGISTERED

## 2024-06-03 PROCEDURE — 3600007502: Performed by: INTERNAL MEDICINE

## 2024-06-03 PROCEDURE — 2580000003 HC RX 258: Performed by: INTERNAL MEDICINE

## 2024-06-03 RX ORDER — SODIUM CHLORIDE 0.9 % (FLUSH) 0.9 %
5-40 SYRINGE (ML) INJECTION PRN
Status: DISCONTINUED | OUTPATIENT
Start: 2024-06-03 | End: 2024-06-03 | Stop reason: HOSPADM

## 2024-06-03 RX ORDER — TAMSULOSIN HYDROCHLORIDE 0.4 MG/1
0.4 CAPSULE ORAL DAILY
COMMUNITY

## 2024-06-03 RX ORDER — SODIUM CHLORIDE 9 MG/ML
25 INJECTION, SOLUTION INTRAVENOUS PRN
Status: DISCONTINUED | OUTPATIENT
Start: 2024-06-03 | End: 2024-06-03 | Stop reason: HOSPADM

## 2024-06-03 RX ORDER — SODIUM CHLORIDE 0.9 % (FLUSH) 0.9 %
5-40 SYRINGE (ML) INJECTION EVERY 12 HOURS SCHEDULED
Status: DISCONTINUED | OUTPATIENT
Start: 2024-06-03 | End: 2024-06-03 | Stop reason: HOSPADM

## 2024-06-03 RX ORDER — SODIUM CHLORIDE 9 MG/ML
INJECTION, SOLUTION INTRAVENOUS CONTINUOUS
Status: DISCONTINUED | OUTPATIENT
Start: 2024-06-03 | End: 2024-06-03 | Stop reason: HOSPADM

## 2024-06-03 RX ADMIN — PROPOFOL 50 MG: 10 INJECTION, EMULSION INTRAVENOUS at 16:09

## 2024-06-03 RX ADMIN — PROPOFOL 50 MG: 10 INJECTION, EMULSION INTRAVENOUS at 16:08

## 2024-06-03 RX ADMIN — PROPOFOL 50 MG: 10 INJECTION, EMULSION INTRAVENOUS at 16:07

## 2024-06-03 RX ADMIN — SODIUM CHLORIDE: 9 INJECTION, SOLUTION INTRAVENOUS at 16:02

## 2024-06-03 RX ADMIN — PROPOFOL 50 MG: 10 INJECTION, EMULSION INTRAVENOUS at 16:05

## 2024-06-03 ASSESSMENT — PAIN - FUNCTIONAL ASSESSMENT: PAIN_FUNCTIONAL_ASSESSMENT: NONE - DENIES PAIN

## 2024-06-03 NOTE — OP NOTE
[facility]  [address]  [place] [address]  [phone]                            NAME:  Sherman Diana   :   1945   MRN:   [id number]     Date/Time:  6/3/2024 4:14 PM    Esophagogastroduodenoscopy (EGD) Procedure Note    :  Prakash Ross MD    Staff: Circulator: Jacoby Turpin RN  Endoscopy Technician: Soren Ortega     Implants: none    Referring Provider:  Kenya Fuller MD    Anethesia/Sedation:  MAC anesthesia    Procedure Details     After infom consent was obtained for the procedure, with all risks and benefits of procedure explained the patient was taken to the endoscopy suite and placed in the left lateral decubitus position.  Following sequential administration of sedation as per above, the [id number] gastroscope was inserted into the mouth and advanced under direct vision to second portion of the duodenum.  A careful inspection was made as the gastroscope was withdrawn, including a retroflexed view of the proximal stomach; findings and interventions are described below.      Findings:  Esophagus: Obstructing Schatzki's ring was noted at 37 cm from incisors.  Sliding hiatal hernia was noted with diaphragmatic pinch at 40 cm (Hill grade 3).  Stomach:normal   Duodenum/jejunum:normal      Therapies:  esophageal dilation with 12 mm to 15 mm sized CRE balloon was successful with maximal dilation to 15 mm for 1 minute.  Small mucosal breaks were noted.  No immediate complications    Specimens:  * No specimens in log *           EBL: None    Complications:   None; patient tolerated the procedure well.           Impression:    See Postoperative diagnosis above    Recommendations:  -Follow symptoms., -Clear liquid diet today and advance as tolerated.  -No aspirin/NSAIDs for 1 week  -Continue current medications    Discharge disposition:  Home in the company of  when able to ambulate    Prakash Ross MD

## 2024-06-03 NOTE — ANESTHESIA PRE PROCEDURE
Department of Anesthesiology  Preprocedure Note       Name:  Sherman Diana   Age:  78 y.o.  :  1945                                          MRN:  [id number]         Date:  6/3/2024      Surgeon: Surgeon(s):  Prakash Ross MD    Procedure: Procedure(s):  ESOPHAGOGASTRODUODENOSCOPY    Medications prior to admission:   Prior to Admission medications    Medication Sig Start Date End Date Taking? Authorizing Provider   tamsulosin (FLOMAX) 0.4 MG capsule Take 1 capsule by mouth daily   Yes Provider, MD Shakila   fluticasone-umeclidin-vilant (TRELEGY ELLIPTA) 100-62.5-25 MCG/ACT AEPB inhaler Inhale 1 puff into the lungs daily 24   Kenya Fuller MD   rosuvastatin (CRESTOR) 10 MG tablet TAKE 1 TABLET EVERY DAY 24   Kenya Fuller MD   folic acid (FOLVITE) 1 MG tablet Take 1 tablet by mouth daily 3/4/24   Kenya Fuller MD   fluticasone (FLOVENT HFA) 44 MCG/ACT inhaler Inhale 2 puffs into the lungs 2 times daily 3/4/24   Kenya Fuller MD   albuterol-ipratropium (COMBIVENT RESPIMAT)  MCG/ACT AERS inhaler Inhale 1 puff into the lungs every 4 hours as needed for Wheezing 3/4/24   Kenya Fuller MD   losartan (COZAAR) 50 MG tablet TAKE 1 TABLET BY MOUTH EVERY DAY 24   Kenya Fuller MD   carvedilol (COREG) 6.25 MG tablet Take 1 tablet by mouth 2 times daily (with meals) 23   Kenya Fuller MD   omeprazole (PRILOSEC) 40 MG delayed release capsule Take 1 capsule by mouth daily 23   Kenya Fuller MD   ferrous sulfate (IRON 325) 325 (65 Fe) MG tablet TAKE 1 TABLET BY MOUTH EVERY DAY BEFORE BREAKFAST 23   Kenya Fuller MD   metFORMIN (GLUCOPHAGE) 500 MG tablet TAKE 1 TABLET BY MOUTH TWICE A DAY WITH MEALS  Patient taking differently: Take 1 tablet by mouth daily (with breakfast) 8/13/23   Kenya Fuller MD   albuterol (PROVENTIL) (2.5 MG/3ML) 0.083% nebulizer solution Inhale 3 mLs into the lungs every 4 hours as needed 21   [employer]

## 2024-06-03 NOTE — H&P
[facility]  [address]  [place] [address] [phone]                                History and Physical     NAME: Sherman Diana   :  1945   MRN:  [id number]     HPI:  The patient was seen and examined.    Past Surgical History:   Procedure Laterality Date    CATARACT REMOVAL      right eye    COLONOSCOPY N/A 2020    COLONOSCOPY/EGD performed by Armando Page MD at [facility] ENDOSCOPY    MT UNLISTED PROCEDURE CARDIAC SURGERY      stents, 3    PTCA          TONSILLECTOMY      UPPER GASTROINTESTINAL ENDOSCOPY N/A 2024    ESOPHAGOGASTRODUODENOSCOPY performed by Prakash Ross MD at [facility] ENDOSCOPY    VASCULAR SURGERY      angioplasty     Past Medical History:   Diagnosis Date    CAD (coronary artery disease)     3 stents, done one month apart in [facility] in ,    Diabetes (HCC)     GERD     Hypercholesterolemia     Hypertension      Social History     Tobacco Use    Smoking status: Never    Smokeless tobacco: Never   Vaping Use    Vaping Use: Never used   Substance Use Topics    Alcohol use: No     Alcohol/week: 0.0 standard drinks of alcohol    Drug use: No     No Known Allergies  Family History   Problem Relation Age of Onset    No Known Problems Mother     No Known Problems Father      No current facility-administered medications for this encounter.     Current Outpatient Medications   Medication Sig    fluticasone-umeclidin-vilant (TRELEGY ELLIPTA) 100-62.5-25 MCG/ACT AEPB inhaler Inhale 1 puff into the lungs daily    tamsulosin (FLOMAX) 0.4 MG capsule Take 1 capsule by mouth daily    rosuvastatin (CRESTOR) 10 MG tablet TAKE 1 TABLET EVERY DAY    Cholecalciferol (VITAMIN D3) 1.25 MG (53596 UT) CAPS Take by mouth    folic acid (FOLVITE) 1 MG tablet Take 1 tablet by mouth daily    fluticasone (FLOVENT HFA) 44 MCG/ACT inhaler Inhale 2 puffs into the lungs 2 times daily    albuterol-ipratropium (COMBIVENT RESPIMAT)  MCG/ACT AERS inhaler Inhale 1 puff

## 2024-06-03 NOTE — ANESTHESIA POSTPROCEDURE EVALUATION
Department of Anesthesiology  Postprocedure Note    Patient: Sherman Diana  MRN: [id number]  YOB: 1945  Date of evaluation: 6/3/2024    Procedure Summary       Date: 06/03/24 Room / Location: [facility] ENDO 01 / [facility] ENDOSCOPY    Anesthesia Start: 1602 Anesthesia Stop: 1611    Procedure: ESOPHAGOGASTRODUODENOSCOPY (Upper GI Region) Diagnosis:       Dysphagia, unspecified type      (Dysphagia, unspecified type [R13.10])    Surgeons: Prakash Ross MD Responsible Provider: Yevgeniy Garcia Jr., MD    Anesthesia Type: MAC ASA Status: 3            Anesthesia Type: MAC    Alejandra Phase I: Alejandra Score: 10    Alejandra Phase II:      Anesthesia Post Evaluation    Patient location during evaluation: PACU  Patient participation: complete - patient participated  Level of consciousness: awake  Airway patency: patent  Nausea & Vomiting: no nausea  Cardiovascular status: blood pressure returned to baseline and hemodynamically stable  Respiratory status: acceptable  Hydration status: stable    No notable events documented.

## 2024-06-19 ENCOUNTER — OFFICE VISIT (OUTPATIENT)
Age: 79
End: 2024-06-19
Payer: MEDICARE

## 2024-06-19 VITALS
HEART RATE: 73 BPM | BODY MASS INDEX: 26.22 KG/M2 | WEIGHT: 148 LBS | TEMPERATURE: 98.2 F | HEIGHT: 63 IN | DIASTOLIC BLOOD PRESSURE: 70 MMHG | RESPIRATION RATE: 16 BRPM | OXYGEN SATURATION: 98 % | SYSTOLIC BLOOD PRESSURE: 110 MMHG

## 2024-06-19 DIAGNOSIS — Z92.29 HX OF LONG TERM USE OF BLOOD THINNERS: ICD-10-CM

## 2024-06-19 DIAGNOSIS — I25.118 CORONARY ARTERY DISEASE OF NATIVE ARTERY OF NATIVE HEART WITH STABLE ANGINA PECTORIS (HCC): ICD-10-CM

## 2024-06-19 DIAGNOSIS — E11.9 CONTROLLED TYPE 2 DIABETES MELLITUS WITHOUT COMPLICATION, WITHOUT LONG-TERM CURRENT USE OF INSULIN (HCC): Primary | ICD-10-CM

## 2024-06-19 DIAGNOSIS — I10 ESSENTIAL HYPERTENSION WITH GOAL BLOOD PRESSURE LESS THAN 130/85: ICD-10-CM

## 2024-06-19 DIAGNOSIS — J44.9 COPD, MODERATE (HCC): ICD-10-CM

## 2024-06-19 DIAGNOSIS — E78.5 HYPERLIPIDEMIA LDL GOAL <100: ICD-10-CM

## 2024-06-19 PROCEDURE — 99214 OFFICE O/P EST MOD 30 MIN: CPT | Performed by: FAMILY MEDICINE

## 2024-06-19 PROCEDURE — 1036F TOBACCO NON-USER: CPT | Performed by: FAMILY MEDICINE

## 2024-06-19 PROCEDURE — G2211 COMPLEX E/M VISIT ADD ON: HCPCS | Performed by: FAMILY MEDICINE

## 2024-06-19 PROCEDURE — 1123F ACP DISCUSS/DSCN MKR DOCD: CPT | Performed by: FAMILY MEDICINE

## 2024-06-19 PROCEDURE — G8427 DOCREV CUR MEDS BY ELIG CLIN: HCPCS | Performed by: FAMILY MEDICINE

## 2024-06-19 PROCEDURE — 3044F HG A1C LEVEL LT 7.0%: CPT | Performed by: FAMILY MEDICINE

## 2024-06-19 PROCEDURE — 3023F SPIROM DOC REV: CPT | Performed by: FAMILY MEDICINE

## 2024-06-19 PROCEDURE — 3078F DIAST BP <80 MM HG: CPT | Performed by: FAMILY MEDICINE

## 2024-06-19 PROCEDURE — 3074F SYST BP LT 130 MM HG: CPT | Performed by: FAMILY MEDICINE

## 2024-06-19 PROCEDURE — G8419 CALC BMI OUT NRM PARAM NOF/U: HCPCS | Performed by: FAMILY MEDICINE

## 2024-06-19 ASSESSMENT — PATIENT HEALTH QUESTIONNAIRE - PHQ9
SUM OF ALL RESPONSES TO PHQ QUESTIONS 1-9: 0
SUM OF ALL RESPONSES TO PHQ QUESTIONS 1-9: 0
2. FEELING DOWN, DEPRESSED OR HOPELESS: NOT AT ALL
SUM OF ALL RESPONSES TO PHQ QUESTIONS 1-9: 0
SUM OF ALL RESPONSES TO PHQ9 QUESTIONS 1 & 2: 0
SUM OF ALL RESPONSES TO PHQ QUESTIONS 1-9: 0
1. LITTLE INTEREST OR PLEASURE IN DOING THINGS: NOT AT ALL

## 2024-06-19 ASSESSMENT — ENCOUNTER SYMPTOMS
ABDOMINAL PAIN: 0
COUGH: 1
WHEEZING: 0
SORE THROAT: 0
DIARRHEA: 0
NAUSEA: 0
BACK PAIN: 0
CONSTIPATION: 0
SHORTNESS OF BREATH: 1

## 2024-06-19 NOTE — ASSESSMENT & PLAN NOTE
Borderline controlled, continue current medications, continue current treatment plan, medication adherence emphasized, lifestyle modifications recommended, and patient was started on Trelegy on last appointment but symptoms are borderline controlled with persistent shortness of breath.  Will check pulmonary function test to rule out restrictive disease before ordering CT chest

## 2024-06-19 NOTE — ASSESSMENT & PLAN NOTE
Well-controlled, continue current medications, continue current treatment plan, medication adherence emphasized, and patient just had nuclear stress test last month that was very normal.  Previous stents are open .  He is on carvedilol and aspirin

## 2024-06-19 NOTE — PROGRESS NOTES
Date:6/19/2024        Patient Name:Sherman Diana     YOB: 1945     Age:78 y.o.    Seen today for   Chief Complaint   Patient presents with    Diabetes     Follow up    Hypertension     Follow up    Cholesterol Problem     Follow up   Seen today for 3 months follow-up appointment on diabetes, dyslipidemia, CAD, COPD.  He is still having worsening shortness of breath.  His recent cardiac stress test was very reassuring.  He had 2 esophageal dilatation in the last 3 months.  He was found to have esophageal stricture giving him symptoms of persistent cough and dysphagia.    Endocrine Review  He is seen for diabetes.  Since last visit: no change.  Home glucose monitoring: is performed sporadically, nonfasting values range 100-140.  He reports medication compliance: noncompliant some of the time.  Medication side effects: none.  Diabetic diet compliance: noncompliant some of the time.  Further diabetic ROS: no polyuria or polydipsia, no chest pain, dyspnea or TIA's, no numbness, tingling or pain in extremities, no unusual visual symptoms, no hypoglycemia.  Lab review: labs reviewed and discussed with patient.  Eye exam: due.  ,   Medications: Metformin  500 mg BID  on ACEI/ARBS :yes  On ASA, yes  Podiatry visit: not done     COPD  Patient complains of cough and shortness of breath. Symptoms began several years ago. Symptoms chronic dyspnea: severity = mild: course of sx: well controlled  able walk 5 blocks  cough: severity: moderate: sputum : clear: light  symptoms worse with exertion. does worsen with exertion. Sputum is clear in small amounts. Fever has been suspected fevers but not measured at home. Patient uses 1 pillows at night. Patient can walk 1 mile  before resting. Patient currently is not on home oxygen therapy.. Respiratory history: frequent episodes of bronchitis and COPD  He is on scheduled Trelegy Singulair and prn albuterol, but non compliant with treatment.     Urology Review  He is here

## 2024-06-19 NOTE — ASSESSMENT & PLAN NOTE
Well-controlled, continue current medications, continue current treatment plan, medication adherence emphasized, lifestyle modifications recommended, and stable on current metformin

## 2024-06-19 NOTE — PROGRESS NOTES
Chief Complaint   Patient presents with    Diabetes     Follow up    Hypertension     Follow up    Cholesterol Problem     Follow up         \"Have you been to the ER, urgent care clinic since your last visit?  Hospitalized since your last visit?\"    NO    “Have you seen or consulted any other health care providers outside of [facility] since your last visit?”    NO            Click Here for Release of Records Request           6/19/2024     2:30 PM   PHQ-9    Little interest or pleasure in doing things 0   Feeling down, depressed, or hopeless 0   PHQ-2 Score 0   PHQ-9 Total Score 0           Financial Resource Strain: Low Risk  (10/12/2023)    Overall Financial Resource Strain (CARDIA)     Difficulty of Paying Living Expenses: Not hard at all      Food Insecurity: Not on file (10/12/2023)          Health Maintenance Due   Topic Date Due    DTaP/Tdap/Td vaccine (1 - Tdap) Never done    Shingles vaccine (1 of 2) Never done    Respiratory Syncytial Virus (RSV) Pregnant or age 60 yrs+ (1 - 1-dose 60+ series) Never done    Diabetic retinal exam  10/11/2018    COVID-19 Vaccine (4 - 2023-24 season) 09/01/2023    Diabetic foot exam  06/12/2024

## 2024-06-19 NOTE — ASSESSMENT & PLAN NOTE
At goal, continue current medications, continue current treatment plan, medication adherence emphasized, and stable on current losartan 50 mg and carvedilol

## 2024-06-19 NOTE — ASSESSMENT & PLAN NOTE
At goal, continue current medications, continue current treatment plan, medication adherence emphasized, lifestyle modifications recommended, and to continue with current Crestor l

## 2024-06-20 DIAGNOSIS — I25.118 CORONARY ARTERY DISEASE OF NATIVE ARTERY OF NATIVE HEART WITH STABLE ANGINA PECTORIS (HCC): ICD-10-CM

## 2024-06-20 DIAGNOSIS — I10 ESSENTIAL HYPERTENSION WITH GOAL BLOOD PRESSURE LESS THAN 130/85: ICD-10-CM

## 2024-06-20 DIAGNOSIS — J44.9 COPD, MODERATE (HCC): ICD-10-CM

## 2024-06-20 DIAGNOSIS — E11.9 CONTROLLED TYPE 2 DIABETES MELLITUS WITHOUT COMPLICATION, WITHOUT LONG-TERM CURRENT USE OF INSULIN (HCC): ICD-10-CM

## 2024-06-20 DIAGNOSIS — E78.5 HYPERLIPIDEMIA LDL GOAL <100: ICD-10-CM

## 2024-06-20 DIAGNOSIS — Z92.29 HX OF LONG TERM USE OF BLOOD THINNERS: ICD-10-CM

## 2024-06-20 LAB
ALBUMIN SERPL-MCNC: 3.3 G/DL (ref 3.5–5)
ALBUMIN/GLOB SERPL: 1 (ref 1.1–2.2)
ALP SERPL-CCNC: 80 U/L (ref 45–117)
ALT SERPL-CCNC: 15 U/L (ref 12–78)
ANION GAP SERPL CALC-SCNC: 4 MMOL/L (ref 5–15)
AST SERPL-CCNC: 13 U/L (ref 15–37)
BASOPHILS # BLD: 0 K/UL (ref 0–0.1)
BASOPHILS NFR BLD: 0 % (ref 0–1)
BILIRUB SERPL-MCNC: 0.6 MG/DL (ref 0.2–1)
BUN SERPL-MCNC: 16 MG/DL (ref 6–20)
BUN/CREAT SERPL: 18 (ref 12–20)
CALCIUM SERPL-MCNC: 8.7 MG/DL (ref 8.5–10.1)
CHLORIDE SERPL-SCNC: 108 MMOL/L (ref 97–108)
CHOLEST SERPL-MCNC: 126 MG/DL
CO2 SERPL-SCNC: 28 MMOL/L (ref 21–32)
CREAT SERPL-MCNC: 0.87 MG/DL (ref 0.7–1.3)
DIFFERENTIAL METHOD BLD: NORMAL
EOSINOPHIL # BLD: 0.2 K/UL (ref 0–0.4)
EOSINOPHIL NFR BLD: 3 % (ref 0–7)
ERYTHROCYTE [DISTWIDTH] IN BLOOD BY AUTOMATED COUNT: 12.7 % (ref 11.5–14.5)
EST. AVERAGE GLUCOSE BLD GHB EST-MCNC: 128 MG/DL
GLOBULIN SER CALC-MCNC: 3.2 G/DL (ref 2–4)
GLUCOSE SERPL-MCNC: 141 MG/DL (ref 65–100)
HBA1C MFR BLD: 6.1 % (ref 4–5.6)
HCT VFR BLD AUTO: 41.2 % (ref 36.6–50.3)
HDLC SERPL-MCNC: 48 MG/DL
HDLC SERPL: 2.6 (ref 0–5)
HGB BLD-MCNC: 13.3 G/DL (ref 12.1–17)
IMM GRANULOCYTES # BLD AUTO: 0 K/UL (ref 0–0.04)
IMM GRANULOCYTES NFR BLD AUTO: 0 % (ref 0–0.5)
LDLC SERPL CALC-MCNC: 63 MG/DL (ref 0–100)
LYMPHOCYTES # BLD: 1.7 K/UL (ref 0.8–3.5)
LYMPHOCYTES NFR BLD: 32 % (ref 12–49)
MCH RBC QN AUTO: 30.5 PG (ref 26–34)
MCHC RBC AUTO-ENTMCNC: 32.3 G/DL (ref 30–36.5)
MCV RBC AUTO: 94.5 FL (ref 80–99)
MONOCYTES # BLD: 0.6 K/UL (ref 0–1)
MONOCYTES NFR BLD: 12 % (ref 5–13)
NEUTS SEG # BLD: 2.8 K/UL (ref 1.8–8)
NEUTS SEG NFR BLD: 53 % (ref 32–75)
NRBC # BLD: 0 K/UL (ref 0–0.01)
NRBC BLD-RTO: 0 PER 100 WBC
PLATELET # BLD AUTO: 241 K/UL (ref 150–400)
PMV BLD AUTO: 9.6 FL (ref 8.9–12.9)
POTASSIUM SERPL-SCNC: 4.6 MMOL/L (ref 3.5–5.1)
PROT SERPL-MCNC: 6.5 G/DL (ref 6.4–8.2)
RBC # BLD AUTO: 4.36 M/UL (ref 4.1–5.7)
SODIUM SERPL-SCNC: 140 MMOL/L (ref 136–145)
TRIGL SERPL-MCNC: 75 MG/DL
VLDLC SERPL CALC-MCNC: 15 MG/DL
WBC # BLD AUTO: 5.3 K/UL (ref 4.1–11.1)

## 2024-06-24 ENCOUNTER — HOSPITAL ENCOUNTER (OUTPATIENT)
Facility: HOSPITAL | Age: 79
Discharge: HOME | End: 2024-06-27
Attending: FAMILY MEDICINE
Payer: MEDICARE

## 2024-06-24 DIAGNOSIS — J44.9 COPD, MODERATE (HCC): ICD-10-CM

## 2024-06-24 PROCEDURE — 94010 BREATHING CAPACITY TEST: CPT

## 2024-06-24 PROCEDURE — 94729 DIFFUSING CAPACITY: CPT

## 2024-06-24 PROCEDURE — 94060 EVALUATION OF WHEEZING: CPT

## 2024-06-24 PROCEDURE — 94726 PLETHYSMOGRAPHY LUNG VOLUMES: CPT

## 2024-07-02 ENCOUNTER — TELEPHONE (OUTPATIENT)
Age: 79
End: 2024-07-02

## 2024-07-02 NOTE — TELEPHONE ENCOUNTER
Outbound call to patient. Name and  verified. Informed pt that   Pcp did not change inhaler.  Pt is still supposed to take Trelegy. recommended to stop Flovent. Informed pt that a 3 months supply was sent as per pt's request

## 2024-07-02 NOTE — TELEPHONE ENCOUNTER
Outbound call to patient. Name and  verified. Pt stated that as per last visit. Pt was told by pcp to stop taking trelegy ellipta and that pcp will send a new prescription with different strength for 90 days supply to [facility] pharmacy.   Pt stated that he recevied 3 inhalers and they are the same inhalers that he used to take. Pt would like to verify whether he should take them or pcp is supposed to send a new Rx.   If there is a new prescription needed pt prefers to be sent to [facility] because the amount for inhalers is 45$ each unlike with [facility] pt has to pay 125$    Please advise

## 2024-07-02 NOTE — TELEPHONE ENCOUNTER
Please let him know that I did not change inhaler.  He is still supposed to take Trelegy that is the best inhaler for him.  I recommended to stop Flovent he already had from previous prescription and because he told me that he only got 30 days prescription, I sent 90 days prescription so he received 3 inhalers at the same time.  Please let him know there is no confusion, he is supposed to take the same Trelegy.  Only difference was I sent prescription for 3 months instead of he was getting for 1 month .  thanks

## 2024-09-03 DIAGNOSIS — I10 ESSENTIAL (PRIMARY) HYPERTENSION: ICD-10-CM

## 2024-09-04 RX ORDER — LOSARTAN POTASSIUM 50 MG/1
50 TABLET ORAL DAILY
Qty: 90 TABLET | Refills: 3 | Status: SHIPPED | OUTPATIENT
Start: 2024-09-04

## 2024-09-24 ENCOUNTER — TELEPHONE (OUTPATIENT)
Age: 79
End: 2024-09-24

## 2024-09-24 DIAGNOSIS — E11.9 TYPE 2 DIABETES MELLITUS WITHOUT COMPLICATIONS (HCC): ICD-10-CM

## 2024-10-03 ENCOUNTER — OFFICE VISIT (OUTPATIENT)
Age: 79
End: 2024-10-03
Payer: MEDICARE

## 2024-10-03 VITALS
HEIGHT: 63 IN | BODY MASS INDEX: 27.64 KG/M2 | DIASTOLIC BLOOD PRESSURE: 80 MMHG | RESPIRATION RATE: 16 BRPM | SYSTOLIC BLOOD PRESSURE: 123 MMHG | HEART RATE: 74 BPM | OXYGEN SATURATION: 98 % | WEIGHT: 156 LBS | TEMPERATURE: 98.2 F

## 2024-10-03 DIAGNOSIS — R35.1 BENIGN PROSTATIC HYPERPLASIA WITH NOCTURIA: ICD-10-CM

## 2024-10-03 DIAGNOSIS — D3A.8 PRIMARY PANCREATIC NEUROENDOCRINE TUMOR: ICD-10-CM

## 2024-10-03 DIAGNOSIS — I10 ESSENTIAL HYPERTENSION WITH GOAL BLOOD PRESSURE LESS THAN 130/85: ICD-10-CM

## 2024-10-03 DIAGNOSIS — E11.9 CONTROLLED TYPE 2 DIABETES MELLITUS WITHOUT COMPLICATION, WITHOUT LONG-TERM CURRENT USE OF INSULIN (HCC): Primary | ICD-10-CM

## 2024-10-03 DIAGNOSIS — N40.1 BENIGN PROSTATIC HYPERPLASIA WITH NOCTURIA: ICD-10-CM

## 2024-10-03 DIAGNOSIS — J44.9 COPD, MODERATE (HCC): ICD-10-CM

## 2024-10-03 DIAGNOSIS — E78.5 HYPERLIPIDEMIA LDL GOAL <100: ICD-10-CM

## 2024-10-03 DIAGNOSIS — I25.118 CORONARY ARTERY DISEASE OF NATIVE ARTERY OF NATIVE HEART WITH STABLE ANGINA PECTORIS (HCC): ICD-10-CM

## 2024-10-03 DIAGNOSIS — Z23 NEEDS FLU SHOT: ICD-10-CM

## 2024-10-03 DIAGNOSIS — M17.11 ARTHRITIS OF KNEE, RIGHT: ICD-10-CM

## 2024-10-03 PROCEDURE — 1123F ACP DISCUSS/DSCN MKR DOCD: CPT | Performed by: FAMILY MEDICINE

## 2024-10-03 PROCEDURE — 90653 IIV ADJUVANT VACCINE IM: CPT | Performed by: FAMILY MEDICINE

## 2024-10-03 PROCEDURE — G8427 DOCREV CUR MEDS BY ELIG CLIN: HCPCS | Performed by: FAMILY MEDICINE

## 2024-10-03 PROCEDURE — 3074F SYST BP LT 130 MM HG: CPT | Performed by: FAMILY MEDICINE

## 2024-10-03 PROCEDURE — 99214 OFFICE O/P EST MOD 30 MIN: CPT | Performed by: FAMILY MEDICINE

## 2024-10-03 PROCEDURE — PBSHW PBB SHADOW CHARGE: Performed by: FAMILY MEDICINE

## 2024-10-03 PROCEDURE — 3023F SPIROM DOC REV: CPT | Performed by: FAMILY MEDICINE

## 2024-10-03 PROCEDURE — G8482 FLU IMMUNIZE ORDER/ADMIN: HCPCS | Performed by: FAMILY MEDICINE

## 2024-10-03 PROCEDURE — G8419 CALC BMI OUT NRM PARAM NOF/U: HCPCS | Performed by: FAMILY MEDICINE

## 2024-10-03 PROCEDURE — PBSHW INFLUENZA, FLUAD TRIVALENT, (AGE 65 Y+), IM, PRESERVATIVE FREE, 0.5ML: Performed by: FAMILY MEDICINE

## 2024-10-03 PROCEDURE — 3079F DIAST BP 80-89 MM HG: CPT | Performed by: FAMILY MEDICINE

## 2024-10-03 PROCEDURE — 1036F TOBACCO NON-USER: CPT | Performed by: FAMILY MEDICINE

## 2024-10-03 PROCEDURE — 3044F HG A1C LEVEL LT 7.0%: CPT | Performed by: FAMILY MEDICINE

## 2024-10-03 ASSESSMENT — ENCOUNTER SYMPTOMS
DIARRHEA: 0
COUGH: 0
WHEEZING: 0
SORE THROAT: 0
CONSTIPATION: 0
NAUSEA: 0
BACK PAIN: 0
SHORTNESS OF BREATH: 0
ABDOMINAL PAIN: 0

## 2024-10-03 ASSESSMENT — PATIENT HEALTH QUESTIONNAIRE - PHQ9
SUM OF ALL RESPONSES TO PHQ9 QUESTIONS 1 & 2: 0
1. LITTLE INTEREST OR PLEASURE IN DOING THINGS: NOT AT ALL
2. FEELING DOWN, DEPRESSED OR HOPELESS: NOT AT ALL
SUM OF ALL RESPONSES TO PHQ QUESTIONS 1-9: 0

## 2024-10-03 NOTE — ASSESSMENT & PLAN NOTE
Chronic, at goal (stable), continue current treatment plan, medication adherence emphasized, and lifestyle modifications recommended  Symptoms have improved since he is taking his inhaler more regularly as well as he had upper endoscopy with esophageal dilatation

## 2024-10-03 NOTE — ASSESSMENT & PLAN NOTE
Monitored by specialist- no acute findings meriting change in the plan  Reviewed records from Dr. Be.  Recent nuclear SPECT angiogram was very reassuring.  Recommended to continue with same medications

## 2024-10-03 NOTE — PROGRESS NOTES
Chief Complaint   Patient presents with    Hypertension     Follow up    Cholesterol Problem     Follow up         \"Have you been to the ER, urgent care clinic since your last visit?  Hospitalized since your last visit?\"    NO    “Have you seen or consulted any other health care providers outside of [facility] since your last visit?”    NO            Click Here for Release of Records Request           10/3/2024     2:21 PM   PHQ-9    Little interest or pleasure in doing things 0   Feeling down, depressed, or hopeless 0   PHQ-2 Score 0   PHQ-9 Total Score 0           Financial Resource Strain: Low Risk  (10/12/2023)    Overall Financial Resource Strain (CARDIA)     Difficulty of Paying Living Expenses: Not hard at all      Food Insecurity: Not on file (10/12/2023)          Health Maintenance Due   Topic Date Due    DTaP/Tdap/Td vaccine (1 - Tdap) Never done    Shingles vaccine (1 of 2) Never done    Respiratory Syncytial Virus (RSV) Pregnant or age 60 yrs+ (1 - 1-dose 60+ series) Never done    Diabetic foot exam  06/12/2024    Flu vaccine (1) 08/01/2024    COVID-19 Vaccine (4 - 2023-24 season) 09/01/2024

## 2024-10-03 NOTE — PROGRESS NOTES
Date:10/3/2024        Patient Name:Sherman Diana     YOB: 1945     Age:79 y.o.    Seen today for   Chief Complaint   Patient presents with    Hypertension     Follow up    Cholesterol Problem     Follow up     Patient was seen today for follow-up on diabetes, COPD, CAD, hypertension, chronic knee arthritis, esophageal stricture.  He is planning to go to [facility] in a month.  HPI   COPD  Patient complains of cough and shortness of breath. Symptoms began several years ago. Symptoms chronic dyspnea: severity = mild: course of sx: well controlled  able walk 5 blocks  cough: severity: moderate: sputum : clear: light  symptoms worse with exertion. does worsen with exertion. Sputum is clear in small amounts. Fever has been suspected fevers but not measured at home. Patient uses 1 pillows at night. Patient can walk 1 mile  before resting. Patient currently is not on home oxygen therapy.. Respiratory history: frequent episodes of bronchitis and COPD  He is on scheduled Trelegy Singulair and prn albuterol, but non compliant with treatment.  He had pulmonary function testing in June that confirmed COPD     Urology Review  He is here today because of BPH.  He reports his symptoms are well controlled.  His symptoms include: nocturia x 2, dysuria, pain on tip of penis.  He denies: urinary hesitancy, urinary frequency, double voiding, weak stream, perineal discomfort, hematuria, abdominal pain, flank pain, testicular pain.  He is on the following medications: Flomax.  He reports the following medication side effects: none.  Lab review: labs reviewed and discussed with patient.      Cardiovascular Review  The patient has hypertension, hyperlipidemia, coronary artery disease and status post coronary artery stenting.  He reports taking medications as instructed, no medication side effects noted, no TIA's, no chest pain on exertion, no dyspnea on exertion, no swelling of ankles, no orthostatic dizziness or lightheadedness,

## 2024-10-03 NOTE — ASSESSMENT & PLAN NOTE
Monitored by specialist- no acute findings meriting change in the plan  Follows GI, patient is currently asymptomatic

## 2024-10-04 DIAGNOSIS — E78.5 HYPERLIPIDEMIA LDL GOAL <100: ICD-10-CM

## 2024-10-04 DIAGNOSIS — J44.9 COPD, MODERATE (HCC): ICD-10-CM

## 2024-10-04 DIAGNOSIS — E11.9 CONTROLLED TYPE 2 DIABETES MELLITUS WITHOUT COMPLICATION, WITHOUT LONG-TERM CURRENT USE OF INSULIN (HCC): ICD-10-CM

## 2024-10-04 DIAGNOSIS — I25.118 CORONARY ARTERY DISEASE OF NATIVE ARTERY OF NATIVE HEART WITH STABLE ANGINA PECTORIS (HCC): ICD-10-CM

## 2024-10-04 DIAGNOSIS — I10 ESSENTIAL HYPERTENSION WITH GOAL BLOOD PRESSURE LESS THAN 130/85: ICD-10-CM

## 2024-10-04 LAB
ALBUMIN SERPL-MCNC: 3.6 G/DL (ref 3.5–5)
ALBUMIN/GLOB SERPL: 1.1 (ref 1.1–2.2)
ALP SERPL-CCNC: 90 U/L (ref 45–117)
ALT SERPL-CCNC: 18 U/L (ref 12–78)
ANION GAP SERPL CALC-SCNC: 0 MMOL/L (ref 2–12)
AST SERPL-CCNC: 12 U/L (ref 15–37)
BASOPHILS # BLD: 0 K/UL (ref 0–0.1)
BASOPHILS NFR BLD: 0 % (ref 0–1)
BILIRUB SERPL-MCNC: 0.9 MG/DL (ref 0.2–1)
BUN SERPL-MCNC: 19 MG/DL (ref 6–20)
BUN/CREAT SERPL: 19 (ref 12–20)
CALCIUM SERPL-MCNC: 9.1 MG/DL (ref 8.5–10.1)
CHLORIDE SERPL-SCNC: 106 MMOL/L (ref 97–108)
CHOLEST SERPL-MCNC: 142 MG/DL
CO2 SERPL-SCNC: 30 MMOL/L (ref 21–32)
CREAT SERPL-MCNC: 1.02 MG/DL (ref 0.7–1.3)
CREAT UR-MCNC: 149 MG/DL
DIFFERENTIAL METHOD BLD: NORMAL
EOSINOPHIL # BLD: 0.3 K/UL (ref 0–0.4)
EOSINOPHIL NFR BLD: 3 % (ref 0–7)
ERYTHROCYTE [DISTWIDTH] IN BLOOD BY AUTOMATED COUNT: 12.3 % (ref 11.5–14.5)
EST. AVERAGE GLUCOSE BLD GHB EST-MCNC: 140 MG/DL
GLOBULIN SER CALC-MCNC: 3.4 G/DL (ref 2–4)
GLUCOSE SERPL-MCNC: 123 MG/DL (ref 65–100)
HBA1C MFR BLD: 6.5 % (ref 4–5.6)
HCT VFR BLD AUTO: 43.5 % (ref 36.6–50.3)
HDLC SERPL-MCNC: 49 MG/DL
HDLC SERPL: 2.9 (ref 0–5)
HGB BLD-MCNC: 14.4 G/DL (ref 12.1–17)
IMM GRANULOCYTES # BLD AUTO: 0 K/UL (ref 0–0.04)
IMM GRANULOCYTES NFR BLD AUTO: 0 % (ref 0–0.5)
LDLC SERPL CALC-MCNC: 69.8 MG/DL (ref 0–100)
LYMPHOCYTES # BLD: 1.2 K/UL (ref 0.8–3.5)
LYMPHOCYTES NFR BLD: 14 % (ref 12–49)
MCH RBC QN AUTO: 30.8 PG (ref 26–34)
MCHC RBC AUTO-ENTMCNC: 33.1 G/DL (ref 30–36.5)
MCV RBC AUTO: 92.9 FL (ref 80–99)
MICROALBUMIN UR-MCNC: 1.03 MG/DL
MICROALBUMIN/CREAT UR-RTO: 7 MG/G (ref 0–30)
MONOCYTES # BLD: 0.7 K/UL (ref 0–1)
MONOCYTES NFR BLD: 9 % (ref 5–13)
NEUTS SEG # BLD: 6.2 K/UL (ref 1.8–8)
NEUTS SEG NFR BLD: 74 % (ref 32–75)
NRBC # BLD: 0 K/UL (ref 0–0.01)
NRBC BLD-RTO: 0 PER 100 WBC
PLATELET # BLD AUTO: 243 K/UL (ref 150–400)
PMV BLD AUTO: 9.4 FL (ref 8.9–12.9)
POTASSIUM SERPL-SCNC: 4.4 MMOL/L (ref 3.5–5.1)
PROT SERPL-MCNC: 7 G/DL (ref 6.4–8.2)
RBC # BLD AUTO: 4.68 M/UL (ref 4.1–5.7)
SODIUM SERPL-SCNC: 136 MMOL/L (ref 136–145)
SPECIMEN HOLD: NORMAL
TRIGL SERPL-MCNC: 116 MG/DL
VLDLC SERPL CALC-MCNC: 23.2 MG/DL
WBC # BLD AUTO: 8.4 K/UL (ref 4.1–11.1)

## 2024-10-15 DIAGNOSIS — K14.4 ATROPHY OF TONGUE PAPILLAE: ICD-10-CM

## 2024-10-15 RX ORDER — FOLIC ACID 1 MG/1
1000 TABLET ORAL DAILY
Qty: 90 TABLET | Refills: 1 | Status: SHIPPED | OUTPATIENT
Start: 2024-10-15

## 2024-11-17 DIAGNOSIS — N40.1 BENIGN PROSTATIC HYPERPLASIA WITH LOWER URINARY TRACT SYMPTOMS: ICD-10-CM

## 2024-11-17 RX ORDER — TAMSULOSIN HYDROCHLORIDE 0.4 MG/1
CAPSULE ORAL DAILY
Qty: 90 CAPSULE | Refills: 1 | Status: SHIPPED | OUTPATIENT
Start: 2024-11-17

## 2024-12-02 ENCOUNTER — TELEPHONE (OUTPATIENT)
Age: 79
End: 2024-12-02

## 2024-12-02 NOTE — TELEPHONE ENCOUNTER
I contacted this patient, his name &  were verified.  Per PCP's request, patient rescheduled for an appointment on 2024 at 9:00 AM.  Patient consented to rescheduled appointment.

## 2024-12-03 ENCOUNTER — OFFICE VISIT (OUTPATIENT)
Age: 79
End: 2024-12-03
Payer: MEDICARE

## 2024-12-03 VITALS
DIASTOLIC BLOOD PRESSURE: 75 MMHG | HEART RATE: 84 BPM | OXYGEN SATURATION: 99 % | HEIGHT: 63 IN | SYSTOLIC BLOOD PRESSURE: 114 MMHG | WEIGHT: 160 LBS | TEMPERATURE: 97.5 F | BODY MASS INDEX: 28.35 KG/M2 | RESPIRATION RATE: 16 BRPM

## 2024-12-03 DIAGNOSIS — T21.12XA: Primary | ICD-10-CM

## 2024-12-03 PROCEDURE — G8482 FLU IMMUNIZE ORDER/ADMIN: HCPCS | Performed by: FAMILY MEDICINE

## 2024-12-03 PROCEDURE — G8419 CALC BMI OUT NRM PARAM NOF/U: HCPCS | Performed by: FAMILY MEDICINE

## 2024-12-03 PROCEDURE — 1036F TOBACCO NON-USER: CPT | Performed by: FAMILY MEDICINE

## 2024-12-03 PROCEDURE — 3074F SYST BP LT 130 MM HG: CPT | Performed by: FAMILY MEDICINE

## 2024-12-03 PROCEDURE — G8427 DOCREV CUR MEDS BY ELIG CLIN: HCPCS | Performed by: FAMILY MEDICINE

## 2024-12-03 PROCEDURE — 1159F MED LIST DOCD IN RCRD: CPT | Performed by: FAMILY MEDICINE

## 2024-12-03 PROCEDURE — 1125F AMNT PAIN NOTED PAIN PRSNT: CPT | Performed by: FAMILY MEDICINE

## 2024-12-03 PROCEDURE — 1123F ACP DISCUSS/DSCN MKR DOCD: CPT | Performed by: FAMILY MEDICINE

## 2024-12-03 PROCEDURE — 3078F DIAST BP <80 MM HG: CPT | Performed by: FAMILY MEDICINE

## 2024-12-03 PROCEDURE — 99213 OFFICE O/P EST LOW 20 MIN: CPT | Performed by: FAMILY MEDICINE

## 2024-12-03 RX ORDER — SILVER SULFADIAZINE 10 MG/G
CREAM TOPICAL
Qty: 50 G | Refills: 0 | Status: SHIPPED | OUTPATIENT
Start: 2024-12-03

## 2024-12-03 RX ORDER — CEPHALEXIN 500 MG/1
500 CAPSULE ORAL 2 TIMES DAILY
Qty: 14 CAPSULE | Refills: 0 | Status: SHIPPED | OUTPATIENT
Start: 2024-12-03 | End: 2024-12-10

## 2024-12-03 SDOH — ECONOMIC STABILITY: FOOD INSECURITY: WITHIN THE PAST 12 MONTHS, YOU WORRIED THAT YOUR FOOD WOULD RUN OUT BEFORE YOU GOT MONEY TO BUY MORE.: NEVER TRUE

## 2024-12-03 SDOH — ECONOMIC STABILITY: FOOD INSECURITY: WITHIN THE PAST 12 MONTHS, THE FOOD YOU BOUGHT JUST DIDN'T LAST AND YOU DIDN'T HAVE MONEY TO GET MORE.: NEVER TRUE

## 2024-12-03 SDOH — ECONOMIC STABILITY: INCOME INSECURITY: HOW HARD IS IT FOR YOU TO PAY FOR THE VERY BASICS LIKE FOOD, HOUSING, MEDICAL CARE, AND HEATING?: NOT HARD AT ALL

## 2024-12-03 ASSESSMENT — ENCOUNTER SYMPTOMS
DIARRHEA: 0
SORE THROAT: 0
NAUSEA: 0
ABDOMINAL PAIN: 0
SHORTNESS OF BREATH: 0
WHEEZING: 0
BACK PAIN: 0
COUGH: 0
CONSTIPATION: 0

## 2024-12-03 ASSESSMENT — PATIENT HEALTH QUESTIONNAIRE - PHQ9
SUM OF ALL RESPONSES TO PHQ QUESTIONS 1-9: 0
SUM OF ALL RESPONSES TO PHQ QUESTIONS 1-9: 0
2. FEELING DOWN, DEPRESSED OR HOPELESS: NOT AT ALL
SUM OF ALL RESPONSES TO PHQ9 QUESTIONS 1 & 2: 0
SUM OF ALL RESPONSES TO PHQ QUESTIONS 1-9: 0
SUM OF ALL RESPONSES TO PHQ QUESTIONS 1-9: 0
1. LITTLE INTEREST OR PLEASURE IN DOING THINGS: NOT AT ALL
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
## 2025-02-10 ENCOUNTER — OFFICE VISIT (OUTPATIENT)
Age: 80
End: 2025-02-10
Payer: MEDICARE

## 2025-02-10 VITALS
OXYGEN SATURATION: 99 % | RESPIRATION RATE: 14 BRPM | HEART RATE: 74 BPM | WEIGHT: 160.6 LBS | TEMPERATURE: 97.5 F | HEIGHT: 63 IN | DIASTOLIC BLOOD PRESSURE: 77 MMHG | BODY MASS INDEX: 28.46 KG/M2 | SYSTOLIC BLOOD PRESSURE: 126 MMHG

## 2025-02-10 DIAGNOSIS — J30.89 NON-SEASONAL ALLERGIC RHINITIS, UNSPECIFIED TRIGGER: ICD-10-CM

## 2025-02-10 DIAGNOSIS — I25.118 CORONARY ARTERY DISEASE OF NATIVE ARTERY OF NATIVE HEART WITH STABLE ANGINA PECTORIS (HCC): ICD-10-CM

## 2025-02-10 DIAGNOSIS — K21.00 GASTROESOPHAGEAL REFLUX DISEASE WITH ESOPHAGITIS WITHOUT HEMORRHAGE: ICD-10-CM

## 2025-02-10 DIAGNOSIS — E11.9 CONTROLLED TYPE 2 DIABETES MELLITUS WITHOUT COMPLICATION, WITHOUT LONG-TERM CURRENT USE OF INSULIN (HCC): ICD-10-CM

## 2025-02-10 DIAGNOSIS — R35.1 BENIGN PROSTATIC HYPERPLASIA WITH NOCTURIA: ICD-10-CM

## 2025-02-10 DIAGNOSIS — Z00.00 MEDICARE ANNUAL WELLNESS VISIT, SUBSEQUENT: Primary | ICD-10-CM

## 2025-02-10 DIAGNOSIS — Z92.29 HX OF LONG TERM USE OF BLOOD THINNERS: ICD-10-CM

## 2025-02-10 DIAGNOSIS — E78.5 HYPERLIPIDEMIA LDL GOAL <100: ICD-10-CM

## 2025-02-10 DIAGNOSIS — J44.9 COPD, MODERATE (HCC): ICD-10-CM

## 2025-02-10 DIAGNOSIS — D3A.8 PRIMARY PANCREATIC NEUROENDOCRINE TUMOR: ICD-10-CM

## 2025-02-10 DIAGNOSIS — N40.1 BENIGN PROSTATIC HYPERPLASIA WITH NOCTURIA: ICD-10-CM

## 2025-02-10 DIAGNOSIS — Z71.89 ACP (ADVANCE CARE PLANNING): ICD-10-CM

## 2025-02-10 DIAGNOSIS — I10 ESSENTIAL HYPERTENSION WITH GOAL BLOOD PRESSURE LESS THAN 130/85: ICD-10-CM

## 2025-02-10 PROCEDURE — G0446 INTENS BEHAVE THER CARDIO DX: HCPCS | Performed by: FAMILY MEDICINE

## 2025-02-10 PROCEDURE — 99214 OFFICE O/P EST MOD 30 MIN: CPT | Performed by: FAMILY MEDICINE

## 2025-02-10 PROCEDURE — 99497 ADVNCD CARE PLAN 30 MIN: CPT | Performed by: FAMILY MEDICINE

## 2025-02-10 RX ORDER — FERROUS SULFATE 325(65) MG
1 TABLET ORAL
Qty: 90 TABLET | Refills: 1 | Status: SHIPPED | OUTPATIENT
Start: 2025-02-10

## 2025-02-10 RX ORDER — NITROGLYCERIN 0.4 MG/1
0.4 TABLET SUBLINGUAL EVERY 5 MIN PRN
Qty: 30 TABLET | Refills: 0 | Status: SHIPPED | OUTPATIENT
Start: 2025-02-10

## 2025-02-10 RX ORDER — FLUTICASONE PROPIONATE 50 MCG
2 SPRAY, SUSPENSION (ML) NASAL DAILY
Qty: 16 G | Refills: 2 | Status: SHIPPED | OUTPATIENT
Start: 2025-02-10

## 2025-02-10 RX ORDER — CARVEDILOL 6.25 MG/1
6.25 TABLET ORAL 2 TIMES DAILY WITH MEALS
Qty: 180 TABLET | Refills: 1 | Status: SHIPPED | OUTPATIENT
Start: 2025-02-10

## 2025-02-10 SDOH — ECONOMIC STABILITY: FOOD INSECURITY: WITHIN THE PAST 12 MONTHS, THE FOOD YOU BOUGHT JUST DIDN'T LAST AND YOU DIDN'T HAVE MONEY TO GET MORE.: NEVER TRUE

## 2025-02-10 SDOH — ECONOMIC STABILITY: FOOD INSECURITY: WITHIN THE PAST 12 MONTHS, YOU WORRIED THAT YOUR FOOD WOULD RUN OUT BEFORE YOU GOT MONEY TO BUY MORE.: NEVER TRUE

## 2025-02-10 ASSESSMENT — ENCOUNTER SYMPTOMS
BACK PAIN: 0
SORE THROAT: 0
COUGH: 0
WHEEZING: 0
CONSTIPATION: 0
SHORTNESS OF BREATH: 0
ABDOMINAL PAIN: 0
DIARRHEA: 0
NAUSEA: 0

## 2025-02-10 ASSESSMENT — PATIENT HEALTH QUESTIONNAIRE - PHQ9
SUM OF ALL RESPONSES TO PHQ QUESTIONS 1-9: 0
SUM OF ALL RESPONSES TO PHQ QUESTIONS 1-9: 0
1. LITTLE INTEREST OR PLEASURE IN DOING THINGS: NOT AT ALL
SUM OF ALL RESPONSES TO PHQ QUESTIONS 1-9: 0
1. LITTLE INTEREST OR PLEASURE IN DOING THINGS: NOT AT ALL
SUM OF ALL RESPONSES TO PHQ9 QUESTIONS 1 & 2: 0
SUM OF ALL RESPONSES TO PHQ9 QUESTIONS 1 & 2: 0
SUM OF ALL RESPONSES TO PHQ QUESTIONS 1-9: 0
SUM OF ALL RESPONSES TO PHQ QUESTIONS 1-9: 0
2. FEELING DOWN, DEPRESSED OR HOPELESS: NOT AT ALL
2. FEELING DOWN, DEPRESSED OR HOPELESS: NOT AT ALL
SUM OF ALL RESPONSES TO PHQ QUESTIONS 1-9: 0

## 2025-02-10 NOTE — PATIENT INSTRUCTIONS
Personalized Preventive Plan for Sherman Diana - 2/10/2025  Medicare offers a range of preventive health benefits. Some of the tests and screenings are paid in full while other may be subject to a deductible, co-insurance, and/or copay.  Some of these benefits include a comprehensive review of your medical history including lifestyle, illnesses that may run in your family, and various assessments and screenings as appropriate.  After reviewing your medical record and screening and assessments performed today your provider may have ordered immunizations, labs, imaging, and/or referrals for you.  A list of these orders (if applicable) as well as your Preventive Care list are included within your After Visit Summary for your review.      Advance Care Planning     Advance Care Planning opens a door to talk about and write down your wishes before a sudden accident or illness.  Make your goals, values, and preferences known.     This puts you in the ’s seat and helps others know what matters most to you so they won’t have to guess.      Where can you learn more?    Go to [email or web address]   to learn how to:    Name someone you trust to make healthcare decisions for you, only if you can’t. (Healthcare Power of )    Document your wishes for care if you were seriously ill and not expected to recover or are approaching end of life. (Advance Directive or Living Will)    The same page can be found using the QR code below.

## 2025-02-10 NOTE — ASSESSMENT & PLAN NOTE
Chronic, at goal (stable), continue current treatment plan, medication adherence emphasized, and lifestyle modifications recommended  Stable on current Trelegy as well as as needed albuterol nebulizer and Combivent inhaler

## 2025-02-10 NOTE — ASSESSMENT & PLAN NOTE
Monitored by specialist- no acute findings meriting change in the plan  Reviewed records from Dr. Be. Recent nuclear SPECT angiogram was very reassuring. Recommended to continue with same medications , stable on carvedilol, statin, aspirin and losartan

## 2025-02-10 NOTE — PROGRESS NOTES
Chief Complaint   Patient presents with    Diabetes     \"Have you been to the ER, urgent care clinic since your last visit?  Hospitalized since your last visit?\"    NO    “Have you seen or consulted any other health care providers outside of [facility] since your last visit?”    NO                   2/10/2025     3:02 PM   PHQ-9    Little interest or pleasure in doing things 0   Feeling down, depressed, or hopeless 0   PHQ-2 Score 0   PHQ-9 Total Score 0           Financial Resource Strain: Low Risk  (12/3/2024)    Overall Financial Resource Strain (CARDIA)     Difficulty of Paying Living Expenses: Not hard at all      Food Insecurity: No Food Insecurity (2/10/2025)    Hunger Vital Sign     Worried About Running Out of Food in the Last Year: Never true     Ran Out of Food in the Last Year: Never true          Health Maintenance Due   Topic Date Due    DTaP/Tdap/Td vaccine (1 - Tdap) Never done      
Medicare Annual Wellness Visit    Sherman Diana is here for Diabetes and Medicare AWV    Assessment & Plan   Medicare annual wellness visit, subsequent  ACP (advance care planning)  -     Full code  Controlled type 2 diabetes mellitus without complication, without long-term current use of insulin (HCC)  Assessment & Plan:   Chronic, at goal (stable), continue current treatment plan, medication adherence emphasized, and lifestyle modifications recommended  Orders:  -     [place] Intensive Behavior Counseling for Cardiovascular Diseases, 8-15 minutes []  -     Comprehensive Metabolic Panel; Future  -     Hemoglobin A1C; Future  -     Lipid Panel; Future  -     Albumin/Creatinine Ratio, Urine; Future  COPD, moderate (HCC)  Assessment & Plan:   Chronic, at goal (stable), continue current treatment plan, medication adherence emphasized, and lifestyle modifications recommended  Stable on current Trelegy as well as as needed albuterol nebulizer and Combivent inhaler  Orders:  -     CBC with Auto Differential; Future  Coronary artery disease of native artery of native heart with stable angina pectoris (HCC)  Assessment & Plan:   Monitored by specialist- no acute findings meriting change in the plan  Reviewed records from Dr. Be. Recent nuclear SPECT angiogram was very reassuring. Recommended to continue with same medications , stable on carvedilol, statin, aspirin and losartan  Orders:  -     carvedilol (COREG) 6.25 MG tablet; Take 1 tablet by mouth 2 times daily (with meals), Disp-180 tablet, R-1Normal  -     nitroGLYCERIN (NITROSTAT) 0.4 MG SL tablet; Place 1 tablet under the tongue every 5 minutes as needed for Chest pain (for 2 doses) up to max of 3 total doses. If no relief after 1 dose, call 911., Disp-30 tablet, R-0Normal  -     [place] Intensive Behavior Counseling for Cardiovascular Diseases, 8-15 minutes []  -     Lipid Panel; Future  Essential hypertension with goal blood pressure less than 130/85  -     MA 
nuclear SPECT angiogram was very reassuring. Recommended to continue with same medications , stable on carvedilol, statin, aspirin and losartan  Orders:  -     carvedilol (COREG) 6.25 MG tablet; Take 1 tablet by mouth 2 times daily (with meals), Disp-180 tablet, R-1Normal  -     nitroGLYCERIN (NITROSTAT) 0.4 MG SL tablet; Place 1 tablet under the tongue every 5 minutes as needed for Chest pain (for 2 doses) up to max of 3 total doses. If no relief after 1 dose, call 911., Disp-30 tablet, R-0Normal  -     [place] Intensive Behavior Counseling for Cardiovascular Diseases, 8-15 minutes []  -     Lipid Panel; Future  6. Essential hypertension with goal blood pressure less than 130/85  -     [place] Intensive Behavior Counseling for Cardiovascular Diseases, 8-15 minutes []  -     Comprehensive Metabolic Panel; Future  7. Hyperlipidemia LDL goal <100  -     [place] Intensive Behavior Counseling for Cardiovascular Diseases, 8-15 minutes []  -     Comprehensive Metabolic Panel; Future  -     Lipid Panel; Future  8. Primary pancreatic neuroendocrine tumor  9. Hx of long term use of blood thinners  -     CBC with Auto Differential; Future  10. Benign prostatic hyperplasia with nocturia  11. Atherosclerotic heart disease of native coronary artery without angina pectoris  -     carvedilol (COREG) 6.25 MG tablet; Take 1 tablet by mouth 2 times daily (with meals), Disp-180 tablet, R-1Normal  -     [place] Intensive Behavior Counseling for Cardiovascular Diseases, 8-15 minutes []  -     Lipid Panel; Future  12. Gastroesophageal reflux disease with esophagitis without hemorrhage  -     CBC with Auto Differential; Future  13. Non-seasonal allergic rhinitis, unspecified trigger  -     fluticasone (FLONASE) 50 MCG/ACT nasal spray; 2 sprays by Nasal route daily, Disp-16 g, R-2Normal     Again I had a long discussion on considering knee surgery as this is worsening and he is having significant pain with walking.      Discussed lifestyle

## 2025-02-11 ENCOUNTER — TELEPHONE (OUTPATIENT)
Age: 80
End: 2025-02-11

## 2025-02-11 DIAGNOSIS — I25.118 CORONARY ARTERY DISEASE OF NATIVE ARTERY OF NATIVE HEART WITH STABLE ANGINA PECTORIS: ICD-10-CM

## 2025-02-11 DIAGNOSIS — Z92.29 HX OF LONG TERM USE OF BLOOD THINNERS: ICD-10-CM

## 2025-02-11 DIAGNOSIS — I10 ESSENTIAL HYPERTENSION WITH GOAL BLOOD PRESSURE LESS THAN 130/85: ICD-10-CM

## 2025-02-11 DIAGNOSIS — E11.9 CONTROLLED TYPE 2 DIABETES MELLITUS WITHOUT COMPLICATION, WITHOUT LONG-TERM CURRENT USE OF INSULIN (HCC): ICD-10-CM

## 2025-02-11 DIAGNOSIS — E78.5 HYPERLIPIDEMIA LDL GOAL <100: ICD-10-CM

## 2025-02-11 DIAGNOSIS — K21.00 GASTROESOPHAGEAL REFLUX DISEASE WITH ESOPHAGITIS WITHOUT HEMORRHAGE: ICD-10-CM

## 2025-02-11 DIAGNOSIS — J44.9 COPD, MODERATE (HCC): ICD-10-CM

## 2025-02-11 LAB
ALBUMIN SERPL-MCNC: 3.3 G/DL (ref 3.5–5)
ALBUMIN/GLOB SERPL: 1 (ref 1.1–2.2)
ALP SERPL-CCNC: 80 U/L (ref 45–117)
ALT SERPL-CCNC: 19 U/L (ref 12–78)
ANION GAP SERPL CALC-SCNC: 6 MMOL/L (ref 2–12)
AST SERPL-CCNC: 19 U/L (ref 15–37)
BASOPHILS # BLD: 0.03 K/UL (ref 0–0.1)
BASOPHILS NFR BLD: 0.4 % (ref 0–1)
BILIRUB SERPL-MCNC: 0.8 MG/DL (ref 0.2–1)
BUN SERPL-MCNC: 19 MG/DL (ref 6–20)
BUN/CREAT SERPL: 20 (ref 12–20)
CALCIUM SERPL-MCNC: 8.9 MG/DL (ref 8.5–10.1)
CHLORIDE SERPL-SCNC: 107 MMOL/L (ref 97–108)
CHOLEST SERPL-MCNC: 110 MG/DL
CO2 SERPL-SCNC: 26 MMOL/L (ref 21–32)
CREAT SERPL-MCNC: 0.93 MG/DL (ref 0.7–1.3)
CREAT UR-MCNC: 245 MG/DL
DIFFERENTIAL METHOD BLD: ABNORMAL
EOSINOPHIL # BLD: 0.48 K/UL (ref 0–0.4)
EOSINOPHIL NFR BLD: 6 % (ref 0–7)
ERYTHROCYTE [DISTWIDTH] IN BLOOD BY AUTOMATED COUNT: 12.3 % (ref 11.5–14.5)
EST. AVERAGE GLUCOSE BLD GHB EST-MCNC: 143 MG/DL
GLOBULIN SER CALC-MCNC: 3.4 G/DL (ref 2–4)
GLUCOSE SERPL-MCNC: 178 MG/DL (ref 65–100)
HBA1C MFR BLD: 6.6 % (ref 4–5.6)
HCT VFR BLD AUTO: 41.3 % (ref 36.6–50.3)
HDLC SERPL-MCNC: 50 MG/DL
HDLC SERPL: 2.2 (ref 0–5)
HGB BLD-MCNC: 13.1 G/DL (ref 12.1–17)
IMM GRANULOCYTES # BLD AUTO: 0.03 K/UL (ref 0–0.04)
IMM GRANULOCYTES NFR BLD AUTO: 0.4 % (ref 0–0.5)
LDLC SERPL CALC-MCNC: 48.4 MG/DL (ref 0–100)
LYMPHOCYTES # BLD: 1.9 K/UL (ref 0.8–3.5)
LYMPHOCYTES NFR BLD: 23.9 % (ref 12–49)
MCH RBC QN AUTO: 29.6 PG (ref 26–34)
MCHC RBC AUTO-ENTMCNC: 31.7 G/DL (ref 30–36.5)
MCV RBC AUTO: 93.4 FL (ref 80–99)
MICROALBUMIN UR-MCNC: 1.87 MG/DL
MICROALBUMIN/CREAT UR-RTO: 8 MG/G (ref 0–30)
MONOCYTES # BLD: 0.71 K/UL (ref 0–1)
MONOCYTES NFR BLD: 8.9 % (ref 5–13)
NEUTS SEG # BLD: 4.8 K/UL (ref 1.8–8)
NEUTS SEG NFR BLD: 60.4 % (ref 32–75)
NRBC # BLD: 0 K/UL (ref 0–0.01)
NRBC BLD-RTO: 0 PER 100 WBC
PLATELET # BLD AUTO: 249 K/UL (ref 150–400)
PMV BLD AUTO: 10.1 FL (ref 8.9–12.9)
POTASSIUM SERPL-SCNC: 4.1 MMOL/L (ref 3.5–5.1)
PROT SERPL-MCNC: 6.7 G/DL (ref 6.4–8.2)
RBC # BLD AUTO: 4.42 M/UL (ref 4.1–5.7)
SODIUM SERPL-SCNC: 139 MMOL/L (ref 136–145)
SPECIMEN HOLD: NORMAL
TRIGL SERPL-MCNC: 58 MG/DL
VLDLC SERPL CALC-MCNC: 11.6 MG/DL
WBC # BLD AUTO: 8 K/UL (ref 4.1–11.1)

## 2025-02-11 NOTE — TELEPHONE ENCOUNTER
[employer] called states that are needing a PA form for medication ferrous sulfate 325 mg iron.         BCBN [phone]    [phone]

## 2025-02-11 NOTE — TELEPHONE ENCOUNTER
We can discontinue iron prescription as last hemoglobin was very normal.  It was routed to me as a prescription request when nurse roomed patient.  We do not need prior authorization for this medication.  Thanks

## 2025-02-12 NOTE — RESULT ENCOUNTER NOTE
Please inform patient,  Result for blood count, kidney and liver function urine protein, cholesterol profile are very normal but fasting sugar is very high and average sugar has been up from previous results.  I strongly recommend him to take his metformin both times instead of 1 time as numbers are getting higher.  No change in other medications.  He does not need to take iron supplement as his hemoglobin is very high  Thanks

## 2025-02-18 ENCOUNTER — TELEPHONE (OUTPATIENT)
Age: 80
End: 2025-02-18

## 2025-02-18 NOTE — TELEPHONE ENCOUNTER
Called patient. Two patient identifiers verified. Discussed lab results per provider's note. He Verbalized understanding.

## 2025-02-27 DIAGNOSIS — I10 ESSENTIAL (PRIMARY) HYPERTENSION: ICD-10-CM

## 2025-02-27 RX ORDER — LOSARTAN POTASSIUM 50 MG/1
50 TABLET ORAL DAILY
Qty: 90 TABLET | Refills: 1 | Status: SHIPPED | OUTPATIENT
Start: 2025-02-27

## 2025-03-01 DIAGNOSIS — E78.5 HYPERLIPIDEMIA, UNSPECIFIED: ICD-10-CM

## 2025-03-01 RX ORDER — ROSUVASTATIN CALCIUM 10 MG/1
10 TABLET, COATED ORAL DAILY
Qty: 90 TABLET | Refills: 3 | Status: SHIPPED | OUTPATIENT
Start: 2025-03-01

## 2025-05-05 NOTE — PROGRESS NOTES
Severo Reels is a 77 y.o. male who was seen by synchronous (real-time) audio-video technology on 11/9/2020 for Cough (Persistent cough, denies any other symptoms)        Assessment & Plan:   Diagnoses and all orders for this visit:    1. Acute bronchitis, unspecified organism  -     azithromycin (ZITHROMAX) 250 mg tablet; Take 2 tablets today, then take 1 tablet daily  -     benzonatate (TESSALON) 200 mg capsule; Take 1 Cap by mouth three (3) times daily as needed for Cough for up to 7 days. 2. COPD, moderate (HCC)  -     budesonide (PULMICORT) 180 mcg/actuation aepb inhaler; Take 1 Puff by inhalation two (2) times a day. I spent at least 15 minutes on this visit with this established patient. Again had long discussion on compliance of medicines ( inhalers and Singulair)    Subjective:   Cough  Patient complains of nonproductive cough, sore throat and wheezing. Symptoms began 1 week ago. The cough is non-productive, with wheezing, with shortness of breath, with shortness of breath during the cough, chest is painful during coughing, worsening over time and is aggravated by pollens, cold air and reclining position Associated symptoms include:chills, change in voice, sore throat. Patient does not have new pets. Patient does have a history of COPD Patient does have a history of environmental allergens. Patient does not have a history of smoking. Patient  does have previous Chest X-ray. Patient does not have had a PPD done. He is on scheduled Flovent and prn albuterol but not using controller  at all and albuterol only occasionally. Prior to Admission medications    Medication Sig Start Date End Date Taking?  Authorizing Provider   folic acid (FOLVITE) 1 mg tablet TAKE 1 TABLET BY MOUTH EVERY DAY 9/22/20  Yes Juan Pablo Keys MD   metFORMIN (GLUCOPHAGE) 500 mg tablet TAKE 1 TABLET TWICE DAILY WITH MEALS 9/2/20  Yes Juan Pablo Keys MD   losartan-hydroCHLOROthiazide (HYZAAR) 50-12.5 mg per tablet Take 1 Tab by mouth daily. For hypertension 8/21/20  Yes Norman Galindo MD   rosuvastatin (CRESTOR) 10 mg tablet Take 1 Tab by mouth daily. 8/21/20  Yes Norman Galindo MD   carvediloL (COREG) 6.25 mg tablet TAKE 1 TABLET TWICE DAILY WITH FOOD 4/19/20  Yes Norman Galindo MD   tamsulosin (FLOMAX) 0.4 mg capsule TAKE 1 CAPSULE BY MOUTH EVERY DAY AT NIGHT 3/30/20  Yes Norman Galindo MD   omeprazole (PRILOSEC) 40 mg capsule Take 1 Cap by mouth daily. 3/30/20  Yes Norman Galindo MD   azelastine (ASTEPRO) 0.15 % (205.5 mcg) 1 Spray by Both Nostrils route two (2) times a day. 3/11/20  Yes Norman Galindo MD   ipratropium-albuterol (COMBIVENT RESPIMAT)  mcg/actuation inhaler Take 1 Puff by inhalation every four (4) hours as needed for Wheezing or Shortness of Breath. 12/3/19  Yes Norman Galindo MD   Nebulizer & Compressor machine 1 Each by Does Not Apply route every four (4) hours as needed for Cough (wheezing). 7/29/19  Yes Norman Galindo MD   aspirin delayed-release 81 mg tablet  3/11/19  Yes Provider, Historical   multivit-min/FA/lycopen/lutein (SENIOR TABS PO)  3/11/19  Yes Provider, Historical   VITAMIN B-12 5,000 mcg subl  3/11/19  Yes Provider, Historical   albuterol (PROVENTIL VENTOLIN) 2.5 mg /3 mL (0.083 %) nebulizer solution 3 mL by Nebulization route every four (4) hours as needed for Wheezing. 4/2/19  Yes Norman Galindo MD   nitroglycerin (NITROSTAT) 0.4 mg SL tablet TAKE 1 TABLET AND PLACE UNDER THE TONGUE EVERY 5 MINUTES FOR CHEST PAIN FOR UP TO 3 DOSES 7/9/18  Yes Norman Galindo MD   montelukast (SINGULAIR) 10 mg tablet Take 1 Tab by mouth daily. 3/30/15  Yes Norman Galindo MD   fluticasone [facility]) 50 mcg/actuation nasal spray One spray each nostril 11/27/13  Yes Norman Galindo MD   omega-3 acid ethyl esters (LOVAZA) 1 gram capsule Take 1 Cap by mouth two (2) times daily (with meals).  10/16/20   Norman Galindo MD   polyethylene glycol (MIRALAX) 17 gram/dose powder Take 17 g by mouth daily. 9/11/20   Marlon Alpers, MD   diclofenac (VOLTAREN) 1 % gel Apply 4 g to affected area four (4) times daily. Over right side hip joint 8/15/19   Marlon Alpers, MD   budesonide (PULMICORT) 180 mcg/actuation aepb inhaler Take 1 Puff by inhalation two (2) times a day. 11/17/18   Marlon Alpers, MD     Patient Active Problem List    Diagnosis Date Noted    Controlled type 2 diabetes mellitus without complication ([facility] [address] 99/08/6814     Priority: 1 - One    Essential hypertension with goal blood pressure less than 130/85 04/30/2012     Priority: 1 - One    CAD (coronary artery disease) 04/30/2012     Priority: 1 - One    Hyperlipidemia LDL goal <100 04/30/2012     Priority: 1 - One    Vitamin D deficiency 08/15/2013     Priority: 2 - Two    Pancreatic mass 04/07/2020    Coronary artery disease involving native coronary artery of native heart with unstable angina pectoris ([facility] [address] 02/28/2018    Hx of long term use of blood thinners 02/28/2018    Arthritis of knee, right 06/29/2017    Precordial pain 01/20/2017    Costochondritis, acute 01/20/2017    Bilateral impacted cerumen 11/10/2016    Chronic ethmoidal sinusitis 10/07/2016    Coronary artery disease involving native coronary artery of native heart without angina pectoris 05/12/2016    Routine general medical examination at a health care facility 01/28/2016    COPD, moderate ([facility] [address] 04/09/2015    Tongue sore 03/20/2015    Tonsillitis 03/20/2015    Bronchitis, acute 10/31/2013    Sinusitis 10/31/2013    Visit for preventive health examination 08/15/2013    Dysuria 04/30/2012    Urinary frequency 04/30/2012    Fever 04/30/2012     Current Outpatient Medications   Medication Sig Dispense Refill    budesonide (PULMICORT) 180 mcg/actuation aepb inhaler Take 1 Puff by inhalation two (2) times a day.  1 Inhaler 3    azithromycin (ZITHROMAX) 250 mg tablet Take 2 tablets today, then take 1 tablet daily 6 Tab 0    benzonatate (TESSALON) 200 mg capsule Take 1 Cap by mouth three (3) times daily as needed for Cough for up to 7 days. 20 Cap 0    folic acid (FOLVITE) 1 mg tablet TAKE 1 TABLET BY MOUTH EVERY DAY 90 Tab 0    metFORMIN (GLUCOPHAGE) 500 mg tablet TAKE 1 TABLET TWICE DAILY WITH MEALS 180 Tab 0    losartan-hydroCHLOROthiazide (HYZAAR) 50-12.5 mg per tablet Take 1 Tab by mouth daily. For hypertension 90 Tab 1    rosuvastatin (CRESTOR) 10 mg tablet Take 1 Tab by mouth daily. 90 Tab 1    carvediloL (COREG) 6.25 mg tablet TAKE 1 TABLET TWICE DAILY WITH FOOD 180 Tab 0    tamsulosin (FLOMAX) 0.4 mg capsule TAKE 1 CAPSULE BY MOUTH EVERY DAY AT NIGHT 90 Cap 0    omeprazole (PRILOSEC) 40 mg capsule Take 1 Cap by mouth daily. 90 Cap 0    azelastine (ASTEPRO) 0.15 % (205.5 mcg) 1 Spray by Both Nostrils route two (2) times a day. 1 Bottle 0    ipratropium-albuterol (COMBIVENT RESPIMAT)  mcg/actuation inhaler Take 1 Puff by inhalation every four (4) hours as needed for Wheezing or Shortness of Breath. 3 Inhaler 0    Nebulizer & Compressor machine 1 Each by Does Not Apply route every four (4) hours as needed for Cough (wheezing). 1 Each 0    aspirin delayed-release 81 mg tablet       multivit-min/FA/lycopen/lutein (SENIOR TABS PO)       VITAMIN B-12 5,000 mcg subl       albuterol (PROVENTIL VENTOLIN) 2.5 mg /3 mL (0.083 %) nebulizer solution 3 mL by Nebulization route every four (4) hours as needed for Wheezing. 24 Each 0    nitroglycerin (NITROSTAT) 0.4 mg SL tablet TAKE 1 TABLET AND PLACE UNDER THE TONGUE EVERY 5 MINUTES FOR CHEST PAIN FOR UP TO 3 DOSES 25 Tab 0    montelukast (SINGULAIR) 10 mg tablet Take 1 Tab by mouth daily. 30 Tab 3    fluticasone (FLONASE) 50 mcg/actuation nasal spray One spray each nostril 1 Bottle 2    omega-3 acid ethyl esters (LOVAZA) 1 gram capsule Take 1 Cap by mouth two (2) times daily (with meals). 180 Cap 1    polyethylene glycol (MIRALAX) 17 gram/dose powder Take 17 g by mouth daily.  [address] [facility] g 1    diclofenac (VOLTAREN) 1 % gel Apply 4 g to affected area four (4) times daily. Over right side hip joint 300 g 0     No Known Allergies  Past Medical History:   Diagnosis Date    Acid indigestion     CAD (coronary artery disease)     3 stents, done one month apart in [facility] in 2007,    Diabetes [facility])     Heart disease     Hypercholesterolemia     Hypertension      Past Surgical History:   Procedure Laterality Date    CARDIAC SURG PROCEDURE UNLIST      stents, 3    COLONOSCOPY N/A 2/13/2020    COLONOSCOPY/EGD performed by Arturo Tyler MD at [facility] ENDOSCOPY    HX PTCA      2007    HX TONSILLECTOMY      VASCULAR SURGERY PROCEDURE UNLIST      angioplasty     Family History   Problem Relation Age of Onset    No Known Problems Mother     No Known Problems Father      Social History     Tobacco Use    Smoking status: Never Smoker    Smokeless tobacco: Never Used   Substance Use Topics    Alcohol use: No     Alcohol/week: 0.0 standard drinks       Review of Systems   Constitutional: Negative for chills, fever and malaise/fatigue. HENT: Negative for congestion, ear pain, sore throat and tinnitus. Eyes: Negative for blurred vision, double vision, pain and discharge. Respiratory: Positive for cough and wheezing. Negative for shortness of breath. Cardiovascular: Negative for chest pain, palpitations and leg swelling. Gastrointestinal: Negative for abdominal pain, blood in stool, constipation, diarrhea, nausea and vomiting. Genitourinary: Negative for dysuria, frequency, hematuria and urgency. Musculoskeletal: Negative for back pain, joint pain and myalgias. Skin: Negative for rash. Neurological: Negative for dizziness, tremors, seizures and headaches. Endo/Heme/Allergies: Negative for polydipsia. Does not bruise/bleed easily. Psychiatric/Behavioral: Negative for depression and substance abuse. The patient is not nervous/anxious. Objective:   No flowsheet data found. [INSTRUCTIONS:  \"[x]\" Indicates a positive item  \"[]\" Indicates a negative item  -- DELETE ALL ITEMS NOT EXAMINED]    Constitutional: [x] Appears well-developed and well-nourished [x] No apparent distress      [] Abnormal -     Mental status: [x] Alert and awake  [x] Oriented to person/place/time [x] Able to follow commands    [] Abnormal -     Eyes:   EOM    [x]  Normal    [] Abnormal -   Sclera  [x]  Normal    [] Abnormal -          Discharge [x]  None visible   [] Abnormal -     HENT: [x] Normocephalic, atraumatic  [] Abnormal -   [x] Mouth/Throat: Mucous membranes are moist    External Ears [x] Normal  [] Abnormal -    Neck: [x] No visualized mass [] Abnormal -     Pulmonary/Chest: [x] Respiratory effort normal   [x] No visualized signs of difficulty breathing or respiratory distress        [] Abnormal -      Musculoskeletal:   [x] Normal gait with no signs of ataxia         [x] Normal range of motion of neck        [] Abnormal -     Neurological:        [x] No Facial Asymmetry (Cranial nerve 7 motor function) (limited exam due to video visit)          [x] No gaze palsy        [] Abnormal -          Skin:        [x] No significant exanthematous lesions or discoloration noted on facial skin         [] Abnormal -            Psychiatric:       [x] Normal Affect [] Abnormal -        [x] No Hallucinations    Other pertinent observable physical exam findings:-        We discussed the expected course, resolution and complications of the diagnosis(es) in detail. Medication risks, benefits, costs, interactions, and alternatives were discussed as indicated. I advised him to contact the office if his condition worsens, changes or fails to improve as anticipated. He expressed understanding with the diagnosis(es) and plan.        Chris Azalea, who was evaluated through a patient-initiated, synchronous (real-time) audio-video encounter, and/or his healthcare decision maker, is aware that it is a billable service, with coverage as determined by his insurance carrier. He provided verbal consent to proceed: Yes, and patient identification was verified. It was conducted pursuant to the emergency declaration under the [facility], [address] [place] [address] authority and the [employer] General Act. A caregiver was present when appropriate. Ability to conduct physical exam was limited. I was in the office. The patient was at home.     This service was provided through telehealth, between patient Torey macario from home and Oliver Cochran MD from [facility]     I discussed with the patient the nature of our telemedicine visits, that:      I would evaluate the patient and recommend diagnostics and treatments based on my assessment   Our sessions are not being recorded and that personal health information is protected   Our team would provide follow up care in person if/when the patient needs it    Ivelisse Ricks MD [phone]

## 2025-05-30 ENCOUNTER — TELEPHONE (OUTPATIENT)
Age: 80
End: 2025-05-30

## 2025-05-30 DIAGNOSIS — I10 ESSENTIAL (PRIMARY) HYPERTENSION: ICD-10-CM

## 2025-05-30 DIAGNOSIS — E11.9 TYPE 2 DIABETES MELLITUS WITHOUT COMPLICATIONS (HCC): ICD-10-CM

## 2025-05-30 NOTE — TELEPHONE ENCOUNTER
PT called stating they are going out of town on 5/31 and need a refill ASAP on metFORMIN (GLUCOPHAGE and losartan (COZAAR) to [id number] - [place] - [address] [facility] - P [phone] - F [phone] [[id number]]

## 2025-05-31 RX ORDER — LOSARTAN POTASSIUM 50 MG/1
50 TABLET ORAL DAILY
Qty: 90 TABLET | Refills: 1 | Status: SHIPPED | OUTPATIENT
Start: 2025-05-31

## 2025-08-21 DIAGNOSIS — I25.118 CORONARY ARTERY DISEASE OF NATIVE ARTERY OF NATIVE HEART WITH STABLE ANGINA PECTORIS: ICD-10-CM

## 2025-08-21 RX ORDER — CARVEDILOL 6.25 MG/1
6.25 TABLET ORAL 2 TIMES DAILY WITH MEALS
Qty: 180 TABLET | Refills: 0 | Status: SHIPPED | OUTPATIENT
Start: 2025-08-21

## 2025-08-26 ENCOUNTER — OFFICE VISIT (OUTPATIENT)
Age: 80
End: 2025-08-26
Payer: MEDICARE

## 2025-08-26 VITALS
TEMPERATURE: 96.9 F | WEIGHT: 155 LBS | OXYGEN SATURATION: 98 % | RESPIRATION RATE: 16 BRPM | BODY MASS INDEX: 27.46 KG/M2 | HEIGHT: 63 IN | DIASTOLIC BLOOD PRESSURE: 70 MMHG | SYSTOLIC BLOOD PRESSURE: 110 MMHG | HEART RATE: 76 BPM

## 2025-08-26 DIAGNOSIS — R05.3 CHRONIC COUGH: ICD-10-CM

## 2025-08-26 DIAGNOSIS — I25.118 CORONARY ARTERY DISEASE OF NATIVE ARTERY OF NATIVE HEART WITH STABLE ANGINA PECTORIS: ICD-10-CM

## 2025-08-26 DIAGNOSIS — I10 ESSENTIAL HYPERTENSION WITH GOAL BLOOD PRESSURE LESS THAN 130/85: ICD-10-CM

## 2025-08-26 DIAGNOSIS — R35.1 BENIGN PROSTATIC HYPERPLASIA WITH NOCTURIA: ICD-10-CM

## 2025-08-26 DIAGNOSIS — N40.1 BENIGN PROSTATIC HYPERPLASIA WITH NOCTURIA: ICD-10-CM

## 2025-08-26 DIAGNOSIS — D3A.8 PRIMARY PANCREATIC NEUROENDOCRINE TUMOR (HCC): ICD-10-CM

## 2025-08-26 DIAGNOSIS — E11.9 CONTROLLED TYPE 2 DIABETES MELLITUS WITHOUT COMPLICATION, WITHOUT LONG-TERM CURRENT USE OF INSULIN (HCC): Primary | ICD-10-CM

## 2025-08-26 DIAGNOSIS — E78.5 HYPERLIPIDEMIA LDL GOAL <100: ICD-10-CM

## 2025-08-26 DIAGNOSIS — J44.9 COPD, MODERATE (HCC): ICD-10-CM

## 2025-08-26 DIAGNOSIS — M47.26 OSTEOARTHRITIS OF SPINE WITH RADICULOPATHY, LUMBAR REGION: ICD-10-CM

## 2025-08-26 PROCEDURE — G2211 COMPLEX E/M VISIT ADD ON: HCPCS | Performed by: FAMILY MEDICINE

## 2025-08-26 PROCEDURE — G8427 DOCREV CUR MEDS BY ELIG CLIN: HCPCS | Performed by: FAMILY MEDICINE

## 2025-08-26 PROCEDURE — 3044F HG A1C LEVEL LT 7.0%: CPT | Performed by: FAMILY MEDICINE

## 2025-08-26 PROCEDURE — 1125F AMNT PAIN NOTED PAIN PRSNT: CPT | Performed by: FAMILY MEDICINE

## 2025-08-26 PROCEDURE — 1160F RVW MEDS BY RX/DR IN RCRD: CPT | Performed by: FAMILY MEDICINE

## 2025-08-26 PROCEDURE — G8419 CALC BMI OUT NRM PARAM NOF/U: HCPCS | Performed by: FAMILY MEDICINE

## 2025-08-26 PROCEDURE — 1036F TOBACCO NON-USER: CPT | Performed by: FAMILY MEDICINE

## 2025-08-26 PROCEDURE — 99214 OFFICE O/P EST MOD 30 MIN: CPT | Performed by: FAMILY MEDICINE

## 2025-08-26 PROCEDURE — 3023F SPIROM DOC REV: CPT | Performed by: FAMILY MEDICINE

## 2025-08-26 PROCEDURE — 1159F MED LIST DOCD IN RCRD: CPT | Performed by: FAMILY MEDICINE

## 2025-08-26 PROCEDURE — 3074F SYST BP LT 130 MM HG: CPT | Performed by: FAMILY MEDICINE

## 2025-08-26 PROCEDURE — 1123F ACP DISCUSS/DSCN MKR DOCD: CPT | Performed by: FAMILY MEDICINE

## 2025-08-26 PROCEDURE — 3078F DIAST BP <80 MM HG: CPT | Performed by: FAMILY MEDICINE

## 2025-08-26 RX ORDER — BENZONATATE 200 MG/1
200 CAPSULE ORAL 3 TIMES DAILY PRN
Qty: 30 CAPSULE | Refills: 0 | Status: SHIPPED | OUTPATIENT
Start: 2025-08-26 | End: 2025-09-05

## 2025-08-26 RX ORDER — DUTASTERIDE 0.5 MG/1
0.5 CAPSULE, LIQUID FILLED ORAL DAILY
Qty: 90 CAPSULE | Refills: 1 | Status: SHIPPED | OUTPATIENT
Start: 2025-08-26

## 2025-08-26 ASSESSMENT — ENCOUNTER SYMPTOMS
SORE THROAT: 0
BACK PAIN: 1
CONSTIPATION: 0
SHORTNESS OF BREATH: 0
COUGH: 1
NAUSEA: 0
DIARRHEA: 0
ABDOMINAL PAIN: 0
WHEEZING: 0

## 2025-08-26 ASSESSMENT — PATIENT HEALTH QUESTIONNAIRE - PHQ9
SUM OF ALL RESPONSES TO PHQ QUESTIONS 1-9: 0
SUM OF ALL RESPONSES TO PHQ QUESTIONS 1-9: 0
2. FEELING DOWN, DEPRESSED OR HOPELESS: NOT AT ALL
SUM OF ALL RESPONSES TO PHQ QUESTIONS 1-9: 0
1. LITTLE INTEREST OR PLEASURE IN DOING THINGS: NOT AT ALL
SUM OF ALL RESPONSES TO PHQ QUESTIONS 1-9: 0

## 2025-08-28 DIAGNOSIS — E78.5 HYPERLIPIDEMIA LDL GOAL <100: ICD-10-CM

## 2025-08-28 DIAGNOSIS — R05.3 CHRONIC COUGH: ICD-10-CM

## 2025-08-28 DIAGNOSIS — E11.9 CONTROLLED TYPE 2 DIABETES MELLITUS WITHOUT COMPLICATION, WITHOUT LONG-TERM CURRENT USE OF INSULIN (HCC): ICD-10-CM

## 2025-08-28 DIAGNOSIS — I25.118 CORONARY ARTERY DISEASE OF NATIVE ARTERY OF NATIVE HEART WITH STABLE ANGINA PECTORIS: ICD-10-CM

## 2025-08-28 DIAGNOSIS — J44.9 COPD, MODERATE (HCC): ICD-10-CM

## 2025-08-28 DIAGNOSIS — I10 ESSENTIAL HYPERTENSION WITH GOAL BLOOD PRESSURE LESS THAN 130/85: ICD-10-CM

## 2025-08-28 LAB
ALBUMIN SERPL-MCNC: 3.3 G/DL (ref 3.5–5.2)
ALBUMIN/GLOB SERPL: 1.1 (ref 1.1–2.2)
ALP SERPL-CCNC: 106 U/L (ref 40–129)
ALT SERPL-CCNC: 13 U/L (ref 10–50)
ANION GAP SERPL CALC-SCNC: 9 MMOL/L (ref 2–14)
AST SERPL-CCNC: 18 U/L (ref 10–50)
BASOPHILS # BLD: 0.03 K/UL (ref 0–0.1)
BASOPHILS NFR BLD: 0.3 % (ref 0–1)
BILIRUB SERPL-MCNC: 0.4 MG/DL (ref 0–1.2)
BUN SERPL-MCNC: 19 MG/DL (ref 8–23)
BUN/CREAT SERPL: 20 (ref 12–20)
CALCIUM SERPL-MCNC: 8.9 MG/DL (ref 8.8–10.2)
CHLORIDE SERPL-SCNC: 104 MMOL/L (ref 98–107)
CHOLEST SERPL-MCNC: 145 MG/DL (ref 0–200)
CO2 SERPL-SCNC: 25 MMOL/L (ref 20–29)
CREAT SERPL-MCNC: 0.98 MG/DL (ref 0.7–1.2)
DIFFERENTIAL METHOD BLD: ABNORMAL
EOSINOPHIL # BLD: 0.23 K/UL (ref 0–0.4)
EOSINOPHIL NFR BLD: 2.2 % (ref 0–7)
ERYTHROCYTE [DISTWIDTH] IN BLOOD BY AUTOMATED COUNT: 13 % (ref 11.5–14.5)
EST. AVERAGE GLUCOSE BLD GHB EST-MCNC: 151 MG/DL
GLOBULIN SER CALC-MCNC: 3.2 G/DL (ref 2–4)
GLUCOSE SERPL-MCNC: 133 MG/DL (ref 65–100)
HBA1C MFR BLD: 6.9 % (ref 4–5.6)
HCT VFR BLD AUTO: 38.8 % (ref 36.6–50.3)
HDLC SERPL-MCNC: 43 MG/DL (ref 40–60)
HDLC SERPL: 3.4 (ref 0–5)
HGB BLD-MCNC: 12.5 G/DL (ref 12.1–17)
IMM GRANULOCYTES # BLD AUTO: 0.03 K/UL (ref 0–0.04)
IMM GRANULOCYTES NFR BLD AUTO: 0.3 % (ref 0–0.5)
LDLC SERPL CALC-MCNC: 88 MG/DL (ref 0–100)
LYMPHOCYTES # BLD: 1.25 K/UL (ref 0.8–3.5)
LYMPHOCYTES NFR BLD: 11.9 % (ref 12–49)
MCH RBC QN AUTO: 29 PG (ref 26–34)
MCHC RBC AUTO-ENTMCNC: 32.2 G/DL (ref 30–36.5)
MCV RBC AUTO: 90 FL (ref 80–99)
MONOCYTES # BLD: 0.69 K/UL (ref 0–1)
MONOCYTES NFR BLD: 6.6 % (ref 5–13)
NEUTS SEG # BLD: 8.27 K/UL (ref 1.8–8)
NEUTS SEG NFR BLD: 78.7 % (ref 32–75)
NRBC # BLD: 0 K/UL (ref 0–0.01)
NRBC BLD-RTO: 0 PER 100 WBC
PLATELET # BLD AUTO: 338 K/UL (ref 150–400)
PMV BLD AUTO: 9.6 FL (ref 8.9–12.9)
POTASSIUM SERPL-SCNC: 4.7 MMOL/L (ref 3.5–5.1)
PROT SERPL-MCNC: 6.5 G/DL (ref 6.4–8.3)
RBC # BLD AUTO: 4.31 M/UL (ref 4.1–5.7)
SODIUM SERPL-SCNC: 138 MMOL/L (ref 136–145)
TRIGL SERPL-MCNC: 70 MG/DL (ref 0–150)
VLDLC SERPL CALC-MCNC: 14 MG/DL
WBC # BLD AUTO: 10.5 K/UL (ref 4.1–11.1)

## 2025-09-02 DIAGNOSIS — I25.118 CORONARY ARTERY DISEASE OF NATIVE ARTERY OF NATIVE HEART WITH STABLE ANGINA PECTORIS: ICD-10-CM

## 2025-09-02 DIAGNOSIS — I10 ESSENTIAL (PRIMARY) HYPERTENSION: ICD-10-CM

## 2025-09-02 RX ORDER — CARVEDILOL 6.25 MG/1
6.25 TABLET ORAL 2 TIMES DAILY WITH MEALS
Qty: 180 TABLET | Refills: 1 | Status: SHIPPED | OUTPATIENT
Start: 2025-09-02

## 2025-09-02 RX ORDER — LOSARTAN POTASSIUM 50 MG/1
50 TABLET ORAL DAILY
Qty: 90 TABLET | Refills: 1 | Status: SHIPPED | OUTPATIENT
Start: 2025-09-02

## (undated) DEVICE — FORCEPS BX L240CM JAW DIA2.8MM L CAP W/ NDL MIC MESH TOOTH

## (undated) DEVICE — SYR ASSEMB INFL BLLN 60ML --

## (undated) DEVICE — SYRINGE INFL 60ML DISP ALLIANCE II

## (undated) DEVICE — ESOPHAGEAL BALLOON DILATATION CATHETER: Brand: CRE FIXED WIRE

## (undated) DEVICE — TUBING HYDR IRR --

## (undated) DEVICE — ORISE PROKNIFE 3.0 MM ELECTRODE: Brand: ORISE™ PROKNIFE

## (undated) DEVICE — SYSTEM BX DIA22GA FN W/ PRE LD NDL SHARKCORE

## (undated) DEVICE — ORISE PROKNIFE 1.5 MM ELECTRODE: Brand: ORISE™ PROKNIFE